# Patient Record
Sex: FEMALE | Race: WHITE | NOT HISPANIC OR LATINO | Employment: OTHER | URBAN - METROPOLITAN AREA
[De-identification: names, ages, dates, MRNs, and addresses within clinical notes are randomized per-mention and may not be internally consistent; named-entity substitution may affect disease eponyms.]

---

## 2017-01-25 RX ORDER — SIMVASTATIN 40 MG
TABLET ORAL
Qty: 90 TABLET | Refills: 3 | Status: SHIPPED | OUTPATIENT
Start: 2017-01-25 | End: 2017-12-22 | Stop reason: SDUPTHER

## 2017-01-25 RX ORDER — LISINOPRIL AND HYDROCHLOROTHIAZIDE 25; 20 MG/1; MG/1
TABLET ORAL
Qty: 90 TABLET | Refills: 3 | Status: SHIPPED | OUTPATIENT
Start: 2017-01-25 | End: 2017-12-22 | Stop reason: SDUPTHER

## 2017-02-28 ENCOUNTER — TELEPHONE (OUTPATIENT)
Dept: INTERNAL MEDICINE | Facility: CLINIC | Age: 72
End: 2017-02-28

## 2017-02-28 ENCOUNTER — OFFICE VISIT (OUTPATIENT)
Dept: INTERNAL MEDICINE | Facility: CLINIC | Age: 72
End: 2017-02-28

## 2017-02-28 VITALS
WEIGHT: 145 LBS | RESPIRATION RATE: 20 BRPM | TEMPERATURE: 97 F | DIASTOLIC BLOOD PRESSURE: 60 MMHG | SYSTOLIC BLOOD PRESSURE: 120 MMHG | HEART RATE: 68 BPM | BODY MASS INDEX: 28.56 KG/M2

## 2017-02-28 DIAGNOSIS — F41.1 GENERALIZED ANXIETY DISORDER: ICD-10-CM

## 2017-02-28 DIAGNOSIS — Z23 NEED FOR PROPHYLACTIC VACCINATION AGAINST STREPTOCOCCUS PNEUMONIAE (PNEUMOCOCCUS): ICD-10-CM

## 2017-02-28 DIAGNOSIS — E11.9 TYPE 2 DIABETES MELLITUS WITHOUT COMPLICATION, WITHOUT LONG-TERM CURRENT USE OF INSULIN (HCC): Primary | ICD-10-CM

## 2017-02-28 DIAGNOSIS — R82.90 MALODOROUS URINE: ICD-10-CM

## 2017-02-28 DIAGNOSIS — K21.9 GASTROESOPHAGEAL REFLUX DISEASE WITHOUT ESOPHAGITIS: ICD-10-CM

## 2017-02-28 DIAGNOSIS — M81.0 OSTEOPOROSIS: ICD-10-CM

## 2017-02-28 DIAGNOSIS — I10 ESSENTIAL HYPERTENSION: ICD-10-CM

## 2017-02-28 DIAGNOSIS — N39.0 URINARY TRACT INFECTION, SITE UNSPECIFIED: ICD-10-CM

## 2017-02-28 DIAGNOSIS — Z23 NEED FOR PROPHYLACTIC VACCINATION AND INOCULATION AGAINST INFLUENZA: ICD-10-CM

## 2017-02-28 DIAGNOSIS — K64.9 HEMORRHOIDS, UNSPECIFIED HEMORRHOID TYPE: ICD-10-CM

## 2017-02-28 DIAGNOSIS — M15.9 OSTEOARTHRITIS OF MULTIPLE JOINTS, UNSPECIFIED OSTEOARTHRITIS TYPE: ICD-10-CM

## 2017-02-28 DIAGNOSIS — K63.5 BENIGN COLONIC POLYP: ICD-10-CM

## 2017-02-28 DIAGNOSIS — E55.9 VITAMIN D DEFICIENCY: ICD-10-CM

## 2017-02-28 DIAGNOSIS — E78.49 OTHER HYPERLIPIDEMIA: ICD-10-CM

## 2017-02-28 LAB
ALBUMIN SERPL-MCNC: 4.6 G/DL (ref 3.2–4.8)
ALBUMIN/GLOB SERPL: 1.6 G/DL (ref 1.5–2.5)
ALP SERPL-CCNC: 76 U/L (ref 25–100)
ALT SERPL W P-5'-P-CCNC: 20 U/L (ref 7–40)
ANION GAP SERPL CALCULATED.3IONS-SCNC: 1 MMOL/L (ref 3–11)
ARTICHOKE IGE QN: 115 MG/DL (ref 0–130)
AST SERPL-CCNC: 23 U/L (ref 0–33)
BASOPHILS # BLD AUTO: 0.06 10*3/MM3 (ref 0–0.2)
BASOPHILS NFR BLD AUTO: 0.7 % (ref 0–1)
BILIRUB BLD-MCNC: NEGATIVE MG/DL
BILIRUB SERPL-MCNC: 0.3 MG/DL (ref 0.3–1.2)
BUN BLD-MCNC: 20 MG/DL (ref 9–23)
BUN/CREAT SERPL: 18.2 (ref 7–25)
CALCIUM SPEC-SCNC: 11.3 MG/DL (ref 8.7–10.4)
CHLORIDE SERPL-SCNC: 99 MMOL/L (ref 99–109)
CHOLEST SERPL-MCNC: 203 MG/DL (ref 0–200)
CLARITY, POC: ABNORMAL
CO2 SERPL-SCNC: 38 MMOL/L (ref 20–31)
COLOR UR: YELLOW
CREAT BLD-MCNC: 1.1 MG/DL (ref 0.6–1.3)
DEPRECATED RDW RBC AUTO: 42.3 FL (ref 37–54)
EOSINOPHIL # BLD AUTO: 0.36 10*3/MM3 (ref 0.1–0.3)
EOSINOPHIL NFR BLD AUTO: 4 % (ref 0–3)
ERYTHROCYTE [DISTWIDTH] IN BLOOD BY AUTOMATED COUNT: 12.2 % (ref 11.3–14.5)
EXPIRATION DATE: ABNORMAL
EXPIRATION DATE: NORMAL
GFR SERPL CREATININE-BSD FRML MDRD: 49 ML/MIN/1.73
GLOBULIN UR ELPH-MCNC: 2.9 GM/DL
GLUCOSE BLD-MCNC: 91 MG/DL (ref 70–100)
GLUCOSE UR STRIP-MCNC: NEGATIVE MG/DL
HBA1C MFR BLD: 5.9 %
HCT VFR BLD AUTO: 38.5 % (ref 34.5–44)
HDLC SERPL-MCNC: 52 MG/DL (ref 40–60)
HGB BLD-MCNC: 12.9 G/DL (ref 11.5–15.5)
IMM GRANULOCYTES # BLD: 0.03 10*3/MM3 (ref 0–0.03)
IMM GRANULOCYTES NFR BLD: 0.3 % (ref 0–0.6)
KETONES UR QL: NEGATIVE
LEUKOCYTE EST, POC: ABNORMAL
LYMPHOCYTES # BLD AUTO: 2.8 10*3/MM3 (ref 0.6–4.8)
LYMPHOCYTES NFR BLD AUTO: 31 % (ref 24–44)
Lab: ABNORMAL
Lab: NORMAL
MCH RBC QN AUTO: 31.9 PG (ref 27–31)
MCHC RBC AUTO-ENTMCNC: 33.5 G/DL (ref 32–36)
MCV RBC AUTO: 95.1 FL (ref 80–99)
MONOCYTES # BLD AUTO: 0.81 10*3/MM3 (ref 0–1)
MONOCYTES NFR BLD AUTO: 9 % (ref 0–12)
NEUTROPHILS # BLD AUTO: 4.96 10*3/MM3 (ref 1.5–8.3)
NEUTROPHILS NFR BLD AUTO: 55 % (ref 41–71)
NITRITE UR-MCNC: NEGATIVE MG/ML
PH UR: 7 [PH] (ref 5–8)
PLATELET # BLD AUTO: 339 10*3/MM3 (ref 150–450)
PMV BLD AUTO: 11.5 FL (ref 6–12)
POTASSIUM BLD-SCNC: 5.2 MMOL/L (ref 3.5–5.5)
PROT SERPL-MCNC: 7.5 G/DL (ref 5.7–8.2)
PROT UR STRIP-MCNC: NEGATIVE MG/DL
RBC # BLD AUTO: 4.05 10*6/MM3 (ref 3.89–5.14)
RBC # UR STRIP: NEGATIVE /UL
SODIUM BLD-SCNC: 138 MMOL/L (ref 132–146)
SP GR UR: 1 (ref 1–1.03)
TRIGL SERPL-MCNC: 323 MG/DL (ref 0–150)
TSH SERPL DL<=0.05 MIU/L-ACNC: 2.87 MIU/ML (ref 0.35–5.35)
UROBILINOGEN UR QL: NORMAL
WBC NRBC COR # BLD: 9.02 10*3/MM3 (ref 3.5–10.8)

## 2017-02-28 PROCEDURE — 87086 URINE CULTURE/COLONY COUNT: CPT | Performed by: INTERNAL MEDICINE

## 2017-02-28 PROCEDURE — 90732 PPSV23 VACC 2 YRS+ SUBQ/IM: CPT | Performed by: INTERNAL MEDICINE

## 2017-02-28 PROCEDURE — 80053 COMPREHEN METABOLIC PANEL: CPT | Performed by: INTERNAL MEDICINE

## 2017-02-28 PROCEDURE — 85025 COMPLETE CBC W/AUTO DIFF WBC: CPT | Performed by: INTERNAL MEDICINE

## 2017-02-28 PROCEDURE — 90662 IIV NO PRSV INCREASED AG IM: CPT | Performed by: INTERNAL MEDICINE

## 2017-02-28 PROCEDURE — 81003 URINALYSIS AUTO W/O SCOPE: CPT | Performed by: INTERNAL MEDICINE

## 2017-02-28 PROCEDURE — G0008 ADMIN INFLUENZA VIRUS VAC: HCPCS | Performed by: INTERNAL MEDICINE

## 2017-02-28 PROCEDURE — 36415 COLL VENOUS BLD VENIPUNCTURE: CPT | Performed by: INTERNAL MEDICINE

## 2017-02-28 PROCEDURE — 80061 LIPID PANEL: CPT | Performed by: INTERNAL MEDICINE

## 2017-02-28 PROCEDURE — 83036 HEMOGLOBIN GLYCOSYLATED A1C: CPT | Performed by: INTERNAL MEDICINE

## 2017-02-28 PROCEDURE — G0009 ADMIN PNEUMOCOCCAL VACCINE: HCPCS | Performed by: INTERNAL MEDICINE

## 2017-02-28 PROCEDURE — 99215 OFFICE O/P EST HI 40 MIN: CPT | Performed by: INTERNAL MEDICINE

## 2017-02-28 PROCEDURE — 84443 ASSAY THYROID STIM HORMONE: CPT | Performed by: INTERNAL MEDICINE

## 2017-02-28 RX ORDER — SULFAMETHOXAZOLE AND TRIMETHOPRIM 800; 160 MG/1; MG/1
1 TABLET ORAL 2 TIMES DAILY
Qty: 20 TABLET | Refills: 0 | Status: SHIPPED | OUTPATIENT
Start: 2017-02-28 | End: 2017-03-10

## 2017-02-28 RX ORDER — OMEPRAZOLE 20 MG/1
20 CAPSULE, DELAYED RELEASE ORAL 2 TIMES DAILY
COMMUNITY
End: 2018-02-21 | Stop reason: SDUPTHER

## 2017-02-28 RX ORDER — ESCITALOPRAM OXALATE 5 MG/1
5 TABLET ORAL DAILY
Qty: 30 TABLET | Refills: 0 | Status: SHIPPED | OUTPATIENT
Start: 2017-02-28 | End: 2017-03-13 | Stop reason: SINTOL

## 2017-02-28 NOTE — TELEPHONE ENCOUNTER
Call patient please.  She left without coming back to the exam room.  Her hemoglobin A1C was 5.9, so no changes in her diabetes medication.  Urine test did show a UTI.  Bactrim e-rx'd to pharmacy.

## 2017-03-02 LAB — BACTERIA SPEC AEROBE CULT: NORMAL

## 2017-03-13 ENCOUNTER — TELEPHONE (OUTPATIENT)
Dept: INTERNAL MEDICINE | Facility: CLINIC | Age: 72
End: 2017-03-13

## 2017-03-13 DIAGNOSIS — E21.3 HYPERPARATHYROIDISM (HCC): Primary | ICD-10-CM

## 2017-03-13 DIAGNOSIS — E83.52 HYPERCALCEMIA: ICD-10-CM

## 2017-03-14 ENCOUNTER — OFFICE VISIT (OUTPATIENT)
Dept: DIABETES SERVICES | Facility: HOSPITAL | Age: 72
End: 2017-03-14

## 2017-03-14 PROCEDURE — G0109 DIAB MANAGE TRN IND/GROUP: HCPCS | Performed by: DIETITIAN, REGISTERED

## 2017-03-14 NOTE — CONSULTS
Patient attended scheduled diabetes education class. Please see media tab for assessment and notes if you use [employer]. If you are not an [employer] user a copy of patient's assessment and notes will be sent per routine. Thank you.

## 2017-03-21 ENCOUNTER — HOSPITAL ENCOUNTER (OUTPATIENT)
Dept: GENERAL RADIOLOGY | Facility: HOSPITAL | Age: 72
Discharge: HOME | End: 2017-03-21
Attending: INTERNAL MEDICINE | Admitting: INTERNAL MEDICINE

## 2017-03-21 ENCOUNTER — OFFICE VISIT (OUTPATIENT)
Dept: INTERNAL MEDICINE | Facility: CLINIC | Age: 72
End: 2017-03-21

## 2017-03-21 VITALS
DIASTOLIC BLOOD PRESSURE: 60 MMHG | WEIGHT: 144 LBS | HEART RATE: 72 BPM | RESPIRATION RATE: 20 BRPM | SYSTOLIC BLOOD PRESSURE: 110 MMHG | BODY MASS INDEX: 28.36 KG/M2 | TEMPERATURE: 98.5 F

## 2017-03-21 DIAGNOSIS — M54.42 ACUTE LEFT-SIDED LOW BACK PAIN WITH BILATERAL SCIATICA: Primary | ICD-10-CM

## 2017-03-21 DIAGNOSIS — R11.0 NAUSEA: ICD-10-CM

## 2017-03-21 DIAGNOSIS — M54.9 UPPER BACK PAIN: ICD-10-CM

## 2017-03-21 DIAGNOSIS — M54.41 ACUTE LEFT-SIDED LOW BACK PAIN WITH BILATERAL SCIATICA: Primary | ICD-10-CM

## 2017-03-21 PROCEDURE — 72072 X-RAY EXAM THORAC SPINE 3VWS: CPT

## 2017-03-21 PROCEDURE — 99214 OFFICE O/P EST MOD 30 MIN: CPT | Performed by: INTERNAL MEDICINE

## 2017-03-21 PROCEDURE — 72110 X-RAY EXAM L-2 SPINE 4/>VWS: CPT

## 2017-03-21 RX ORDER — ONDANSETRON 8 MG/1
8 TABLET, ORALLY DISINTEGRATING ORAL EVERY 8 HOURS PRN
Qty: 90 TABLET | Refills: 3 | Status: SHIPPED | OUTPATIENT
Start: 2017-03-21 | End: 2017-12-04 | Stop reason: SDUPTHER

## 2017-03-21 RX ORDER — MELOXICAM 7.5 MG/1
7.5 TABLET ORAL DAILY PRN
Qty: 30 TABLET | Refills: 0 | Status: SHIPPED | OUTPATIENT
Start: 2017-03-21 | End: 2018-03-20 | Stop reason: ALTCHOICE

## 2017-03-21 RX ORDER — ONDANSETRON 8 MG/1
8 TABLET, ORALLY DISINTEGRATING ORAL EVERY 8 HOURS PRN
Qty: 90 TABLET | Refills: 3 | Status: SHIPPED | OUTPATIENT
Start: 2017-03-21 | End: 2017-03-21 | Stop reason: SDUPTHER

## 2017-03-21 NOTE — PROGRESS NOTES
Bairon Louie is a 71 y.o. female.     Chief Complaint   Patient presents with   • Anxiety     stopped the escitalopram    • Sciatica   • Nausea     x 3 weeks        History obtained from the patient.      Anxiety   Presents for follow-up visit. Symptoms include excessive worry, nausea and nervous/anxious behavior (improved overall). Patient reports no chest pain, compulsions, confusion, decreased concentration, depressed mood, dizziness (resolved after the Escitalopram stopped.), insomnia, irritability, malaise, muscle tension, obsessions, panic, shortness of breath or suicidal ideas.     Compliance with medications: stopped it after 2 days due to dizziness.   Sciatica   This is a recurrent problem. Episode onset: 3 weeks ago. The problem has been unchanged. Associated symptoms include nausea, neck pain and numbness (shoulders and arms). Pertinent negatives include no abdominal pain, chest pain, chills, coughing, fever, joint swelling, rash, urinary symptoms or vomiting. Associated symptoms comments: Pain goes to both legs L>R.. She has tried nothing for the symptoms.   Nausea   This is a new problem. Episode onset: 3 weeks ago, when started the Bactrim. The problem occurs intermittently. The problem has been gradually improving. Associated symptoms include nausea, neck pain and numbness (shoulders and arms). Pertinent negatives include no abdominal pain, chest pain, chills, coughing, fever, joint swelling, rash, urinary symptoms or vomiting. Treatments tried: zofran. The treatment provided moderate relief.        Current Outpatient Prescriptions on File Prior to Visit   Medication Sig Dispense Refill   • dicyclomine (BENTYL) 20 MG tablet Take  by mouth 4 (four) times a day.     • fluticasone (FLONASE) 50 MCG/ACT nasal spray into each nostril.     • lisinopril-hydrochlorothiazide (PRINZIDE,ZESTORETIC) 20-25 MG per tablet TAKE 1 TABLET EVERY DAY 90 tablet 3   • metFORMIN (GLUCOPHAGE) 500 MG tablet  TAKE 1 TABLET TWICE DAILY WITH FOOD 180 tablet 3   • omeprazole (priLOSEC) 20 MG capsule Take 20 mg by mouth 2 (Two) Times a Day.     • simvastatin (ZOCOR) 40 MG tablet TAKE 1 TABLET EVERY DAY AT BEDTIME 90 tablet 3   • St Johns Wort 150 MG capsule Take 1 tablet by mouth 2 (two) times a day.     • ondansetron ODT (ZOFRAN-ODT) 8 MG disintegrating tablet Take 1 tablet by mouth every 8 (eight) hours as needed for nausea or vomiting. 90 tablet 3     No current facility-administered medications on file prior to visit.          The following portions of the patient's history were reviewed and updated as appropriate: allergies, current medications, past family history, past medical history, past social history, past surgical history and problem list.    Review of Systems   Constitutional: Negative for chills, fever and irritability.   HENT:        Pain in left TMJ area x 4 days.   Respiratory: Negative for cough, shortness of breath and wheezing.    Cardiovascular: Negative for chest pain.   Gastrointestinal: Positive for diarrhea and nausea. Negative for abdominal pain and vomiting.   Genitourinary: Negative for dysuria, frequency, hematuria, pelvic pain and urgency.   Musculoskeletal: Positive for back pain (middle and low back pain, radiates down both legs) and neck pain. Negative for gait problem, joint swelling and neck stiffness.   Skin: Negative for rash.   Neurological: Positive for numbness (shoulders and arms). Negative for dizziness (resolved after the Escitalopram stopped.) and light-headedness.   Psychiatric/Behavioral: Negative for confusion, decreased concentration, sleep disturbance and suicidal ideas. The patient is nervous/anxious (improved overall). The patient does not have insomnia.         No depression or memory problems.         Objective       Blood pressure 110/60, pulse 72, temperature 98.5 °F (36.9 °C), temperature source Temporal Artery , resp. rate 20, weight 144 lb (65.3 kg).      Physical  Exam   Constitutional: She appears well-developed and well-nourished.   HENT:   There is mild tenderness to palpation over the left TMJ area, no erythema, edema, or warmth.   Neck: Normal range of motion. Neck supple.   There is no tenderness to palpation of the cervical spine or paraspinal muscles.   Cardiovascular: Normal rate, regular rhythm and normal heart sounds.    No murmur heard.  Pulmonary/Chest: Effort normal and breath sounds normal.   Abdominal: Soft. Bowel sounds are normal. She exhibits no distension and no mass. There is no hepatomegaly. There is tenderness (mild suprapubic).   No CVA tenderness.   Musculoskeletal: Normal range of motion.   There is  tenderness to palpation over the mid  Lumbar back, but no tenderness to palpation over the lumbar spine or paraspinal muscles.  Straight leg raise is negative bilaterally.  There is no pain with right hip flexion, extension, abduction, and adduction.  There is no pain with left hip flexion, extension, abduction, and adduction.   Neurological: She has normal strength and normal reflexes.   Skin: No rash noted.   Psychiatric: She has a normal mood and affect.   Nursing note and vitals reviewed.     Counseling was given to patient for the following topics: discussed labs and diagnostic tests performed last visit or since last visit, risks and benefits of treament options, side effects of medications, stress increase in the patient's life, symptoms of anxiety and symptoms of depression . Total time of the encounter was 25 minutes and 15 minutes was spent counseling.          Assessment / Plan:    Cristy was seen today for anxiety, sciatica and nausea.    Diagnoses and all orders for this visit:    Acute left-sided low back pain with bilateral sciatica  -     XR Spine Lumbar 4+ View  -     meloxicam (MOBIC) 7.5 MG tablet; Take 1 tablet by mouth Daily As Needed for Moderate Pain (4-6).    Upper back pain  -     XR spine thoracic 3 vw  -     meloxicam (MOBIC)  7.5 MG tablet; Take 1 tablet by mouth Daily As Needed for Moderate Pain (4-6).    Nausea  -     Discontinue: ondansetron ODT (ZOFRAN-ODT) 8 MG disintegrating tablet; Take 1 tablet by mouth Every 8 (Eight) Hours As Needed for Nausea or Vomiting.          Return in about 6 months (around 9/21/2017) for Recheck-Diabetes fasting.

## 2017-09-20 ENCOUNTER — OFFICE VISIT (OUTPATIENT)
Dept: INTERNAL MEDICINE | Facility: CLINIC | Age: 72
End: 2017-09-20

## 2017-09-20 VITALS
DIASTOLIC BLOOD PRESSURE: 70 MMHG | TEMPERATURE: 97.9 F | SYSTOLIC BLOOD PRESSURE: 130 MMHG | HEART RATE: 80 BPM | RESPIRATION RATE: 20 BRPM | WEIGHT: 150.5 LBS | BODY MASS INDEX: 29.64 KG/M2

## 2017-09-20 DIAGNOSIS — Z12.31 ENCOUNTER FOR SCREENING MAMMOGRAM FOR BREAST CANCER: ICD-10-CM

## 2017-09-20 DIAGNOSIS — F41.1 GENERALIZED ANXIETY DISORDER: ICD-10-CM

## 2017-09-20 DIAGNOSIS — K63.5 BENIGN COLONIC POLYP: ICD-10-CM

## 2017-09-20 DIAGNOSIS — I10 ESSENTIAL HYPERTENSION: ICD-10-CM

## 2017-09-20 DIAGNOSIS — Z79.899 HIGH RISK MEDICATION USE: ICD-10-CM

## 2017-09-20 DIAGNOSIS — E55.9 VITAMIN D DEFICIENCY: ICD-10-CM

## 2017-09-20 DIAGNOSIS — E78.49 OTHER HYPERLIPIDEMIA: ICD-10-CM

## 2017-09-20 DIAGNOSIS — M15.9 OSTEOARTHRITIS OF MULTIPLE JOINTS, UNSPECIFIED OSTEOARTHRITIS TYPE: ICD-10-CM

## 2017-09-20 DIAGNOSIS — R82.998 LEUKOCYTES IN URINE: ICD-10-CM

## 2017-09-20 DIAGNOSIS — E11.9 TYPE 2 DIABETES MELLITUS WITHOUT COMPLICATION, WITHOUT LONG-TERM CURRENT USE OF INSULIN (HCC): Primary | ICD-10-CM

## 2017-09-20 DIAGNOSIS — M81.0 OSTEOPOROSIS: ICD-10-CM

## 2017-09-20 DIAGNOSIS — K21.9 GASTROESOPHAGEAL REFLUX DISEASE WITHOUT ESOPHAGITIS: ICD-10-CM

## 2017-09-20 LAB
25(OH)D3 SERPL-MCNC: 30.8 NG/ML
A/C: NORMAL
ALBUMIN SERPL-MCNC: 4.5 G/DL (ref 3.2–4.8)
ALBUMIN/GLOB SERPL: 1.7 G/DL (ref 1.5–2.5)
ALP SERPL-CCNC: 94 U/L (ref 25–100)
ALT SERPL W P-5'-P-CCNC: 19 U/L (ref 7–40)
ANION GAP SERPL CALCULATED.3IONS-SCNC: 9 MMOL/L (ref 3–11)
ARTICHOKE IGE QN: 82 MG/DL (ref 0–130)
AST SERPL-CCNC: 20 U/L (ref 0–33)
BASOPHILS # BLD AUTO: 0.04 10*3/MM3 (ref 0–0.2)
BASOPHILS NFR BLD AUTO: 0.4 % (ref 0–1)
BILIRUB BLD-MCNC: NEGATIVE MG/DL
BILIRUB SERPL-MCNC: 0.2 MG/DL (ref 0.3–1.2)
BUN BLD-MCNC: 17 MG/DL (ref 9–23)
BUN/CREAT SERPL: 15.5 (ref 7–25)
CALCIUM SPEC-SCNC: 9.8 MG/DL (ref 8.7–10.4)
CHLORIDE SERPL-SCNC: 101 MMOL/L (ref 99–109)
CHOLEST SERPL-MCNC: 174 MG/DL (ref 0–200)
CLARITY, POC: ABNORMAL
CO2 SERPL-SCNC: 29 MMOL/L (ref 20–31)
COLOR UR: YELLOW
CREAT BLD-MCNC: 1.1 MG/DL (ref 0.6–1.3)
DEPRECATED RDW RBC AUTO: 42.3 FL (ref 37–54)
EOSINOPHIL # BLD AUTO: 0.32 10*3/MM3 (ref 0–0.3)
EOSINOPHIL NFR BLD AUTO: 3.5 % (ref 0–3)
ERYTHROCYTE [DISTWIDTH] IN BLOOD BY AUTOMATED COUNT: 12.4 % (ref 11.3–14.5)
EXPIRATION DATE: ABNORMAL
EXPIRATION DATE: NORMAL
EXPIRATION DATE: NORMAL
GFR SERPL CREATININE-BSD FRML MDRD: 49 ML/MIN/1.73
GLOBULIN UR ELPH-MCNC: 2.7 GM/DL
GLUCOSE BLD-MCNC: 94 MG/DL (ref 70–100)
GLUCOSE UR STRIP-MCNC: NEGATIVE MG/DL
HBA1C MFR BLD: 6.2 %
HCT VFR BLD AUTO: 36.5 % (ref 34.5–44)
HDLC SERPL-MCNC: 48 MG/DL (ref 40–60)
HGB BLD-MCNC: 12 G/DL (ref 11.5–15.5)
IMM GRANULOCYTES # BLD: 0.03 10*3/MM3 (ref 0–0.03)
IMM GRANULOCYTES NFR BLD: 0.3 % (ref 0–0.6)
KETONES UR QL: NEGATIVE
LEUKOCYTE EST, POC: ABNORMAL
LYMPHOCYTES # BLD AUTO: 2.69 10*3/MM3 (ref 0.6–4.8)
LYMPHOCYTES NFR BLD AUTO: 29.7 % (ref 24–44)
Lab: ABNORMAL
Lab: NORMAL
Lab: NORMAL
MAGNESIUM SERPL-MCNC: 1.8 MG/DL (ref 1.3–2.7)
MCH RBC QN AUTO: 30.9 PG (ref 27–31)
MCHC RBC AUTO-ENTMCNC: 32.9 G/DL (ref 32–36)
MCV RBC AUTO: 94.1 FL (ref 80–99)
MONOCYTES # BLD AUTO: 0.58 10*3/MM3 (ref 0–1)
MONOCYTES NFR BLD AUTO: 6.4 % (ref 0–12)
NEUTROPHILS # BLD AUTO: 5.4 10*3/MM3 (ref 1.5–8.3)
NEUTROPHILS NFR BLD AUTO: 59.7 % (ref 41–71)
NITRITE UR-MCNC: NEGATIVE MG/ML
NRBC BLD MANUAL-RTO: 0 /100 WBC (ref 0–0)
PH UR: 5 [PH] (ref 5–8)
PLATELET # BLD AUTO: 260 10*3/MM3 (ref 150–450)
PMV BLD AUTO: 12 FL (ref 6–12)
POC CREATININE URINE: 50
POC MICROALBUMIN URINE: 10
POTASSIUM BLD-SCNC: 4.6 MMOL/L (ref 3.5–5.5)
PROT SERPL-MCNC: 7.2 G/DL (ref 5.7–8.2)
PROT UR STRIP-MCNC: NEGATIVE MG/DL
RBC # BLD AUTO: 3.88 10*6/MM3 (ref 3.89–5.14)
RBC # UR STRIP: NEGATIVE /UL
SODIUM BLD-SCNC: 139 MMOL/L (ref 132–146)
SP GR UR: 1.01 (ref 1–1.03)
TRIGL SERPL-MCNC: 372 MG/DL (ref 0–150)
TSH SERPL DL<=0.05 MIU/L-ACNC: 2.6 MIU/ML (ref 0.35–5.35)
UROBILINOGEN UR QL: NORMAL
WBC NRBC COR # BLD: 9.06 10*3/MM3 (ref 3.5–10.8)

## 2017-09-20 PROCEDURE — 83735 ASSAY OF MAGNESIUM: CPT | Performed by: INTERNAL MEDICINE

## 2017-09-20 PROCEDURE — 85025 COMPLETE CBC W/AUTO DIFF WBC: CPT | Performed by: INTERNAL MEDICINE

## 2017-09-20 PROCEDURE — 99214 OFFICE O/P EST MOD 30 MIN: CPT | Performed by: INTERNAL MEDICINE

## 2017-09-20 PROCEDURE — 80053 COMPREHEN METABOLIC PANEL: CPT | Performed by: INTERNAL MEDICINE

## 2017-09-20 PROCEDURE — 84443 ASSAY THYROID STIM HORMONE: CPT | Performed by: INTERNAL MEDICINE

## 2017-09-20 PROCEDURE — 82044 UR ALBUMIN SEMIQUANTITATIVE: CPT | Performed by: INTERNAL MEDICINE

## 2017-09-20 PROCEDURE — 36415 COLL VENOUS BLD VENIPUNCTURE: CPT | Performed by: INTERNAL MEDICINE

## 2017-09-20 PROCEDURE — 87086 URINE CULTURE/COLONY COUNT: CPT | Performed by: INTERNAL MEDICINE

## 2017-09-20 PROCEDURE — 83036 HEMOGLOBIN GLYCOSYLATED A1C: CPT | Performed by: INTERNAL MEDICINE

## 2017-09-20 PROCEDURE — 81002 URINALYSIS NONAUTO W/O SCOPE: CPT | Performed by: INTERNAL MEDICINE

## 2017-09-20 PROCEDURE — 80061 LIPID PANEL: CPT | Performed by: INTERNAL MEDICINE

## 2017-09-20 PROCEDURE — 82306 VITAMIN D 25 HYDROXY: CPT | Performed by: INTERNAL MEDICINE

## 2017-09-20 RX ORDER — AMITRIPTYLINE HYDROCHLORIDE 25 MG/1
25 TABLET, FILM COATED ORAL NIGHTLY
COMMUNITY
End: 2018-03-20

## 2017-09-20 RX ORDER — DIAPER,BRIEF,ADULT, DISPOSABLE
EACH MISCELLANEOUS DAILY
COMMUNITY
End: 2019-04-23

## 2017-09-20 RX ORDER — TURMERIC ROOT EXTRACT 500 MG
1 TABLET ORAL DAILY
COMMUNITY
End: 2020-10-06

## 2017-09-20 RX ORDER — HYDROXYCHLOROQUINE SULFATE 200 MG/1
TABLET, FILM COATED ORAL DAILY
COMMUNITY
End: 2018-06-29 | Stop reason: SDUPTHER

## 2017-09-20 NOTE — PROGRESS NOTES
Bairon Louie is a 72 y.o. female.     Chief Complaint   Patient presents with   • Diabetes     6 month follow up   fasting        History obtained from the patient.      History of Present Illness     Primary Care Cardiac Diagnostic Constellation: The patient is here today for a follow-up visit.       Her Diabetes Mellitus Type 2 is stable.   Medication(s): Metformin HCl.   Her Hypertension is stable.   Medication(s): Lisinopril / HCTZ.   Her Hyperlipidemia has been unstable. Her LDL goal is 70 mg/dL, last LDL was 115 mg/dL and .   Medication(s): Simvastatin.   The patient is adherent with her medication regimen. She denies medication side effects.       Interval Events: The patient states her blood sugar at home has been  fasting. She denies episodes of low blood sugar.  Last Hemoglobin A1C was 5.9 on 2/28/17.   She does check her feet daily. She saw the ophthalmologist 9/20/16, no retinopathy.       Symptoms:  Has stable lower extremity edema, stable fatigue, stable myalgias / arthralgias, and stable lightheadedness / dizziness.  Has occasional blurred vision.   Denies chest pain, denies heart palpitations, denies intermittent leg claudication, denies dyspnea,  denies numbness of the feet, denies foot pain, denies a foot ulcer, denies visual impairment, and denies muscle weakness.   Associated symptoms include a recent 6 pounds weight gain and mild memory loss,   but no syncope, no headache, no orthopnea, no PND, no polydipsia, no polyuria, no focal neurologic deficits, and concentration problems.      Lifestyle and Disease Management: Diet: She does not have a healthy diet. Weight Issues: She has weight concerns. Exercise: She walks 1 mile per day.  she states she has been less active overall.   Smoking: She does not use tobacco.       Gastroesophageal Reflux Disease (Brief): The patient is being seen for a routine clinic follow-up of Gastroesophageal Reflux Disease.   Symptoms: no  abdominal pain, no acid regurgitation, no vomiting, no heartburn, no belching, no nausea,  no sore throat, no odynophagia, no dysphagia,  no hematemesis, and no melena.    Previous Evaluation: Last EGD 1/25/16 showed Class A reflux esophagitis.   Associated symptoms: no bloating, no anorexia, no early satiety, no bloating, no belching, no weight loss, no halitosis, no hoarseness, no cough,  and no wheezing.   Current treatment includes Omeprazole BID, Zofran, and Dicyclomine.           Osteoporosis: The patient is being seen for routine follow-up of Osteoporosis.   The last DEXA scan was done 11/12/14, Osteopenia.   Symptoms:  She reports myalgias, back pain, neck pain, arthralgias, and difficulty with stairs.   no inability to stand straight, no loss of balance, no difficulty ambulating, no difficulty with posture, no change in clothing fit, no weight gain,  no hip pain, no wrist pain, and no new fracture.  Current treatment includes:  None. Off Calcium and Vitamin D for several months.  The disease type is likely primary/postmenopausal osteoporosis.       Colonic Polyp (Brief): The patient is being seen for a routine clinic follow-up of Colon Polyp(s).   Current diagnosis was determined by colonoscopy and last 1/25/16- no polyps.   Symptoms:  occasional constipation,  but no hematochezia, no melena, no diarrhea,  no decreased stool caliber, no change in bowel habits and no abdominal pain.   Associated symptoms: no rectal prolapse.   Medications:  Magnesium supplements.      Vitamin D Deficiency:The patient is here for follow up of Vitamin D Deficiency.   Recent laboratory results: date 6/7/16 , 25-hydroxyvitamin D 45.9 ng/mL.    Symptoms: stable fatigue and stable muscle pain, no bone pain, no muscle weakness, no muscle cramps, no muscle twitching, and no paresthesias.   Medication:  None.  Off Calcium and Vitamin D for several months.      Osteoarthritis (Follow-Up): The patient states her Osteoarthritis has been  stable since the last visit.   Comorbid Illnesses: Rheumatoid arthritis and Fibromyalgia.   Interval Events: None.  Medications: The patient is adherent with her medication regimen. She denies medication side effects.   Medication(s): Advil prn.   Taking Tumeric.   Off Tramadol.        Anxiety Disorder (Follow-Up): The patient is being seen for follow-up of Anxiety. The patient reports the condition is stable.  Interval Events:   None.  Interval symptoms: stable anxiety and stable sleep disruption, but no difficulty concentrating, restlessness, or. panic attacks. denies depression.   Associated symptoms: no suicidal ideation.    Medication(s): Jessica's Wort   Medications: the patient is adherent to her medication regimen, but she denies medication side effects.            Current Outpatient Prescriptions on File Prior to Visit   Medication Sig Dispense Refill   • dicyclomine (BENTYL) 20 MG tablet Take  by mouth 4 (four) times a day.     • lisinopril-hydrochlorothiazide (PRINZIDE,ZESTORETIC) 20-25 MG per tablet TAKE 1 TABLET EVERY DAY 90 tablet 3   • metFORMIN (GLUCOPHAGE) 500 MG tablet TAKE 1 TABLET TWICE DAILY WITH FOOD 180 tablet 3   • omeprazole (priLOSEC) 20 MG capsule Take 20 mg by mouth 2 (Two) Times a Day.     • simvastatin (ZOCOR) 40 MG tablet TAKE 1 TABLET EVERY DAY AT BEDTIME 90 tablet 3   • fluticasone (FLONASE) 50 MCG/ACT nasal spray into each nostril.     • meloxicam (MOBIC) 7.5 MG tablet Take 1 tablet by mouth Daily As Needed for Moderate Pain (4-6). 30 tablet 0   • ondansetron ODT (ZOFRAN-ODT) 8 MG disintegrating tablet Take 1 tablet by mouth Every 8 (Eight) Hours As Needed for Nausea or Vomiting. 90 tablet 3   • St Johns Wort 150 MG capsule Take 1 tablet by mouth 2 (two) times a day.       No current facility-administered medications on file prior to visit.          The following portions of the patient's history were reviewed and updated as appropriate: allergies, current medications, past family  history, past medical history, past social history, past surgical history and problem list.    Review of Systems   Constitutional: Positive for fatigue. Negative for unexpected weight change.   HENT: Negative for sore throat and voice change.    Eyes: Positive for visual disturbance.   Respiratory: Negative for cough, shortness of breath and wheezing.    Cardiovascular: Positive for leg swelling. Negative for chest pain and palpitations.        Stable  COOL, but no orthopnea or claudication.   Gastrointestinal: Positive for constipation. Negative for abdominal distention, abdominal pain, blood in stool, diarrhea, nausea and vomiting.        Denies melena, heartburn, dysphagia, odynophagia, and belching.   Endocrine: Negative for polydipsia and polyuria.   Musculoskeletal: Positive for arthralgias, back pain (stable), myalgias and neck pain (stable).   Neurological: Positive for dizziness, light-headedness and numbness (right hand, not new). Negative for syncope and headaches.        Mild stable memory issues.   Psychiatric/Behavioral: Positive for sleep disturbance. Negative for decreased concentration and suicidal ideas. The patient is nervous/anxious.         Denies depression.         Objective       Blood pressure 130/70, pulse 80, temperature 97.9 °F (36.6 °C), temperature source Temporal Artery , resp. rate 20, weight 150 lb 8 oz (68.3 kg).      Physical Exam   Constitutional:   Overweight.   Neck: Normal range of motion. Neck supple. Carotid bruit is not present. No thyromegaly present.   Cardiovascular: Normal rate, regular rhythm, normal heart sounds and intact distal pulses.  Exam reveals no gallop and no friction rub.    No murmur heard.  No peripheral edema.   Pulmonary/Chest: Effort normal and breath sounds normal.   Abdominal: Soft. Bowel sounds are normal. She exhibits no distension, no abdominal bruit and no mass. There is no hepatosplenomegaly. There is no tenderness.   Psychiatric: She has a normal  mood and affect.   Nursing note and vitals reviewed.       Results for orders placed or performed in visit on 09/20/17   POC Glycosylated Hemoglobin (Hb A1C)   Result Value Ref Range    Hemoglobin A1C 6.2 %    Lot Number [id number]     Expiration Date 5-19    POC Microalbumin   Result Value Ref Range    Microalbumin, Urine 10     Creatinine, Urine 50     A/C 30-300mg/g ABNORMAL     Lot Number [id number]     Expiration Date 11-30-18    POC Urinalysis Dipstick, Multipro   Result Value Ref Range    Color Yellow Yellow, Straw, Dark Yellow, Carol    Clarity, UA Hazy (A) Clear    Glucose, UA Negative Negative, 1000 mg/dL (3+) mg/dL    Bilirubin Negative Negative    Ketones, UA Negative Negative    Specific Gravity  1.010 1.005 - 1.030    Blood, UA Negative Negative    pH, Urine 5.0 5.0 - 8.0    Protein, POC Negative Negative mg/dL    Urobilinogen, UA Normal Normal    Leukocytes 500 Olegario/ul (A) Negative    Nitrite, UA Negative Negative    Lot Number [id number]     Expiration Date 5-31-18        Assessment / Plan:    Diagnoses and all orders for this visit:    Type 2 diabetes mellitus without complication, without long-term current use of insulin  -     TSH  -     Comprehensive Metabolic Panel  -     POC Glycosylated Hemoglobin (Hb A1C)  -     POC Microalbumin  -     POC Urinalysis Dipstick, Multipro    Essential hypertension    Other hyperlipidemia  -     Lipid Panel    Gastroesophageal reflux disease without esophagitis    Benign colonic polyp    Osteoporosis  -     DEXA Bone Density Axial; Future    Vitamin D deficiency  -     Vitamin D 25 Hydroxy    Osteoarthritis of multiple joints, unspecified osteoarthritis type    Generalized anxiety disorder    High risk medication use  -     Magnesium  -     CBC & Differential  -     CBC Auto Differential    Encounter for screening mammogram for breast cancer  -     Mammo Screening Digital Tomosynthesis Bilateral With CAD; Future    Leukocytes in urine  -     Urine Culture        Return in  about 6 months (around 3/20/2018) for Recheck-Diabetes, fasting and schedule Initial Medicare Wellness Exam.

## 2017-09-22 LAB
BACTERIA SPEC AEROBE CULT: NORMAL
BACTERIA SPEC AEROBE CULT: NORMAL

## 2017-10-09 ENCOUNTER — OFFICE VISIT (OUTPATIENT)
Dept: ENDOCRINOLOGY | Facility: CLINIC | Age: 72
End: 2017-10-09

## 2017-10-09 VITALS
WEIGHT: 153.4 LBS | HEART RATE: 84 BPM | DIASTOLIC BLOOD PRESSURE: 68 MMHG | HEIGHT: 61 IN | BODY MASS INDEX: 28.96 KG/M2 | SYSTOLIC BLOOD PRESSURE: 110 MMHG | OXYGEN SATURATION: 99 %

## 2017-10-09 DIAGNOSIS — M85.89 OSTEOPENIA OF MULTIPLE SITES: ICD-10-CM

## 2017-10-09 DIAGNOSIS — E21.0 PRIMARY HYPERPARATHYROIDISM (HCC): Primary | ICD-10-CM

## 2017-10-09 LAB
25(OH)D3 SERPL-MCNC: 31.4 NG/ML
ALBUMIN SERPL-MCNC: 4.7 G/DL (ref 3.2–4.8)
ANION GAP SERPL CALCULATED.3IONS-SCNC: 8 MMOL/L (ref 3–11)
BUN BLD-MCNC: 21 MG/DL (ref 9–23)
BUN/CREAT SERPL: 17.5 (ref 7–25)
CALCIUM SPEC-SCNC: 10 MG/DL (ref 8.7–10.4)
CHLORIDE SERPL-SCNC: 106 MMOL/L (ref 99–109)
CO2 SERPL-SCNC: 28 MMOL/L (ref 20–31)
CREAT BLD-MCNC: 1.2 MG/DL (ref 0.6–1.3)
GFR SERPL CREATININE-BSD FRML MDRD: 44 ML/MIN/1.73
GLUCOSE BLD-MCNC: 102 MG/DL (ref 70–100)
PHOSPHATE SERPL-MCNC: 4 MG/DL (ref 2.4–5.1)
POTASSIUM BLD-SCNC: 4.5 MMOL/L (ref 3.5–5.5)
SODIUM BLD-SCNC: 142 MMOL/L (ref 132–146)

## 2017-10-09 PROCEDURE — 83970 ASSAY OF PARATHORMONE: CPT | Performed by: INTERNAL MEDICINE

## 2017-10-09 PROCEDURE — 82306 VITAMIN D 25 HYDROXY: CPT | Performed by: INTERNAL MEDICINE

## 2017-10-09 PROCEDURE — 80069 RENAL FUNCTION PANEL: CPT | Performed by: INTERNAL MEDICINE

## 2017-10-09 PROCEDURE — 99204 OFFICE O/P NEW MOD 45 MIN: CPT | Performed by: INTERNAL MEDICINE

## 2017-10-10 LAB — PTH-INTACT SERPL-MCNC: 145 PG/ML (ref 15–65)

## 2017-10-12 ENCOUNTER — TELEPHONE (OUTPATIENT)
Dept: INTERNAL MEDICINE | Facility: CLINIC | Age: 72
End: 2017-10-12

## 2017-10-12 NOTE — TELEPHONE ENCOUNTER
----- Message from Yolande Duffy sent at 10/12/2017 12:11 PM EDT -----  Patient's endocrinology Dr called, Dr. Sandra Aiken in regards to requesting to speak to doctor about a visit she had with the patient. Dr. Aiken has requested a call back when first available.    Phone number for endocrinology doctor: [phone]    Thank you.

## 2017-10-12 NOTE — TELEPHONE ENCOUNTER
Spoke with Dr. Aiken.  She feels the patient's hypercalcemia is due to hyperparathyroidism.  The patient has a DEXA scan scheduled for 10/16/17, and she is recommending changing it to a enhanced scan,  to better evaluate for osteoporosis, prior to sending her to surgery.  She offered to cancel the current DEXA scan and reorder it.  I was agreeable to this.

## 2017-10-17 ENCOUNTER — APPOINTMENT (OUTPATIENT)
Dept: BONE DENSITY | Facility: HOSPITAL | Age: 72
End: 2017-10-17
Attending: INTERNAL MEDICINE

## 2017-10-17 ENCOUNTER — APPOINTMENT (OUTPATIENT)
Dept: MAMMOGRAPHY | Facility: HOSPITAL | Age: 72
End: 2017-10-17
Attending: INTERNAL MEDICINE

## 2017-10-19 ENCOUNTER — HOSPITAL ENCOUNTER (OUTPATIENT)
Dept: MAMMOGRAPHY | Facility: HOSPITAL | Age: 72
Discharge: HOME | End: 2017-10-19
Attending: INTERNAL MEDICINE | Admitting: INTERNAL MEDICINE

## 2017-10-19 ENCOUNTER — HOSPITAL ENCOUNTER (OUTPATIENT)
Dept: BONE DENSITY | Facility: HOSPITAL | Age: 72
Discharge: HOME | End: 2017-10-19
Attending: INTERNAL MEDICINE

## 2017-10-19 DIAGNOSIS — M81.0 OSTEOPOROSIS: ICD-10-CM

## 2017-10-19 DIAGNOSIS — Z12.31 ENCOUNTER FOR SCREENING MAMMOGRAM FOR BREAST CANCER: ICD-10-CM

## 2017-10-19 PROCEDURE — 77063 BREAST TOMOSYNTHESIS BI: CPT

## 2017-10-19 PROCEDURE — G0202 SCR MAMMO BI INCL CAD: HCPCS

## 2017-10-19 PROCEDURE — 77080 DXA BONE DENSITY AXIAL: CPT

## 2017-10-20 PROCEDURE — G0202 SCR MAMMO BI INCL CAD: HCPCS | Performed by: RADIOLOGY

## 2017-10-20 PROCEDURE — 77063 BREAST TOMOSYNTHESIS BI: CPT | Performed by: RADIOLOGY

## 2017-12-04 ENCOUNTER — TELEPHONE (OUTPATIENT)
Dept: INTERNAL MEDICINE | Facility: CLINIC | Age: 72
End: 2017-12-04

## 2017-12-04 DIAGNOSIS — R11.0 NAUSEA: ICD-10-CM

## 2017-12-04 RX ORDER — ONDANSETRON 8 MG/1
8 TABLET, ORALLY DISINTEGRATING ORAL EVERY 8 HOURS PRN
Qty: 90 TABLET | Refills: 3 | Status: SHIPPED | OUTPATIENT
Start: 2017-12-04

## 2017-12-04 NOTE — TELEPHONE ENCOUNTER
----- Message from Linda Doss sent at 12/4/2017  3:46 PM EST -----  Contact: SELF  RACHELLE MEGAN CALLING TO SEE IF SHE CAN GET A REFILL FOR ZOFRAN. SHE WOULD LIKE THIS SENT TO THE [facility] IN [facility].  RACHELLE CAN BE REACHED [phone]

## 2017-12-22 RX ORDER — LISINOPRIL AND HYDROCHLOROTHIAZIDE 25; 20 MG/1; MG/1
TABLET ORAL
Qty: 90 TABLET | Refills: 3 | Status: SHIPPED | OUTPATIENT
Start: 2017-12-22 | End: 2018-10-10 | Stop reason: SDUPTHER

## 2017-12-22 RX ORDER — SIMVASTATIN 40 MG
TABLET ORAL
Qty: 90 TABLET | Refills: 3 | Status: SHIPPED | OUTPATIENT
Start: 2017-12-22 | End: 2018-10-10 | Stop reason: SDUPTHER

## 2017-12-27 RX ORDER — DICYCLOMINE HCL 20 MG
20 TABLET ORAL 4 TIMES DAILY
Qty: 90 TABLET | Refills: 1 | Status: CANCELLED | OUTPATIENT
Start: 2017-12-27

## 2018-01-02 RX ORDER — DICYCLOMINE HCL 20 MG
20 TABLET ORAL 4 TIMES DAILY
Qty: 100 TABLET | Refills: 3 | Status: SHIPPED | OUTPATIENT
Start: 2018-01-02 | End: 2018-01-22 | Stop reason: SDUPTHER

## 2018-01-16 ENCOUNTER — TELEPHONE (OUTPATIENT)
Dept: INTERNAL MEDICINE | Facility: CLINIC | Age: 73
End: 2018-01-16

## 2018-01-16 NOTE — TELEPHONE ENCOUNTER
Spoke to patient about her results and the plan for her f/u visit in April 2018. Will give her copy of results at her f/u.  Maureen Aiken MD

## 2018-01-16 NOTE — TELEPHONE ENCOUNTER
PT HAD LABS DONE BACK IN OCT AND SHE WOULD LIKE TO RECEIVE THE RESULTS. PLEASE FAX THEM TO THE PATIENT.     FAX NUMBER FOR PATIENT:   [phone]

## 2018-01-22 RX ORDER — DICYCLOMINE HCL 20 MG
20 TABLET ORAL 4 TIMES DAILY
Qty: 100 TABLET | Refills: 3 | Status: SHIPPED | OUTPATIENT
Start: 2018-01-22 | End: 2022-08-19

## 2018-02-21 ENCOUNTER — TELEPHONE (OUTPATIENT)
Dept: INTERNAL MEDICINE | Facility: CLINIC | Age: 73
End: 2018-02-21

## 2018-02-21 RX ORDER — OMEPRAZOLE 20 MG/1
20 CAPSULE, DELAYED RELEASE ORAL DAILY
Qty: 180 CAPSULE | Refills: 5 | Status: SHIPPED | OUTPATIENT
Start: 2018-02-21 | End: 2018-03-20 | Stop reason: DRUGHIGH

## 2018-03-20 ENCOUNTER — OFFICE VISIT (OUTPATIENT)
Dept: INTERNAL MEDICINE | Facility: CLINIC | Age: 73
End: 2018-03-20

## 2018-03-20 VITALS
BODY MASS INDEX: 28.77 KG/M2 | TEMPERATURE: 97.3 F | RESPIRATION RATE: 20 BRPM | SYSTOLIC BLOOD PRESSURE: 130 MMHG | WEIGHT: 152.25 LBS | DIASTOLIC BLOOD PRESSURE: 72 MMHG | HEART RATE: 72 BPM

## 2018-03-20 DIAGNOSIS — F41.1 GENERALIZED ANXIETY DISORDER: ICD-10-CM

## 2018-03-20 DIAGNOSIS — K63.5 BENIGN COLONIC POLYP: ICD-10-CM

## 2018-03-20 DIAGNOSIS — E78.49 OTHER HYPERLIPIDEMIA: ICD-10-CM

## 2018-03-20 DIAGNOSIS — E21.3 HYPERPARATHYROIDISM (HCC): ICD-10-CM

## 2018-03-20 DIAGNOSIS — K21.9 GASTROESOPHAGEAL REFLUX DISEASE WITHOUT ESOPHAGITIS: ICD-10-CM

## 2018-03-20 DIAGNOSIS — Z23 NEED FOR IMMUNIZATION AGAINST INFLUENZA: ICD-10-CM

## 2018-03-20 DIAGNOSIS — M15.9 OSTEOARTHRITIS OF MULTIPLE JOINTS, UNSPECIFIED OSTEOARTHRITIS TYPE: ICD-10-CM

## 2018-03-20 DIAGNOSIS — M81.8 OTHER OSTEOPOROSIS WITHOUT CURRENT PATHOLOGICAL FRACTURE: ICD-10-CM

## 2018-03-20 DIAGNOSIS — E55.9 VITAMIN D DEFICIENCY: ICD-10-CM

## 2018-03-20 DIAGNOSIS — I10 ESSENTIAL HYPERTENSION: ICD-10-CM

## 2018-03-20 DIAGNOSIS — E11.9 TYPE 2 DIABETES MELLITUS WITHOUT COMPLICATION, WITHOUT LONG-TERM CURRENT USE OF INSULIN (HCC): Primary | ICD-10-CM

## 2018-03-20 LAB
ALBUMIN SERPL-MCNC: 4.6 G/DL (ref 3.2–4.8)
ALBUMIN/GLOB SERPL: 1.8 G/DL (ref 1.5–2.5)
ALP SERPL-CCNC: 89 U/L (ref 25–100)
ALT SERPL W P-5'-P-CCNC: 22 U/L (ref 7–40)
ANION GAP SERPL CALCULATED.3IONS-SCNC: 8 MMOL/L (ref 3–11)
ARTICHOKE IGE QN: 100 MG/DL (ref 0–130)
AST SERPL-CCNC: 21 U/L (ref 0–33)
BASOPHILS # BLD AUTO: 0.05 10*3/MM3 (ref 0–0.2)
BASOPHILS NFR BLD AUTO: 0.5 % (ref 0–1)
BILIRUB SERPL-MCNC: 0.3 MG/DL (ref 0.3–1.2)
BUN BLD-MCNC: 17 MG/DL (ref 9–23)
BUN/CREAT SERPL: 17 (ref 7–25)
CALCIUM SPEC-SCNC: 10.5 MG/DL (ref 8.7–10.4)
CHLORIDE SERPL-SCNC: 100 MMOL/L (ref 99–109)
CHOLEST SERPL-MCNC: 174 MG/DL (ref 0–200)
CO2 SERPL-SCNC: 29 MMOL/L (ref 20–31)
CREAT BLD-MCNC: 1 MG/DL (ref 0.6–1.3)
DEPRECATED RDW RBC AUTO: 44.4 FL (ref 37–54)
EOSINOPHIL # BLD AUTO: 0.81 10*3/MM3 (ref 0–0.3)
EOSINOPHIL NFR BLD AUTO: 7.9 % (ref 0–3)
ERYTHROCYTE [DISTWIDTH] IN BLOOD BY AUTOMATED COUNT: 12.8 % (ref 11.3–14.5)
EXPIRATION DATE: NORMAL
GFR SERPL CREATININE-BSD FRML MDRD: 54 ML/MIN/1.73
GLOBULIN UR ELPH-MCNC: 2.6 GM/DL
GLUCOSE BLD-MCNC: 101 MG/DL (ref 70–100)
HBA1C MFR BLD: 6.2 %
HCT VFR BLD AUTO: 37.9 % (ref 34.5–44)
HDLC SERPL-MCNC: 57 MG/DL (ref 40–60)
HGB BLD-MCNC: 12 G/DL (ref 11.5–15.5)
IMM GRANULOCYTES # BLD: 0.05 10*3/MM3 (ref 0–0.03)
IMM GRANULOCYTES NFR BLD: 0.5 % (ref 0–0.6)
LYMPHOCYTES # BLD AUTO: 2.72 10*3/MM3 (ref 0.6–4.8)
LYMPHOCYTES NFR BLD AUTO: 26.7 % (ref 24–44)
Lab: NORMAL
MCH RBC QN AUTO: 30.2 PG (ref 27–31)
MCHC RBC AUTO-ENTMCNC: 31.7 G/DL (ref 32–36)
MCV RBC AUTO: 95.2 FL (ref 80–99)
MONOCYTES # BLD AUTO: 0.85 10*3/MM3 (ref 0–1)
MONOCYTES NFR BLD AUTO: 8.3 % (ref 0–12)
NEUTROPHILS # BLD AUTO: 5.72 10*3/MM3 (ref 1.5–8.3)
NEUTROPHILS NFR BLD AUTO: 56.1 % (ref 41–71)
PLATELET # BLD AUTO: 336 10*3/MM3 (ref 150–450)
PMV BLD AUTO: 11.4 FL (ref 6–12)
POTASSIUM BLD-SCNC: 5 MMOL/L (ref 3.5–5.5)
PROT SERPL-MCNC: 7.2 G/DL (ref 5.7–8.2)
RBC # BLD AUTO: 3.98 10*6/MM3 (ref 3.89–5.14)
SODIUM BLD-SCNC: 137 MMOL/L (ref 132–146)
TRIGL SERPL-MCNC: 237 MG/DL (ref 0–150)
WBC NRBC COR # BLD: 10.2 10*3/MM3 (ref 3.5–10.8)

## 2018-03-20 PROCEDURE — 36415 COLL VENOUS BLD VENIPUNCTURE: CPT | Performed by: INTERNAL MEDICINE

## 2018-03-20 PROCEDURE — 90662 IIV NO PRSV INCREASED AG IM: CPT | Performed by: INTERNAL MEDICINE

## 2018-03-20 PROCEDURE — G0008 ADMIN INFLUENZA VIRUS VAC: HCPCS | Performed by: INTERNAL MEDICINE

## 2018-03-20 PROCEDURE — 85025 COMPLETE CBC W/AUTO DIFF WBC: CPT | Performed by: INTERNAL MEDICINE

## 2018-03-20 PROCEDURE — 99214 OFFICE O/P EST MOD 30 MIN: CPT | Performed by: INTERNAL MEDICINE

## 2018-03-20 PROCEDURE — 83036 HEMOGLOBIN GLYCOSYLATED A1C: CPT | Performed by: INTERNAL MEDICINE

## 2018-03-20 PROCEDURE — 80053 COMPREHEN METABOLIC PANEL: CPT | Performed by: INTERNAL MEDICINE

## 2018-03-20 PROCEDURE — 80061 LIPID PANEL: CPT | Performed by: INTERNAL MEDICINE

## 2018-03-20 RX ORDER — OMEPRAZOLE 20 MG/1
20 CAPSULE, DELAYED RELEASE ORAL 2 TIMES DAILY
Qty: 18 CAPSULE | Refills: 3 | Status: SHIPPED | OUTPATIENT
Start: 2018-03-20 | End: 2018-08-08 | Stop reason: SDUPTHER

## 2018-03-20 RX ORDER — OMEPRAZOLE 20 MG/1
20 CAPSULE, DELAYED RELEASE ORAL DAILY
Qty: 180 CAPSULE | Refills: 5 | Status: CANCELLED | OUTPATIENT
Start: 2018-03-20

## 2018-03-20 NOTE — PATIENT INSTRUCTIONS
The patient was given an information sheet on Td and agrees to check on insurance coverage at the pharmacy.

## 2018-03-20 NOTE — PROGRESS NOTES
"Bairon Louie is a 72 y.o. female.     Chief Complaint   Patient presents with   • Diabetes       6 month follow up     fasting        History obtained from the patient.      History of Present Illness     Primary Care Cardiac Diagnostic Constellation: The patient is here today for a follow-up visit.       Her Diabetes Mellitus Type 2 is stable.   Medication(s): Metformin HCl.   Her Hypertension is stable.   Medication(s): Lisinopril / HCTZ.   Her Hyperlipidemia has been unstable.   Her LDL goal is 70 mg/dL, last LDL was 82 mg/dL and .   Medication(s): Simvastatin.   The patient is adherent with her medication regimen. She denies medication side effects.       Interval Events: The patient states her blood sugar at home has been <110  fasting.  She denies episodes of low blood sugar.  Last Hemoglobin A1C was 6.2 on 9/20/17.   She does check her feet daily. She saw the ophthalmologist 10/4/17, no retinopathy.   The patient has seen Dr. Aiken for Hyperparathyroidism.  She has recommended surgery.  She has a follow up appointment scheduled on 4/9/18.      Symptoms:  Has occasional COOL.  Has some burning in her feet, resolves with \"diabetic cream\".  Has stable lower extremity edema,  fatigue,  myalgias / arthralgias, and lightheadedness / dizziness.  Denies chest pain,  heart palpitations,  intermittent leg claudication,  dyspnea, numbness of the feet,  foot ulcer,  visual impairment, and muscle weakness.   Associated symptoms:  no recent significant  weight changes. No syncope, no headache, no orthopnea, no PND, no polydipsia, no polyuria, no focal neurologic deficits, no memory loss,  and no concentration problems.      Lifestyle and Disease Management: Diet: She does not have a healthy diet. Weight Issues: She has weight concerns. Exercise: She walks 1 mile per day.  she states she has been less active overall in the Winter..   Smoking: She does not use tobacco.       Gastroesophageal Reflux " Disease Follow-up: The patient is being seen for a routine clinic follow-up of Gastroesophageal Reflux Disease, which is stable.  Previous Evaluation: Last EGD 1/25/16 showed Class A reflux esophagitis.  Interval Events:  Dr. Kim increased her Omeprazole to BID.   Symptoms: has intermittent bloating,  Nausea, and vomiting.  Has some hoarseness and a dry cough.  No abdominal pain, no acid regurgitation,  no heartburn, no belching,  no sore throat, no odynophagia, no dysphagia,  no hematemesis, and no melena.    Associated symptoms: no bloating, no anorexia, no early satiety,  no belching, no weight loss, no halitosis,  and no wheezing.   Current treatment includes Omeprazole BID, Zofran, and Dicyclomine.     Colonic Polyp Follow-up: The patient is being seen for a routine clinic follow-up of Colon Polyp(s), which are stable.   Current diagnosis was determined by colonoscopy and last 1/25/16- no polyps.   Symptoms:  occasional constipation,  but no hematochezia, no melena, no diarrhea,  no decreased stool caliber, no change in bowel habits,and no abdominal pain.   Associated symptoms: no rectal prolapse.   Medications:  Magnesium supplements.    Osteoporosis Follow-up: The patient is being seen for routine follow-up of Osteoporosis, which is stable.   The last DEXA scan was done10/19/17, Osteopenia.   Symptoms:  She reports myalgias, back pain, neck pain, arthralgias, and difficulty with stairs.   no inability to stand straight, no loss of balance, no difficulty ambulating, no difficulty with posture, no change in clothing fit, no weight gain,  no hip pain, no wrist pain, and no new fracture.  Medication:  Vitamin D.  Off Calcium.  The disease type is likely primary/postmenopausal osteoporosis.         Vitamin D Deficiency: The patient is here for follow up of Vitamin D Deficiency, which is stable.   Recent laboratory results: date 10/19/17 , 25-hydroxyvitamin D 31.4 ng/mL.   Symptoms: stable fatigue and stable  muscle pain, but no bone pain, no muscle weakness, no muscle cramps, no muscle twitching, and no paresthesias.   Medication:  Vitamin D.  Off Calcium.      Osteoarthritis Follow-Up: The patient is here for follow up of Osteoarthritis, which is stable.  Comorbid Illnesses: Rheumatoid Arthritis and Fibromyalgia.   Interval Events: None.  Symptoms:  Has arthralgias (hands, hips, and knees), myalgias, and back pain, but no myalgias.  Has left knee swelling, but no joint stiffness.  Medications:  on Placquenil.  Takes Advil prn.   Taking Tumeric.   The patient is adherent with her medication regimen. She denies medication side effects.     Anxiety Disorder Follow-Up: The patient is being seen for follow-up of Anxiety, which is  stable.  Interval Events:   None.  Symptoms: stable anxiety and insomnia, but no difficulty concentrating, restlessness, panic attacks, or depression.   Associated symptoms: no suicidal ideation.   Medication(s): Jessica's Wort  The patient is adherent to her medication regimen, and she denies medication side effects.        Current Outpatient Prescriptions on File Prior to Visit   Medication Sig Dispense Refill   • dicyclomine (BENTYL) 20 MG tablet Take 1 tablet by mouth 4 (Four) Times a Day. 100 tablet 3   • fluticasone (FLONASE) 50 MCG/ACT nasal spray into each nostril.     • hydroxychloroquine (PLAQUENIL) 200 MG tablet Take  by mouth Daily.     • Lecithin 1200 MG capsule Take  by mouth Daily.     • lisinopril-hydrochlorothiazide (PRINZIDE,ZESTORETIC) 20-25 MG per tablet TAKE 1 TABLET EVERY DAY 90 tablet 3   • meloxicam (MOBIC) 7.5 MG tablet Take 1 tablet by mouth Daily As Needed for Moderate Pain (4-6). 30 tablet 0   • metFORMIN (GLUCOPHAGE) 500 MG tablet TAKE 1 TABLET TWICE DAILY WITH FOOD 180 tablet 3   • ondansetron ODT (ZOFRAN-ODT) 8 MG disintegrating tablet Take 1 tablet by mouth Every 8 (Eight) Hours As Needed for Nausea or Vomiting. 90 tablet 3   • simvastatin (ZOCOR) 40 MG tablet TAKE  1 TABLET AT BEDTIME 90 tablet 3   • St Johns Wort 150 MG capsule Take 1 tablet by mouth 2 (two) times a day.     • Turmeric 500 MG tablet Take 1 tablet by mouth Daily.     • omeprazole (priLOSEC) 20 MG capsule Take 1 capsule by mouth Daily. 180 capsule 5   • [DISCONTINUED] amitriptyline (ELAVIL) 25 MG tablet Take 25 mg by mouth Every Night.       No current facility-administered medications on file prior to visit.        Current outpatient and discharge medications have been reconciled for the patient.  Marina Pederson MD        The following portions of the patient's history were reviewed and updated as appropriate: allergies, current medications, past family history, past medical history, past social history, past surgical history and problem list.    Review of Systems   Constitutional: Positive for fatigue and fever (logterm in 99 range per patient). Negative for unexpected weight change.   Eyes: Negative for visual disturbance.   Respiratory: Negative for cough, shortness of breath and wheezing.    Cardiovascular: Positive for leg swelling. Negative for chest pain and palpitations.         COOL with edema, but no orthopnea or claudication.   Gastrointestinal: Positive for abdominal distention (bloating). Negative for abdominal pain, blood in stool, constipation, diarrhea, nausea and vomiting.        Denies melena.   Endocrine: Negative for polydipsia and polyuria.   Musculoskeletal: Positive for arthralgias, joint swelling and myalgias. Negative for back pain and neck pain.   Neurological: Positive for dizziness and light-headedness. Negative for syncope and headaches.        No memory issues.   Psychiatric/Behavioral: Positive for sleep disturbance. Negative for decreased concentration and suicidal ideas. The patient is nervous/anxious.         Denies depression.         Objective       Blood pressure 130/72, pulse 72, temperature 97.3 °F (36.3 °C), temperature source Temporal Artery , resp. rate 20, weight 69.1  kg (152 lb 4 oz).      Physical Exam   Constitutional:   Overweight.   Neck: Normal range of motion. Neck supple. Carotid bruit is not present. No thyromegaly present.   Cardiovascular: Normal rate, regular rhythm, normal heart sounds and intact distal pulses.  Exam reveals no gallop and no friction rub.    No murmur heard.  No peripheral edema.   Pulmonary/Chest: Effort normal and breath sounds normal.   Abdominal: Soft. Bowel sounds are normal. She exhibits no distension, no abdominal bruit and no mass. There is no hepatosplenomegaly. There is no tenderness.   Psychiatric: She has a normal mood and affect.   Nursing note and vitals reviewed.      Results for orders placed or performed in visit on 03/20/18   POC Glycosylated Hemoglobin (Hb A1C)   Result Value Ref Range    Hemoglobin A1C 6.2 %    Lot Number [id number]     Expiration Date 10-19        Assessment / Plan:    Cristy was seen today for diabetes.    Diagnoses and all orders for this visit:    Type 2 diabetes mellitus without complication, without long-term current use of insulin  -     Comprehensive Metabolic Panel  -     POC Glycosylated Hemoglobin (Hb A1C)  -     CBC & Differential  -     CBC Auto Differential    Essential hypertension    Other hyperlipidemia  -     Lipid Panel    Benign colonic polyp    Gastroesophageal reflux disease without esophagitis    Other osteoporosis without current pathological fracture    Vitamin D deficiency    Osteoarthritis of multiple joints, unspecified osteoarthritis type    Hyperparathyroidism    Generalized anxiety disorder    Need for immunization against influenza  -     Flu Vaccine High Dose PF 65YR+      The patient was given an information sheet on Td and agrees to check on insurance coverage at the pharmacy.       Return in about 6 months (around 9/20/2018) for Recheck-Diabetes fasting, and schedule Initial Medicare Wellness exam, this month is possibe.

## 2018-04-09 ENCOUNTER — OFFICE VISIT (OUTPATIENT)
Dept: ENDOCRINOLOGY | Facility: CLINIC | Age: 73
End: 2018-04-09

## 2018-04-09 VITALS
DIASTOLIC BLOOD PRESSURE: 74 MMHG | HEIGHT: 61 IN | SYSTOLIC BLOOD PRESSURE: 138 MMHG | HEART RATE: 72 BPM | OXYGEN SATURATION: 98 % | BODY MASS INDEX: 28.89 KG/M2 | WEIGHT: 153 LBS

## 2018-04-09 DIAGNOSIS — E21.0 PRIMARY HYPERPARATHYROIDISM (HCC): Primary | ICD-10-CM

## 2018-04-09 DIAGNOSIS — M85.89 OSTEOPENIA OF MULTIPLE SITES: ICD-10-CM

## 2018-04-09 LAB
25(OH)D3 SERPL-MCNC: 27.7 NG/ML
PTH-INTACT SERPL-MCNC: 89.9 PG/ML (ref 11–80)

## 2018-04-09 PROCEDURE — 76536 US EXAM OF HEAD AND NECK: CPT | Performed by: INTERNAL MEDICINE

## 2018-04-09 PROCEDURE — 99213 OFFICE O/P EST LOW 20 MIN: CPT | Performed by: INTERNAL MEDICINE

## 2018-04-09 PROCEDURE — 82306 VITAMIN D 25 HYDROXY: CPT | Performed by: INTERNAL MEDICINE

## 2018-04-09 PROCEDURE — 83970 ASSAY OF PARATHORMONE: CPT | Performed by: INTERNAL MEDICINE

## 2018-04-10 ENCOUNTER — OFFICE VISIT (OUTPATIENT)
Dept: INTERNAL MEDICINE | Facility: CLINIC | Age: 73
End: 2018-04-10

## 2018-04-10 VITALS
DIASTOLIC BLOOD PRESSURE: 66 MMHG | BODY MASS INDEX: 30.09 KG/M2 | TEMPERATURE: 97 F | SYSTOLIC BLOOD PRESSURE: 126 MMHG | WEIGHT: 153.25 LBS | HEIGHT: 60 IN | HEART RATE: 72 BPM | RESPIRATION RATE: 20 BRPM

## 2018-04-10 DIAGNOSIS — Z00.00 MEDICARE ANNUAL WELLNESS VISIT, INITIAL: Primary | ICD-10-CM

## 2018-04-10 PROCEDURE — G0438 PPPS, INITIAL VISIT: HCPCS | Performed by: INTERNAL MEDICINE

## 2018-04-10 NOTE — PROGRESS NOTES
0QUICK REFERENCE INFORMATION:  The ABCs of the Annual Wellness Visit    Initial Medicare Wellness Visit    HEALTH RISK ASSESSMENT    1945    Recent Hospitalizations:  No hospitalization(s) within the last year..        Current Medical Providers:  Patient Care Team:  Marina Pederson MD as PCP - General  Marina Pederson MD as PCP - Family Medicine  Marina Pederson MD as PCP - Claims Attributed  Sunil Jones MD as Consulting Physician (Dermatology)  Maureen Aiken MD as Consulting Physician (Endocrinology)  Gerardo Eason MD as Consulting Physician (Ophthalmology)        Smoking Status:  History   Smoking Status   • Never Smoker   Smokeless Tobacco   • Never Used       Alcohol Consumption:  History   Alcohol Use No       Depression Screen:   PHQ-2/PHQ-9 Depression Screening 4/10/2018   Little interest or pleasure in doing things 0   Feeling down, depressed, or hopeless 0   Trouble falling or staying asleep, or sleeping too much -   Feeling tired or having little energy -   Poor appetite or overeating -   Feeling bad about yourself - or that you are a failure or have let yourself or your family down -   Trouble concentrating on things, such as reading the newspaper or watching television -   Moving or speaking so slowly that other people could have noticed. Or the opposite - being so fidgety or restless that you have been moving around a lot more than usual -   Thoughts that you would be better off dead, or of hurting yourself in some way -   Total Score 0   If you checked off any problems, how difficult have these problems made it for you to do your work, take care of things at home, or get along with other people? -       Health Habits and Functional and Cognitive Screening:  Functional & Cognitive Status 4/10/2018   Do you have difficulty preparing food and eating? No   Do you have difficulty bathing yourself, getting dressed or grooming yourself? Yes   Current Diet Low Carb Diet   Dental Exam Not up to  date   Eye Exam Up to date   Exercise (times per week) 3 times per week   Current Exercise Activities Include Walking   Do you need help using the phone?  No   Are you deaf or do you have serious difficulty hearing?  Yes   Do you need help with transportation? No   Do you need help shopping? No   Do you need help preparing meals?  No   Do you need help with housework?  No   Do you need help with laundry? No   Do you need help taking your medications? No   Do you need help managing money? No   Do you ever drive or ride in a car without wearing a seat belt? No   Have you felt unusual stress, anger or loneliness in the last month? No   Who do you live with? Alone   If you need help, do you have trouble finding someone available to you? No   Have you been bothered in the last four weeks by sexual problems? No           Does the patient have evidence of cognitive impairment? No    Asiprin use counseling: Does not need ASA (and currently is not on it)      Recent Lab Results:    Visual Acuity:  No exam data present    Age-appropriate Screening Schedule:  Refer to the list below for future screening recommendations based on patient's age, sex and/or medical conditions. Orders for these recommended tests are listed in the plan section. The patient has been provided with a written plan.    Health Maintenance   Topic Date Due   • TDAP/TD VACCINES (2 - Td) 11/28/2017   • DIABETIC FOOT EXAM  02/28/2018   • HEMOGLOBIN A1C  09/20/2018   • URINE MICROALBUMIN  09/20/2018   • DIABETIC EYE EXAM  10/04/2018   • LIPID PANEL  03/20/2019   • MAMMOGRAM  10/20/2019   • DXA SCAN  10/19/2022   • COLONOSCOPY  01/25/2026   • INFLUENZA VACCINE  Completed   • PNEUMOCOCCAL VACCINES (65+ LOW/MEDIUM RISK)  Completed   • ZOSTER VACCINE  Addressed        Subjective   History of Present Illness    Cristy Louie is a 72 y.o. female who presents for an Annual Wellness Visit.    The following portions of the patient's history were reviewed and updated  as appropriate: allergies, current medications, past family history, past medical history, past social history, past surgical history and problem list.    Outpatient Medications Prior to Visit   Medication Sig Dispense Refill   • dicyclomine (BENTYL) 20 MG tablet Take 1 tablet by mouth 4 (Four) Times a Day. 100 tablet 3   • fluticasone (FLONASE) 50 MCG/ACT nasal spray into each nostril.     • hydroxychloroquine (PLAQUENIL) 200 MG tablet Take  by mouth Daily.     • Lecithin 1200 MG capsule Take  by mouth Daily.     • lisinopril-hydrochlorothiazide (PRINZIDE,ZESTORETIC) 20-25 MG per tablet TAKE 1 TABLET EVERY DAY 90 tablet 3   • metFORMIN (GLUCOPHAGE) 500 MG tablet TAKE 1 TABLET TWICE DAILY WITH FOOD 180 tablet 3   • omeprazole (PRILOSEC) 20 MG capsule Take 1 capsule by mouth 2 (Two) Times a Day. 18 capsule 3   • ondansetron ODT (ZOFRAN-ODT) 8 MG disintegrating tablet Take 1 tablet by mouth Every 8 (Eight) Hours As Needed for Nausea or Vomiting. 90 tablet 3   • simvastatin (ZOCOR) 40 MG tablet TAKE 1 TABLET AT BEDTIME 90 tablet 3   • St Johns Wort 150 MG capsule Take 1 tablet by mouth 2 (two) times a day.     • Turmeric 500 MG tablet Take 1 tablet by mouth Daily.       No facility-administered medications prior to visit.        Patient Active Problem List   Diagnosis   • Cataract   • Abnormal liver function tests   • Allergic rhinitis   • Anemia   • Anxiety disorder   • Asthma   • Benign colonic polyp   • Diabetes mellitus   • Diverticulosis of intestine   • Gastroesophageal reflux disease   • Fibromyalgia   • Hyperlipidemia   • Primary hyperparathyroidism   • Hypertension   • Osteoarthritis   • Osteoporosis   • Rheumatoid arthritis   • Vitamin D deficiency       Advance Care Planning:  has NO advance directive - information provided to the patient today    Identification of Risk Factors:  Risk factors include: weight , unhealthy diet, cardiovascular risk, inactivity, increased fall risk, chronic pain, inadequate  "social support and vision limitations.    Review of Systems    Compared to one year ago, the patient feels her physical health is the same.  Compared to one year ago, the patient feels her mental health is the same.    Objective     Physical Exam    Cristy had a diabetic foot exam performed today.   During the foot exam she had a monofilament test performed (see form).  Vascular Status -  Her right foot exhibits normal foot vasculature . Her left foot exhibits normal foot vasculature .  Skin Integrity  -  Her right foot skin is intact.Her left foot skin is intact..  Nursing note and vitals reviewed.      Vitals:    04/10/18 1129   BP: 126/66   BP Location: Right arm   Pulse: 72   Resp: 20   Temp: 97 °F (36.1 °C)   TempSrc: Temporal Artery    Weight: 69.5 kg (153 lb 4 oz)   Height: 152.4 cm (60\")   PainSc: 0-No pain       Body mass index is 29.93 kg/m².  Discussed the patient's BMI with her. BMI is above normal parameters. Follow-up plan includes:  exercise counseling and nutrition counseling.  Finger Rub Hearing{Test (right ear):passed  Finger Rub Hearing{Test (left ear):failed     Assessment/Plan   Patient Self-Management and Personalized Health Advice  The patient has been provided with information about: diet, exercise, weight management, prevention of cardiac or vascular disease, fall prevention and designing advance directives and preventive services including:   · Advance directive, Exercise counseling provided, Fall Risk assessment done, Fall Risk plan of care done, Nutrition counseling provided.  Td information sheet given, for the patient to get done at the pharmacy.    Visit Diagnoses:  Cristy was seen today for annual exam.    Diagnoses and all orders for this visit:    Medicare annual wellness visit, initial          Outpatient Encounter Prescriptions as of 4/10/2018   Medication Sig Dispense Refill   • dicyclomine (BENTYL) 20 MG tablet Take 1 tablet by mouth 4 (Four) Times a Day. 100 tablet 3   • " fluticasone (FLONASE) 50 MCG/ACT nasal spray into each nostril.     • hydroxychloroquine (PLAQUENIL) 200 MG tablet Take  by mouth Daily.     • Lecithin 1200 MG capsule Take  by mouth Daily.     • lisinopril-hydrochlorothiazide (PRINZIDE,ZESTORETIC) 20-25 MG per tablet TAKE 1 TABLET EVERY DAY 90 tablet 3   • metFORMIN (GLUCOPHAGE) 500 MG tablet TAKE 1 TABLET TWICE DAILY WITH FOOD 180 tablet 3   • omeprazole (PRILOSEC) 20 MG capsule Take 1 capsule by mouth 2 (Two) Times a Day. 18 capsule 3   • ondansetron ODT (ZOFRAN-ODT) 8 MG disintegrating tablet Take 1 tablet by mouth Every 8 (Eight) Hours As Needed for Nausea or Vomiting. 90 tablet 3   • simvastatin (ZOCOR) 40 MG tablet TAKE 1 TABLET AT BEDTIME 90 tablet 3   • St Johns Wort 150 MG capsule Take 1 tablet by mouth 2 (two) times a day.     • Turmeric 500 MG tablet Take 1 tablet by mouth Daily.       No facility-administered encounter medications on file as of 4/10/2018.        Reviewed use of high risk medication in the elderly: yes  Reviewed for potential of harmful drug interactions in the elderly: yes    Follow Up:    Return in about 1 year (around 4/10/2019) for Subsequent Medicare Wellness Exam.    An After Visit Summary and PPPS with all of these plans were given to the patient.

## 2018-04-10 NOTE — PATIENT INSTRUCTIONS
Heart-Healthy Eating Plan  Many factors influence your heart health, including eating and exercise habits. Heart (coronary) risk increases with abnormal blood fat (lipid) levels. Heart-healthy meal planning includes limiting unhealthy fats, increasing healthy fats, and making other small dietary changes. This includes maintaining a healthy body weight to help keep lipid levels within a normal range.  What is my plan?  Your health care provider recommends that you:  · Get no more than _________% of the total calories in your daily diet from fat.  · Limit your intake of saturated fat to less than _________% of your total calories each day.  · Limit the amount of cholesterol in your diet to less than _________ mg per day.  What types of fat should I choose?  · Choose healthy fats more often. Choose monounsaturated and polyunsaturated fats, such as olive oil and canola oil, flaxseeds, walnuts, almonds, and seeds.  · Eat more omega-3 fats. Good choices include salmon, mackerel, sardines, tuna, flaxseed oil, and ground flaxseeds. Aim to eat fish at least two times each week.  · Limit saturated fats. Saturated fats are primarily found in animal products, such as meats, butter, and cream. Plant sources of saturated fats include palm oil, palm kernel oil, and coconut oil.  · Avoid foods with partially hydrogenated oils in them. These contain trans fats. Examples of foods that contain trans fats are stick margarine, some tub margarines, cookies, crackers, and other baked goods.  What general guidelines do I need to follow?  · Check food labels carefully to identify foods with trans fats or high amounts of saturated fat.  · Fill one half of your plate with vegetables and green salads. Eat 4-5 servings of vegetables per day. A serving of vegetables equals 1 cup of raw leafy vegetables, ½ cup of raw or cooked cut-up vegetables, or ½ cup of vegetable juice.  · Fill one fourth of your plate with whole grains. Look for the word  "\"whole\" as the first word in the ingredient list.  · Fill one fourth of your plate with lean protein foods.  · Eat 4-5 servings of fruit per day. A serving of fruit equals one medium whole fruit, ¼ cup of dried fruit, ½ cup of fresh, frozen, or canned fruit, or ½ cup of 100% fruit juice.  · Eat more foods that contain soluble fiber. Examples of foods that contain this type of fiber are apples, broccoli, carrots, beans, peas, and barley. Aim to get 20-30 g of fiber per day.  · Eat more home-cooked food and less restaurant, buffet, and fast food.  · Limit or avoid alcohol.  · Limit foods that are high in starch and sugar.  · Avoid fried foods.  · Cook foods by using methods other than frying. Baking, boiling, grilling, and broiling are all great options. Other fat-reducing suggestions include:  ¨ Removing the skin from poultry.  ¨ Removing all visible fats from meats.  ¨ Skimming the fat off of stews, soups, and gravies before serving them.  ¨ Steaming vegetables in water or broth.  · Lose weight if you are overweight. Losing just 5-10% of your initial body weight can help your overall health and prevent diseases such as diabetes and heart disease.  · Increase your consumption of nuts, legumes, and seeds to 4-5 servings per week. One serving of dried beans or legumes equals ½ cup after being cooked, one serving of nuts equals 1½ ounces, and one serving of seeds equals ½ ounce or 1 tablespoon.  · You may need to monitor your salt (sodium) intake, especially if you have high blood pressure. Talk with your health care provider or dietitian to get more information about reducing sodium.  What foods can I eat?  Grains     Breads, including Estonian, white, marky, wheat, raisin, rye, oatmeal, and Italian. Tortillas that are neither fried nor made with lard or trans fat. Low-fat rolls, including hotdog and hamburger buns and English muffins. Biscuits. Muffins. Waffles. Pancakes. Light popcorn. Whole-grain cereals. Flatbread. " Val toast. Pretzels. Breadsticks. Rusks. Low-fat snacks and crackers, including oyster, saltine, matzo, jimy, animal, and rye. Rice and pasta, including brown rice and those that are made with whole wheat.  Vegetables   All vegetables.  Fruits   All fruits, but limit coconut.  Meats and Other Protein Sources   Lean, well-trimmed beef, veal, pork, and lamb. Chicken and turkey without skin. All fish and shellfish. Wild duck, rabbit, pheasant, and venison. Egg whites or low-cholesterol egg substitutes. Dried beans, peas, lentils, and tofu. Seeds and most nuts.  Dairy   Low-fat or nonfat cheeses, including ricotta, string, and mozzarella. Skim or 1% milk that is liquid, powdered, or evaporated. Buttermilk that is made with low-fat milk. Nonfat or low-fat yogurt.  Beverages   Mineral water. Diet carbonated beverages.  Sweets and Desserts   Sherbets and fruit ices. Honey, jam, marmalade, jelly, and syrups. Meringues and gelatins. Pure sugar candy, such as hard candy, jelly beans, gumdrops, mints, marshmallows, and small amounts of dark chocolate. Darian food cake.  Eat all sweets and desserts in moderation.  Fats and Oils   Nonhydrogenated (trans-free) margarines. Vegetable oils, including soybean, sesame, sunflower, olive, peanut, safflower, corn, canola, and cottonseed. Salad dressings or mayonnaise that are made with a vegetable oil. Limit added fats and oils that you use for cooking, baking, salads, and as spreads.  Other   Cocoa powder. Coffee and tea. All seasonings and condiments.  The items listed above may not be a complete list of recommended foods or beverages. Contact your dietitian for more options.   What foods are not recommended?  Grains   Breads that are made with saturated or trans fats, oils, or whole milk. Croissants. Butter rolls. Cheese breads. Sweet rolls. Donuts. Buttered popcorn. Chow mein noodles. High-fat crackers, such as cheese or butter crackers.  Meats and Other Protein Sources   Fatty  meats, such as hotdogs, short ribs, sausage, spareribs, ring, ribeye roast or steak, and mutton. High-fat deli meats, such as salami and bologna. Caviar. Domestic duck and goose. Organ meats, such as kidney, liver, sweetbreads, brains, gizzard, chitterlings, and heart.  Dairy   Cream, sour cream, cream cheese, and creamed cottage cheese. Whole milk cheeses, including blue (levon), Emanuel Julio, Brie, Gatito, American, Havarti, Swiss, cheddar, Camembert, and Avoca. Whole or 2% milk that is liquid, evaporated, or condensed. Whole buttermilk. Cream sauce or high-fat cheese sauce. Yogurt that is made from whole milk.  Beverages   Regular sodas and drinks with added sugar.  Sweets and Desserts   Frosting. Pudding. Cookies. Cakes other than gibran food cake. Candy that has milk chocolate or white chocolate, hydrogenated fat, butter, coconut, or unknown ingredients. Buttered syrups. Full-fat ice cream or ice cream drinks.  Fats and Oils   Gravy that has suet, meat fat, or shortening. Cocoa butter, hydrogenated oils, palm oil, coconut oil, palm kernel oil. These can often be found in baked products, candy, fried foods, nondairy creamers, and whipped toppings. Solid fats and shortenings, including ring fat, salt pork, lard, and butter. Nondairy cream substitutes, such as coffee creamers and sour cream substitutes. Salad dressings that are made of unknown oils, cheese, or sour cream.  The items listed above may not be a complete list of foods and beverages to avoid. Contact your dietitian for more information.   This information is not intended to replace advice given to you by your health care provider. Make sure you discuss any questions you have with your health care provider.  Document Released: 09/26/2009 Document Revised: 07/07/2017 Document Reviewed: 06/11/2015  "[employer] Interactive Patient Education © 2017 "[employer] Inc.    Exercising to Lose Weight  Exercising can help you to lose weight. In order to lose weight  through exercise, you need to do vigorous-intensity exercise. You can tell that you are exercising with vigorous intensity if you are breathing very hard and fast and cannot hold a conversation while exercising.  Moderate-intensity exercise helps to maintain your current weight. You can tell that you are exercising at a moderate level if you have a higher heart rate and faster breathing, but you are still able to hold a conversation.  How often should I exercise?  Choose an activity that you enjoy and set realistic goals. Your health care provider can help you to make an activity plan that works for you. Exercise regularly as directed by your health care provider. This may include:  · Doing resistance training twice each week, such as:  ¨ Push-ups.  ¨ Sit-ups.  ¨ Lifting weights.  ¨ Using resistance bands.  · Doing a given intensity of exercise for a given amount of time. Choose from these options:  ¨ 150 minutes of moderate-intensity exercise every week.  ¨ 75 minutes of vigorous-intensity exercise every week.  ¨ A mix of moderate-intensity and vigorous-intensity exercise every week.  Children, pregnant women, people who are out of shape, people who are overweight, and older adults may need to consult a health care provider for individual recommendations. If you have any sort of medical condition, be sure to consult your health care provider before starting a new exercise program.  What are some activities that can help me to lose weight?  · Walking at a rate of at least 4.5 miles an hour.  · Jogging or running at a rate of 5 miles per hour.  · Biking at a rate of at least 10 miles per hour.  · Lap swimming.  · Roller-skating or in-line skating.  · Cross-country skiing.  · Vigorous competitive sports, such as football, basketball, and soccer.  · Jumping rope.  · Aerobic dancing.  How can I be more active in my day-to-day activities?  · Use the stairs instead of the elevator.  · Take a walk during your lunch  break.  · If you drive, park your car farther away from work or school.  · If you take public transportation, get off one stop early and walk the rest of the way.  · Make all of your phone calls while standing up and walking around.  · Get up, stretch, and walk around every 30 minutes throughout the day.  What guidelines should I follow while exercising?  · Do not exercise so much that you hurt yourself, feel dizzy, or get very short of breath.  · Consult your health care provider prior to starting a new exercise program.  · Wear comfortable clothes and shoes with good support.  · Drink plenty of water while you exercise to prevent dehydration or heat stroke. Body water is lost during exercise and must be replaced.  · Work out until you breathe faster and your heart beats faster.  This information is not intended to replace advice given to you by your health care provider. Make sure you discuss any questions you have with your health care provider.  Document Released: 01/20/2012 Document Revised: 05/25/2017 Document Reviewed: 05/21/2015  [employer] Interactive Patient Education © 2017 [employer]  Fall Prevention in the Home  Falls can cause injuries and can affect people from all age groups. There are many simple things that you can do to make your home safe and to help prevent falls.  What can I do on the outside of my home?  · Regularly repair the edges of walkways and driveways and fix any cracks.  · Remove high doorway thresholds.  · Trim any shrubbery on the main path into your home.  · Use bright outdoor lighting.  · Clear walkways of debris and clutter, including tools and rocks.  · Regularly check that handrails are securely fastened and in good repair. Both sides of any steps should have handrails.  · Install guardrails along the edges of any raised decks or porches.  · Have leaves, snow, and ice cleared regularly.  · Use sand or salt on walkways during winter months.  · In the garage, clean up any spills  right away, including grease or oil spills.  What can I do in the bathroom?  · Use night lights.  · Install grab bars by the toilet and in the tub and shower. Do not use towel bars as grab bars.  · Use non-skid mats or decals on the floor of the tub or shower.  · If you need to sit down while you are in the shower, use a plastic, non-slip stool.  · Keep the floor dry. Immediately clean up any water that spills on the floor.  · Remove soap buildup in the tub or shower on a regular basis.  · Attach bath mats securely with double-sided non-slip rug tape.  · Remove throw rugs and other tripping hazards from the floor.  What can I do in the bedroom?  · Use night lights.  · Make sure that a bedside light is easy to reach.  · Do not use oversized bedding that drapes onto the floor.  · Have a firm chair that has side arms to use for getting dressed.  · Remove throw rugs and other tripping hazards from the floor.  What can I do in the kitchen?  · Clean up any spills right away.  · Avoid walking on wet floors.  · Place frequently used items in easy-to-reach places.  · If you need to reach for something above you, use a sturdy step stool that has a grab bar.  · Keep electrical cables out of the way.  · Do not use floor polish or wax that makes floors slippery. If you have to use wax, make sure that it is non-skid floor wax.  · Remove throw rugs and other tripping hazards from the floor.  What can I do in the stairways?  · Do not leave any items on the stairs.  · Make sure that there are handrails on both sides of the stairs. Fix handrails that are broken or loose. Make sure that handrails are as long as the stairways.  · Check any carpeting to make sure that it is firmly attached to the stairs. Fix any carpet that is loose or worn.  · Avoid having throw rugs at the top or bottom of stairways, or secure the rugs with carpet tape to prevent them from moving.  · Make sure that you have a light switch at the top of the stairs and  the bottom of the stairs. If you do not have them, have them installed.  What are some other fall prevention tips?  · Wear closed-toe shoes that fit well and support your feet. Wear shoes that have rubber soles or low heels.  · When you use a stepladder, make sure that it is completely opened and that the sides are firmly locked. Have someone hold the ladder while you are using it. Do not climb a closed stepladder.  · Add color or contrast paint or tape to grab bars and handrails in your home. Place contrasting color strips on the first and last steps.  · Use mobility aids as needed, such as canes, walkers, scooters, and crutches.  · Turn on lights if it is dark. Replace any light bulbs that burn out.  · Set up furniture so that there are clear paths. Keep the furniture in the same spot.  · Fix any uneven floor surfaces.  · Choose a carpet design that does not hide the edge of steps of a stairway.  · Be aware of any and all pets.  · Review your medicines with your healthcare provider. Some medicines can cause dizziness or changes in blood pressure, which increase your risk of falling.  Talk with your health care provider about other ways that you can decrease your risk of falls. This may include working with a physical therapist or  to improve your strength, balance, and endurance.  This information is not intended to replace advice given to you by your health care provider. Make sure you discuss any questions you have with your health care provider.  Document Released: 12/08/2003 Document Revised: 05/16/2017 Document Reviewed: 01/22/2016  [employer] Interactive Patient Education © 2017 [employer]

## 2018-06-06 ENCOUNTER — TELEPHONE (OUTPATIENT)
Dept: INTERNAL MEDICINE | Facility: CLINIC | Age: 73
End: 2018-06-06

## 2018-06-06 NOTE — TELEPHONE ENCOUNTER
----- Message from Nova Bautista sent at 6/6/2018  1:32 PM EDT -----  Contact: PT   PT IS HAVING PAIN IN HER RIGHT FOOT. PT STATES THAT THERE IS SWELLING AND STATES THAT IT FEELS A LITTLE WARM AND SOME NUMBNESS IN TOES PT WAS GIVEN AN APPT FOR TOMORROW BUT WANTS TO SEE IF SHE CAN BE SEEN BEFORE THEN. PT STATES IT STARTED 2 OR 3 WEEKS AGO BUT THE PAIN IS GETTING WORSE. CALL BACK NUMBER FOR PT [phone].

## 2018-06-07 ENCOUNTER — HOSPITAL ENCOUNTER (OUTPATIENT)
Dept: GENERAL RADIOLOGY | Facility: HOSPITAL | Age: 73
Discharge: HOME | End: 2018-06-07
Attending: INTERNAL MEDICINE | Admitting: INTERNAL MEDICINE

## 2018-06-07 ENCOUNTER — OFFICE VISIT (OUTPATIENT)
Dept: INTERNAL MEDICINE | Facility: CLINIC | Age: 73
End: 2018-06-07

## 2018-06-07 VITALS
HEIGHT: 60 IN | BODY MASS INDEX: 29.06 KG/M2 | TEMPERATURE: 98 F | WEIGHT: 148 LBS | HEART RATE: 62 BPM | OXYGEN SATURATION: 98 % | SYSTOLIC BLOOD PRESSURE: 124 MMHG | DIASTOLIC BLOOD PRESSURE: 62 MMHG

## 2018-06-07 DIAGNOSIS — M79.671 RIGHT FOOT PAIN: Primary | ICD-10-CM

## 2018-06-07 PROCEDURE — 73620 X-RAY EXAM OF FOOT: CPT

## 2018-06-07 PROCEDURE — 99214 OFFICE O/P EST MOD 30 MIN: CPT | Performed by: INTERNAL MEDICINE

## 2018-06-07 RX ORDER — MELOXICAM 15 MG/1
15 TABLET ORAL DAILY PRN
Qty: 30 TABLET | Refills: 2 | Status: SHIPPED | OUTPATIENT
Start: 2018-06-07 | End: 2018-09-18

## 2018-06-07 NOTE — PROGRESS NOTES
"Bairon Louie is a 72 y.o. female.     Chief Complaint   Patient presents with   • Foot Pain     right foot        History obtained from the patient.      Foot Pain   This is a new problem. Episode onset: 2 months ago, right foot at the base of the big toe, no injury or trauma. The problem occurs constantly. The problem has been unchanged. Associated symptoms include joint swelling. Pertinent negatives include no arthralgias, chills, fatigue, fever, myalgias, numbness (and no tingling) or rash. The symptoms are aggravated by walking and standing (and touching). Treatments tried: Tylenol Arthritis.  Takes Turmeric daily.        The following portions of the patient's history were reviewed and updated as appropriate: allergies, current medications, past family history, past medical history, past social history, past surgical history and problem list.      Review of Systems   Constitutional: Positive for unexpected weight change (down 5 pounds). Negative for chills, fatigue and fever.   Musculoskeletal: Positive for joint swelling. Negative for arthralgias, back pain, gait problem and myalgias.   Skin: Negative for rash.   Neurological: Negative for numbness (and no tingling).           Objective     Blood pressure 124/62, pulse 62, temperature 98 °F (36.7 °C), height 152.4 cm (60\"), weight 67.1 kg (148 lb), SpO2 98 %.    Physical Exam   Constitutional:   Overweight.   Cardiovascular: Intact distal pulses.    Musculoskeletal:   There is mild pain to palpation on the right foot at the base of the right great toe dorsally.  There is mild erythema and edema of the area, but no warmth. No tenderness over the Achilles tendon or other parts of the foot.   The patient has pain with flexion, extension, and lateral movement of the right great toe.  There is no pain in the right foot with right ankle flexion and extension, nor with right foot inversion and eversion.   Neurological: She is alert.   Skin: No " rash noted.   Psychiatric: She has a normal mood and affect.   Nursing note and vitals reviewed.    Imaging: X-ray of the right foot showed degenerative changes at the base of the right great toe, otherwise negative, per my interpretation.  The patient was informed of these results at the time of her visit.    Assessment/Plan   Cristy was seen today for foot pain.    Diagnoses and all orders for this visit:    Right foot pain  -     XR Foot 2 View Right  -     meloxicam (MOBIC) 15 MG tablet; Take 1 tablet by mouth Daily As Needed for Moderate Pain .        Recommend Meloxicam for 2-3 weeks. Use ice or heat 3-4 times per day.   If no better, recommend a Podiatry appointment.        Return if symptoms worsen or fail to improve.

## 2018-06-07 NOTE — PATIENT INSTRUCTIONS
Recommend Meloxicam for 2-3 weeks. Use ice or heat 3-4 times per day.   If no better, recommend a Podiatry appointment.

## 2018-06-25 ENCOUNTER — TELEPHONE (OUTPATIENT)
Dept: INTERNAL MEDICINE | Facility: CLINIC | Age: 73
End: 2018-06-25

## 2018-06-25 NOTE — TELEPHONE ENCOUNTER
----- Message from Linda Doss sent at 6/25/2018  2:49 PM EDT -----  Contact: SELF  RACHELLE GUZMAN WOULD LIKE TO DISCUSS DISCONTINUING HER FLUID PILL. SHE CAN BE REACHED [phone]

## 2018-06-26 NOTE — TELEPHONE ENCOUNTER
She states she has been on lisinopril/HCTC for 30 years and she is wondering if this could cause her kidney disease .  She states she is in stage 3 kidney disease

## 2018-06-27 NOTE — TELEPHONE ENCOUNTER
Do you want me to call in a rx for just Lisinopril .  And have her stop the combination  Lisinoprin/HCTZ

## 2018-06-28 NOTE — TELEPHONE ENCOUNTER
Cristy notified   She was  Told by someone that the     HCTZ could have caused her kidney .    She states she she would like to stay on it if there is no chance of it causing kidney disease .    She has appt tomorrow to discuss this and be seen for sore throat.

## 2018-06-29 ENCOUNTER — OFFICE VISIT (OUTPATIENT)
Dept: INTERNAL MEDICINE | Facility: CLINIC | Age: 73
End: 2018-06-29

## 2018-06-29 VITALS
TEMPERATURE: 96.9 F | DIASTOLIC BLOOD PRESSURE: 78 MMHG | WEIGHT: 147.38 LBS | BODY MASS INDEX: 28.78 KG/M2 | HEART RATE: 72 BPM | SYSTOLIC BLOOD PRESSURE: 120 MMHG | RESPIRATION RATE: 20 BRPM

## 2018-06-29 DIAGNOSIS — J02.9 SORE THROAT: Primary | ICD-10-CM

## 2018-06-29 DIAGNOSIS — J30.2 ACUTE SEASONAL ALLERGIC RHINITIS DUE TO OTHER ALLERGEN: ICD-10-CM

## 2018-06-29 LAB
EXPIRATION DATE: NORMAL
INTERNAL CONTROL: NORMAL
Lab: NORMAL
S PYO AG THROAT QL: NEGATIVE

## 2018-06-29 PROCEDURE — 87880 STREP A ASSAY W/OPTIC: CPT | Performed by: INTERNAL MEDICINE

## 2018-06-29 PROCEDURE — 99214 OFFICE O/P EST MOD 30 MIN: CPT | Performed by: INTERNAL MEDICINE

## 2018-06-29 PROCEDURE — 87081 CULTURE SCREEN ONLY: CPT | Performed by: INTERNAL MEDICINE

## 2018-06-29 RX ORDER — HYDROXYCHLOROQUINE SULFATE 200 MG/1
200 TABLET, FILM COATED ORAL DAILY
Qty: 90 TABLET | Refills: 1 | Status: SHIPPED | OUTPATIENT
Start: 2018-06-29 | End: 2018-09-18

## 2018-06-29 NOTE — PROGRESS NOTES
Bairon Louie is a 72 y.o. female.     Chief Complaint   Patient presents with   • Sore Throat     swollen tonsils        History obtained from the patient.      Sore Throat    This is a new problem. The current episode started yesterday. The problem has been gradually improving. The pain is worse on the left side. There has been no fever. The pain is mild. Associated symptoms include ear pain (bilateral, L>R), headaches (intermittent), a hoarse voice and swollen glands. Pertinent negatives include no abdominal pain, congestion, coughing, diarrhea, ear discharge, neck pain, shortness of breath or vomiting. She has had no exposure to strep or mono. Treatments tried: On Flonase daily. The treatment provided mild relief.        The following portions of the patient's history were reviewed and updated as appropriate: allergies, current medications, past family history, past medical history, past social history, past surgical history and problem list.      Review of Systems   Constitutional: Positive for fatigue (chronic). Negative for chills and fever.   HENT: Positive for ear pain (bilateral, L>R), hoarse voice, sore throat and voice change. Negative for congestion, ear discharge, postnasal drip, rhinorrhea, sinus pain and sinus pressure.    Eyes: Negative for pain, discharge, redness and itching.   Respiratory: Negative for cough and shortness of breath.    Cardiovascular: Negative for chest pain.   Gastrointestinal: Negative for abdominal pain, diarrhea and vomiting.   Musculoskeletal: Positive for arthralgias (chronic) and myalgias (chronic). Negative for joint swelling and neck pain.   Neurological: Positive for headaches (intermittent).   Hematological: Positive for adenopathy.           Objective     Blood pressure 120/78, pulse 72, temperature 96.9 °F (36.1 °C), temperature source Temporal Artery , resp. rate 20, weight 66.8 kg (147 lb 6 oz).    Physical Exam   Constitutional:   Overweight.    HENT:   Head: Normocephalic and atraumatic.   Right Ear: Tympanic membrane, external ear and ear canal normal.   Left Ear: Tympanic membrane, external ear and ear canal normal.   Mouth/Throat: Oropharynx is clear and moist and mucous membranes are normal. No oral lesions.   Tonsils normal.  No sinus tenderness to palpation.   Eyes: Conjunctivae are normal.   Neck: Normal range of motion. Neck supple.   Cardiovascular: Normal rate and regular rhythm.    No murmur heard.  Pulmonary/Chest: Effort normal and breath sounds normal.   Abdominal: Soft. Bowel sounds are normal. She exhibits no distension and no mass. There is no hepatosplenomegaly. There is tenderness (mild diffuse). There is no rebound, no guarding and no CVA tenderness.   Lymphadenopathy:     She has no cervical adenopathy.   Skin: No rash noted.   Psychiatric: She has a normal mood and affect.   Nursing note and vitals reviewed.      Results for orders placed or performed in visit on 06/29/18   POC Rapid Strep A   Result Value Ref Range    Rapid Strep A Screen Negative Negative, VALID, INVALID, Not Performed    Internal Control Passed Passed    Lot Number [id number]     Expiration Date 12-31-19        Assessment/Plan   Cristy was seen today for sore throat.    Diagnoses and all orders for this visit:    Sore throat  -     POC Rapid Strep A  -     Beta Strep Culture, Throat - Swab, Throat    Acute seasonal allergic rhinitis due to other allergen    Continue Fluticasone.  Add Allegra otc.          Return if symptoms worsen or fail to improve.

## 2018-07-01 LAB — BACTERIA SPEC AEROBE CULT: NORMAL

## 2018-08-08 RX ORDER — OMEPRAZOLE 20 MG/1
20 CAPSULE, DELAYED RELEASE ORAL 2 TIMES DAILY
Qty: 180 CAPSULE | Refills: 3 | Status: SHIPPED | OUTPATIENT
Start: 2018-08-08 | End: 2019-06-12 | Stop reason: SDUPTHER

## 2018-09-18 ENCOUNTER — OFFICE VISIT (OUTPATIENT)
Dept: INTERNAL MEDICINE | Facility: CLINIC | Age: 73
End: 2018-09-18

## 2018-09-18 VITALS
SYSTOLIC BLOOD PRESSURE: 110 MMHG | HEART RATE: 84 BPM | BODY MASS INDEX: 29.17 KG/M2 | TEMPERATURE: 97.1 F | DIASTOLIC BLOOD PRESSURE: 60 MMHG | RESPIRATION RATE: 20 BRPM | WEIGHT: 149.38 LBS

## 2018-09-18 DIAGNOSIS — E55.9 VITAMIN D DEFICIENCY: ICD-10-CM

## 2018-09-18 DIAGNOSIS — D22.9 ATYPICAL NEVUS: ICD-10-CM

## 2018-09-18 DIAGNOSIS — E78.49 OTHER HYPERLIPIDEMIA: ICD-10-CM

## 2018-09-18 DIAGNOSIS — J45.20 MILD INTERMITTENT ASTHMA WITHOUT COMPLICATION: ICD-10-CM

## 2018-09-18 DIAGNOSIS — Z12.31 ENCOUNTER FOR SCREENING MAMMOGRAM FOR BREAST CANCER: ICD-10-CM

## 2018-09-18 DIAGNOSIS — C73 THYROID CANCER (HCC): ICD-10-CM

## 2018-09-18 DIAGNOSIS — K21.9 GASTROESOPHAGEAL REFLUX DISEASE WITHOUT ESOPHAGITIS: ICD-10-CM

## 2018-09-18 DIAGNOSIS — I10 ESSENTIAL HYPERTENSION: ICD-10-CM

## 2018-09-18 DIAGNOSIS — M85.89 OSTEOPENIA OF MULTIPLE SITES: ICD-10-CM

## 2018-09-18 DIAGNOSIS — M15.9 PRIMARY OSTEOARTHRITIS INVOLVING MULTIPLE JOINTS: ICD-10-CM

## 2018-09-18 DIAGNOSIS — F41.1 GENERALIZED ANXIETY DISORDER: ICD-10-CM

## 2018-09-18 DIAGNOSIS — K63.5 BENIGN COLONIC POLYP: ICD-10-CM

## 2018-09-18 DIAGNOSIS — E11.9 TYPE 2 DIABETES MELLITUS WITHOUT COMPLICATION, WITHOUT LONG-TERM CURRENT USE OF INSULIN (HCC): Primary | ICD-10-CM

## 2018-09-18 LAB
25(OH)D3 SERPL-MCNC: 26 NG/ML
A/C: NORMAL
ALBUMIN SERPL-MCNC: 4.79 G/DL (ref 3.2–4.8)
ALBUMIN/GLOB SERPL: 2.1 G/DL (ref 1.5–2.5)
ALP SERPL-CCNC: 80 U/L (ref 25–100)
ALT SERPL W P-5'-P-CCNC: 17 U/L (ref 7–40)
ANION GAP SERPL CALCULATED.3IONS-SCNC: 9 MMOL/L (ref 3–11)
ARTICHOKE IGE QN: 93 MG/DL (ref 0–130)
AST SERPL-CCNC: 17 U/L (ref 0–33)
BILIRUB SERPL-MCNC: 0.3 MG/DL (ref 0.3–1.2)
BUN BLD-MCNC: 14 MG/DL (ref 9–23)
BUN/CREAT SERPL: 13.1 (ref 7–25)
CALCIUM SPEC-SCNC: 9.7 MG/DL (ref 8.7–10.4)
CHLORIDE SERPL-SCNC: 102 MMOL/L (ref 99–109)
CHOLEST SERPL-MCNC: 176 MG/DL (ref 0–200)
CLARITY, POC: CLEAR
CO2 SERPL-SCNC: 28 MMOL/L (ref 20–31)
COLOR UR: YELLOW
CREAT BLD-MCNC: 1.07 MG/DL (ref 0.6–1.3)
GFR SERPL CREATININE-BSD FRML MDRD: 50 ML/MIN/1.73
GLOBULIN UR ELPH-MCNC: 2.3 GM/DL
GLUCOSE BLD-MCNC: 97 MG/DL (ref 70–100)
GLUCOSE UR STRIP-MCNC: NEGATIVE MG/DL
HDLC SERPL-MCNC: 46 MG/DL (ref 40–60)
KETONES UR QL: NEGATIVE
LEUKOCYTE EST, POC: NEGATIVE
NITRITE UR-MCNC: NEGATIVE MG/ML
PH UR: 7 [PH] (ref 5–8)
POC CREATININE URINE: NORMAL
POC MICROALBUMIN URINE: NORMAL
POTASSIUM BLD-SCNC: 4.9 MMOL/L (ref 3.5–5.5)
PROT SERPL-MCNC: 7.1 G/DL (ref 5.7–8.2)
PROT UR STRIP-MCNC: NEGATIVE MG/DL
RBC # UR STRIP: NEGATIVE /UL
SODIUM BLD-SCNC: 139 MMOL/L (ref 132–146)
SP GR UR: 1.01 (ref 1–1.03)
T4 FREE SERPL-MCNC: 1 NG/DL (ref 0.89–1.76)
TRIGL SERPL-MCNC: 343 MG/DL (ref 0–150)
TSH SERPL DL<=0.05 MIU/L-ACNC: 4.79 MIU/ML (ref 0.35–5.35)

## 2018-09-18 PROCEDURE — 81003 URINALYSIS AUTO W/O SCOPE: CPT | Performed by: INTERNAL MEDICINE

## 2018-09-18 PROCEDURE — 84443 ASSAY THYROID STIM HORMONE: CPT | Performed by: INTERNAL MEDICINE

## 2018-09-18 PROCEDURE — 82306 VITAMIN D 25 HYDROXY: CPT | Performed by: INTERNAL MEDICINE

## 2018-09-18 PROCEDURE — 84439 ASSAY OF FREE THYROXINE: CPT | Performed by: INTERNAL MEDICINE

## 2018-09-18 PROCEDURE — 82044 UR ALBUMIN SEMIQUANTITATIVE: CPT | Performed by: INTERNAL MEDICINE

## 2018-09-18 PROCEDURE — 99215 OFFICE O/P EST HI 40 MIN: CPT | Performed by: INTERNAL MEDICINE

## 2018-09-18 PROCEDURE — 80061 LIPID PANEL: CPT | Performed by: INTERNAL MEDICINE

## 2018-09-18 PROCEDURE — 36415 COLL VENOUS BLD VENIPUNCTURE: CPT | Performed by: INTERNAL MEDICINE

## 2018-09-18 PROCEDURE — 80053 COMPREHEN METABOLIC PANEL: CPT | Performed by: INTERNAL MEDICINE

## 2018-09-18 RX ORDER — BLOOD SUGAR DIAGNOSTIC
STRIP MISCELLANEOUS
Qty: 100 EACH | Refills: 3 | Status: SHIPPED | OUTPATIENT
Start: 2018-09-18 | End: 2018-09-19 | Stop reason: CLARIF

## 2018-09-18 NOTE — PROGRESS NOTES
Subjective       Cristy Louie is a 73 y.o. female.     Chief Complaint   Patient presents with   • Diabetes     6 month follow up  fasting        History obtained from the patient.      History of Present Illness     The patient had a Right Thyroidectomy and a Right Superior Parathyroid Excision on 8/7/18.  This was done at [facility] by Dr. Deedee Jacobs.  The patient was referred by Dr. Aiken.  Records have been received and reviewed.  Notes from Dr. Jacobs were sent to Dr. Aiken and scanned into [employer].  I did review these notes.    The patient had a right lobe thyroid ultrasound guided FNA on 5/23/18.  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  The patient had a nuclear medicine parathyroid scan on 7/11/18 which showed an enlarged hypercellular parathyroid gland in the deep right neck region of the inferior right thyroid lobe.  There was also a grossly abnormal thyroid gland.  Pathology from the right thyroidectomy showed Papillary Thyroid Carcinoma with negative surgical margins, one benign lymph node, and benign adenomatous nodules.  Right superior parathyroid excision pathology was consistent with a hypercellular parathyroid.    EKG on 7/31/18 showed normal sinus rhythm with low voltage QRS.      On 8/15/18, Calcium was 10.1.  On 8/7/18, PTH was 58.  The patient has a follow-up appointment with Dr. Jacobs to discuss biopsy results on 9/26/18.    Primary Care Cardiac Diagnostic [facility]: The patient is here today for a follow-up visit.       Her Diabetes Mellitus Type 2 is stable.   Medication(s): Metformin HCl.   Her Hypertension is stable.   Medication(s): Lisinopril / HCTZ.   Her Hyperlipidemia has been unstable.   Her LDL goal is 70 mg/dL, last LDL was 100 mg/dL and    Medication(s): Simvastatin.   The patient is adherent with her medication regimen. She denies medication side effects.       Interval Events: The patient states her glucometer is broken and she has not been checking  her blood sugar at home.  She would like a new glucometer.  Last Hemoglobin A1C was 6.4 on 7/31/18.  CMP was normal at that time.   She does check her feet daily. She saw the ophthalmologist 10/4/17, no retinopathy.    She states she has an appointment scheduled in November.  Patient has a follow-up appointment scheduled with Dr. Aiken on 10/8/18.      Symptoms:  Has stable COOL,  lower extremity edema,  fatigue,  myalgias / arthralgias, and lightheadedness / dizziness. Denies chest pain,  heart palpitations, syncope,  orthopnea,  PND, intermittent leg claudication, resting dyspnea, and muscle weakness.   Associated symptoms:  no recent significant  weight changes. Has headaches and some blurred vision, but no double vision.  No polydipsia, no polyuria, no focal neurologic deficits, no memory loss,  and no concentration problems.   No numbness of the feet or foot ulcer.       Lifestyle and Disease Management: Diet: She does have a healthy diet. Weight Issues: She has weight concerns. Exercise: She has not been exercising recently.   Smoking: She does not use tobacco.       Gastroesophageal Reflux Disease Follow-up: The patient is being seen for a routine clinic follow-up of Gastroesophageal Reflux Disease, which is stable.  Previous Evaluation: Last EGD 1/25/16 showed Class A reflux esophagitis.  Interval Events:  None.  Symptoms:   No abdominal pain, no nausea, no vomiting, no acid regurgitation,  no heartburn, no bloating, no belching,  no sore throat, no hoarseness, no cough,  no odynophagia, no dysphagia,  no hematemesis, and no melena.    Associated symptoms: no bloating, no anorexia, no early satiety,  no belching, no weight loss, no halitosis,  and no wheezing.   Medication:  Omeprazole BID, Zofran, and Dicyclomine.      Colonic Polyp Follow-up: The patient is being seen for a routine clinic follow-up of Colon Polyp(s), which is stable.   Current diagnosis was determined by colonoscopy and last 1/25/16- no  polyps.   Symptoms:  occasional constipation,  but no hematochezia, no melena, no diarrhea,  no decreased stool caliber, no change in bowel habits,and no abdominal pain.   Associated symptoms: no rectal prolapse.   Medications:  Magnesium supplements.     Osteoporosis Follow-up: The patient is being seen for routine follow-up of Osteoporosis, which is stable.   Interval Events:  The last DEXA scan was done10/19/17, Osteopenia.   Symptoms:  She reports myalgias, back pain, hip pain, neck pain, arthralgias, and difficulty with stairs.   No inability to stand straight, no loss of balance, no difficulty ambulating,  no wrist pain, and no new fracture.  Medication:  Vitamin D.  Off Calcium.  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         Vitamin D Deficiency: The patient is here for follow up of Vitamin D Deficiency, which is stable.   Interval Events:  Recent laboratory results: 4/9/18  , 25-hydroxyvitamin D 27.7  ng/mL.  PTH was 89.9  Symptoms: stable fatigue and muscle pain.  Has some right hand numbness and tingling.   no bone pain, no muscle weakness, no muscle cramps, and no muscle twitching.  Medication:  Vitamin D.  Off Calcium.      Osteoarthritis Follow-Up: The patient is here for follow up of Osteoarthritis, which is stable.  Comorbid Illnesses: Rheumatoid Arthritis and Fibromyalgia.   Interval Events: she is wondering if she can stop her Placquenil since it does not seem to be helping her.    Symptoms:  Has arthralgias (hands, hips, and knees), myalgias, and back pain, but no myalgias.  Has left knee swelling, but no joint stiffness.  Medications:  on Placquenil.  Takes Advil prn.   Taking Tumeric.   The patient is adherent with her medication regimen. She denies medication side effects.      Anxiety Disorder Follow-Up: The patient is being seen for follow-up of Anxiety, which is stable.  Interval Events:   None.  Symptoms: stable anxiety and insomnia, but no difficulty  concentrating, restlessness, panic attacks, or depression.   Associated symptoms: no suicidal ideation.   Medication(s): Jessica's Wort  The patient is adherent to her medication regimen, and she denies medication side effects.     Current Outpatient Prescriptions on File Prior to Visit   Medication Sig Dispense Refill   • fluticasone (FLONASE) 50 MCG/ACT nasal spray into each nostril.     • Lecithin 1200 MG capsule Take  by mouth Daily.     • lisinopril-hydrochlorothiazide (PRINZIDE,ZESTORETIC) 20-25 MG per tablet TAKE 1 TABLET EVERY DAY 90 tablet 3   • metFORMIN (GLUCOPHAGE) 500 MG tablet TAKE 1 TABLET TWICE DAILY WITH FOOD 180 tablet 3   • omeprazole (PRILOSEC) 20 MG capsule Take 1 capsule by mouth 2 (Two) Times a Day. 180 capsule 3   • ondansetron ODT (ZOFRAN-ODT) 8 MG disintegrating tablet Take 1 tablet by mouth Every 8 (Eight) Hours As Needed for Nausea or Vomiting. 90 tablet 3   • simvastatin (ZOCOR) 40 MG tablet TAKE 1 TABLET AT BEDTIME 90 tablet 3   • St Johns Wort 150 MG capsule Take 1 tablet by mouth 2 (two) times a day.     • Turmeric 500 MG tablet Take 1 tablet by mouth Daily.     • dicyclomine (BENTYL) 20 MG tablet Take 1 tablet by mouth 4 (Four) Times a Day. 100 tablet 3   • [DISCONTINUED] hydroxychloroquine (PLAQUENIL) 200 MG tablet Take 1 tablet by mouth Daily. 90 tablet 1   • [DISCONTINUED] meloxicam (MOBIC) 15 MG tablet Take 1 tablet by mouth Daily As Needed for Moderate Pain . 30 tablet 2     No current facility-administered medications on file prior to visit.        Current outpatient and discharge medications have been reconciled for the patient.  Reviewed by: Marina Pederson MD        The following portions of the patient's history were reviewed and updated as appropriate: allergies, current medications, past family history, past medical history, past social history, past surgical history and problem list.    Review of Systems   Constitutional: Positive for fatigue. Negative for unexpected  weight change.   HENT: Negative for sore throat, trouble swallowing and voice change.    Eyes: Positive for visual disturbance.   Respiratory: Negative for cough, shortness of breath and wheezing.    Cardiovascular: Positive for leg swelling. Negative for chest pain and palpitations.        Stable  COOL, but no orthopnea or claudication.   Gastrointestinal: Positive for constipation. Negative for abdominal pain, blood in stool, diarrhea, nausea and vomiting.        Denies melena.   Endocrine: Negative for polydipsia and polyuria.   Genitourinary: Negative for dysuria, frequency, hematuria and urgency.   Musculoskeletal: Positive for arthralgias, back pain, myalgias and neck pain. Negative for joint swelling.   Skin:        Has a mole in the left groin area, which has become darker recently.   Neurological: Positive for dizziness, light-headedness and headaches. Negative for syncope.        No memory issues.   Psychiatric/Behavioral: Positive for sleep disturbance. Negative for decreased concentration and suicidal ideas. The patient is nervous/anxious.          Objective       Blood pressure 110/60, pulse 84, temperature 97.1 °F (36.2 °C), temperature source Temporal Artery , resp. rate 20, weight 67.8 kg (149 lb 6 oz).      Physical Exam   Constitutional: She appears well-developed and well-nourished.   Overweight.   Neck: Normal range of motion. Neck supple. Carotid bruit is not present. No thyromegaly (Right thyroid absent) present.   Cardiovascular: Normal rate, regular rhythm, normal heart sounds and intact distal pulses.  Exam reveals no gallop and no friction rub.    No murmur heard.  No peripheral edema.   Pulmonary/Chest: Effort normal and breath sounds normal.   Abdominal: Soft. Bowel sounds are normal. She exhibits no distension, no abdominal bruit and no mass. There is no hepatosplenomegaly. There is tenderness (diffuse). There is no rebound, no guarding and no CVA tenderness.   Neurological: She is alert.    Skin: No rash noted.   There is an oval, macular, brown lesion on the left upper inner leg.   Psychiatric: She has a normal mood and affect.   Nursing note and vitals reviewed.    Counseling was given to patient for the following topics: appropriate exercise, bone health, discussed labs and diagnostic tests performed last visit or since last visit (thyroid biopsy results and new cancer diagnosis), discussed labs and diagnostic tests performed this visit (HgA1C), disease prevention, healthy eating habits, importance of daily blood sugar checks, to include fasting/2 hours postprandial/bedtime, importance of daily foot exam for patients with diabetes, importance of yearly ophthalmology exam for patients with diabetes, stress increase in the patient's life (recent surgery),  symptoms of anxiety and symptoms of depression . Total time of the encounter was 25 minutes and 15 minutes was spent counseling.        Results for orders placed or performed in visit on 09/18/18   POC Urinalysis Dipstick, Multipro   Result Value Ref Range    Color Yellow Yellow, Straw, Dark Yellow, Carol    Clarity, UA Clear Clear    Glucose, UA Negative Negative, 1000 mg/dL (3+) mg/dL    Ketones, UA Negative Negative    Specific Gravity  1.010 1.005 - 1.030    Blood, UA Negative Negative    pH, Urine 7.0 5.0 - 8.0    Protein, POC Negative Negative mg/dL    Nitrite, UA Negative Negative    Leukocytes Negative Negative   POC Microalbumin   Result Value Ref Range    Microalbumin, Urine 10 mg/L     Creatinine, Urine 50 mg/dL     A/C  mg/g        Assessment / Plan:  Crsity was seen today for diabetes.    Diagnoses and all orders for this visit:    Type 2 diabetes mellitus without complication, without long-term current use of insulin (CMS/[facility])  -     POC Urinalysis Dipstick, Multipro  -     POC Microalbumin  -     Blood Glucose Monitoring Suppl (ADVOCATE BLOOD GLUCOSE SYSTEM) w/Device kit; Blood glucose testing once daily, as directed  -      ADVOCATE LANCETS misc; Blood glucose testing once daily, as directed  -     Glucose blood (ADVOCATE TEST) test strip; Blood glucose testing once daily, as directed    Essential hypertension    Other hyperlipidemia  -     Lipid Panel  -     Comprehensive Metabolic Panel    Thyroid cancer (CMS/[facility])  -     TSH  -     T4, Free    Gastroesophageal reflux disease without esophagitis    Benign colonic polyp    Mild intermittent asthma without complication    Primary osteoarthritis involving multiple joints   Patient was instructed to discontinue her Plaquenil, since it is not really helping.      Osteopenia of multiple sites    Vitamin D deficiency  -     Vitamin D 25 Hydroxy    Generalized anxiety disorder    Atypical nevus (new problem)  -     Ambulatory Referral to Dermatology    Encounter for screening mammogram for breast cancer  -     Mammo Screening Digital Tomosynthesis Bilateral With CAD; Future      Recommend Td (or Tdap) and Shingrix at the Pharmacy.      I certify that the patient had a face-to-face encounter with me today, 9/18/2018.  The encounter meets Medicare face-to face encounter requirements.  The reason for the visit was to assess and evaluate the patient, who has been prescribed a new glucometer, lancets, and strips for her Diabetes.  she was evaluated for the need for skilled services including diabetic glucose monitoring.  I certify that these services are medically necessary for the patient.  The patient is homebound, due to the fact that she requires the aid of supportive devices (wheelchair or walker) and requires the use of special transportation.  I will be treating the patient on an ongoing basis.        Return in about 6 months (around 3/18/2019) for Recheck Diabetes, fasting.

## 2018-09-19 RX ORDER — LANCETS
EACH MISCELLANEOUS
Qty: 102 EACH | Refills: 12 | Status: SHIPPED | OUTPATIENT
Start: 2018-09-19 | End: 2019-10-29 | Stop reason: SDUPTHER

## 2018-09-19 RX ORDER — BLOOD-GLUCOSE METER
1 EACH MISCELLANEOUS DAILY
Qty: 1 KIT | Refills: 0 | Status: SHIPPED | OUTPATIENT
Start: 2018-09-19 | End: 2021-04-27 | Stop reason: ALTCHOICE

## 2018-09-26 ENCOUNTER — HOSPITAL ENCOUNTER (OUTPATIENT)
Dept: MAMMOGRAPHY | Facility: HOSPITAL | Age: 73
Discharge: HOME | End: 2018-09-26
Attending: INTERNAL MEDICINE | Admitting: INTERNAL MEDICINE

## 2018-09-26 DIAGNOSIS — Z12.31 ENCOUNTER FOR SCREENING MAMMOGRAM FOR BREAST CANCER: ICD-10-CM

## 2018-09-26 PROCEDURE — 77063 BREAST TOMOSYNTHESIS BI: CPT

## 2018-09-26 PROCEDURE — 77067 SCR MAMMO BI INCL CAD: CPT

## 2018-09-26 PROCEDURE — 77063 BREAST TOMOSYNTHESIS BI: CPT | Performed by: RADIOLOGY

## 2018-09-26 PROCEDURE — 77067 SCR MAMMO BI INCL CAD: CPT | Performed by: RADIOLOGY

## 2018-10-08 ENCOUNTER — OFFICE VISIT (OUTPATIENT)
Dept: ENDOCRINOLOGY | Facility: CLINIC | Age: 73
End: 2018-10-08

## 2018-10-08 VITALS
SYSTOLIC BLOOD PRESSURE: 120 MMHG | WEIGHT: 148 LBS | HEART RATE: 76 BPM | OXYGEN SATURATION: 98 % | DIASTOLIC BLOOD PRESSURE: 76 MMHG | HEIGHT: 60 IN | BODY MASS INDEX: 29.06 KG/M2

## 2018-10-08 DIAGNOSIS — E89.0 POST-SURGICAL HYPOTHYROIDISM: ICD-10-CM

## 2018-10-08 DIAGNOSIS — C73 THYROID CANCER (HCC): Primary | ICD-10-CM

## 2018-10-08 PROCEDURE — 99215 OFFICE O/P EST HI 40 MIN: CPT | Performed by: INTERNAL MEDICINE

## 2018-10-08 RX ORDER — LEVOTHYROXINE SODIUM 0.05 MG/1
112 TABLET ORAL DAILY
COMMUNITY
End: 2018-12-18

## 2018-10-08 NOTE — PROGRESS NOTES
Chief Complaint   Patient presents with   • Thyroid Cancer     f/u    • post-surgical hypothyroidism       HPI:   Cristy Louie is a 73 y.o.female who returns to Endocrine Clinic for f/u evaluation of recently found thyroid cancer and post-surgical hypothyroidism. Last visit 04/09/2018.  Her history is as follows:    Interim Events: see below    1) pT1a, N0 papillary thyroid carcinoma, oncocytic variant  - pt was sent in consultation by me to Dr. Ava Jacobs, Endocrine Surgery, at [facility] for surgical treatment of pt's primary hyperparathyroidism.  - (05/24/2018) had pre-operative localization imaging with Thyroid US at  by Dr. Jacobs and an US-Guided FNA of a right inferior thyroid lobe nodule was completed. 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  - pt underwent right superior parathyroidectomy and partial right thyroidectomy on 08/07/2018    Surgical pathology:  A. Thyroid, right, lobectomy:  - papillary thyroid carcinoma, oncocytic variant (pT1a, N0)  - surgical margins negative for tumor  - on benign perithyroidal lymph node (0/1)  - benign adenomatoid nodules.    B. Parathyroid, right superior, excision:  - hypercellular parathyroid (0.45 gm)    After her pathology was reviewed by the [facility] tumor board, Dr. Jacobs discussed the pathology findings with me and the the recommendation for completion thyroidectomy given the more aggressive histologic subtype of papillary thyroid cancer the patient had.      Pt presents today for follow-up and to discuss her treatment options.    2) post-surgical hypothyroidism:  - (09/26/2018, ) TSH 6.84 (0.4 - 4.2)  - Pt was prescribed levothyroxine 50 mcg daily by Dr. Jacobs  - Pt taking in AM on an empty stomach as directed without interfering substances    3) h/o  primary hyperparathyroidism:  - s/p right superior parathyroidectomy 05/24/2018  - (09/18/2018) Calcium 9.7    Lab Summary:   (09/26/2018, )  Calcium, Total: 9.9  PTH 65 (12 -72)  TSH  6.84  "(0.4 - 4.2)  Free T4 1.1 (0.8 - 1.7)  TG Ab <1.0  TG 6.3    Review of Systems   Constitutional: Positive for fatigue.        Weight stable   HENT: Negative.    Eyes: Negative.    Respiratory: Negative.    Cardiovascular: Negative.    Gastrointestinal: Negative.  Negative for abdominal pain and constipation.   Endocrine: Negative.    Genitourinary: Negative.    Musculoskeletal: Positive for arthralgias and myalgias. Negative for joint swelling.   Skin: Negative.    Allergic/Immunologic: Negative.    Neurological: Negative.    Hematological: Negative.    Psychiatric/Behavioral: Negative.      The following portions of the patient's history were reviewed and updated as appropriate: allergies, current medications, past family history, past medical history, past social history, past surgical history and problem list.    /76   Pulse 76   Ht 152.4 cm (60\")   Wt 67.1 kg (148 lb)   SpO2 98%   BMI 28.90 kg/m²   Physical Exam   Constitutional: She is oriented to person, place, and time. She appears well-developed. No distress.   HENT:   Head: Normocephalic.   Mouth/Throat: Oropharynx is clear and moist.   Eyes: Pupils are equal, round, and reactive to light. Conjunctivae and EOM are normal.   Neck: No tracheal deviation present. No thyromegaly present.   No palpable thyroid nodules in remaining left lobe isthmus. Right thyroid lobe absent.      Cardiovascular: Normal rate, regular rhythm and normal heart sounds.    No murmur heard.  Pulmonary/Chest: Effort normal and breath sounds normal. No respiratory distress.   Abdominal: Soft. Bowel sounds are normal. She exhibits no mass. There is no tenderness.   Lymphadenopathy:     She has no cervical adenopathy.   Neurological: She is alert and oriented to person, place, and time. No cranial nerve deficit.   Skin: Skin is warm and dry. She is not diaphoretic. No erythema.   Psychiatric: She has a normal mood and affect. Her behavior is normal.   Vitals " reviewed.    LABS/IMAGING: outside records reviewed and summarized in HPI  Results for orders placed or performed in visit on 09/18/18   Lipid Panel   Result Value Ref Range    Total Cholesterol 176 0 - 200 mg/dL    Triglycerides 343 (H) 0 - 150 mg/dL    HDL Cholesterol 46 40 - 60 mg/dL    LDL Cholesterol  93 0 - 130 mg/dL   Comprehensive Metabolic Panel   Result Value Ref Range    Glucose 97 70 - 100 mg/dL    BUN 14 9 - 23 mg/dL    Creatinine 1.07 0.60 - 1.30 mg/dL    Sodium 139 132 - 146 mmol/L    Potassium 4.9 3.5 - 5.5 mmol/L    Chloride 102 99 - 109 mmol/L    CO2 28.0 20.0 - 31.0 mmol/L    Calcium 9.7 8.7 - 10.4 mg/dL    Total Protein 7.1 5.7 - 8.2 g/dL    Albumin 4.79 3.20 - 4.80 g/dL    ALT (SGPT) 17 7 - 40 U/L    AST (SGOT) 17 0 - 33 U/L    Alkaline Phosphatase 80 25 - 100 U/L    Total Bilirubin 0.3 0.3 - 1.2 mg/dL    eGFR Non African Amer 50 (L) >60 mL/min/1.73    Globulin 2.3 gm/dL    A/G Ratio 2.1 1.5 - 2.5 g/dL    BUN/Creatinine Ratio 13.1 7.0 - 25.0    Anion Gap 9.0 3.0 - 11.0 mmol/L   Vitamin D 25 Hydroxy   Result Value Ref Range    25 Hydroxy, Vitamin D 26.0 ng/ml   TSH   Result Value Ref Range    TSH 4.786 0.350 - 5.350 mIU/mL   T4, Free   Result Value Ref Range    Free T4 1.00 0.89 - 1.76 ng/dL   POC Urinalysis Dipstick, Multipro   Result Value Ref Range    Color Yellow Yellow, Straw, Dark Yellow, Carol    Clarity, UA Clear Clear    Glucose, UA Negative Negative, 1000 mg/dL (3+) mg/dL    Ketones, UA Negative Negative    Specific Gravity  1.010 1.005 - 1.030    Blood, UA Negative Negative    pH, Urine 7.0 5.0 - 8.0    Protein, POC Negative Negative mg/dL    Nitrite, UA Negative Negative    Leukocytes Negative Negative   POC Microalbumin   Result Value Ref Range    Microalbumin, Urine 10 mg/L     Creatinine, Urine 50 mg/dL     A/C  mg/g      Lab Results   Component Value Date    GLUCOSE 97 09/18/2018    BUN 14 09/18/2018    CREATININE 1.07 09/18/2018    EGFRIFNONA 50 (L) 09/18/2018    BCR 13.1  09/18/2018    K 4.9 09/18/2018    CO2 28.0 09/18/2018    CALCIUM 9.7 09/18/2018    ALBUMIN 4.79 09/18/2018    AST 17 09/18/2018    ALT 17 09/18/2018     ASSESSMENT/PLAN:  1) pT1a, N0 papillary thyroid carcinoma, oncocytic variant: s/p partial right thyroidectomy in 08/2018  - I reviewed with Ms. Louie current American Thyroid Association 2015 guidelines for differentiated thyroid carcinoma. I discussed the risks and benefits of her options for completion thyroidectomy vs close monitoring for disease recurrence after partial thyroidectomy.  - I discussed my conversation with Dr. Jacobs regarding her pathology findings and the the recommendation for completion thyroidectomy given the more aggressive histologic subtype of papillary thyroid cancer she had.   - I also discussed that the need for additional treatment (in particular, radioiodine therapy) after surgery, per these guidelines, is based upon an initial risk stratification system which estimates the risk of persistent/recurrent disease. Per this stratification system, she is at low risk for recurrence.  I did not recommend REESE remnant ablation.     Pt would like to pursue completion thyroidectomy and will contact Dr. Jacobs's office.    Discussed with patient that I will continue to monitor her for disease recurrence.    2) post-surgical hypothyroidism:  - (09/26/2018, ) TSH 6.84 (0.4 - 4.2)  - Pt was prescribed levothyroxine 50 mcg daily by Dr. Jacobs  - Advised pt to continue this dose for now. She will be prescribed a higher dose after surgery.  - Reviewed proper thyroid hormone administration, and factors to avoid that decrease medication potency and medication absorption.     RTC 6 months.     Counseling was given to patient for the following topics:  instructions for management, prognosis, impressions and risks and benefits of treatment options, see details in assessment/plan. Total face to face time of the encounter was 45 minutes and 35 minutes was spent  counseling.

## 2018-10-11 RX ORDER — LISINOPRIL AND HYDROCHLOROTHIAZIDE 25; 20 MG/1; MG/1
TABLET ORAL
Qty: 90 TABLET | Refills: 3 | Status: SHIPPED | OUTPATIENT
Start: 2018-10-11 | End: 2018-12-18

## 2018-10-11 RX ORDER — SIMVASTATIN 40 MG
TABLET ORAL
Qty: 90 TABLET | Refills: 3 | Status: SHIPPED | OUTPATIENT
Start: 2018-10-11 | End: 2019-07-30 | Stop reason: SDUPTHER

## 2018-10-12 ENCOUNTER — TELEPHONE (OUTPATIENT)
Dept: INTERNAL MEDICINE | Facility: CLINIC | Age: 73
End: 2018-10-12

## 2018-10-12 DIAGNOSIS — M79.7 FIBROMYALGIA: Primary | ICD-10-CM

## 2018-10-12 RX ORDER — MELOXICAM 15 MG/1
15 TABLET ORAL DAILY PRN
Qty: 30 TABLET | Refills: 2 | Status: SHIPPED | OUTPATIENT
Start: 2018-10-12 | End: 2018-12-18

## 2018-10-12 NOTE — TELEPHONE ENCOUNTER
----- Message from Linda Doss sent at 10/12/2018  9:37 AM EDT -----  Contact: SELF  RACHELLE GUZMAN CALLING TO SEE IF SHE CAN GET SOMETHING FOR HER FIBROMYALGIA, SHE SAID SHE IS IN A LOT OF PAIN WITH THIS. SHE SAID SHE CANT TAKE TYLENOL DUE TO HER LIVER. SHE USES THE [facility] IN [facility]. SHE CAN BE REACHED -[phone]

## 2018-10-12 NOTE — TELEPHONE ENCOUNTER
Patient states that she thinks one of her doctors told her not to take meloxicam due to her kidneys.

## 2018-12-18 ENCOUNTER — HOSPITAL ENCOUNTER (OUTPATIENT)
Dept: GENERAL RADIOLOGY | Facility: HOSPITAL | Age: 73
Discharge: HOME | End: 2018-12-18
Attending: INTERNAL MEDICINE | Admitting: INTERNAL MEDICINE

## 2018-12-18 ENCOUNTER — OFFICE VISIT (OUTPATIENT)
Dept: INTERNAL MEDICINE | Facility: CLINIC | Age: 73
End: 2018-12-18

## 2018-12-18 VITALS
SYSTOLIC BLOOD PRESSURE: 122 MMHG | TEMPERATURE: 97 F | BODY MASS INDEX: 29.69 KG/M2 | DIASTOLIC BLOOD PRESSURE: 64 MMHG | RESPIRATION RATE: 20 BRPM | HEART RATE: 72 BPM | WEIGHT: 152 LBS

## 2018-12-18 DIAGNOSIS — M79.671 RIGHT FOOT PAIN: Primary | ICD-10-CM

## 2018-12-18 DIAGNOSIS — Z23 NEED FOR INFLUENZA VACCINATION: ICD-10-CM

## 2018-12-18 DIAGNOSIS — M79.675 PAIN AND SWELLING OF TOE OF LEFT FOOT: ICD-10-CM

## 2018-12-18 DIAGNOSIS — M06.9 RHEUMATOID ARTHRITIS, INVOLVING UNSPECIFIED SITE, UNSPECIFIED RHEUMATOID FACTOR PRESENCE: ICD-10-CM

## 2018-12-18 DIAGNOSIS — M79.89 PAIN AND SWELLING OF TOE OF LEFT FOOT: ICD-10-CM

## 2018-12-18 LAB
BASOPHILS # BLD AUTO: 0.05 10*3/MM3 (ref 0–0.2)
BASOPHILS NFR BLD AUTO: 0.5 % (ref 0–1)
DEPRECATED RDW RBC AUTO: 44.3 FL (ref 37–54)
EOSINOPHIL # BLD AUTO: 0.44 10*3/MM3 (ref 0–0.3)
EOSINOPHIL NFR BLD AUTO: 4.6 % (ref 0–3)
ERYTHROCYTE [DISTWIDTH] IN BLOOD BY AUTOMATED COUNT: 12.7 % (ref 11.3–14.5)
ERYTHROCYTE [SEDIMENTATION RATE] IN BLOOD: 39 MM/HR (ref 0–30)
HCT VFR BLD AUTO: 36.4 % (ref 34.5–44)
HGB BLD-MCNC: 11.4 G/DL (ref 11.5–15.5)
IMM GRANULOCYTES # BLD: 0.02 10*3/MM3 (ref 0–0.03)
IMM GRANULOCYTES NFR BLD: 0.2 % (ref 0–0.6)
LYMPHOCYTES # BLD AUTO: 2.74 10*3/MM3 (ref 0.6–4.8)
LYMPHOCYTES NFR BLD AUTO: 28.8 % (ref 24–44)
MCH RBC QN AUTO: 29.9 PG (ref 27–31)
MCHC RBC AUTO-ENTMCNC: 31.3 G/DL (ref 32–36)
MCV RBC AUTO: 95.5 FL (ref 80–99)
MONOCYTES # BLD AUTO: 1.16 10*3/MM3 (ref 0–1)
MONOCYTES NFR BLD AUTO: 12.2 % (ref 0–12)
NEUTROPHILS # BLD AUTO: 5.13 10*3/MM3 (ref 1.5–8.3)
NEUTROPHILS NFR BLD AUTO: 53.9 % (ref 41–71)
PLATELET # BLD AUTO: 352 10*3/MM3 (ref 150–450)
PMV BLD AUTO: 11.9 FL (ref 6–12)
RBC # BLD AUTO: 3.81 10*6/MM3 (ref 3.89–5.14)
URATE SERPL-MCNC: 6.7 MG/DL (ref 3.1–7.8)
WBC NRBC COR # BLD: 9.52 10*3/MM3 (ref 3.5–10.8)

## 2018-12-18 PROCEDURE — 86431 RHEUMATOID FACTOR QUANT: CPT | Performed by: INTERNAL MEDICINE

## 2018-12-18 PROCEDURE — 85025 COMPLETE CBC W/AUTO DIFF WBC: CPT | Performed by: INTERNAL MEDICINE

## 2018-12-18 PROCEDURE — 99214 OFFICE O/P EST MOD 30 MIN: CPT | Performed by: INTERNAL MEDICINE

## 2018-12-18 PROCEDURE — G0008 ADMIN INFLUENZA VIRUS VAC: HCPCS | Performed by: INTERNAL MEDICINE

## 2018-12-18 PROCEDURE — 84550 ASSAY OF BLOOD/URIC ACID: CPT | Performed by: INTERNAL MEDICINE

## 2018-12-18 PROCEDURE — 36415 COLL VENOUS BLD VENIPUNCTURE: CPT | Performed by: INTERNAL MEDICINE

## 2018-12-18 PROCEDURE — 73630 X-RAY EXAM OF FOOT: CPT

## 2018-12-18 PROCEDURE — 90662 IIV NO PRSV INCREASED AG IM: CPT | Performed by: INTERNAL MEDICINE

## 2018-12-18 PROCEDURE — 86430 RHEUMATOID FACTOR TEST QUAL: CPT | Performed by: INTERNAL MEDICINE

## 2018-12-18 RX ORDER — LEVOTHYROXINE SODIUM 112 UG/1
112 TABLET ORAL DAILY
COMMUNITY
End: 2019-04-08 | Stop reason: SDUPTHER

## 2018-12-18 RX ORDER — CALCIUM CARBONATE 200(500)MG
6 TABLET,CHEWABLE ORAL DAILY
COMMUNITY
End: 2019-04-08

## 2018-12-18 RX ORDER — LISINOPRIL 20 MG/1
20 TABLET ORAL DAILY
COMMUNITY
End: 2019-04-08

## 2018-12-18 RX ORDER — MELOXICAM 15 MG/1
15 TABLET ORAL DAILY
Qty: 14 TABLET | Refills: 0 | Status: SHIPPED | OUTPATIENT
Start: 2018-12-18 | End: 2019-01-01

## 2018-12-18 NOTE — PROGRESS NOTES
Bairon Louie is a 73 y.o. female.     Chief Complaint   Patient presents with   • Foot Pain     RT foot        History obtained from the patient.      Foot Pain   This is a new problem. Episode onset: 3 weeks ago, no injury or trauma. The problem occurs constantly (and radiates all the way to the hip). The problem has been gradually improving. Associated symptoms include joint swelling. Pertinent negatives include no arthralgias, chest pain, chills, coughing, fever, myalgias, neck pain, numbness (and no tingling) or rash. The symptoms are aggravated by walking and standing. Treatments tried: seen at an urgent care on 12/8/18, diagnosed with Gout, and put on Colchicine x 3 doses.  Has taken an occasional Tylenol.   No other medication.  No heat or ice. The treatment provided no relief.      She states she did not have lab or x-ray done at the urgent care.  She has a history of Rheumatoid Arthritis, but has been off Plaquenil.  She states she did take 2 doses recently but is not sure whether it helped her pain.    The following portions of the patient's history were reviewed and updated as appropriate: allergies, current medications, past family history, past medical history, past social history, past surgical history and problem list.      Review of Systems   Constitutional: Negative for chills and fever.   Respiratory: Negative for cough and shortness of breath.    Cardiovascular: Negative for chest pain.   Musculoskeletal: Positive for joint swelling. Negative for arthralgias, back pain, gait problem, myalgias and neck pain.   Skin: Negative for rash.   Neurological: Negative for numbness (and no tingling).           Objective     Blood pressure 122/64, pulse 72, temperature 97 °F (36.1 °C), temperature source Temporal, resp. rate 20, weight 68.9 kg (152 lb).    Physical Exam   Constitutional:   Overweight.   Cardiovascular: Intact distal pulses.   Musculoskeletal:   There is slight swelling at  the base of the second and third metatarsals in the left foot, but no erythema, warmth, or tenderness to palpation.  There is erythema, bruising, and  tenderness to palpation at the base of the right great toe.  There is pain with flexion, extension, and  lateral movement of the right great toe.  There is no pain with flexion and extension of the right ankle.  There is no pain with inversion and eversion of the right foot.   Neurological: She is alert.   Skin: No rash noted.   Psychiatric: She has a normal mood and affect.   Nursing note and vitals reviewed.      Imaging: Bilateral foot x-ray showed degenerative changes, but no fracture, per my interpretation.  The patient was informed of these results at her visit.    Assessment/Plan   Cristy was seen today for foot pain.    Diagnoses and all orders for this visit:    Right foot pain  -     XR Foot 3+ View Right  -     CBC & Differential  -     Uric Acid  -     Sedimentation Rate  -     Rheumatoid Factor  -     CBC Auto Differential  -     meloxicam (MOBIC) 15 MG tablet; Take 1 tablet by mouth Daily for 14 days.    Need for influenza vaccination  -     Fluzone High Dose =>65Years  -     CBC & Differential  -     Sedimentation Rate  -     Rheumatoid Factor  -     CBC Auto Differential    Pain and swelling of toe of left foot  -     XR Foot 3+ View Left    Rheumatoid arthritis, involving unspecified site, unspecified rheumatoid factor presence (CMS/HCC)      Take Meloxicam with food.  Recommend ice, rest, and elevation of the feet.        Return if symptoms worsen or fail to improve.

## 2018-12-19 LAB
RHEUMATOID FACT SERPL-ACNC: POSITIVE [IU]/ML
RHEUMATOID FACT TITR SER: NORMAL IU/ML

## 2018-12-20 DIAGNOSIS — R07.81 RIB PAIN ON RIGHT SIDE: Primary | ICD-10-CM

## 2018-12-20 DIAGNOSIS — M05.9 RHEUMATOID ARTHRITIS WITH POSITIVE RHEUMATOID FACTOR, INVOLVING UNSPECIFIED SITE (HCC): Primary | ICD-10-CM

## 2018-12-21 ENCOUNTER — HOSPITAL ENCOUNTER (OUTPATIENT)
Dept: GENERAL RADIOLOGY | Facility: HOSPITAL | Age: 73
Discharge: HOME | End: 2018-12-21
Attending: INTERNAL MEDICINE | Admitting: INTERNAL MEDICINE

## 2018-12-21 DIAGNOSIS — R07.81 RIB PAIN ON RIGHT SIDE: ICD-10-CM

## 2018-12-21 PROCEDURE — 71101 X-RAY EXAM UNILAT RIBS/CHEST: CPT

## 2018-12-25 NOTE — TELEPHONE ENCOUNTER
"Physical Therapy Treatment completed.   Bed Mobility:  Supine to Sit: Contact Guard Assist  Transfers: Sit to Stand: Contact Guard Assist (Min A from lower surfaces )  Gait: Level Of Assist: Minimal Assist with Front-Wheel Walker       Plan of Care: Will benefit from Physical Therapy 5 times per week  Discharge Recommendations: Equipment: Will Continue to Assess for Equipment Needs. Recommend inpatient transitional care services for continued physical therapy services.      See \"Rehab Therapy-Acute\" Patient Summary Report for complete documentation.     Pt is progressing gradually demonstrating with improved functional mobility, strength, and balance. Pt has also improved in saftey awareness, however, continues to require occasional cues to keep FWW close to body. Pt was able to demonstrate CGA to Min A for all functional mobility at this time. Pt was able to ambulate a total of 120ft with 25ft increments and rest breaks along the way. Pt was primarily limited by pain. Pt required Min A to stand from a lower surface and cues to kick on L LE to maintain posterior hip precautions. Pt will continue to benefit from skilled PT while in house, with recommendation for post acute therapy services prior to d/c home given current objective findings.   " She states she stopped taking the es citalopram.   It made her dizzy.   Once she stopped the medication the dizziness went away.   She is taking st Johns wort. And she is doing fine.  She is still feeling nauseated.

## 2019-03-19 ENCOUNTER — OFFICE VISIT (OUTPATIENT)
Dept: INTERNAL MEDICINE | Facility: CLINIC | Age: 74
End: 2019-03-19

## 2019-03-19 VITALS
SYSTOLIC BLOOD PRESSURE: 124 MMHG | WEIGHT: 153.38 LBS | BODY MASS INDEX: 29.95 KG/M2 | DIASTOLIC BLOOD PRESSURE: 64 MMHG | RESPIRATION RATE: 20 BRPM | TEMPERATURE: 98 F | HEART RATE: 72 BPM

## 2019-03-19 DIAGNOSIS — M79.7 FIBROMYALGIA: ICD-10-CM

## 2019-03-19 DIAGNOSIS — M15.9 PRIMARY OSTEOARTHRITIS INVOLVING MULTIPLE JOINTS: ICD-10-CM

## 2019-03-19 DIAGNOSIS — K63.5 BENIGN COLONIC POLYP: ICD-10-CM

## 2019-03-19 DIAGNOSIS — F41.1 GENERALIZED ANXIETY DISORDER: ICD-10-CM

## 2019-03-19 DIAGNOSIS — L02.214 ABSCESS OF RIGHT GROIN: ICD-10-CM

## 2019-03-19 DIAGNOSIS — M85.89 OSTEOPENIA OF MULTIPLE SITES: ICD-10-CM

## 2019-03-19 DIAGNOSIS — C73 THYROID CANCER (HCC): ICD-10-CM

## 2019-03-19 DIAGNOSIS — I10 ESSENTIAL HYPERTENSION: ICD-10-CM

## 2019-03-19 DIAGNOSIS — M05.9 RHEUMATOID ARTHRITIS WITH POSITIVE RHEUMATOID FACTOR, INVOLVING UNSPECIFIED SITE (HCC): ICD-10-CM

## 2019-03-19 DIAGNOSIS — K21.9 GASTROESOPHAGEAL REFLUX DISEASE WITHOUT ESOPHAGITIS: ICD-10-CM

## 2019-03-19 DIAGNOSIS — E11.9 TYPE 2 DIABETES MELLITUS WITHOUT COMPLICATION, WITHOUT LONG-TERM CURRENT USE OF INSULIN (HCC): Primary | ICD-10-CM

## 2019-03-19 DIAGNOSIS — E78.49 OTHER HYPERLIPIDEMIA: ICD-10-CM

## 2019-03-19 DIAGNOSIS — E55.9 VITAMIN D DEFICIENCY: ICD-10-CM

## 2019-03-19 LAB
25(OH)D3 SERPL-MCNC: 57.8 NG/ML
ARTICHOKE IGE QN: 86 MG/DL (ref 0–130)
CHOLEST SERPL-MCNC: 156 MG/DL (ref 0–200)
EXPIRATION DATE: NORMAL
HBA1C MFR BLD: 6.2 %
HDLC SERPL-MCNC: 46 MG/DL (ref 40–60)
Lab: NORMAL
TRIGL SERPL-MCNC: 213 MG/DL (ref 0–150)
TSH SERPL DL<=0.05 MIU/L-ACNC: 0.4 MIU/ML (ref 0.35–5.35)

## 2019-03-19 PROCEDURE — 36415 COLL VENOUS BLD VENIPUNCTURE: CPT | Performed by: INTERNAL MEDICINE

## 2019-03-19 PROCEDURE — 80061 LIPID PANEL: CPT | Performed by: INTERNAL MEDICINE

## 2019-03-19 PROCEDURE — 99214 OFFICE O/P EST MOD 30 MIN: CPT | Performed by: INTERNAL MEDICINE

## 2019-03-19 PROCEDURE — 83036 HEMOGLOBIN GLYCOSYLATED A1C: CPT | Performed by: INTERNAL MEDICINE

## 2019-03-19 PROCEDURE — 82306 VITAMIN D 25 HYDROXY: CPT | Performed by: INTERNAL MEDICINE

## 2019-03-19 PROCEDURE — 84443 ASSAY THYROID STIM HORMONE: CPT | Performed by: INTERNAL MEDICINE

## 2019-03-19 RX ORDER — SULFAMETHOXAZOLE AND TRIMETHOPRIM 800; 160 MG/1; MG/1
1 TABLET ORAL 2 TIMES DAILY
Qty: 20 TABLET | Refills: 0 | Status: SHIPPED | OUTPATIENT
Start: 2019-03-19 | End: 2019-03-28

## 2019-03-19 RX ORDER — DULOXETIN HYDROCHLORIDE 30 MG/1
30 CAPSULE, DELAYED RELEASE ORAL DAILY
COMMUNITY

## 2019-03-19 NOTE — PROGRESS NOTES
Bairon Louie is a 73 y.o. female.     Chief Complaint   Patient presents with   • Diabetes     6 month follow up        History obtained from the patient.      History of Present Illness     The patient had a Right Thyroidectomy and a Right Superior Parathyroid Excision on 8/7/18.  This was done at [facility] by Dr. Deedee Jacobs.  She was diagnosed with Papillary Thyroid Cancer.  The patient has an appointment with Dr. Aiken in May 2019.  she states she was told to take Calcium 3000 mg daily, in addition to Tums 1000 mg daily, and a Vitamin D supplement (? dose).     The patient sees Dr. Brenner for RA and Fibromyalgia. She has been on Cymbalta.  She is now on Neurontin. She states Humira was too expensive, and she is waiting to hear what the alternative will be.  On 2/4/19, the patient had a normal CBC.  CMP was normal with the exception of a mildly elevated creatinine.      The patient is complaining of a boil in her right groin area x 3-4 days.  It is itchy, but not painful.  It has not changed.  She has treated it with Neosporin ointment and Hydrocortisone cream, with mild relief. There has been some yellow, bloody drainage.  No fever or chills.    Primary Care Cardiac Diagnostic Constellation: The patient is here today for a follow-up visit.       Her Diabetes Mellitus Type 2 is stable.   Medication(s): Metformin HCl.   Her Hypertension is stable.   Medication(s): Lisinopril.   Her Hyperlipidemia has been unstable.   Her LDL goal is 70 mg/dL, last LDL on 9/18/18was 93 mg/dL and .   Medication(s): Simvastatin.   The patient is adherent with her medication regimen. She denies medication side effects.       Interval Events: The patient states her blood sugar at home has been 119-128, fasting, but she does not check it post prandial.  No episodes of Hypoglycemia.  Last Hemoglobin A1C was 6.4 on 7/31/18.  She does check her feet daily. She saw the ophthalmologist 10/4/17, no  retinopathy.          Symptoms:   Denies chest pain, dyspnea, COOL, orthopnea, PND,  heart palpitations, syncope, lower extremity edema, intermittent leg claudication, lightheadedness, and dizziness.  .Associated symptoms:  no recent significant  weight changes. Has stable fatigue, headaches, myalgias, and arthralgias.  Visual impairment has resolved.  No polydipsia, polyuria, memory loss, concentration issues, or focal neurologic deficits.   No foot pain, numbness of the feet, or foot ulcer.       Lifestyle and Disease Management: Diet: She does have a healthy diet. Weight Issues: She has weight concerns. Exercise: She has not been exercising recently.   Smoking: She does not use tobacco.       Gastroesophageal Reflux Disease Follow-up: The patient is being seen for a routine clinic follow-up of Gastroesophageal Reflux Disease, which is stable.  Previous Evaluation: Last EGD 1/25/16 showed Class A reflux esophagitis.  Interval Events:  None.  Symptoms:  Stable lower abdominal pain.  No nausea, vomiting, acid regurgitation, heartburn, dysphagia, odynophagia, hematochezia, melena, early satiety, bloating, or belching.   Associated symptoms: No chronic sore throat, hoarseness, cough, or wheezing.   Medication:  Omeprazole and Zofran.      Colonic Polyp Follow-up: The patient is being seen for a routine clinic follow-up of Colon Polyp(s), which is stable.   Interval Events:  Current diagnosis was determined by Colonoscopy and last 1/25/16- no polyps.   Symptoms:  stable  lower abdominal pain and chronic constipation.  No hematochezia, melena, diarrhea,  decreased stool caliber, or change in bowel habits.   Associated symptoms: no rectal prolapse.   Medications:  Bentyl.     Osteoporosis Follow-up: The patient is being seen for routine follow-up of Osteoporosis, which is stable.   Interval Events:  The last DEXA scan was done10/19/17, Osteopenia.   Symptoms:  She reports arthralgias, myalgias, back pain, hip pain, neck  pain,  and difficulty with stairs. No loss of balance, difficulty ambulating,  wrist pain, or new fracture.  Medication:  Calcium and Vitamin D supplements.  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         Vitamin D Deficiency: The patient is here for follow up of Vitamin D Deficiency, which is stable.   Interval Events:  Recent laboratory results: 9/18/18  , 25-hydroxyvitamin D 26.0 ng/mL.  Symptoms: stable fatigue, myalgias, arthralgias, paresthesias, and balance issues.       Medication:  Vitamin D.        Osteoarthritis Follow-Up: The patient is here for follow up of Osteoarthritis, which is stable.  Comorbid Illnesses: Rheumatoid Arthritis and Fibromyalgia.   Interval Events: None.  Symptoms:  Has arthralgias (hands, hips, and knees), myalgias, and back pain. Has left knee swelling, but no joint stiffness.  Medications:  Cymbalta.    The patient is adherent with her medication regimen. She denies medication side effects.      Anxiety Disorder Follow-Up: The patient is being seen for follow-up of Anxiety, which is stable.  Interval Events:   None.  Symptoms: stable anxiety and insomnia, but no memory loss, difficulty concentrating, restlessness, panic attacks, or depression.   Associated symptoms: no suicidal ideation.   Medication(s): Jessica's Wort  The patient is adherent to her medication regimen, and she denies medication side effects.     Current Outpatient Medications on File Prior to Visit   Medication Sig Dispense Refill   • Blood Glucose Monitoring Suppl (ACCU-CHEK RENZO PLUS) w/Device kit 1 each Daily. 1 kit 0   • calcium carbonate (TUMS) 500 MG chewable tablet Chew 6 tablets Daily.     • DULoxetine (CYMBALTA) 30 MG capsule Take 30 mg by mouth Daily.     • fluticasone (FLONASE) 50 MCG/ACT nasal spray into each nostril.     • glucose blood (ACCU-CHEK RENZO) test strip Test once daily  DX  E 11.9 100 each 12   • Lancets (ACCU-CHEK MULTICLIX) lancets Test once daily  Dx   E11.9  102 each 12   • levothyroxine (SYNTHROID, LEVOTHROID) 112 MCG tablet Take 112 mcg by mouth Daily.     • lisinopril (PRINIVIL,ZESTRIL) 20 MG tablet Take 20 mg by mouth Daily.     • metFORMIN (GLUCOPHAGE) 500 MG tablet TAKE 1 TABLET TWICE DAILY WITH FOOD 180 tablet 3   • omeprazole (PRILOSEC) 20 MG capsule Take 1 capsule by mouth 2 (Two) Times a Day. 180 capsule 3   • simvastatin (ZOCOR) 40 MG tablet TAKE 1 TABLET AT BEDTIME 90 tablet 3   • Turmeric 500 MG tablet Take 1 tablet by mouth Daily.     • dicyclomine (BENTYL) 20 MG tablet Take 1 tablet by mouth 4 (Four) Times a Day. 100 tablet 3   • Lecithin 1200 MG capsule Take  by mouth Daily.     • ondansetron ODT (ZOFRAN-ODT) 8 MG disintegrating tablet Take 1 tablet by mouth Every 8 (Eight) Hours As Needed for Nausea or Vomiting. 90 tablet 3     No current facility-administered medications on file prior to visit.        Current outpatient and discharge medications have been reconciled for the patient.  Reviewed by: Marina Pederson MD        The following portions of the patient's history were reviewed and updated as appropriate: allergies, current medications, past family history, past medical history, past social history, past surgical history and problem list.    Review of Systems   Constitutional: Positive for fatigue. Negative for unexpected weight change.   Eyes: Negative for visual disturbance.   Respiratory: Negative for cough, shortness of breath and wheezing.    Cardiovascular: Negative for chest pain, palpitations and leg swelling.        No COOL, orthopnea, or claudication.   Gastrointestinal: Positive for abdominal pain and constipation. Negative for blood in stool, diarrhea, nausea and vomiting.        Denies melena.   Endocrine: Negative for polydipsia and polyuria.   Musculoskeletal: Positive for arthralgias and myalgias.   Neurological: Positive for headaches. Negative for dizziness, syncope and light-headedness.        No memory issues.    Psychiatric/Behavioral: Positive for sleep disturbance. Negative for decreased concentration and suicidal ideas. The patient is nervous/anxious.          Objective       Blood pressure 124/64, pulse 72, temperature 98 °F (36.7 °C), temperature source Temporal, resp. rate 20, weight 69.6 kg (153 lb 6 oz).      Physical Exam   Constitutional:   Overweight, borderline obese.   Neck: Normal range of motion. Neck supple. Carotid bruit is not present. No thyromegaly present.   Cardiovascular: Normal rate, regular rhythm, normal heart sounds and intact distal pulses. Exam reveals no gallop and no friction rub.   No murmur heard.  No peripheral edema.   Pulmonary/Chest: Effort normal and breath sounds normal.   Abdominal: Soft. Bowel sounds are normal. She exhibits no distension, no abdominal bruit and no mass. There is no hepatosplenomegaly. There is no tenderness.    Cristy had a diabetic foot exam performed today.   During the foot exam she had a monofilament test performed (see form).  Vascular Status -  Her right foot exhibits normal foot vasculature . Her left foot exhibits normal foot vasculature .  Skin Integrity  -  Her right foot skin is intact (dry).Her left foot skin is intact (dry)..  Skin: No rash noted.   Nodular mass in right groin.  No erythema, edema, warmth, fluctuance, or drainage.   Psychiatric: She has a normal mood and affect.   Nursing note and vitals reviewed.       Lab Results   Component Value Date    HGBA1C 6.2 03/19/2019       Assessment / Plan:  Cristy was seen today for diabetes.    Diagnoses and all orders for this visit:    Type 2 diabetes mellitus without complication, without long-term current use of insulin (CMS/[facility])  -     POC Glycosylated Hemoglobin (Hb A1C)  -     Ambulatory Referral to Ophthalmology    Other hyperlipidemia  -     Lipid Panel    Essential hypertension    Thyroid cancer (CMS/HCC)  -     TSH    Gastroesophageal reflux disease without esophagitis    Benign colonic  polyp    Osteopenia of multiple sites    Rheumatoid arthritis with positive rheumatoid factor, involving unspecified site (CMS/HCC)    Primary osteoarthritis involving multiple joints    Vitamin D deficiency  -     Vitamin D 25 Hydroxy    Fibromyalgia    Generalized anxiety disorder    Abscess of right groin  -     sulfamethoxazole-trimethoprim (BACTRIM DS) 800-160 MG per tablet; Take 1 tablet by mouth 2 (Two) Times a Day for 10 days.    I recommend Shingrix  (new Shingles vaccine), Td/Tdap vaccine (Tetanus booster), and Hepatitis A vaccination at the pharmacy.      Return in about 6 months (around 9/19/2019) for Recheck Diabetes, fasting, and schedule subseq Medicare Wellness Exam after 4/10 19.

## 2019-03-19 NOTE — PATIENT INSTRUCTIONS
I recommend Shingrix  (new Shingles vaccine), Td/Tdap vaccine (Tetanus booster), and Hepatitis A vaccination at the pharmacy.      Fall Prevention in the Home, Adult  Falls can cause injuries and can affect people from all age groups. There are many simple things that you can do to make your home safe and to help prevent falls. Ask for help when making these changes, if needed.  What actions can I take to prevent falls?  General instructions  · Use good lighting in all rooms. Replace any light bulbs that burn out.  · Turn on lights if it is dark. Use night-lights.  · Place frequently used items in easy-to-reach places. Lower the shelves around your home if necessary.  · Set up furniture so that there are clear paths around it. Avoid moving your furniture around.  · Remove throw rugs and other tripping hazards from the floor.  · Avoid walking on wet floors.  · Fix any uneven floor surfaces.  · Add color or contrast paint or tape to grab bars and handrails in your home. Place contrasting color strips on the first and last steps of stairways.  · When you use a stepladder, make sure that it is completely opened and that the sides are firmly locked. Have someone hold the ladder while you are using it. Do not climb a closed stepladder.  · Be aware of any and all pets.  What can I do in the bathroom?  · Keep the floor dry. Immediately clean up any water that spills onto the floor.  · Remove soap buildup in the tub or shower on a regular basis.  · Use non-skid mats or decals on the floor of the tub or shower.  · Attach bath mats securely with double-sided, non-slip rug tape.  · If you need to sit down while you are in the shower, use a plastic, non-slip stool.  · Install grab bars by the toilet and in the tub and shower. Do not use towel bars as grab bars.  What can I do in the bedroom?  · Make sure that a bedside light is easy to reach.  · Do not use oversized bedding that drapes onto the floor.  · Have a firm chair that  has side arms to use for getting dressed.  What can I do in the kitchen?  · Clean up any spills right away.  · If you need to reach for something above you, use a sturdy step stool that has a grab bar.  · Keep electrical cables out of the way.  · Do not use floor polish or wax that makes floors slippery. If you must use wax, make sure that it is non-skid floor wax.  What can I do in the stairways?  · Do not leave any items on the stairs.  · Make sure that you have a light switch at the top of the stairs and the bottom of the stairs. Have them installed if you do not have them.  · Make sure that there are handrails on both sides of the stairs. Fix handrails that are broken or loose. Make sure that handrails are as long as the stairways.  · Install non-slip stair treads on all stairs in your home.  · Avoid having throw rugs at the top or bottom of stairways, or secure the rugs with carpet tape to prevent them from moving.  · Choose a carpet design that does not hide the edge of steps on the stairway.  · Check any carpeting to make sure that it is firmly attached to the stairs. Fix any carpet that is loose or worn.  What can I do on the outside of my home?  · Use bright outdoor lighting.  · Regularly repair the edges of walkways and driveways and fix any cracks.  · Remove high doorway thresholds.  · Trim any shrubbery on the main path into your home.  · Regularly check that handrails are securely fastened and in good repair. Both sides of any steps should have handrails.  · Install guardrails along the edges of any raised decks or porches.  · Clear walkways of debris and clutter, including tools and rocks.  · Have leaves, snow, and ice cleared regularly.  · Use sand or salt on walkways during winter months.  · In the garage, clean up any spills right away, including grease or oil spills.  What other actions can I take?  · Wear closed-toe shoes that fit well and support your feet. Wear shoes that have rubber soles or  low heels.  · Use mobility aids as needed, such as canes, walkers, scooters, and crutches.  · Review your medicines with your health care provider. Some medicines can cause dizziness or changes in blood pressure, which increase your risk of falling.  Talk with your health care provider about other ways that you can decrease your risk of falls. This may include working with a physical therapist or  to improve your strength, balance, and endurance.  Where to find more information  · Centers for Disease Control and Prevention, STEAD[place]: https://www.cdc.gov  · National [employer] on Aging: https://wm9phij.deena.nih.gov  Contact a health care provider if:  · You are afraid of falling at home.  · You feel weak, drowsy, or dizzy at home.  · You fall at home.  Summary  · There are many simple things that you can do to make your home safe and to help prevent falls.  · Ways to make your home safe include removing tripping hazards and installing grab bars in the bathroom.  · Ask for help when making these changes in your home.  This information is not intended to replace advice given to you by your health care provider. Make sure you discuss any questions you have with your health care provider.  Document Released: 12/08/2003 Document Revised: 08/02/2018 Document Reviewed: 08/02/2018  [employer] Interactive Patient Education © 2019 [employer]      Sit-to-Stand Exercise  The sit-to-stand exercise (also known as the chair stand or chair rise exercise) strengthens your lower body and helps you maintain or improve your mobility and independence. The goal is to do the sit-to-stand exercise without using your hands. This will be easier as you become stronger. You should always talk with your health care provider before starting any exercise program, especially if you have had recent surgery.  Do the exercise exactly as told by your health care provider and adjust it as directed. It is normal to feel mild stretching, pulling,  tightness, or discomfort as you do this exercise, but you should stop right away if you feel sudden pain or your pain gets worse. Do not begin doing this exercise until told by your health care provider.  What the sit-to-stand exercise does  The sit-to-stand exercise helps to strengthen the muscles in your thighs and the muscles in the center of your body that give you stability (core muscles). This exercise is especially helpful if:  · You have had knee or hip surgery.  · You have trouble getting up from a chair, out of a car, or off the toilet.    How to do the sit-to-stand exercise  1. Sit toward the front edge of a sturdy chair without armrests. Your knees should be bent and your feet should be flat on the floor and shoulder-width apart.  2. Place your hands lightly on each side of the seat. Keep your back and neck as straight as possible, with your chest slightly forward.  3. Breathe in slowly. Lean forward and slightly shift your weight to the front of your feet.  4. Breathe out as you slowly stand up. Use your hands as little as possible.  5. Stand and pause for a full breath in and out.  6. Breathe in as you sit down slowly. Tighten your core and abdominal muscles to control your lowering as much as possible.  7. Breathe out slowly.  8. Do this exercise 10-15 times. If needed, do it fewer times until you build up strength.  9. Rest for 1 minute, then do another set of 10-15 repetitions.  To change the difficulty of the sit-to-stand exercise  · If the exercise is too difficult, use a chair with sturdy armrests, and push off the armrests to help you come to the standing position. You can also use the armrests to help slowly lower yourself back to sitting. As this gets easier, try to use your arms less. You can also place a firm cushion or pillow on the chair to make the surface higher.  · If this exercise is too easy, do not use your arms to help raise or lower yourself. You can also wear a weighted vest, use  hand weights, increase your repetitions, or try a lower chair.  General tips  · You may feel tired when starting an exercise routine. This is normal.  · You may have muscle soreness that lasts a few days. This is normal. As you get stronger, you may not feel muscle soreness.  · Use smooth, steady movements.  · Do not  hold your breath during strength exercises. This can cause unsafe changes in your blood pressure.  · Breathe in slowly through your nose, and breathe out slowly through your mouth.  Summary  · Strengthening your lower body is an important step to help you move safely and independently.  · The sit-to-stand exercise helps strengthen the muscles in your thighs and core.  · You should always talk with your health care provider before starting any exercise program, especially if you have had recent surgery.  This information is not intended to replace advice given to you by your health care provider. Make sure you discuss any questions you have with your health care provider.  Document Released: 02/08/2018 Document Revised: 02/08/2018 Document Reviewed: 02/08/2018  [employer] Interactive Patient Education © 2019 [employer]

## 2019-03-20 ENCOUNTER — TELEPHONE (OUTPATIENT)
Dept: INTERNAL MEDICINE | Facility: CLINIC | Age: 74
End: 2019-03-20

## 2019-03-20 RX ORDER — CALCITRIOL 0.25 UG/1
0.25 CAPSULE, LIQUID FILLED ORAL 2 TIMES DAILY
Start: 2019-03-20 | End: 2019-04-08

## 2019-03-20 NOTE — TELEPHONE ENCOUNTER
----- Message from Chela Muñoz sent at 3/20/2019  9:54 AM EDT -----  [phone]  PT IS CALLING TO LET YOU KNOW SHE TOOK VITAMIN D3 25MCG 1000IU 2 A DAY , THE OTHER ONE IS CALCITRIOL .25MCG CAPS 2XS A DAY . PT IS SUPPOSE TO LET PT KNOW ABOUT STOMACH MEDICINE CALL PT TO DISCUSS   [facility]

## 2019-03-28 ENCOUNTER — TELEPHONE (OUTPATIENT)
Dept: INTERNAL MEDICINE | Facility: CLINIC | Age: 74
End: 2019-03-28

## 2019-03-28 NOTE — TELEPHONE ENCOUNTER
Cristy states the abcess is better.  She does have a swollen gland below the inside of the RT Groin area.  She states the gland feels better today than yesterday     She states she sees Dr Harrison in 2 weeks and will ask her about how many tums to take a day .    Med list updated

## 2019-03-28 NOTE — TELEPHONE ENCOUNTER
She should discontinue the Bactrim immediately.  The patient is on the Bactrim for the abscess in her right groin area.  Has that resolved?    It is okay to take Omeprazole with Tums.  However, I still feel that she is taking too much Tums.  She states her endocrinologist told her to take this much.  Please have her confirm the amount she is supposed be taking, with her endocrinologist.

## 2019-03-28 NOTE — TELEPHONE ENCOUNTER
Dr Pederson See other message also     She is also wanting to know if she should take omeprazole along with tums.   She states she will takes up to 3000mg of tums   She she be taking tums and omeprazole

## 2019-03-28 NOTE — TELEPHONE ENCOUNTER
----- Message from Ester Izquierdo sent at 3/28/2019 12:41 PM EDT -----  PT CALLED STATING SHE HAS BEEN SICK, VOMITING, NAUSEA, CAN BARELY GET OUT OF BED SINCE SHE STARTING TAKING sulfamethoxazole-trimethoprim (BACTRIM DS) 800-160 MG per tablet. PT BELIEVES SHE'S HAVING AN ALLERGIC REACTION TO THE MEDICATION.    PHARMACY: [place] [address] - [phone]  - [phone] FX    THANK YOU.

## 2019-03-28 NOTE — TELEPHONE ENCOUNTER
She started the bactrim on 3/19/2019 and has had nausea and vomiting and can only lay in bed .  Excessive sweating  She tried taking it with food but still made her sick to her stomach.     Please clarify

## 2019-04-08 ENCOUNTER — OFFICE VISIT (OUTPATIENT)
Dept: ENDOCRINOLOGY | Facility: CLINIC | Age: 74
End: 2019-04-08

## 2019-04-08 VITALS
BODY MASS INDEX: 30.35 KG/M2 | HEIGHT: 60 IN | SYSTOLIC BLOOD PRESSURE: 130 MMHG | WEIGHT: 154.6 LBS | DIASTOLIC BLOOD PRESSURE: 70 MMHG | HEART RATE: 80 BPM | OXYGEN SATURATION: 98 %

## 2019-04-08 DIAGNOSIS — C73 THYROID CANCER (HCC): ICD-10-CM

## 2019-04-08 DIAGNOSIS — E89.0 POST-SURGICAL HYPOTHYROIDISM: Primary | ICD-10-CM

## 2019-04-08 LAB
ALBUMIN SERPL-MCNC: 4.6 G/DL (ref 3.5–5.2)
ANION GAP SERPL CALCULATED.3IONS-SCNC: 14.7 MMOL/L
BUN BLD-MCNC: 17 MG/DL (ref 8–23)
BUN/CREAT SERPL: 16 (ref 7–25)
CALCIUM SPEC-SCNC: 8.6 MG/DL (ref 8.6–10.5)
CHLORIDE SERPL-SCNC: 95 MMOL/L (ref 98–107)
CO2 SERPL-SCNC: 28.3 MMOL/L (ref 22–29)
CREAT BLD-MCNC: 1.06 MG/DL (ref 0.57–1)
GFR SERPL CREATININE-BSD FRML MDRD: 51 ML/MIN/1.73
GLUCOSE BLD-MCNC: 127 MG/DL (ref 65–99)
PHOSPHATE SERPL-MCNC: 4.4 MG/DL (ref 2.5–4.5)
POTASSIUM BLD-SCNC: 4.4 MMOL/L (ref 3.5–5.2)
SODIUM BLD-SCNC: 138 MMOL/L (ref 136–145)

## 2019-04-08 PROCEDURE — 86800 THYROGLOBULIN ANTIBODY: CPT

## 2019-04-08 PROCEDURE — 99214 OFFICE O/P EST MOD 30 MIN: CPT | Performed by: INTERNAL MEDICINE

## 2019-04-08 PROCEDURE — 80069 RENAL FUNCTION PANEL: CPT | Performed by: INTERNAL MEDICINE

## 2019-04-08 PROCEDURE — 83970 ASSAY OF PARATHORMONE: CPT | Performed by: INTERNAL MEDICINE

## 2019-04-08 PROCEDURE — 84432 ASSAY OF THYROGLOBULIN: CPT | Performed by: INTERNAL MEDICINE

## 2019-04-08 RX ORDER — LEVOTHYROXINE SODIUM 112 UG/1
112 TABLET ORAL DAILY
Qty: 90 TABLET | Refills: 3 | Status: SHIPPED | OUTPATIENT
Start: 2019-04-08 | End: 2020-01-02

## 2019-04-08 NOTE — PROGRESS NOTES
Chief Complaint   Patient presents with   • Thyroid Cancer     f/u    • post-surgical hypothyroidism     f/u       HPI:   Cristy Louie is a 73 y.o.female who returns to [facility] for f/u evaluation of her thyroid cancer and post-surgical hypothyroidism. Last visit 10/08/2018.  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 Ava Jacobs, Endocrine Surgery, at [facility] for surgical treatment of pt's primary hyperparathyroidism.  - (05/24/2018) had pre-operative localization imaging with Thyroid US at  by Dr. Jacobs and an US-Guided FNA of a right inferior thyroid lobe nodule was completed. 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  - pt underwent right superior parathyroidectomy and partial right thyroidectomy on 08/07/2018  - underwent completion thyroidectomy 11/27/2018    Surgical pathology:  (08/07/2018)  A. Thyroid, right, lobectomy:  - papillary thyroid carcinoma, oncocytic variant (pT1a, N0)  - Tumor Size: 1.0 x 0.8 x 0.7 cm, single focus  - no extrathyroidal extension  - no lymphatic or angioinvasion  - no invasion of the surgical resection margin or to the thyroid capsule  - one benign perithyroidal lymph node (0/1)  - benign adenomatoid nodules.    B. Parathyroid, right superior, excision:  - hypercellular parathyroid (0.45 gm)    (11/27/2018)  A. Thyroid, left lobe:   - multifocal papillary thyroid carcinoma, classic type, involving bilateral lobes (pT1b, Nx)  - Tumor Size: 1.2 x 0.8 x 0.6 cm, single focus  - no extrathyroidal extension  - no lymphatic or angioinvasion  - no invasion of the surgical resection margin or to the thyroid capsule.  - Follicular adenoma of the superior pole (1.5 mm in the greatest  "dimension).    B. Parathyroid Left Inferior:   - hypercellular parathyroid gland (0.03 gm) with no tumor seen    C. Parathyroid Left Superior:   - hypercellular parathyroid gland (0.04 gm) with no tumor seen    Lab summary:   (01/2019, [facility]) TG <0.1, TG Ab <0.1, TSH 0.55, free T4 1.6, Calcium 7.7    2) post-surgical hypothyroidism:  Current Dose: levothyroxine 112 mcg  - Pt taking in AM on an empty stomach as directed without interfering substances    3) h/o  primary hyperparathyroidism:  - s/p right superior parathyroidectomy 08/2018  - s/p left superior and inferior parathyroidectomy 11/2018    No calcitriol for past few months  - takes Vit D3 1000 IU daily, calcium 1000 mg daily    Review of Systems   Constitutional: Positive for fatigue.        Weight stable   HENT: Negative.    Eyes: Negative.    Respiratory: Negative.    Cardiovascular: Negative.    Gastrointestinal: Negative.  Negative for abdominal pain and constipation.   Endocrine: Negative.    Genitourinary: Negative.    Musculoskeletal: Positive for arthralgias and myalgias. Negative for joint swelling.   Skin: Negative.    Allergic/Immunologic: Negative.    Neurological: Negative.    Hematological: Negative.    Psychiatric/Behavioral: Negative.      The following portions of the patient's history were reviewed and updated as appropriate: allergies, current medications, past family history, past medical history, past social history, past surgical history and problem list.    /70   Pulse 80   Ht 152.4 cm (60\")   Wt 70.1 kg (154 lb 9.6 oz)   SpO2 98%   BMI 30.19 kg/m²   Physical Exam   Constitutional: She is oriented to person, place, and time. She appears well-developed. No distress.   HENT:   Head: Normocephalic.   Mouth/Throat: Oropharynx is clear and moist.   Eyes: Conjunctivae and EOM are normal. Pupils are equal, round, and reactive to light.   Neck: No tracheal deviation present.   No palpable thyroid tissue      Cardiovascular: Normal " rate, regular rhythm and normal heart sounds.   No murmur heard.  Pulmonary/Chest: Effort normal and breath sounds normal. No respiratory distress.   Abdominal: Soft. Bowel sounds are normal. She exhibits no mass. There is no tenderness.   Lymphadenopathy:     She has no cervical adenopathy.   Neurological: She is alert and oriented to person, place, and time. No cranial nerve deficit.   Skin: Skin is warm and dry. She is not diaphoretic. No erythema.   Psychiatric: She has a normal mood and affect. Her behavior is normal.   Vitals reviewed.    LABS/IMAGING: outside records reviewed and summarized in HPI  Results for orders placed or performed in visit on 04/08/19   TG and TG Abs -  send out - Miscellaneous Test   Result Value Ref Range    TG 0.1  TG Ab <0.1     Renal Function Panel   Result Value Ref Range    Glucose 127 (H) 65 - 99 mg/dL    BUN 17 8 - 23 mg/dL    Creatinine 1.06 (H) 0.57 - 1.00 mg/dL    Sodium 138 136 - 145 mmol/L    Potassium 4.4 3.5 - 5.2 mmol/L    Chloride 95 (L) 98 - 107 mmol/L    CO2 28.3 22.0 - 29.0 mmol/L    Calcium 8.6 8.6 - 10.5 mg/dL    Albumin 4.60 3.50 - 5.20 g/dL    Phosphorus 4.4 2.5 - 4.5 mg/dL    Anion Gap 14.7 mmol/L    BUN/Creatinine Ratio 16.0 7.0 - 25.0    eGFR Non African Amer 51 (L) >60 mL/min/1.73   PTH, Intact   Result Value Ref Range    PTH, Intact 43.9 15.0 - 65.0 pg/mL     Lab Results   Component Value Date    TSH 0.397 03/19/2019       Lab Results   Component Value Date    GLUCOSE 127 (H) 04/08/2019    BUN 17 04/08/2019    CREATININE 1.06 (H) 04/08/2019    EGFRIFNONA 51 (L) 04/08/2019    BCR 16.0 04/08/2019    K 4.4 04/08/2019    CO2 28.3 04/08/2019    CALCIUM 8.6 04/08/2019    ALBUMIN 4.60 04/08/2019    AST 17 09/18/2018    ALT 17 09/18/2018     ASSESSMENT/PLAN:  1) pT1b(m), N0, Mx: papillary thyroid carcinoma, 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 Louie current American Thyroid  Association 2015 guidelines for differentiated thyroid carcinoma.   - discussed that the need for additional treatment (in particular, radioiodine therapy) after surgery, per these guidelines, is based upon an initial risk stratification system which estimates the risk of persistent/recurrent disease. Per this stratification system, she is at low risk for recurrence.  I did not recommend REESE remnant ablation.     TG 0.1, TG Ab <0.1, TSH 0.397: good response to surgical resection  - goal will be to keep TSH in the lower end of the range (0.1 - 0.5) for the first year post-operatively    2) post-surgical hypothyroidism:  - Recent TSH at goal  - continue levothyroxine 112 mcg daily  - Reviewed proper thyroid hormone administration, and factors to avoid that decrease medication potency and medication absorption.     3) h/o hyperparathyroidism:  - s/p parathyroid resection  X 3 glands  - not on calcitriol for a few months  - currently taking Vit D3 1000 IU daily, calcium 1000 mg daily  - labs today show normal PTH and calcium level    RTC 6 months.     Counseling was given to patient for the following topics:  instructions for management, prognosis and impressions, see details in assessment/plan. Total face to face time of the encounter was 30 minutes and 20 minutes was spent counseling.

## 2019-04-09 LAB — PTH-INTACT SERPL-MCNC: 43.9 PG/ML (ref 15–65)

## 2019-04-10 LAB — REF LAB TEST RESULTS: NORMAL

## 2019-04-23 ENCOUNTER — OFFICE VISIT (OUTPATIENT)
Dept: INTERNAL MEDICINE | Facility: CLINIC | Age: 74
End: 2019-04-23

## 2019-04-23 VITALS
HEART RATE: 72 BPM | WEIGHT: 155 LBS | RESPIRATION RATE: 20 BRPM | TEMPERATURE: 97 F | SYSTOLIC BLOOD PRESSURE: 122 MMHG | HEIGHT: 60 IN | DIASTOLIC BLOOD PRESSURE: 72 MMHG | BODY MASS INDEX: 30.43 KG/M2

## 2019-04-23 DIAGNOSIS — Z00.00 MEDICARE ANNUAL WELLNESS VISIT, SUBSEQUENT: Primary | ICD-10-CM

## 2019-04-23 PROCEDURE — G0439 PPPS, SUBSEQ VISIT: HCPCS | Performed by: INTERNAL MEDICINE

## 2019-04-23 NOTE — PATIENT INSTRUCTIONS
· Recommend Shingrix (new Shingles vaccine), Td/Tdap (Tetanus booster) and Hepatitis A vaccine at the pharmacy.      Health Maintenance for Postmenopausal Women  Menopause is a normal process in which your reproductive ability comes to an end. This process happens gradually over a span of months to years, usually between the ages of 48 and 55. Menopause is complete when you have missed 12 consecutive menstrual periods.  It is important to talk with your health care provider about some of the most common conditions that affect postmenopausal women, such as heart disease, cancer, and bone loss (osteoporosis). Adopting a healthy lifestyle and getting preventive care can help to promote your health and wellness. Those actions can also lower your chances of developing some of these common conditions.  What should I know about menopause?  During menopause, you may experience a number of symptoms, such as:  · Moderate-to-severe hot flashes.  · Night sweats.  · Decrease in sex drive.  · Mood swings.  · Headaches.  · Tiredness.  · Irritability.  · Memory problems.  · Insomnia.    Choosing to treat or not to treat menopausal changes is an individual decision that you make with your health care provider.  What should I know about hormone replacement therapy and supplements?  Hormone therapy products are effective for treating symptoms that are associated with menopause, such as hot flashes and night sweats. Hormone replacement carries certain risks, especially as you become older. If you are thinking about using estrogen or estrogen with progestin treatments, discuss the benefits and risks with your health care provider.  What should I know about heart disease and stroke?  Heart disease, heart attack, and stroke become more likely as you age. This may be due, in part, to the hormonal changes that your body experiences during menopause. These can affect how your body processes dietary fats, triglycerides, and cholesterol. Heart  attack and stroke are both medical emergencies.  There are many things that you can do to help prevent heart disease and stroke:  · Have your blood pressure checked at least every 1-2 years. High blood pressure causes heart disease and increases the risk of stroke.  · If you are 55-79 years old, ask your health care provider if you should take aspirin to prevent a heart attack or a stroke.  · Do not use any tobacco products, including cigarettes, chewing tobacco, or electronic cigarettes. If you need help quitting, ask your health care provider.  · It is important to eat a healthy diet and maintain a healthy weight.  ? Be sure to include plenty of vegetables, fruits, low-fat dairy products, and lean protein.  ? Avoid eating foods that are high in solid fats, added sugars, or salt (sodium).  · Get regular exercise. This is one of the most important things that you can do for your health.  ? Try to exercise for at least 150 minutes each week. The type of exercise that you do should increase your heart rate and make you sweat. This is known as moderate-intensity exercise.  ? Try to do strengthening exercises at least twice each week. Do these in addition to the moderate-intensity exercise.  · Know your numbers. Ask your health care provider to check your cholesterol and your blood glucose. Continue to have your blood tested as directed by your health care provider.    What should I know about cancer screening?  There are several types of cancer. Take the following steps to reduce your risk and to catch any cancer development as early as possible.  Breast Cancer  · Practice breast self-awareness.  ? This means understanding how your breasts normally appear and feel.  ? It also means doing regular breast self-exams. Let your health care provider know about any changes, no matter how small.  · If you are 40 or older, have a clinician do a breast exam (clinical breast exam or CBE) every year. Depending on your age, family  history, and medical history, it may be recommended that you also have a yearly breast X-ray (mammogram).  · If you have a family history of breast cancer, talk with your health care provider about genetic screening.  · If you are at high risk for breast cancer, talk with your health care provider about having an MRI and a mammogram every year.  · Breast cancer (BRCA) gene test is recommended for women who have family members with BRCA-related cancers. Results of the assessment will determine the need for genetic counseling and BRCA1 and for BRCA2 testing. BRCA-related cancers include these types:  ? Breast. This occurs in males or females.  ? Ovarian.  ? Tubal. This may also be called fallopian tube cancer.  ? Cancer of the abdominal or pelvic lining (peritoneal cancer).  ? Prostate.  ? Pancreatic.    Cervical, Uterine, and Ovarian Cancer  Your health care provider may recommend that you be screened regularly for cancer of the pelvic organs. These include your ovaries, uterus, and vagina. This screening involves a pelvic exam, which includes checking for microscopic changes to the surface of your cervix (Pap test).  · For women ages 21-65, health care providers may recommend a pelvic exam and a Pap test every three years. For women ages 30-65, they may recommend the Pap test and pelvic exam, combined with testing for human papilloma virus (HPV), every five years. Some types of HPV increase your risk of cervical cancer. Testing for HPV may also be done on women of any age who have unclear Pap test results.  · Other health care providers may not recommend any screening for nonpregnant women who are considered low risk for pelvic cancer and have no symptoms. Ask your health care provider if a screening pelvic exam is right for you.  · If you have had past treatment for cervical cancer or a condition that could lead to cancer, you need Pap tests and screening for cancer for at least 20 years after your treatment. If  Pap tests have been discontinued for you, your risk factors (such as having a new sexual partner) need to be reassessed to determine if you should start having screenings again. Some women have medical problems that increase the chance of getting cervical cancer. In these cases, your health care provider may recommend that you have screening and Pap tests more often.  · If you have a family history of uterine cancer or ovarian cancer, talk with your health care provider about genetic screening.  · If you have vaginal bleeding after reaching menopause, tell your health care provider.  · There are currently no reliable tests available to screen for ovarian cancer.    Lung Cancer  Lung cancer screening is recommended for adults 55-80 years old who are at high risk for lung cancer because of a history of smoking. A yearly low-dose CT scan of the lungs is recommended if you:  · Currently smoke.  · Have a history of at least 30 pack-years of smoking and you currently smoke or have quit within the past 15 years. A pack-year is smoking an average of one pack of cigarettes per day for one year.    Yearly screening should:  · Continue until it has been 15 years since you quit.  · Stop if you develop a health problem that would prevent you from having lung cancer treatment.    Colorectal Cancer  · This type of cancer can be detected and can often be prevented.  · Routine colorectal cancer screening usually begins at age 50 and continues through age 75.  · If you have risk factors for colon cancer, your health care provider may recommend that you be screened at an earlier age.  · If you have a family history of colorectal cancer, talk with your health care provider about genetic screening.  · Your health care provider may also recommend using home test kits to check for hidden blood in your stool.  · A small camera at the end of a tube can be used to examine your colon directly (sigmoidoscopy or colonoscopy). This is done to  check for the earliest forms of colorectal cancer.  · Direct examination of the colon should be repeated every 5-10 years until age 75. However, if early forms of precancerous polyps or small growths are found or if you have a family history or genetic risk for colorectal cancer, you may need to be screened more often.    Skin Cancer  · Check your skin from head to toe regularly.  · Monitor any moles. Be sure to tell your health care provider:  ? About any new moles or changes in moles, especially if there is a change in a mole's shape or color.  ? If you have a mole that is larger than the size of a pencil eraser.  · If any of your family members has a history of skin cancer, especially at a young age, talk with your health care provider about genetic screening.  · Always use sunscreen. Apply sunscreen liberally and repeatedly throughout the day.  · Whenever you are outside, protect yourself by wearing long sleeves, pants, a wide-brimmed hat, and sunglasses.    What should I know about osteoporosis?  Osteoporosis is a condition in which bone destruction happens more quickly than new bone creation. After menopause, you may be at an increased risk for osteoporosis. To help prevent osteoporosis or the bone fractures that can happen because of osteoporosis, the following is recommended:  · If you are 19-50 years old, get at least 1,000 mg of calcium and at least 600 mg of vitamin D per day.  · If you are older than age 50 but younger than age 70, get at least 1,200 mg of calcium and at least 600 mg of vitamin D per day.  · If you are older than age 70, get at least 1,200 mg of calcium and at least 800 mg of vitamin D per day.    Smoking and excessive alcohol intake increase the risk of osteoporosis. Eat foods that are rich in calcium and vitamin D, and do weight-bearing exercises several times each week as directed by your health care provider.  What should I know about how menopause affects my mental  health?  Depression may occur at any age, but it is more common as you become older. Common symptoms of depression include:  · Low or sad mood.  · Changes in sleep patterns.  · Changes in appetite or eating patterns.  · Feeling an overall lack of motivation or enjoyment of activities that you previously enjoyed.  · Frequent crying spells.    Talk with your health care provider if you think that you are experiencing depression.  What should I know about immunizations?  It is important that you get and maintain your immunizations. These include:  · Tetanus, diphtheria, and pertussis (Tdap) booster vaccine.  · Influenza every year before the flu season begins.  · Pneumonia vaccine.  · Shingles vaccine.    Your health care provider may also recommend other immunizations.  This information is not intended to replace advice given to you by your health care provider. Make sure you discuss any questions you have with your health care provider.  Document Released: 02/09/2007 Document Revised: 07/07/2017 Document Reviewed: 09/20/2016  [employer] Interactive Patient Education © 2019 [employer]      Heart-Healthy Eating Plan  Many factors influence your heart health, including eating and exercise habits. Heart (coronary) risk increases with abnormal blood fat (lipid) levels. Heart-healthy meal planning includes limiting unhealthy fats, increasing healthy fats, and making other small dietary changes. This includes maintaining a healthy body weight to help keep lipid levels within a normal range.  What is my plan?  Your health care provider recommends that you:  · Get no more than _________% of the total calories in your daily diet from fat.  · Limit your intake of saturated fat to less than _________% of your total calories each day.  · Limit the amount of cholesterol in your diet to less than _________ mg per day.    What types of fat should I choose?  · Choose healthy fats more often. Choose monounsaturated and  "polyunsaturated fats, such as olive oil and canola oil, flaxseeds, walnuts, almonds, and seeds.  · Eat more omega-3 fats. Good choices include salmon, mackerel, sardines, tuna, flaxseed oil, and ground flaxseeds. Aim to eat fish at least two times each week.  · Limit saturated fats. Saturated fats are primarily found in animal products, such as meats, butter, and cream. Plant sources of saturated fats include palm oil, palm kernel oil, and coconut oil.  · Avoid foods with partially hydrogenated oils in them. These contain trans fats. Examples of foods that contain trans fats are stick margarine, some tub margarines, cookies, crackers, and other baked goods.  What general guidelines do I need to follow?  · Check food labels carefully to identify foods with trans fats or high amounts of saturated fat.  · Fill one half of your plate with vegetables and green salads. Eat 4-5 servings of vegetables per day. A serving of vegetables equals 1 cup of raw leafy vegetables, ½ cup of raw or cooked cut-up vegetables, or ½ cup of vegetable juice.  · Fill one fourth of your plate with whole grains. Look for the word \"whole\" as the first word in the ingredient list.  · Fill one fourth of your plate with lean protein foods.  · Eat 4-5 servings of fruit per day. A serving of fruit equals one medium whole fruit, ¼ cup of dried fruit, ½ cup of fresh, frozen, or canned fruit, or ½ cup of 100% fruit juice.  · Eat more foods that contain soluble fiber. Examples of foods that contain this type of fiber are apples, broccoli, carrots, beans, peas, and barley. Aim to get 20-30 g of fiber per day.  · Eat more home-cooked food and less restaurant, buffet, and fast food.  · Limit or avoid alcohol.  · Limit foods that are high in starch and sugar.  · Avoid fried foods.  · Cook foods by using methods other than frying. Baking, boiling, grilling, and broiling are all great options. Other fat-reducing suggestions include:  ? Removing the skin from " poultry.  ? Removing all visible fats from meats.  ? Skimming the fat off of stews, soups, and gravies before serving them.  ? Steaming vegetables in water or broth.  · Lose weight if you are overweight. Losing just 5-10% of your initial body weight can help your overall health and prevent diseases such as diabetes and heart disease.  · Increase your consumption of nuts, legumes, and seeds to 4-5 servings per week. One serving of dried beans or legumes equals ½ cup after being cooked, one serving of nuts equals 1½ ounces, and one serving of seeds equals ½ ounce or 1 tablespoon.  · You may need to monitor your salt (sodium) intake, especially if you have high blood pressure. Talk with your health care provider or dietitian to get more information about reducing sodium.  What foods can I eat?  Grains    Breads, including Maltese, white, marky, wheat, raisin, rye, oatmeal, and Italian. Tortillas that are neither fried nor made with lard or trans fat. Low-fat rolls, including hotdog and hamburger buns and English muffins. Biscuits. Muffins. Waffles. Pancakes. Light popcorn. Whole-grain cereals. Flatbread. [place] toast. Pretzels. Breadsticks. Rusks. Low-fat snacks and crackers, including oyster, saltine, matzo, jimy, animal, and rye. Rice and pasta, including brown rice and those that are made with whole wheat.  Vegetables  All vegetables.  Fruits  All fruits, but limit coconut.  Meats and Other Protein Sources  Lean, well-trimmed beef, veal, pork, and lamb. Chicken and turkey without skin. All fish and shellfish. Wild duck, rabbit, pheasant, and venison. Egg whites or low-cholesterol egg substitutes. Dried beans, peas, lentils, and tofu. Seeds and most nuts.  Dairy  Low-fat or nonfat cheeses, including ricotta, string, and mozzarella. Skim or 1% milk that is liquid, powdered, or evaporated. Buttermilk that is made with low-fat milk. Nonfat or low-fat yogurt.  Beverages  Mineral water. Diet carbonated beverages.  Sweets  and Desserts  Sherbets and fruit ices. Honey, jam, marmalade, jelly, and syrups. Meringues and gelatins. Pure sugar candy, such as hard candy, jelly beans, gumdrops, mints, marshmallows, and small amounts of dark chocolate. Darian food cake.  Eat all sweets and desserts in moderation.  Fats and Oils  Nonhydrogenated (trans-free) margarines. Vegetable oils, including soybean, sesame, sunflower, olive, peanut, safflower, corn, canola, and cottonseed. Salad dressings or mayonnaise that are made with a vegetable oil. Limit added fats and oils that you use for cooking, baking, salads, and as spreads.  Other  Cocoa powder. Coffee and tea. All seasonings and condiments.  The items listed above may not be a complete list of recommended foods or beverages. Contact your dietitian for more options.  What foods are not recommended?  Grains  Breads that are made with saturated or trans fats, oils, or whole milk. Croissants. Butter rolls. Cheese breads. Sweet rolls. Donuts. Buttered popcorn. Chow mein noodles. High-fat crackers, such as cheese or butter crackers.  Meats and Other Protein Sources  Fatty meats, such as hotdogs, short ribs, sausage, spareribs, ring, ribeye roast or steak, and mutton. High-fat deli meats, such as salami and bologna. Caviar. Domestic duck and goose. Organ meats, such as kidney, liver, sweetbreads, brains, gizzard, chitterlings, and heart.  Dairy  Cream, sour cream, cream cheese, and creamed cottage cheese. Whole milk cheeses, including blue (levon), [place] Julio, Brie, Gatito, American, Havarti, Swiss, cheddar, Camembert, and Mcintosh. Whole or 2% milk that is liquid, evaporated, or condensed. Whole buttermilk. Cream sauce or high-fat cheese sauce. Yogurt that is made from whole milk.  Beverages  Regular sodas and drinks with added sugar.  Sweets and Desserts  Frosting. Pudding. Cookies. Cakes other than darian food cake. Candy that has milk chocolate or white chocolate, hydrogenated fat, butter,  coconut, or unknown ingredients. Buttered syrups. Full-fat ice cream or ice cream drinks.  Fats and Oils  Gravy that has suet, meat fat, or shortening. Cocoa butter, hydrogenated oils, palm oil, coconut oil, palm kernel oil. These can often be found in baked products, candy, fried foods, nondairy creamers, and whipped toppings. Solid fats and shortenings, including ring fat, salt pork, lard, and butter. Nondairy cream substitutes, such as coffee creamers and sour cream substitutes. Salad dressings that are made of unknown oils, cheese, or sour cream.  The items listed above may not be a complete list of foods and beverages to avoid. Contact your dietitian for more information.  This information is not intended to replace advice given to you by your health care provider. Make sure you discuss any questions you have with your health care provider.  Document Released: 09/26/2009 Document Revised: 07/07/2017 Document Reviewed: 06/11/2015  [email or web address] Interactive Patient Education © 2019 [email or web address]      Exercising to Lose Weight  Exercising can help you to lose weight. In order to lose weight through exercise, you need to do vigorous-intensity exercise. You can tell that you are exercising with vigorous intensity if you are breathing very hard and fast and cannot hold a conversation while exercising.  Moderate-intensity exercise helps to maintain your current weight. You can tell that you are exercising at a moderate level if you have a higher heart rate and faster breathing, but you are still able to hold a conversation.  How often should I exercise?  Choose an activity that you enjoy and set realistic goals. Your health care provider can help you to make an activity plan that works for you. Exercise regularly as directed by your health care provider. This may include:  · Doing resistance training twice each week, such as:  ? Push-ups.  ? Sit-ups.  ? Lifting weights.  ? Using resistance bands.  · Doing a given  intensity of exercise for a given amount of time. Choose from these options:  ? 150 minutes of moderate-intensity exercise every week.  ? 75 minutes of vigorous-intensity exercise every week.  ? A mix of moderate-intensity and vigorous-intensity exercise every week.    Children, pregnant women, people who are out of shape, people who are overweight, and older adults may need to consult a health care provider for individual recommendations. If you have any sort of medical condition, be sure to consult your health care provider before starting a new exercise program.  What are some activities that can help me to lose weight?  · Walking at a rate of at least 4.5 miles an hour.  · Jogging or running at a rate of 5 miles per hour.  · Biking at a rate of at least 10 miles per hour.  · Lap swimming.  · Roller-skating or in-line skating.  · Cross-country skiing.  · Vigorous competitive sports, such as football, basketball, and soccer.  · Jumping rope.  · Aerobic dancing.  How can I be more active in my day-to-day activities?  · Use the stairs instead of the elevator.  · Take a walk during your lunch break.  · If you drive, park your car farther away from work or school.  · If you take public transportation, get off one stop early and walk the rest of the way.  · Make all of your phone calls while standing up and walking around.  · Get up, stretch, and walk around every 30 minutes throughout the day.  What guidelines should I follow while exercising?  · Do not exercise so much that you hurt yourself, feel dizzy, or get very short of breath.  · Consult your health care provider prior to starting a new exercise program.  · Wear comfortable clothes and shoes with good support.  · Drink plenty of water while you exercise to prevent dehydration or heat stroke. Body water is lost during exercise and must be replaced.  · Work out until you breathe faster and your heart beats faster.  This information is not intended to replace  advice given to you by your health care provider. Make sure you discuss any questions you have with your health care provider.  Document Released: 01/20/2012 Document Revised: 05/25/2017 Document Reviewed: 05/21/2015  [employer] Interactive Patient Education © 2019 [employer]

## 2019-04-23 NOTE — PROGRESS NOTES
QUICK REFERENCE INFORMATION:  The ABCs of the Annual Wellness Visit    Subsequent Medicare Wellness Visit     HEALTH RISK ASSESSMENT    : 1945    Recent Hospitalizations:  [facility]  2018 and 2018 .  ccc      Current Medical Providers:  Patient Care Team:  Marina Pederson MD as PCP - General  Marina Pederson MD as PCP - Family Medicine  Marina Pederson MD as PCP - Claims Attributed  Sunil Jones MD as Consulting Physician (Dermatology)  Maureen Aiken MD as Consulting Physician (Endocrinology)        Smoking Status:  Social History     Tobacco Use   Smoking Status Never Smoker   Smokeless Tobacco Never Used       Alcohol Consumption:  Social History     Substance and Sexual Activity   Alcohol Use No       Depression Screen:   PHQ-2/PHQ-9 Depression Screening 2019   Little interest or pleasure in doing things 0   Feeling down, depressed, or hopeless 0   Trouble falling or staying asleep, or sleeping too much -   Feeling tired or having little energy -   Poor appetite or overeating -   Feeling bad about yourself - or that you are a failure or have let yourself or your family down -   Trouble concentrating on things, such as reading the newspaper or watching television -   Moving or speaking so slowly that other people could have noticed. Or the opposite - being so fidgety or restless that you have been moving around a lot more than usual -   Thoughts that you would be better off dead, or of hurting yourself in some way -   Total Score 0   If you checked off any problems, how difficult have these problems made it for you to do your work, take care of things at home, or get along with other people? -       Health Habits and Functional and Cognitive Screening:  Functional & Cognitive Status 2019   Do you have difficulty preparing food and eating? No   Do you have difficulty bathing yourself, getting dressed or grooming yourself? No   Do you have difficulty using the toilet?  No   Do you have difficulty moving around from place to place? No   Do you have trouble with steps or getting out of a bed or a chair? No   In the past year have you fallen or experienced a near fall? No   Current Diet Unhealthy Diet   Dental Exam Up to date   Eye Exam Up to date   Exercise (times per week) 1 times per week   Current Exercise Activities Include Walking   Do you need help using the phone?  No   Are you deaf or do you have serious difficulty hearing?  Yes   Do you need help with transportation? No   Do you need help shopping? No   Do you need help preparing meals?  No   Do you need help with housework?  No   Do you need help with laundry? No   Do you need help taking your medications? No   Do you need help managing money? No   Do you ever drive or ride in a car without wearing a seat belt? No   Have you felt unusual stress, anger or loneliness in the last month? No   Who do you live with? Alone   If you need help, do you have trouble finding someone available to you? No   Have you been bothered in the last four weeks by sexual problems? No   Do you have difficulty concentrating, remembering or making decisions? No           Does the patient have evidence of cognitive impairment? No    Asiprin use counseling: Does not need ASA (and currently is not on it)      Recent Lab Results:       Lab Results   Component Value Date    HGBA1C 6.2 03/19/2019     Lab Results   Component Value Date    CHOL 156 03/19/2019    TRIG 213 (H) 03/19/2019    HDL 46 03/19/2019    LDLHDL 1.30 09/06/2016           Age-appropriate Screening Schedule:  Refer to the list below for future screening recommendations based on patient's age, sex and/or medical conditions. Orders for these recommended tests are listed in the plan section. The patient has been provided with a written plan.    Health Maintenance   Topic Date Due   • ZOSTER VACCINE (2 of 2) 04/25/2017   • TDAP/TD VACCINES (2 - Td) 11/28/2017   • DIABETIC EYE EXAM   10/04/2018   • INFLUENZA VACCINE  08/01/2019   • URINE MICROALBUMIN  09/18/2019   • HEMOGLOBIN A1C  09/19/2019   • LIPID PANEL  03/19/2020   • DIABETIC FOOT EXAM  03/21/2020   • MAMMOGRAM  09/26/2020   • DXA SCAN  10/19/2022   • COLONOSCOPY  01/25/2026   • PNEUMOCOCCAL VACCINES (65+ LOW/MEDIUM RISK)  Completed        Subjective   History of Present Illness    Cristy Louie is a 73 y.o. female who presents for an Annual Wellness Visit.    The following portions of the patient's history were reviewed and updated as appropriate: allergies, current medications, past family history, past medical history, past social history, past surgical history and problem list.    Outpatient Medications Prior to Visit   Medication Sig Dispense Refill   • Blood Glucose Monitoring Suppl (ACCU-CHEK RENZO PLUS) w/Device kit 1 each Daily. 1 kit 0   • Cholecalciferol (VITAMIN D3) 1000 units capsule Take 1 capsule by mouth 2 (Two) Times a Day. 60 capsule    • dicyclomine (BENTYL) 20 MG tablet Take 1 tablet by mouth 4 (Four) Times a Day. 100 tablet 3   • DULoxetine (CYMBALTA) 30 MG capsule Take 30 mg by mouth Daily.     • fluticasone (FLONASE) 50 MCG/ACT nasal spray into each nostril.     • glucose blood (ACCU-CHEK RENZO) test strip Test once daily  DX  E 11.9 100 each 12   • Lancets (ACCU-CHEK MULTICLIX) lancets Test once daily  Dx   E11.9 102 each 12   • levothyroxine (SYNTHROID, LEVOTHROID) 112 MCG tablet Take 1 tablet by mouth Daily. 90 tablet 3   • lisinopril 10 MG tablet 20 mg, hydrochlorothiazide 25 MG tablet 25 mg Take 1 dose by mouth Daily.     • metFORMIN (GLUCOPHAGE) 500 MG tablet TAKE 1 TABLET TWICE DAILY WITH FOOD 180 tablet 3   • omeprazole (PRILOSEC) 20 MG capsule Take 1 capsule by mouth 2 (Two) Times a Day. 180 capsule 3   • simvastatin (ZOCOR) 40 MG tablet TAKE 1 TABLET AT BEDTIME 90 tablet 3   • Turmeric 500 MG tablet Take 1 tablet by mouth Daily.     • ondansetron ODT (ZOFRAN-ODT) 8 MG disintegrating tablet Take 1 tablet  "by mouth Every 8 (Eight) Hours As Needed for Nausea or Vomiting. 90 tablet 3   • Lecithin 1200 MG capsule Take  by mouth Daily.       No facility-administered medications prior to visit.        Patient Active Problem List   Diagnosis   • Cataract   • Abnormal liver function tests   • Allergic rhinitis   • Anemia   • Anxiety disorder   • Asthma   • Benign colonic polyp   • Diabetes mellitus (CMS/HCC)   • Diverticulosis of intestine   • Gastroesophageal reflux disease   • Fibromyalgia   • Hyperlipidemia   • Hypertension   • Osteoarthritis   • Osteopenia of multiple sites   • Rheumatoid arthritis (CMS/HCC)   • Vitamin D deficiency   • Thyroid cancer (CMS/HCC)   • Post-surgical hypothyroidism       Advance Care Planning:  Patient does not have an advance directive - information provided to the patient today    Identification of Risk Factors:  Risk factors include: weight , unhealthy diet, cardiovascular risk, hearing limitations and incontinence.    Review of Systems    Compared to one year ago, the patient feels her physical health is the same.  Compared to one year ago, the patient feels her mental health is better.    Objective     Physical Exam    Vitals:    04/23/19 1127   BP: 122/72   BP Location: Right arm   Pulse: 72   Resp: 20   Temp: 97 °F (36.1 °C)   TempSrc: Temporal   Weight: 70.3 kg (155 lb)   Height: 153 cm (60.25\")   PainSc: 0-No pain       Patient's Body mass index is 30.02 kg/m². BMI is above normal parameters. Recommendations include: educational material, exercise counseling and nutrition counseling.    Finger Rub Hearing{Test (right ear):failed   Finger Rub Hearing{Test (left ear):failed      Assessment/Plan   Patient Self-Management and Personalized Health Advice  The patient has been provided with information about: diet, exercise, weight management and prevention of cardiac or vascular disease and preventive services including:   · Advance directive, Exercise counseling provided, Fall Risk " assessment done, Nutrition counseling provided.   Recommend Shingrix (new Shingles vaccine), Td/Tdap (Tetanus booster) and Hepatitis A vaccine at the pharmacy.    Visit Diagnoses:    ICD-10-CM ICD-9-CM   1. Medicare annual wellness visit, subsequent Z00.00 V70.0       No orders of the defined types were placed in this encounter.      Outpatient Encounter Medications as of 4/23/2019   Medication Sig Dispense Refill   • Blood Glucose Monitoring Suppl (ACCU-CHEK RENZO PLUS) w/Device kit 1 each Daily. 1 kit 0   • Cholecalciferol (VITAMIN D3) 1000 units capsule Take 1 capsule by mouth 2 (Two) Times a Day. 60 capsule    • dicyclomine (BENTYL) 20 MG tablet Take 1 tablet by mouth 4 (Four) Times a Day. 100 tablet 3   • DULoxetine (CYMBALTA) 30 MG capsule Take 30 mg by mouth Daily.     • fluticasone (FLONASE) 50 MCG/ACT nasal spray into each nostril.     • glucose blood (ACCU-CHEK RENZO) test strip Test once daily  DX  E 11.9 100 each 12   • Lancets (ACCU-CHEK MULTICLIX) lancets Test once daily  Dx   E11.9 102 each 12   • levothyroxine (SYNTHROID, LEVOTHROID) 112 MCG tablet Take 1 tablet by mouth Daily. 90 tablet 3   • lisinopril 10 MG tablet 20 mg, hydrochlorothiazide 25 MG tablet 25 mg Take 1 dose by mouth Daily.     • metFORMIN (GLUCOPHAGE) 500 MG tablet TAKE 1 TABLET TWICE DAILY WITH FOOD 180 tablet 3   • omeprazole (PRILOSEC) 20 MG capsule Take 1 capsule by mouth 2 (Two) Times a Day. 180 capsule 3   • simvastatin (ZOCOR) 40 MG tablet TAKE 1 TABLET AT BEDTIME 90 tablet 3   • Turmeric 500 MG tablet Take 1 tablet by mouth Daily.     • ondansetron ODT (ZOFRAN-ODT) 8 MG disintegrating tablet Take 1 tablet by mouth Every 8 (Eight) Hours As Needed for Nausea or Vomiting. 90 tablet 3   • [DISCONTINUED] Lecithin 1200 MG capsule Take  by mouth Daily.       No facility-administered encounter medications on file as of 4/23/2019.        Reviewed use of high risk medication in the elderly: yes  Reviewed for potential of harmful drug  interactions in the elderly: yes    Follow Up:  Return in about 1 year (around 4/23/2020) for schedule subseq Medicare Wellness Exam.     An After Visit Summary and PPPS with all of these plans were given to the patient.

## 2019-06-12 RX ORDER — OMEPRAZOLE 20 MG/1
CAPSULE, DELAYED RELEASE ORAL
Qty: 180 CAPSULE | Refills: 3 | Status: SHIPPED | OUTPATIENT
Start: 2019-06-12

## 2019-07-31 RX ORDER — SIMVASTATIN 40 MG
TABLET ORAL
Qty: 90 TABLET | Refills: 3 | Status: SHIPPED | OUTPATIENT
Start: 2019-07-31 | End: 2021-04-20 | Stop reason: SDUPTHER

## 2019-07-31 RX ORDER — LISINOPRIL AND HYDROCHLOROTHIAZIDE 25; 20 MG/1; MG/1
TABLET ORAL
Qty: 90 TABLET | Refills: 3 | Status: SHIPPED | OUTPATIENT
Start: 2019-07-31 | End: 2021-04-20 | Stop reason: SDUPTHER

## 2019-10-14 ENCOUNTER — OFFICE VISIT (OUTPATIENT)
Dept: ENDOCRINOLOGY | Facility: CLINIC | Age: 74
End: 2019-10-14

## 2019-10-14 VITALS
OXYGEN SATURATION: 96 % | WEIGHT: 159 LBS | BODY MASS INDEX: 31.22 KG/M2 | HEART RATE: 88 BPM | DIASTOLIC BLOOD PRESSURE: 76 MMHG | HEIGHT: 60 IN | SYSTOLIC BLOOD PRESSURE: 132 MMHG

## 2019-10-14 DIAGNOSIS — E89.0 POST-SURGICAL HYPOTHYROIDISM: Primary | ICD-10-CM

## 2019-10-14 DIAGNOSIS — C73 THYROID CANCER (HCC): ICD-10-CM

## 2019-10-14 LAB
ALBUMIN SERPL-MCNC: 4.9 G/DL (ref 3.5–5.2)
ANION GAP SERPL CALCULATED.3IONS-SCNC: 13.1 MMOL/L (ref 5–15)
BUN BLD-MCNC: 16 MG/DL (ref 8–23)
BUN/CREAT SERPL: 16.5 (ref 7–25)
CALCIUM SPEC-SCNC: 8.4 MG/DL (ref 8.6–10.5)
CHLORIDE SERPL-SCNC: 89 MMOL/L (ref 98–107)
CO2 SERPL-SCNC: 28.9 MMOL/L (ref 22–29)
CREAT BLD-MCNC: 0.97 MG/DL (ref 0.57–1)
GFR SERPL CREATININE-BSD FRML MDRD: 56 ML/MIN/1.73
GLUCOSE BLD-MCNC: 112 MG/DL (ref 65–99)
PHOSPHATE SERPL-MCNC: 3.5 MG/DL (ref 2.5–4.5)
POTASSIUM BLD-SCNC: 4.3 MMOL/L (ref 3.5–5.2)
SODIUM BLD-SCNC: 131 MMOL/L (ref 136–145)
TSH SERPL DL<=0.05 MIU/L-ACNC: 0.3 UIU/ML (ref 0.27–4.2)

## 2019-10-14 PROCEDURE — 80069 RENAL FUNCTION PANEL: CPT | Performed by: INTERNAL MEDICINE

## 2019-10-14 PROCEDURE — 99213 OFFICE O/P EST LOW 20 MIN: CPT | Performed by: INTERNAL MEDICINE

## 2019-10-14 PROCEDURE — 84443 ASSAY THYROID STIM HORMONE: CPT | Performed by: INTERNAL MEDICINE

## 2019-10-29 ENCOUNTER — OFFICE VISIT (OUTPATIENT)
Dept: INTERNAL MEDICINE | Facility: CLINIC | Age: 74
End: 2019-10-29

## 2019-10-29 ENCOUNTER — TRANSCRIBE ORDERS (OUTPATIENT)
Dept: ADMINISTRATIVE | Facility: HOSPITAL | Age: 74
End: 2019-10-29

## 2019-10-29 VITALS
SYSTOLIC BLOOD PRESSURE: 142 MMHG | DIASTOLIC BLOOD PRESSURE: 82 MMHG | OXYGEN SATURATION: 97 % | HEIGHT: 60 IN | RESPIRATION RATE: 18 BRPM | BODY MASS INDEX: 31.33 KG/M2 | WEIGHT: 159.6 LBS | HEART RATE: 79 BPM | TEMPERATURE: 97.9 F

## 2019-10-29 DIAGNOSIS — E11.9 TYPE 2 DIABETES MELLITUS WITHOUT COMPLICATION, WITHOUT LONG-TERM CURRENT USE OF INSULIN (HCC): Primary | ICD-10-CM

## 2019-10-29 DIAGNOSIS — Z23 NEED FOR IMMUNIZATION AGAINST INFLUENZA: ICD-10-CM

## 2019-10-29 DIAGNOSIS — Z12.31 VISIT FOR SCREENING MAMMOGRAM: Primary | ICD-10-CM

## 2019-10-29 DIAGNOSIS — R82.998 LEUKOCYTES IN URINE: ICD-10-CM

## 2019-10-29 DIAGNOSIS — Z79.899 HIGH RISK MEDICATION USE: ICD-10-CM

## 2019-10-29 DIAGNOSIS — I10 ESSENTIAL HYPERTENSION: ICD-10-CM

## 2019-10-29 DIAGNOSIS — E78.49 OTHER HYPERLIPIDEMIA: ICD-10-CM

## 2019-10-29 LAB
A/C: NORMAL
ALBUMIN SERPL-MCNC: 4.9 G/DL (ref 3.5–5.2)
ALBUMIN/GLOB SERPL: 1.4 G/DL
ALP SERPL-CCNC: 87 U/L (ref 39–117)
ALT SERPL W P-5'-P-CCNC: 19 U/L (ref 1–33)
ANION GAP SERPL CALCULATED.3IONS-SCNC: 13.6 MMOL/L (ref 5–15)
AST SERPL-CCNC: 22 U/L (ref 1–32)
BASOPHILS # BLD AUTO: 0.05 10*3/MM3 (ref 0–0.2)
BASOPHILS NFR BLD AUTO: 0.6 % (ref 0–1.5)
BILIRUB SERPL-MCNC: 0.4 MG/DL (ref 0.2–1.2)
BUN BLD-MCNC: 15 MG/DL (ref 8–23)
BUN/CREAT SERPL: 15 (ref 7–25)
CALCIUM SPEC-SCNC: 8.5 MG/DL (ref 8.6–10.5)
CHLORIDE SERPL-SCNC: 95 MMOL/L (ref 98–107)
CHOLEST SERPL-MCNC: 164 MG/DL (ref 0–200)
CLARITY, POC: CLEAR
CO2 SERPL-SCNC: 28.4 MMOL/L (ref 22–29)
COLOR UR: YELLOW
CREAT BLD-MCNC: 1 MG/DL (ref 0.57–1)
DEPRECATED RDW RBC AUTO: 39.4 FL (ref 37–54)
EOSINOPHIL # BLD AUTO: 0.28 10*3/MM3 (ref 0–0.4)
EOSINOPHIL NFR BLD AUTO: 3.3 % (ref 0.3–6.2)
ERYTHROCYTE [DISTWIDTH] IN BLOOD BY AUTOMATED COUNT: 11.8 % (ref 12.3–15.4)
EXPIRATION DATE: ABNORMAL
EXPIRATION DATE: NORMAL
EXPIRATION DATE: NORMAL
GFR SERPL CREATININE-BSD FRML MDRD: 54 ML/MIN/1.73
GLOBULIN UR ELPH-MCNC: 3.5 GM/DL
GLUCOSE BLD-MCNC: 108 MG/DL (ref 65–99)
GLUCOSE UR STRIP-MCNC: NEGATIVE MG/DL
HBA1C MFR BLD: 6.5 %
HCT VFR BLD AUTO: 35.8 % (ref 34–46.6)
HDLC SERPL-MCNC: 44 MG/DL (ref 40–60)
HGB BLD-MCNC: 12.2 G/DL (ref 12–15.9)
IMM GRANULOCYTES # BLD AUTO: 0.05 10*3/MM3 (ref 0–0.05)
IMM GRANULOCYTES NFR BLD AUTO: 0.6 % (ref 0–0.5)
KETONES UR QL: NEGATIVE
LDLC SERPL CALC-MCNC: 79 MG/DL (ref 0–100)
LDLC/HDLC SERPL: 1.8 {RATIO}
LEUKOCYTE EST, POC: ABNORMAL
LYMPHOCYTES # BLD AUTO: 2.4 10*3/MM3 (ref 0.7–3.1)
LYMPHOCYTES NFR BLD AUTO: 28 % (ref 19.6–45.3)
Lab: ABNORMAL
Lab: NORMAL
Lab: NORMAL
MAGNESIUM SERPL-MCNC: 2.2 MG/DL (ref 1.6–2.4)
MCH RBC QN AUTO: 31 PG (ref 26.6–33)
MCHC RBC AUTO-ENTMCNC: 34.1 G/DL (ref 31.5–35.7)
MCV RBC AUTO: 90.9 FL (ref 79–97)
MONOCYTES # BLD AUTO: 0.81 10*3/MM3 (ref 0.1–0.9)
MONOCYTES NFR BLD AUTO: 9.5 % (ref 5–12)
NEUTROPHILS # BLD AUTO: 4.97 10*3/MM3 (ref 1.7–7)
NEUTROPHILS NFR BLD AUTO: 58 % (ref 42.7–76)
NITRITE UR-MCNC: NEGATIVE MG/ML
NRBC BLD AUTO-RTO: 0 /100 WBC (ref 0–0.2)
PH UR: 7.5 [PH] (ref 5–8)
PLATELET # BLD AUTO: 346 10*3/MM3 (ref 140–450)
PMV BLD AUTO: 11.7 FL (ref 6–12)
POC CREATININE URINE: 50
POC MICROALBUMIN URINE: 30
POTASSIUM BLD-SCNC: 4.3 MMOL/L (ref 3.5–5.2)
PROT SERPL-MCNC: 8.4 G/DL (ref 6–8.5)
PROT UR STRIP-MCNC: NEGATIVE MG/DL
PROT/CREAT UR: 50 MG/G CREA
RBC # BLD AUTO: 3.94 10*6/MM3 (ref 3.77–5.28)
RBC # UR STRIP: NEGATIVE /UL
SODIUM BLD-SCNC: 137 MMOL/L (ref 136–145)
SP GR UR: 1.01 (ref 1–1.03)
TRIGL SERPL-MCNC: 203 MG/DL (ref 0–150)
VLDLC SERPL-MCNC: 40.6 MG/DL (ref 5–40)
WBC NRBC COR # BLD: 8.56 10*3/MM3 (ref 3.4–10.8)

## 2019-10-29 PROCEDURE — 80061 LIPID PANEL: CPT | Performed by: INTERNAL MEDICINE

## 2019-10-29 PROCEDURE — 83735 ASSAY OF MAGNESIUM: CPT | Performed by: INTERNAL MEDICINE

## 2019-10-29 PROCEDURE — 90653 IIV ADJUVANT VACCINE IM: CPT | Performed by: INTERNAL MEDICINE

## 2019-10-29 PROCEDURE — 85025 COMPLETE CBC W/AUTO DIFF WBC: CPT | Performed by: INTERNAL MEDICINE

## 2019-10-29 PROCEDURE — 80053 COMPREHEN METABOLIC PANEL: CPT | Performed by: INTERNAL MEDICINE

## 2019-10-29 PROCEDURE — 87086 URINE CULTURE/COLONY COUNT: CPT | Performed by: INTERNAL MEDICINE

## 2019-10-29 PROCEDURE — 83036 HEMOGLOBIN GLYCOSYLATED A1C: CPT | Performed by: INTERNAL MEDICINE

## 2019-10-29 PROCEDURE — 81003 URINALYSIS AUTO W/O SCOPE: CPT | Performed by: INTERNAL MEDICINE

## 2019-10-29 PROCEDURE — G0008 ADMIN INFLUENZA VIRUS VAC: HCPCS | Performed by: INTERNAL MEDICINE

## 2019-10-29 PROCEDURE — 99214 OFFICE O/P EST MOD 30 MIN: CPT | Performed by: INTERNAL MEDICINE

## 2019-10-29 PROCEDURE — 82044 UR ALBUMIN SEMIQUANTITATIVE: CPT | Performed by: INTERNAL MEDICINE

## 2019-10-29 PROCEDURE — 36415 COLL VENOUS BLD VENIPUNCTURE: CPT | Performed by: INTERNAL MEDICINE

## 2019-10-29 RX ORDER — LANCETS
EACH MISCELLANEOUS
Qty: 102 EACH | Refills: 12 | Status: SHIPPED | OUTPATIENT
Start: 2019-10-29 | End: 2021-04-27 | Stop reason: ALTCHOICE

## 2019-10-29 NOTE — PROGRESS NOTES
Bairon Louie is a 74 y.o. female.     Chief Complaint   Patient presents with   • Diabetes     6 month f/u       History obtained from the patient.      History of Present Illness     The patient had a Right Thyroidectomy and a Right Superior Parathyroid Excision on 8/7/18.  This was done at [facility] by Dr. Deedee Jacobs.  She was diagnosed with Papillary Thyroid Cancer.  The patient  is being followed by Dr. Aiken, last appointment October 14, 2019.  TSH was normal.     The patient sees Dr. Brenner for RA and Fibromyalgia, next appointment scheduled November 2019.. She has been on Cymbalta.  She states she never took Neurontin.        Primary Care Cardiac Diagnostic Constellation: The patient is here today for a follow-up visit.       Her Diabetes Mellitus Type 2 has been stable.   Medication(s): Metformin HCl, Lisinopril/HCT, and Simvastatin.    Her Hypertension has been stable.   Medication(s): Lisinopril/HCT.   Her Hyperlipidemia has been unstable.   Her LDL goal is < 70 mg/dL, last LDL was 86 mg/dL and TG 213.   Medication(s): Simvastatin.   The patient is adherent with her medication regimen. She denies medication side effects.       Interval Events: The patient states her blood sugar at home has been 125-145, fasting, but she does not check it post prandial.  No episodes of Hypoglycemia.  Last Hemoglobin A1C was 6.2.  She does check her feet daily. She states saw the ophthalmologist in October 2019, no retinopathy.          Symptoms: Stable COOL.  Denies chest pain, dyspnea, orthopnea, PND, palpitations, syncope, lower extremity edema, intermittent leg claudication, lightheadedness, and dizziness.  Associated Symptoms: Weight up 4 pounds since last visit. Has stable fatigue, headaches, myalgias, and arthralgias. No polydipsia, polyuria, visual impairment, memory loss, concentration issues, or focal neurologic deficits.   No foot pain, numbness of the feet, or foot  ulcer.       Lifestyle and Disease Management: Diet: She has not been following a healthy diet. Weight Issues: She has weight concerns. Exercise: She has been exercising recently, walks every other day.  Smoking: She does not use tobacco.       Gastroesophageal Reflux Disease Follow-up: The patient is being seen for a routine clinic follow-up of Gastroesophageal Reflux Disease, which is stable.  Previous Evaluation: Last EGD 1/25/16 showed Class A reflux esophagitis.  Interval Events:  None.  Symptoms: Has intermittent nausea.  No abdominal pain, heartburn, acid regurgitation, vomiting, dysphagia, odynophagia, hematochezia, melena, early satiety, bloating, or belching.  Associated Symptoms: No chronic sore throat, hoarseness, cough, or wheezing.   Medication:  Omeprazole and Zofran.      Colonic Polyp Follow-up: The patient is being seen for a routine clinic follow-up of Colon Polyp(s), which is stable.   Interval Events:  Current diagnosis was determined by Colonoscopy and last 1/25/16- no polyps.   Symptoms:  No abdominal pain, diarrhea, constipation, hematochezia, melena, decreased stool caliber, or change in bowel habits.   Associated Symptoms: no rectal prolapse.   Medications:  Bentyl.     Osteoporosis Follow-up: The patient is being seen for routine follow-up of Osteoporosis, which is stable.   Interval Events:  The last DEXA scan was done 10/19/17, Osteopenia.   Symptoms:  She reports stable arthralgias, myalgias, back pain, hip pain, and neck pain.   No wrist pain, loss of balance, or gait instability.  No new fracture.  Medication:  Calcium and Vitamin D supplements.  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         Vitamin D Deficiency: The patient is here for follow up of Vitamin D Deficiency, which is stable.   Interval Events:  Recent laboratory results: 3/19/19  , 25-hydroxyvitamin D 57.8 ng/mL.  Symptoms: Has some numbness and tingling of the right hand.  Stable fatigue,  myalgias, and arthralgias.  Denies balance issues.       Medication:  Vitamin D supplements.        Osteoarthritis Follow-Up: The patient is here for follow up of Osteoarthritis, which is stable.  Comorbid Illnesses: Rheumatoid Arthritis and Fibromyalgia.   Interval Events: None.  Symptoms:  Has arthralgias (hands, hips, and knees), myalgias, neck pain, and back pain.   Associated Symptoms: Denies joint swelling and joint stiffness.  Medications:  Cymbalta.    The patient is adherent with her medication regimen. She denies medication side effects.      Anxiety Disorder Follow-Up: The patient is being seen for follow-up of Anxiety, which is stable.  Interval Events:   None.  Symptoms: stable anxiety and insomnia.  Denies depression, panic attacks,  memory loss, and difficulty concentrating.   Associated Symptoms: no suicidal ideation.   Medication(s): Cymbalta  The patient is adherent to her medication regimen, and she denies medication side effects.        Current Outpatient Medications on File Prior to Visit   Medication Sig Dispense Refill   • Blood Glucose Monitoring Suppl (ACCU-CHEK RENZO PLUS) w/Device kit 1 each Daily. 1 kit 0   • Cholecalciferol (VITAMIN D3) 1000 units capsule Take 1 capsule by mouth 2 (Two) Times a Day. 60 capsule    • dicyclomine (BENTYL) 20 MG tablet Take 1 tablet by mouth 4 (Four) Times a Day. 100 tablet 3   • DULoxetine (CYMBALTA) 30 MG capsule Take 30 mg by mouth Daily.     • fluticasone (FLONASE) 50 MCG/ACT nasal spray into each nostril.     • levothyroxine (SYNTHROID, LEVOTHROID) 112 MCG tablet Take 1 tablet by mouth Daily. 90 tablet 3   • lisinopril-hydrochlorothiazide (PRINZIDE,ZESTORETIC) 20-25 MG per tablet TAKE 1 TABLET EVERY DAY 90 tablet 3   • metFORMIN (GLUCOPHAGE) 500 MG tablet TAKE 1 TABLET TWICE DAILY WITH FOOD 180 tablet 3   • omeprazole (priLOSEC) 20 MG capsule TAKE 1 CAPSULE TWICE DAILY 180 capsule 3   • ondansetron ODT (ZOFRAN-ODT) 8 MG disintegrating tablet Take 1  tablet by mouth Every 8 (Eight) Hours As Needed for Nausea or Vomiting. 90 tablet 3   • simvastatin (ZOCOR) 40 MG tablet TAKE 1 TABLET AT BEDTIME 90 tablet 3   • Turmeric 500 MG tablet Take 1 tablet by mouth Daily.     • [DISCONTINUED] glucose blood (ACCU-CHEK RENZO) test strip Test once daily  DX  E 11.9 100 each 12   • [DISCONTINUED] Lancets (ACCU-CHEK MULTICLIX) lancets Test once daily  Dx   E11.9 102 each 12   • [DISCONTINUED] metFORMIN (GLUCOPHAGE) 500 MG tablet TAKE 1 TABLET TWICE DAILY WITH FOOD 180 tablet 3     No current facility-administered medications on file prior to visit.        Current outpatient and discharge medications have been reconciled for the patient.  Reviewed by: Marina Pederson MD        The following portions of the patient's history were reviewed and updated as appropriate: allergies, current medications, past family history, past medical history, past social history, past surgical history and problem list.    Review of Systems   Constitutional: Positive for fatigue. Negative for unexpected weight change.   HENT: Negative for sore throat, trouble swallowing and voice change.    Eyes: Negative for visual disturbance.   Respiratory: Negative for cough, shortness of breath and wheezing.    Cardiovascular: Negative for chest pain, palpitations and leg swelling.        Stable COOL.  No orthopnea or claudication.   Gastrointestinal: Positive for nausea. Negative for abdominal pain, blood in stool, constipation, diarrhea and vomiting.        Denies melena.   Endocrine: Negative for polydipsia and polyuria.   Musculoskeletal: Positive for arthralgias, back pain, myalgias and neck pain. Negative for joint swelling.   Neurological: Positive for headaches. Negative for dizziness, syncope and light-headedness.        No memory issues.   Psychiatric/Behavioral: Positive for sleep disturbance. Negative for decreased concentration and suicidal ideas. The patient is nervous/anxious.          Objective  "      Blood pressure 142/82, pulse 79, temperature 97.9 °F (36.6 °C), temperature source Temporal, resp. rate 18, height 153 cm (60.24\"), weight 72.4 kg (159 lb 9.6 oz), SpO2 97 %.      Physical Exam   Constitutional:   Overweight, borderline obese.   Neck: Normal range of motion. Neck supple. Carotid bruit is not present. No thyromegaly present.   Cardiovascular: Normal rate, regular rhythm, normal heart sounds and intact distal pulses. Exam reveals no gallop and no friction rub.   No murmur heard.  No peripheral edema.   Pulmonary/Chest: Effort normal and breath sounds normal.   Abdominal: Soft. Bowel sounds are normal. She exhibits no distension, no abdominal bruit and no mass. There is no hepatosplenomegaly. There is no tenderness.   Psychiatric: She has a normal mood and affect.   Nursing note and vitals reviewed.    Results for orders placed or performed in visit on 10/29/19   POC Urinalysis Dipstick, Multipro   Result Value Ref Range    Color Yellow Yellow, Straw, Dark Yellow, Carol    Clarity, UA Clear Clear    Glucose, UA Negative Negative, 1000 mg/dL (3+) mg/dL    Ketones, UA Negative Negative    Specific Gravity  1.010 1.005 - 1.030    Blood, UA Negative Negative    pH, Urine 7.5 5.0 - 8.0    Protein, POC Negative Negative mg/dL    Nitrite, UA Negative Negative    Leukocytes Moderate (2+) (A) Negative    Protein/Creatinine Ratio, Urine 50.0 mg/G Crea    Lot Number [id number]     Expiration Date 3-31-20    POC Microalbumin   Result Value Ref Range    Microalbumin, Urine 30     Creatinine, Urine 50     A/C 30-300mg/g ABNORMAL     Lot Number [id number]     Expiration Date 8-31-20    POC Glycosylated Hemoglobin (Hb A1C)   Result Value Ref Range    Hemoglobin A1C 6.5 %    Lot Number [id number]     Expiration Date 6-16-21        Assessment / Plan:  Cristy was seen today for Diabetes follow-up.    Diagnoses and all orders for this visit:    Type 2 diabetes mellitus without complication, without long-term current use " of insulin (CMS/[facility])  -     Ambulatory Referral to Podiatry  -     POC Urinalysis Dipstick, Multipro  -     POC Microalbumin  -     POC Glycosylated Hemoglobin (Hb A1C)  -     Lipid Panel  -     Comprehensive Metabolic Panel  -     CBC & Differential  -     CBC Auto Differential  -     glucose blood (ACCU-CHEK RENZO) test strip; Test once daily  DX  E 11.9  -     Lancets (ACCU-CHEK MULTICLIX) lancets; Test once daily  Dx   E11.9   Continue current medication(s) as noted in the history of present illness.    Other hyperlipidemia  -     Lipid Panel  -     Comprehensive Metabolic Panel  -     CBC & Differential  -     CBC Auto Differential   Continue current medication(s) as noted in the history of present illness.    Essential hypertension  -     POC Urinalysis Dipstick, Multipro  -     Lipid Panel  -     Comprehensive Metabolic Panel  -     CBC & Differential  -     Magnesium  -     CBC Auto Differential   Continue current medication(s) as noted in the history of present illness.    High risk medication use  -     Magnesium    Leukocytes in urine  -     Urine Culture - Urine, Urine, Clean Catch    Need for immunization against influenza  -     Fluad Tri 65yr ([id number])    Recommended Shingrix (new Shingles vaccine), Td/Tdap vaccine (Tetanus booster), and Hepatitis A vaccination at the pharmacy.        Return in about 6 months (around 4/29/2020) for Recheck Diabetes, fasting.

## 2019-10-29 NOTE — PATIENT INSTRUCTIONS
I recommend Shingrix (new Shingles vaccine), Td/Tdap vaccine (Tetanus booster), and Hepatitis A vaccination at the pharmacy.      Heart-Healthy Eating Plan  Many factors influence your heart (coronary) health, including eating and exercise habits. Coronary risk increases with abnormal blood fat (lipid) levels. Heart-healthy meal planning includes limiting unhealthy fats, increasing healthy fats, and making other diet and lifestyle changes.  What is my plan?  Your health care provider may recommend that you:  · Limit your fat intake to _________% or less of your total calories each day.  · Limit your saturated fat intake to _________% or less of your total calories each day.  · Limit the amount of cholesterol in your diet to less than _________ mg per day.  What are tips for following this plan?  Cooking  Cook foods using methods other than frying. Baking, boiling, grilling, and broiling are all good options. Other ways to reduce fat include:  · Removing the skin from poultry.  · Removing all visible fats from meats.  · Steaming vegetables in water or broth.  Meal planning    · At meals, imagine dividing your plate into fourths:  ? Fill one-half of your plate with vegetables and green salads.  ? Fill one-fourth of your plate with whole grains.  ? Fill one-fourth of your plate with lean protein foods.  · Eat 4-5 servings of vegetables per day. One serving equals 1 cup raw or cooked vegetable, or 2 cups raw leafy greens.  · Eat 4-5 servings of fruit per day. One serving equals 1 medium whole fruit, ¼ cup dried fruit, ½ cup fresh, frozen, or canned fruit, or ½ cup 100% fruit juice.  · Eat more foods that contain soluble fiber. Examples include apples, broccoli, carrots, beans, peas, and barley. Aim to get 25-30 g of fiber per day.  · Increase your consumption of legumes, nuts, and seeds to 4-5 servings per week. One serving of dried beans or legumes equals ½ cup cooked, 1 serving of nuts is ¼ cup, and 1 serving of seeds  equals 1 tablespoon.  Fats  · Choose healthy fats more often. Choose monounsaturated and polyunsaturated fats, such as olive and canola oils, flaxseeds, walnuts, almonds, and seeds.  · Eat more omega-3 fats. Choose salmon, mackerel, sardines, tuna, flaxseed oil, and ground flaxseeds. Aim to eat fish at least 2 times each week.  · Check food labels carefully to identify foods with trans fats or high amounts of saturated fat.  · Limit saturated fats. These are found in animal products, such as meats, butter, and cream. Plant sources of saturated fats include palm oil, palm kernel oil, and coconut oil.  · Avoid foods with partially hydrogenated oils in them. These contain trans fats. Examples are stick margarine, some tub margarines, cookies, crackers, and other baked goods.  · Avoid fried foods.  General information  · Eat more home-cooked food and less restaurant, buffet, and fast food.  · Limit or avoid alcohol.  · Limit foods that are high in starch and sugar.  · Lose weight if you are overweight. Losing just 5-10% of your body weight can help your overall health and prevent diseases such as diabetes and heart disease.  · Monitor your salt (sodium) intake, especially if you have high blood pressure. Talk with your health care provider about your sodium intake.  · Try to incorporate more vegetarian meals weekly.  What foods can I eat?  Fruits  All fresh, canned (in natural juice), or frozen fruits.  Vegetables  Fresh or frozen vegetables (raw, steamed, roasted, or grilled). Green salads.  Grains  Most grains. Choose whole wheat and whole grains most of the time. Rice and pasta, including brown rice and pastas made with whole wheat.  Meats and other proteins  Lean, well-trimmed beef, veal, pork, and lamb. Chicken and turkey without skin. All fish and shellfish. Wild duck, rabbit, pheasant, and venison. Egg whites or low-cholesterol egg substitutes. Dried beans, peas, lentils, and tofu. Seeds and most  nuts.  Dairy  Low-fat or nonfat cheeses, including ricotta and mozzarella. Skim or 1% milk (liquid, powdered, or evaporated). Buttermilk made with low-fat milk. Nonfat or low-fat yogurt.  Fats and oils  Non-hydrogenated (trans-free) margarines. Vegetable oils, including soybean, sesame, sunflower, olive, peanut, safflower, corn, canola, and cottonseed. Salad dressings or mayonnaise made with a vegetable oil.  Beverages  Water (mineral or sparkling). Coffee and tea. Diet carbonated beverages.  Sweets and desserts  Sherbet, gelatin, and fruit ice. Small amounts of dark chocolate.  Limit all sweets and desserts.  Seasonings and condiments  All seasonings and condiments.  The items listed above may not be a complete list of foods and beverages you can eat. Contact a dietitian for more options.  What foods are not recommended?  Fruits  Canned fruit in heavy syrup. Fruit in cream or butter sauce. Fried fruit. Limit coconut.  Vegetables  Vegetables cooked in cheese, cream, or butter sauce. Fried vegetables.  Grains  Breads made with saturated or trans fats, oils, or whole milk. Croissants. Sweet rolls. Donuts. High-fat crackers, such as cheese crackers.  Meats and other proteins  Fatty meats, such as hot dogs, ribs, sausage, ring, rib-eye roast or steak. High-fat deli meats, such as salami and bologna. Caviar. Domestic duck and goose. Organ meats, such as liver.  Dairy  Cream, sour cream, cream cheese, and creamed cottage cheese. Whole milk cheeses. Whole or 2% milk (liquid, evaporated, or condensed). Whole buttermilk. Cream sauce or high-fat cheese sauce. Whole-milk yogurt.  Fats and oils  Meat fat, or shortening. Cocoa butter, hydrogenated oils, palm oil, coconut oil, palm kernel oil. Solid fats and shortenings, including ring fat, salt pork, lard, and butter. Nondairy cream substitutes. Salad dressings with cheese or sour cream.  Beverages  Regular sodas and any drinks with added sugar.  Sweets and desserts  Frosting.  Pudding. Cookies. Cakes. Pies. Milk chocolate or white chocolate. Buttered syrups. Full-fat ice cream or ice cream drinks.  The items listed above may not be a complete list of foods and beverages to avoid. Contact a dietitian for more information.  Summary  · Heart-healthy meal planning includes limiting unhealthy fats, increasing healthy fats, and making other diet and lifestyle changes.  · Lose weight if you are overweight. Losing just 5-10% of your body weight can help your overall health and prevent diseases such as diabetes and heart disease.  · Focus on eating a balance of foods, including fruits and vegetables, low-fat or nonfat dairy, lean protein, nuts and legumes, whole grains, and heart-healthy oils and fats.  This information is not intended to replace advice given to you by your health care provider. Make sure you discuss any questions you have with your health care provider.  Document Released: 09/26/2009 Document Revised: 01/25/2019 Document Reviewed: 01/25/2019  [employer] Patient Education © 2019 [employer]      Exercising to Lose Weight  Exercise is structured, repetitive physical activity to improve fitness and health. Getting regular exercise is important for everyone. It is especially important if you are overweight. Being overweight increases your risk of heart disease, stroke, diabetes, high blood pressure, and several types of cancer. Reducing your calorie intake and exercising can help you lose weight.  Exercise is usually categorized as moderate or vigorous intensity. To lose weight, most people need to do a certain amount of moderate-intensity or vigorous-intensity exercise each week.  Moderate-intensity exercise    Moderate-intensity exercise is any activity that gets you moving enough to burn at least three times more energy (calories) than if you were sitting.  Examples of moderate exercise include:  · Walking a mile in 15 minutes.  · Doing light yard work.  · Biking at an  easy pace.  Most people should get at least 150 minutes (2 hours and 30 minutes) a week of moderate-intensity exercise to maintain their body weight.  Vigorous-intensity exercise  Vigorous-intensity exercise is any activity that gets you moving enough to burn at least six times more calories than if you were sitting. When you exercise at this intensity, you should be working hard enough that you are not able to carry on a conversation.  Examples of vigorous exercise include:  · Running.  · Playing a team sport, such as football, basketball, and soccer.  · Jumping rope.  Most people should get at least 75 minutes (1 hour and 15 minutes) a week of vigorous-intensity exercise to maintain their body weight.  How can exercise affect me?  When you exercise enough to burn more calories than you eat, you lose weight. Exercise also reduces body fat and builds muscle. The more muscle you have, the more calories you burn. Exercise also:  · Improves mood.  · Reduces stress and tension.  · Improves your overall fitness, flexibility, and endurance.  · Increases bone strength.  The amount of exercise you need to lose weight depends on:  · Your age.  · The type of exercise.  · Any health conditions you have.  · Your overall physical ability.  Talk to your health care provider about how much exercise you need and what types of activities are safe for you.  What actions can I take to lose weight?  Nutrition    · Make changes to your diet as told by your health care provider or diet and nutrition specialist (dietitian). This may include:  ? Eating fewer calories.  ? Eating more protein.  ? Eating less unhealthy fats.  ? Eating a diet that includes fresh fruits and vegetables, whole grains, low-fat dairy products, and lean protein.  ? Avoiding foods with added fat, salt, and sugar.  · Drink plenty of water while you exercise to prevent dehydration or heat stroke.  Activity  · Choose an activity that you enjoy and set realistic goals.  Your health care provider can help you make an exercise plan that works for you.  · Exercise at a moderate or vigorous intensity most days of the week.  ? The intensity of exercise may vary from person to person. You can tell how intense a workout is for you by paying attention to your breathing and heartbeat. Most people will notice their breathing and heartbeat get faster with more intense exercise.  · Do resistance training twice each week, such as:  ? Push-ups.  ? Sit-ups.  ? Lifting weights.  ? Using resistance bands.  · Getting short amounts of exercise can be just as helpful as long structured periods of exercise. If you have trouble finding time to exercise, try to include exercise in your daily routine.  ? Get up, stretch, and walk around every 30 minutes throughout the day.  ? Go for a walk during your lunch break.  ? Park your car farther away from your destination.  ? If you take public transportation, get off one stop early and walk the rest of the way.  ? Make phone calls while standing up and walking around.  ? Take the stairs instead of elevators or escalators.  · Wear comfortable clothes and shoes with good support.  · Do not exercise so much that you hurt yourself, feel dizzy, or get very short of breath.  Where to find more information  · [place] Department of Health and Human Services: www.hhs.gov  · Centers for Disease Control and Prevention (CDC): www.cdc.gov  Contact a health care provider:  · Before starting a new exercise program.  · If you have questions or concerns about your weight.  · If you have a medical problem that keeps you from exercising.  Get help right away if you have any of the following while exercising:  · Injury.  · Dizziness.  · Difficulty breathing or shortness of breath that does not go away when you stop exercising.  · Chest pain.  · Rapid heartbeat.  Summary  · Being overweight increases your risk of heart disease, stroke, diabetes, high blood pressure, and several types  of cancer.  · Losing weight happens when you burn more calories than you eat.  · Reducing the amount of calories you eat in addition to getting regular moderate or vigorous exercise each week helps you lose weight.  This information is not intended to replace advice given to you by your health care provider. Make sure you discuss any questions you have with your health care provider.  Document Released: 01/20/2012 Document Revised: 12/31/2018 Document Reviewed: 12/31/2018  [employer] Interactive Patient Education © 2019 [employer]

## 2019-10-30 ENCOUNTER — HOSPITAL ENCOUNTER (OUTPATIENT)
Dept: MAMMOGRAPHY | Facility: HOSPITAL | Age: 74
Discharge: HOME | End: 2019-10-30
Admitting: INTERNAL MEDICINE

## 2019-10-30 DIAGNOSIS — Z12.31 VISIT FOR SCREENING MAMMOGRAM: ICD-10-CM

## 2019-10-30 LAB — BACTERIA SPEC AEROBE CULT: NO GROWTH

## 2019-10-30 PROCEDURE — 77067 SCR MAMMO BI INCL CAD: CPT | Performed by: RADIOLOGY

## 2019-10-30 PROCEDURE — 77063 BREAST TOMOSYNTHESIS BI: CPT

## 2019-10-30 PROCEDURE — 77067 SCR MAMMO BI INCL CAD: CPT

## 2019-10-30 PROCEDURE — 77063 BREAST TOMOSYNTHESIS BI: CPT | Performed by: RADIOLOGY

## 2019-12-10 ENCOUNTER — TELEPHONE (OUTPATIENT)
Dept: INTERNAL MEDICINE | Facility: CLINIC | Age: 74
End: 2019-12-10

## 2019-12-10 NOTE — TELEPHONE ENCOUNTER
Pt called in today to check on forms she had dropped off about diabetic shoes. Pt  States she dropped them off 3 weeks ago to     Please advise and call back

## 2019-12-11 NOTE — TELEPHONE ENCOUNTER
Cristy notified  She will check with the drug store to make sure they got the fax.  Notified her this was the 2nd time that it was faxed.   Verb understanding given

## 2020-01-02 DIAGNOSIS — E89.0 POST-SURGICAL HYPOTHYROIDISM: ICD-10-CM

## 2020-01-02 RX ORDER — LEVOTHYROXINE SODIUM 112 UG/1
TABLET ORAL
Qty: 90 TABLET | Refills: 2 | Status: SHIPPED | OUTPATIENT
Start: 2020-01-02 | End: 2020-11-10 | Stop reason: DRUGHIGH

## 2020-10-06 ENCOUNTER — OFFICE VISIT (OUTPATIENT)
Dept: INTERNAL MEDICINE | Facility: CLINIC | Age: 75
End: 2020-10-06

## 2020-10-06 ENCOUNTER — HOSPITAL ENCOUNTER (OUTPATIENT)
Dept: GENERAL RADIOLOGY | Facility: HOSPITAL | Age: 75
Discharge: HOME | End: 2020-10-06
Admitting: INTERNAL MEDICINE

## 2020-10-06 VITALS
RESPIRATION RATE: 20 BRPM | TEMPERATURE: 96.6 F | WEIGHT: 157.5 LBS | DIASTOLIC BLOOD PRESSURE: 72 MMHG | HEART RATE: 72 BPM | SYSTOLIC BLOOD PRESSURE: 130 MMHG | BODY MASS INDEX: 30.52 KG/M2

## 2020-10-06 DIAGNOSIS — R10.30 LOWER ABDOMINAL PAIN: Primary | ICD-10-CM

## 2020-10-06 DIAGNOSIS — K59.04 CHRONIC IDIOPATHIC CONSTIPATION: ICD-10-CM

## 2020-10-06 DIAGNOSIS — M67.431 GANGLION CYST OF WRIST, RIGHT: ICD-10-CM

## 2020-10-06 LAB
BILIRUB BLD-MCNC: NEGATIVE MG/DL
CLARITY, POC: CLEAR
COLOR UR: YELLOW
EXPIRATION DATE: NORMAL
GLUCOSE UR STRIP-MCNC: NEGATIVE MG/DL
KETONES UR QL: NEGATIVE
LEUKOCYTE EST, POC: NEGATIVE
Lab: NORMAL
NITRITE UR-MCNC: NEGATIVE MG/ML
PH UR: 6 [PH] (ref 5–8)
PROT UR STRIP-MCNC: NEGATIVE MG/DL
RBC # UR STRIP: NEGATIVE /UL
SP GR UR: 1.01 (ref 1–1.03)
UROBILINOGEN UR QL: NORMAL

## 2020-10-06 PROCEDURE — 81003 URINALYSIS AUTO W/O SCOPE: CPT | Performed by: INTERNAL MEDICINE

## 2020-10-06 PROCEDURE — 74018 RADEX ABDOMEN 1 VIEW: CPT

## 2020-10-06 PROCEDURE — 99214 OFFICE O/P EST MOD 30 MIN: CPT | Performed by: INTERNAL MEDICINE

## 2020-10-06 RX ORDER — POLYETHYLENE GLYCOL 3350 17 G/17G
17 POWDER, FOR SOLUTION ORAL DAILY
Qty: 850 G | Refills: 2 | Status: SHIPPED | OUTPATIENT
Start: 2020-10-06 | End: 2020-10-14

## 2020-10-06 NOTE — PROGRESS NOTES
Bairon Louie is a 75 y.o. female.     Chief Complaint   Patient presents with   • Abdominal Pain   • Mass     Rt wrist       History obtained from the patient.      Abdominal Pain  This is a new problem. Episode onset: 2 days ago. The onset quality is sudden. The problem occurs intermittently (but was constant initially). The problem has been gradually improving. The pain is located in the suprapubic region. The pain is moderate. The quality of the pain is tearing and cramping. Pain radiation: lower mid back. Associated symptoms include arthralgias (chronic), constipation (bowel movements once per day), headaches and hematochezia (?). Pertinent negatives include no belching, diarrhea, dysuria, fever, frequency, hematuria, melena, myalgias, nausea or vomiting. The pain is aggravated by eating (and sitting down). The pain is relieved by nothing. Treatments tried: takes Metamucil daily.  Tried GasX. The treatment provided mild relief. Prior workup: Last Colonoscopy 1/25/16, extensive diverticulosis. Her past medical history is significant for abdominal surgery (h/o cholecystectomy and TERRANCE/BSO) and GERD. There is no history of colon cancer, Crohn's disease, irritable bowel syndrome, pancreatitis, PUD or ulcerative colitis. Has Diverticulosis      The patient also has a cyst on her right wrist, which has increased slightly in size.  It is non-painful.    The following portions of the patient's history were reviewed and updated as appropriate: allergies, current medications, past family history, past medical history, past social history, past surgical history and problem list.      Review of Systems   Constitutional: Positive for appetite change (decreased x 2-3 months). Negative for chills, fever and unexpected weight change.   Respiratory: Negative for cough, shortness of breath and wheezing.    Gastrointestinal: Positive for abdominal pain, blood in stool (?), constipation (bowel movements once per  day) and hematochezia (?). Negative for diarrhea, melena, nausea and vomiting.   Genitourinary: Positive for pelvic pain. Negative for dysuria, frequency, hematuria, urgency, vaginal bleeding and vaginal discharge.   Musculoskeletal: Positive for arthralgias (chronic) and back pain. Negative for myalgias.   Skin: Negative for rash.   Neurological: Positive for headaches.   Hematological: Negative for adenopathy.           Objective     Blood pressure 130/72, pulse 72, temperature 96.6 °F (35.9 °C), temperature source Temporal, resp. rate 20, weight 71.4 kg (157 lb 8 oz).    Physical Exam  Vitals signs and nursing note reviewed.   Constitutional:       Comments: Overweight, borderline obese.   Cardiovascular:      Rate and Rhythm: Normal rate and regular rhythm.      Heart sounds: Normal heart sounds. No murmur.   Pulmonary:      Effort: Pulmonary effort is normal.      Breath sounds: Normal breath sounds.   Abdominal:      General: Abdomen is flat. Bowel sounds are normal. There is no distension.      Palpations: There is no mass.      Tenderness: There is abdominal tenderness (mild diffusely, with the exception of moderate in suprapubic area and bilateral lower abdomen). There is left CVA tenderness. There is no right CVA tenderness, guarding or rebound.   Genitourinary:     Comments: There is vaginal atrophy.  Uterus and cervix are absent.  There is mild diffuse tenderness with the manual pelvic exam.  Musculoskeletal:      Comments: There is a compressible flesh-colored pea-sized nodule right wrist, palmar surface.  It is nontender to palpation.   Skin:     Findings: No rash.   Neurological:      Mental Status: She is alert.   Psychiatric:         Mood and Affect: Mood normal.         Imaging:  X-ray of the abdomen showed moderate to large stool burden, per my interpretation.  The patient was informed of this result at the time of the visit.    Results for orders placed or performed in visit on 10/06/20   POC  Urinalysis Dipstick, Automated    Specimen: Urine   Result Value Ref Range    Color Yellow Yellow, Straw, Dark Yellow, Carol    Clarity, UA Clear Clear    Specific Gravity  1.010 1.005 - 1.030    pH, Urine 6.0 5.0 - 8.0    Leukocytes Negative Negative    Nitrite, UA Negative Negative    Protein, POC Negative Negative mg/dL    Glucose, UA Negative Negative, 1000 mg/dL (3+) mg/dL    Ketones, UA Negative Negative    Urobilinogen, UA Normal Normal    Bilirubin Negative Negative    Blood, UA Negative Negative    Lot Number [id number]     Expiration Date 5-31-21          Assessment/Plan   Cristy was seen today for abdominal pain and mass.    Diagnoses and all orders for this visit:    Lower abdominal pain  -     POC Urinalysis Dipstick, Automated  -     XR Abdomen KUB    Chronic idiopathic constipation  -     polyethylene glycol (MIRALAX) 17 GM/SCOOP powder; Take 17 g by mouth Daily.   Continue Metamucil.   Plenty of fluids.    Ganglion cyst of wrist, right   Continue to monitor.          Return in about 2 weeks (around 10/20/2020) for Recheck Diabetes, fasting.

## 2020-10-14 ENCOUNTER — OFFICE VISIT (OUTPATIENT)
Dept: ENDOCRINOLOGY | Facility: CLINIC | Age: 75
End: 2020-10-14

## 2020-10-14 ENCOUNTER — LAB (OUTPATIENT)
Dept: LAB | Facility: HOSPITAL | Age: 75
End: 2020-10-14

## 2020-10-14 VITALS
WEIGHT: 160 LBS | HEART RATE: 75 BPM | BODY MASS INDEX: 30.21 KG/M2 | DIASTOLIC BLOOD PRESSURE: 70 MMHG | HEIGHT: 61 IN | SYSTOLIC BLOOD PRESSURE: 130 MMHG | OXYGEN SATURATION: 95 %

## 2020-10-14 DIAGNOSIS — C73 THYROID CANCER (HCC): ICD-10-CM

## 2020-10-14 DIAGNOSIS — E89.0 POST-SURGICAL HYPOTHYROIDISM: Primary | ICD-10-CM

## 2020-10-14 LAB
ALBUMIN SERPL-MCNC: 4.4 G/DL (ref 3.5–5.2)
ANION GAP SERPL CALCULATED.3IONS-SCNC: 9.7 MMOL/L (ref 5–15)
BUN SERPL-MCNC: 12 MG/DL (ref 8–23)
BUN/CREAT SERPL: 12.1 (ref 7–25)
CALCIUM SPEC-SCNC: 8.3 MG/DL (ref 8.6–10.5)
CHLORIDE SERPL-SCNC: 104 MMOL/L (ref 98–107)
CO2 SERPL-SCNC: 28.3 MMOL/L (ref 22–29)
CREAT SERPL-MCNC: 0.99 MG/DL (ref 0.57–1)
GFR SERPL CREATININE-BSD FRML MDRD: 54 ML/MIN/1.73
GLUCOSE SERPL-MCNC: 120 MG/DL (ref 65–99)
PHOSPHATE SERPL-MCNC: 3.9 MG/DL (ref 2.5–4.5)
POTASSIUM SERPL-SCNC: 4.4 MMOL/L (ref 3.5–5.2)
SODIUM SERPL-SCNC: 142 MMOL/L (ref 136–145)
T4 FREE SERPL-MCNC: 1.85 NG/DL (ref 0.93–1.7)
TSH SERPL DL<=0.05 MIU/L-ACNC: 0.1 UIU/ML (ref 0.27–4.2)

## 2020-10-14 PROCEDURE — 86800 THYROGLOBULIN ANTIBODY: CPT | Performed by: INTERNAL MEDICINE

## 2020-10-14 PROCEDURE — 80069 RENAL FUNCTION PANEL: CPT | Performed by: INTERNAL MEDICINE

## 2020-10-14 PROCEDURE — 84443 ASSAY THYROID STIM HORMONE: CPT | Performed by: INTERNAL MEDICINE

## 2020-10-14 PROCEDURE — 99213 OFFICE O/P EST LOW 20 MIN: CPT | Performed by: INTERNAL MEDICINE

## 2020-10-14 PROCEDURE — 84439 ASSAY OF FREE THYROXINE: CPT | Performed by: INTERNAL MEDICINE

## 2020-10-14 PROCEDURE — 84432 ASSAY OF THYROGLOBULIN: CPT | Performed by: INTERNAL MEDICINE

## 2020-10-16 LAB — REF LAB TEST METHOD: NORMAL

## 2020-10-20 ENCOUNTER — OFFICE VISIT (OUTPATIENT)
Dept: INTERNAL MEDICINE | Facility: CLINIC | Age: 75
End: 2020-10-20

## 2020-10-20 VITALS
DIASTOLIC BLOOD PRESSURE: 80 MMHG | TEMPERATURE: 98.2 F | OXYGEN SATURATION: 98 % | SYSTOLIC BLOOD PRESSURE: 120 MMHG | BODY MASS INDEX: 30.11 KG/M2 | WEIGHT: 159.38 LBS | HEART RATE: 75 BPM | RESPIRATION RATE: 18 BRPM

## 2020-10-20 DIAGNOSIS — I10 ESSENTIAL HYPERTENSION: ICD-10-CM

## 2020-10-20 DIAGNOSIS — J45.20 MILD INTERMITTENT ASTHMA WITHOUT COMPLICATION: ICD-10-CM

## 2020-10-20 DIAGNOSIS — Z11.59 NEED FOR HEPATITIS C SCREENING TEST: ICD-10-CM

## 2020-10-20 DIAGNOSIS — C73 THYROID CANCER (HCC): ICD-10-CM

## 2020-10-20 DIAGNOSIS — K63.5 BENIGN COLONIC POLYP: ICD-10-CM

## 2020-10-20 DIAGNOSIS — E55.9 VITAMIN D DEFICIENCY: ICD-10-CM

## 2020-10-20 DIAGNOSIS — K21.9 GASTROESOPHAGEAL REFLUX DISEASE, UNSPECIFIED WHETHER ESOPHAGITIS PRESENT: ICD-10-CM

## 2020-10-20 DIAGNOSIS — E78.49 OTHER HYPERLIPIDEMIA: ICD-10-CM

## 2020-10-20 DIAGNOSIS — M85.89 OSTEOPENIA OF MULTIPLE SITES: ICD-10-CM

## 2020-10-20 DIAGNOSIS — Z23 NEED FOR IMMUNIZATION AGAINST INFLUENZA: ICD-10-CM

## 2020-10-20 DIAGNOSIS — M15.9 PRIMARY OSTEOARTHRITIS INVOLVING MULTIPLE JOINTS: ICD-10-CM

## 2020-10-20 DIAGNOSIS — Z79.899 HIGH RISK MEDICATION USE: ICD-10-CM

## 2020-10-20 DIAGNOSIS — Z12.31 ENCOUNTER FOR SCREENING MAMMOGRAM FOR BREAST CANCER: ICD-10-CM

## 2020-10-20 DIAGNOSIS — E11.9 TYPE 2 DIABETES MELLITUS WITHOUT COMPLICATION, WITHOUT LONG-TERM CURRENT USE OF INSULIN (HCC): Primary | ICD-10-CM

## 2020-10-20 DIAGNOSIS — F41.1 GENERALIZED ANXIETY DISORDER: ICD-10-CM

## 2020-10-20 LAB
A/C: NORMAL
BASOPHILS # BLD AUTO: 0.09 10*3/MM3 (ref 0–0.2)
BASOPHILS NFR BLD AUTO: 1 % (ref 0–1.5)
BILIRUB BLD-MCNC: NEGATIVE MG/DL
CLARITY, POC: CLEAR
COLOR UR: YELLOW
DEPRECATED RDW RBC AUTO: 37.7 FL (ref 37–54)
EOSINOPHIL # BLD AUTO: 0.49 10*3/MM3 (ref 0–0.4)
EOSINOPHIL NFR BLD AUTO: 5.7 % (ref 0.3–6.2)
ERYTHROCYTE [DISTWIDTH] IN BLOOD BY AUTOMATED COUNT: 11.9 % (ref 12.3–15.4)
EXPIRATION DATE: ABNORMAL
EXPIRATION DATE: NORMAL
EXPIRATION DATE: NORMAL
GLUCOSE UR STRIP-MCNC: NEGATIVE MG/DL
HBA1C MFR BLD: 6.9 %
HCT VFR BLD AUTO: 37.1 % (ref 34–46.6)
HGB BLD-MCNC: 12.5 G/DL (ref 12–15.9)
IMM GRANULOCYTES # BLD AUTO: 0.05 10*3/MM3 (ref 0–0.05)
IMM GRANULOCYTES NFR BLD AUTO: 0.6 % (ref 0–0.5)
KETONES UR QL: NEGATIVE
LEUKOCYTE EST, POC: NEGATIVE
LYMPHOCYTES # BLD AUTO: 2.63 10*3/MM3 (ref 0.7–3.1)
LYMPHOCYTES NFR BLD AUTO: 30.4 % (ref 19.6–45.3)
Lab: 1063
Lab: ABNORMAL
Lab: NORMAL
MCH RBC QN AUTO: 29.3 PG (ref 26.6–33)
MCHC RBC AUTO-ENTMCNC: 33.7 G/DL (ref 31.5–35.7)
MCV RBC AUTO: 87.1 FL (ref 79–97)
MONOCYTES # BLD AUTO: 1.05 10*3/MM3 (ref 0.1–0.9)
MONOCYTES NFR BLD AUTO: 12.2 % (ref 5–12)
NEUTROPHILS NFR BLD AUTO: 4.33 10*3/MM3 (ref 1.7–7)
NEUTROPHILS NFR BLD AUTO: 50.1 % (ref 42.7–76)
NITRITE UR-MCNC: NEGATIVE MG/ML
NRBC BLD AUTO-RTO: 0 /100 WBC (ref 0–0.2)
PH UR: 7 [PH] (ref 5–8)
PLATELET # BLD AUTO: 370 10*3/MM3 (ref 140–450)
PMV BLD AUTO: 11.7 FL (ref 6–12)
POC CREATININE URINE: 50
POC MICROALBUMIN URINE: 10
PROT UR STRIP-MCNC: NEGATIVE MG/DL
RBC # BLD AUTO: 4.26 10*6/MM3 (ref 3.77–5.28)
RBC # UR STRIP: NEGATIVE /UL
SP GR UR: 1 (ref 1–1.03)
UROBILINOGEN UR QL: NORMAL
WBC # BLD AUTO: 8.64 10*3/MM3 (ref 3.4–10.8)

## 2020-10-20 PROCEDURE — 81003 URINALYSIS AUTO W/O SCOPE: CPT | Performed by: INTERNAL MEDICINE

## 2020-10-20 PROCEDURE — 85025 COMPLETE CBC W/AUTO DIFF WBC: CPT | Performed by: INTERNAL MEDICINE

## 2020-10-20 PROCEDURE — 82044 UR ALBUMIN SEMIQUANTITATIVE: CPT | Performed by: INTERNAL MEDICINE

## 2020-10-20 PROCEDURE — 82306 VITAMIN D 25 HYDROXY: CPT | Performed by: INTERNAL MEDICINE

## 2020-10-20 PROCEDURE — 80061 LIPID PANEL: CPT | Performed by: INTERNAL MEDICINE

## 2020-10-20 PROCEDURE — G0008 ADMIN INFLUENZA VIRUS VAC: HCPCS | Performed by: INTERNAL MEDICINE

## 2020-10-20 PROCEDURE — 99214 OFFICE O/P EST MOD 30 MIN: CPT | Performed by: INTERNAL MEDICINE

## 2020-10-20 PROCEDURE — 83036 HEMOGLOBIN GLYCOSYLATED A1C: CPT | Performed by: INTERNAL MEDICINE

## 2020-10-20 PROCEDURE — 36415 COLL VENOUS BLD VENIPUNCTURE: CPT | Performed by: INTERNAL MEDICINE

## 2020-10-20 PROCEDURE — 90694 VACC AIIV4 NO PRSRV 0.5ML IM: CPT | Performed by: INTERNAL MEDICINE

## 2020-10-20 PROCEDURE — 86803 HEPATITIS C AB TEST: CPT | Performed by: INTERNAL MEDICINE

## 2020-10-20 PROCEDURE — 83735 ASSAY OF MAGNESIUM: CPT | Performed by: INTERNAL MEDICINE

## 2020-10-20 NOTE — PROGRESS NOTES
Subjective       Cristy Louie is a 75 y.o. female.     Chief Complaint   Patient presents with   • Diabetes   • Constipation     bowels still feel full and        History obtained from the patient.      History of Present Illness     The patient had a Right Thyroidectomy and a Right Superior Parathyroid Excision on 8/7/18.  This was done at [facility] by Dr. Deedee Jacobs.  She was diagnosed with Papillary Thyroid Cancer.  The patient  is being followed by Dr. Aiken, last appointment October 14,2020.  TSH was low (0.098), T4 was elevated (1.85), and thyroglobulin was negative.  There were no medication changes.    The patient has Asthma, stable on no medication.  She denies cough, hemoptysis, chest pain, chest tightness, shortness of breath, and wheezing.     Primary Care Cardiac Diagnostic Constellation: The patient is here today for a follow-up visit.       Her Diabetes Mellitus Type 2 has been stable.   Medication(s): Metformin HCl, Lisinopril/HCT, and Simvastatin.    Her Hypertension has been stable.   Medication(s): Lisinopril/HCT.   Her Hyperlipidemia has been unstable.   Her LDL goal is < 70 mg/dL, last LDL was 79 mg/dL and TG 203.   Medication(s): Simvastatin.   The patient is adherent with her medication regimen. She denies medication side effects.       Interval Events: 10/14/2020, CMP was normal with the exception of low Calcium (8.3), elevated Glucose (120),  and GFR 54.  The patient states her blood sugar at home has been 120-150, fasting, but she does not check it post prandial.  No episodes of Hypoglycemia.  Last Hemoglobin A1C was 6.5.  She does check her feet daily. She states saw the Ophthalmologist in October 2019, no retinopathy.    She states her blood pressure at home has been 120's/70's.      Symptoms: Stable COOL.  Denies chest pain, dyspnea, orthopnea, PND, palpitations, syncope, lower extremity edema, intermittent leg claudication, lightheadedness, and dizziness.  Associated  Symptoms:  No significant weight change since last visit. Has stable fatigue, headaches, myalgias, and arthralgias. No polydipsia, polyuria, visual impairment, memory loss, concentration issues, or focal neurologic deficits.   No foot pain, numbness of the feet, or foot ulcer.       Lifestyle and Disease Management: Diet: She has not been following a healthy diet. Weight Issues: She has weight concerns. Exercise: She has not been exercising.  Tobacco Use:Never a Smoker.       Gastroesophageal Reflux Disease Follow-up: The patient is being seen for a routine clinic follow-up of Gastroesophageal Reflux Disease, which is stable.  Previous Evaluation: Last EGD 1/25/16 showed Class A reflux esophagitis.  Interval Events: The patient was seen on 10/6/2020 for abdominal pain and constipation.  Urinalysis was negative and abdominal x-ray was consistent with moderate stool burden.  MiraLAX was prescribed, but the patient states the pharmacy did not call her for 1 week and so she never picked it up.  She has started Metamucil and states the pain has improved.  Symptoms: Has intermittent clinic nausea.  No abdominal pain, heartburn, acid regurgitation, vomiting, dysphagia, odynophagia, hematochezia, melena, early satiety, bloating, or belching.  Associated Symptoms: No chronic sore throat, hoarseness, cough, or wheezing.   Medication:  Omeprazole and Zofran.      Colonic Polyp Follow-up: The patient is being seen for a routine clinic follow-up of Colon Polyp(s), which is stable.   Interval Events:  Current diagnosis was determined by Colonoscopy and last 1/25/16- no polyps.   Symptoms: Has chronic intermittent constipation.  No abdominal pain, diarrhea,  hematochezia, melena, decreased stool caliber, or change in bowel habits.   Associated Symptoms: no rectal prolapse.   Medications:  Bentyl.     Osteoporosis Follow-up: The patient is being seen for routine follow-up of Osteoporosis, which is stable.   Interval Events:  The  last DEXA scan was done 10/19/17, Osteopenia.   Symptoms:  She reports stable arthralgias, myalgias, back pain, hip pain, and neck pain.   No wrist pain, loss of balance, or gait instability.    Associated Symptoms: No new fracture.  Medication:   Vitamin D supplements.  She states Calcium causes constipation.           Vitamin D Deficiency: The patient is here for follow up of Vitamin D Deficiency, which is stable.   Interval Events: Vitamin D level on 3/19/19 was 57.8.  Symptoms:   Stable fatigue, myalgias, and arthralgias.  Denies paresthesias, balance issues, and gait instability.       Medication:  Vitamin D supplements.        Osteoarthritis Follow-Up: The patient is here for follow up of Osteoarthritis, which is stable.  Comorbid Illnesses: Rheumatoid Arthritis and Fibromyalgia.   Interval Events:  The patient sees Dr. Brenner for RA and Fibromyalgia, last appointment 6/16/2020.  Symptoms:  Has arthralgias (hands, hips, and knees), myalgias, neck pain, and back pain.   Associated Symptoms: Denies joint swelling and joint stiffness.  Medications:  Cymbalta.    The patient is adherent with her medication regimen. She denies medication side effects.      Anxiety Disorder Follow-Up: The patient is being seen for follow-up of Anxiety, which is stable.  Interval Events:   None.  Symptoms: stable anxiety and insomnia.  Denies depression, panic attacks, anhedonia, memory loss, and difficulty concentrating.   Associated Symptoms: no suicidal ideation.   Medication(s): Cymbalta  The patient is adherent to her medication regimen, and she denies medication side effects.        Current Outpatient Medications on File Prior to Visit   Medication Sig Dispense Refill   • Blood Glucose Monitoring Suppl (ACCU-CHEK RENZO PLUS) w/Device kit 1 each Daily. 1 kit 0   • Cholecalciferol (VITAMIN D3) 1000 units capsule Take 1 capsule by mouth 2 (Two) Times a Day. 60 capsule    • dicyclomine (BENTYL) 20 MG tablet Take 1 tablet by mouth  4 (Four) Times a Day. 100 tablet 3   • DULoxetine (CYMBALTA) 30 MG capsule Take 30 mg by mouth Daily.     • fluticasone (FLONASE) 50 MCG/ACT nasal spray into each nostril.     • glucose blood (ACCU-CHEK RENZO) test strip Test once daily  DX  E 11.9 100 each 12   • Lancets (ACCU-CHEK MULTICLIX) lancets Test once daily  Dx   E11.9 102 each 12   • levothyroxine (SYNTHROID, LEVOTHROID) 112 MCG tablet TAKE 1 TABLET EVERY DAY 90 tablet 2   • lisinopril-hydrochlorothiazide (PRINZIDE,ZESTORETIC) 20-25 MG per tablet TAKE 1 TABLET EVERY DAY 90 tablet 3   • metFORMIN (GLUCOPHAGE) 500 MG tablet TAKE 1 TABLET TWICE DAILY WITH FOOD 180 tablet 3   • omeprazole (priLOSEC) 20 MG capsule TAKE 1 CAPSULE TWICE DAILY 180 capsule 3   • ondansetron ODT (ZOFRAN-ODT) 8 MG disintegrating tablet Take 1 tablet by mouth Every 8 (Eight) Hours As Needed for Nausea or Vomiting. 90 tablet 3   • simvastatin (ZOCOR) 40 MG tablet TAKE 1 TABLET AT BEDTIME 90 tablet 3     No current facility-administered medications on file prior to visit.        Current outpatient and discharge medications have been reconciled for the patient.  Reviewed by: Marina Pederson MD        The following portions of the patient's history were reviewed and updated as appropriate: allergies, current medications, past family history, past medical history, past social history, past surgical history and problem list.    Review of Systems   Constitutional: Positive for fatigue. Negative for unexpected weight change.   Eyes: Negative for visual disturbance.   Respiratory: Negative for cough, shortness of breath and wheezing.    Cardiovascular: Negative for chest pain, palpitations and leg swelling.        Stable COOL.  No orthopnea or claudication.   Gastrointestinal: Positive for constipation and nausea. Negative for abdominal pain, blood in stool, diarrhea and vomiting.        Denies melena.   Endocrine: Negative for polydipsia and polyuria.   Musculoskeletal: Positive for  arthralgias, back pain, myalgias and neck pain. Negative for gait problem and joint swelling.   Neurological: Positive for headaches. Negative for dizziness, syncope, light-headedness and numbness.        No memory issues.   Psychiatric/Behavioral: Positive for sleep disturbance. Negative for decreased concentration and suicidal ideas. The patient is nervous/anxious.          Objective       Blood pressure 120/80, pulse 75, temperature 98.2 °F (36.8 °C), temperature source Temporal, resp. rate 18, weight 72.3 kg (159 lb 6 oz), SpO2 98 %.      Physical Exam  Vitals signs and nursing note reviewed.   Constitutional:       Appearance: She is well-developed.      Comments: Overweight.   Neck:      Musculoskeletal: Normal range of motion and neck supple.      Vascular: No carotid bruit.      Comments: Thyroid absent.  Cardiovascular:      Rate and Rhythm: Normal rate and regular rhythm.      Pulses: Normal pulses.      Heart sounds: Normal heart sounds. No murmur. No friction rub. No gallop.    Pulmonary:      Effort: Pulmonary effort is normal.      Breath sounds: Normal breath sounds.   Abdominal:      General: Bowel sounds are normal. There is no distension or abdominal bruit.      Palpations: Abdomen is soft. There is no hepatomegaly, splenomegaly or mass.      Tenderness: There is abdominal tenderness (mild suprapubic). There is no right CVA tenderness, left CVA tenderness, guarding or rebound.   Musculoskeletal:      Right lower leg: No edema.      Left lower leg: No edema.   Neurological:      Mental Status: She is alert.   Psychiatric:         Mood and Affect: Mood normal.     Diabetic Foot Exam Performed  Monofilament Test Performed    Results for orders placed or performed in visit on 10/20/20   POC Microalbumin    Specimen: Urine   Result Value Ref Range    Microalbumin, Urine 10     Creatinine, Urine 50     A/C <30mg/g NORMAL     Lot Number [id number]     Expiration Date 6-30-21    POC Glycosylated Hemoglobin (Hb  A1C)    Specimen: Blood   Result Value Ref Range    Hemoglobin A1C 6.9 %    Lot Number [id number]     Expiration Date 4-22-22    POC Urinalysis Dipstick, Automated    Specimen: Urine   Result Value Ref Range    Color Yellow Yellow, Straw, Dark Yellow, Carol    Clarity, UA Clear Clear    Specific Gravity  1.000 (A) 1.005 - 1.030    pH, Urine 7.0 5.0 - 8.0    Leukocytes Negative Negative    Nitrite, UA Negative Negative    Protein, POC Negative Negative mg/dL    Glucose, UA Negative Negative, 1000 mg/dL (3+) mg/dL    Ketones, UA Negative Negative    Urobilinogen, UA Normal Normal    Bilirubin Negative Negative    Blood, UA Negative Negative    Lot Number [id number]     Expiration Date 5-31-21        Assessment / Plan:  Diagnoses and all orders for this visit:    1. Type 2 diabetes mellitus without complication, without long-term current use of insulin (CMS/[facility]) (Primary)  -     POC Microalbumin  -     POC Glycosylated Hemoglobin (Hb A1C)  -     CBC & Differential  -     Lipid Panel  -     CBC Auto Differential  -     POC Urinalysis Dipstick, Automated   Continue current medication(s) as noted in the history of present illness.   The patient agrees to schedule her ophthalmology appointment for the diabetic eye exam as soon as possible.    2. Essential hypertension  -     CBC & Differential  -     Lipid Panel  -     CBC Auto Differential  -     POC Urinalysis Dipstick, Automated   Continue current medication(s) as noted in the history of present illness.    3. Other hyperlipidemia  -     CBC & Differential  -     Lipid Panel  -     CBC Auto Differential   Continue current medication(s) as noted in the history of present illness.     4. Thyroid cancer (CMS/HCC)   Follow up per Endocrinology.    5. Mild intermittent asthma without complication   Stable, no medication.    6. Gastroesophageal reflux disease, unspecified whether esophagitis present   Continue current medication(s) as noted in the history of present  illness.    7. Benign colonic polyp   Colonoscopy up-to-date.    8. Primary osteoarthritis involving multiple joints   Continue current medication(s) as noted in the history of present illness.   Follow-up per Rheumatology.    9. Osteopenia of multiple sites  -     Vitamin D 25 Hydroxy  -     Magnesium   Continue Vitamin D supplementation, and weightbearing exercises.    10. Vitamin D deficiency  -     Vitamin D 25 Hydroxy  -     Magnesium   Continue Vitamin D supplementation.    11. Generalized anxiety disorder   Continue current medication(s) as noted in the history of present illness.    12. High risk medication use  -     Magnesium    13. Need for immunization against influenza  -     Fluad Quad >65 years    14. Need for hepatitis C screening test  -     Hepatitis C Antibody    15. Encounter for screening mammogram for breast cancer  -     Mammo Screening Digital Tomosynthesis Bilateral With CAD; Future      Recommended Shingrix (new Shingles vaccine), Td/Tdap vaccine (Tetanus booster), and Hepatitis A vaccination at the pharmacy.      Return in about 3 months (around 1/20/2021) for Recheck Diabetes, fasting, and schedule subseq Medicare Wellness Exam.

## 2020-10-20 NOTE — PATIENT INSTRUCTIONS
I recommend Shingrix (new Shingles vaccine), Td/Tdap vaccine (Tetanus booster), and Hepatitis A vaccination at the pharmacy.    Please schedule your Ophthalmology appointment for the diabetic eye exam as soon as possible, as we discussed.      Heart-Healthy Eating Plan  Many factors influence your heart (coronary) health, including eating and exercise habits. Coronary risk increases with abnormal blood fat (lipid) levels. Heart-healthy meal planning includes limiting unhealthy fats, increasing healthy fats, and making other diet and lifestyle changes.  What is my plan?  Your health care provider may recommend that you:  · Limit your fat intake to _________% or less of your total calories each day.  · Limit your saturated fat intake to _________% or less of your total calories each day.  · Limit the amount of cholesterol in your diet to less than _________ mg per day.  What are tips for following this plan?  Cooking  Cook foods using methods other than frying. Baking, boiling, grilling, and broiling are all good options. Other ways to reduce fat include:  · Removing the skin from poultry.  · Removing all visible fats from meats.  · Steaming vegetables in water or broth.  Meal planning    · At meals, imagine dividing your plate into fourths:  ? Fill one-half of your plate with vegetables and green salads.  ? Fill one-fourth of your plate with whole grains.  ? Fill one-fourth of your plate with lean protein foods.  · Eat 4-5 servings of vegetables per day. One serving equals 1 cup raw or cooked vegetable, or 2 cups raw leafy greens.  · Eat 4-5 servings of fruit per day. One serving equals 1 medium whole fruit, ¼ cup dried fruit, ½ cup fresh, frozen, or canned fruit, or ½ cup 100% fruit juice.  · Eat more foods that contain soluble fiber. Examples include apples, broccoli, carrots, beans, peas, and barley. Aim to get 25-30 g of fiber per day.  · Increase your consumption of legumes, nuts, and seeds to 4-5 servings per  week. One serving of dried beans or legumes equals ½ cup cooked, 1 serving of nuts is ¼ cup, and 1 serving of seeds equals 1 tablespoon.  Fats  · Choose healthy fats more often. Choose monounsaturated and polyunsaturated fats, such as olive and canola oils, flaxseeds, walnuts, almonds, and seeds.  · Eat more omega-3 fats. Choose salmon, mackerel, sardines, tuna, flaxseed oil, and ground flaxseeds. Aim to eat fish at least 2 times each week.  · Check food labels carefully to identify foods with trans fats or high amounts of saturated fat.  · Limit saturated fats. These are found in animal products, such as meats, butter, and cream. Plant sources of saturated fats include palm oil, palm kernel oil, and coconut oil.  · Avoid foods with partially hydrogenated oils in them. These contain trans fats. Examples are stick margarine, some tub margarines, cookies, crackers, and other baked goods.  · Avoid fried foods.  General information  · Eat more home-cooked food and less restaurant, buffet, and fast food.  · Limit or avoid alcohol.  · Limit foods that are high in starch and sugar.  · Lose weight if you are overweight. Losing just 5-10% of your body weight can help your overall health and prevent diseases such as diabetes and heart disease.  · Monitor your salt (sodium) intake, especially if you have high blood pressure. Talk with your health care provider about your sodium intake.  · Try to incorporate more vegetarian meals weekly.  What foods can I eat?  Fruits  All fresh, canned (in natural juice), or frozen fruits.  Vegetables  Fresh or frozen vegetables (raw, steamed, roasted, or grilled). Green salads.  Grains  Most grains. Choose whole wheat and whole grains most of the time. Rice and pasta, including brown rice and pastas made with whole wheat.  Meats and other proteins  Lean, well-trimmed beef, veal, pork, and lamb. Chicken and turkey without skin. All fish and shellfish. Wild duck, rabbit, pheasant, and venison.  Egg whites or low-cholesterol egg substitutes. Dried beans, peas, lentils, and tofu. Seeds and most nuts.  Dairy  Low-fat or nonfat cheeses, including ricotta and mozzarella. Skim or 1% milk (liquid, powdered, or evaporated). Buttermilk made with low-fat milk. Nonfat or low-fat yogurt.  Fats and oils  Non-hydrogenated (trans-free) margarines. Vegetable oils, including soybean, sesame, sunflower, olive, peanut, safflower, corn, canola, and cottonseed. Salad dressings or mayonnaise made with a vegetable oil.  Beverages  Water (mineral or sparkling). Coffee and tea. Diet carbonated beverages.  Sweets and desserts  Sherbet, gelatin, and fruit ice. Small amounts of dark chocolate.  Limit all sweets and desserts.  Seasonings and condiments  All seasonings and condiments.  The items listed above may not be a complete list of foods and beverages you can eat. Contact a dietitian for more options.  What foods are not recommended?  Fruits  Canned fruit in heavy syrup. Fruit in cream or butter sauce. Fried fruit. Limit coconut.  Vegetables  Vegetables cooked in cheese, cream, or butter sauce. Fried vegetables.  Grains  Breads made with saturated or trans fats, oils, or whole milk. Croissants. Sweet rolls. Donuts. High-fat crackers, such as cheese crackers.  Meats and other proteins  Fatty meats, such as hot dogs, ribs, sausage, ring, rib-eye roast or steak. High-fat deli meats, such as salami and bologna. Caviar. Domestic duck and goose. Organ meats, such as liver.  Dairy  Cream, sour cream, cream cheese, and creamed cottage cheese. Whole milk cheeses. Whole or 2% milk (liquid, evaporated, or condensed). Whole buttermilk. Cream sauce or high-fat cheese sauce. Whole-milk yogurt.  Fats and oils  Meat fat, or shortening. Cocoa butter, hydrogenated oils, palm oil, coconut oil, palm kernel oil. Solid fats and shortenings, including ring fat, salt pork, lard, and butter. Nondairy cream substitutes. Salad dressings with cheese or  sour cream.  Beverages  Regular sodas and any drinks with added sugar.  Sweets and desserts  Frosting. Pudding. Cookies. Cakes. Pies. Milk chocolate or white chocolate. Buttered syrups. Full-fat ice cream or ice cream drinks.  The items listed above may not be a complete list of foods and beverages to avoid. Contact a dietitian for more information.  Summary  · Heart-healthy meal planning includes limiting unhealthy fats, increasing healthy fats, and making other diet and lifestyle changes.  · Lose weight if you are overweight. Losing just 5-10% of your body weight can help your overall health and prevent diseases such as diabetes and heart disease.  · Focus on eating a balance of foods, including fruits and vegetables, low-fat or nonfat dairy, lean protein, nuts and legumes, whole grains, and heart-healthy oils and fats.  This information is not intended to replace advice given to you by your health care provider. Make sure you discuss any questions you have with your health care provider.  Document Released: 09/26/2009 Document Revised: 01/25/2019 Document Reviewed: 01/25/2019  [employer] Patient Education © 2020 [employer]      Exercising to Lose Weight  Exercise is structured, repetitive physical activity to improve fitness and health. Getting regular exercise is important for everyone. It is especially important if you are overweight. Being overweight increases your risk of heart disease, stroke, diabetes, high blood pressure, and several types of cancer. Reducing your calorie intake and exercising can help you lose weight.  Exercise is usually categorized as moderate or vigorous intensity. To lose weight, most people need to do a certain amount of moderate-intensity or vigorous-intensity exercise each week.  Moderate-intensity exercise    Moderate-intensity exercise is any activity that gets you moving enough to burn at least three times more energy (calories) than if you were sitting.  Examples of moderate  exercise include:  · Walking a mile in 15 minutes.  · Doing light yard work.  · Biking at an easy pace.  Most people should get at least 150 minutes (2 hours and 30 minutes) a week of moderate-intensity exercise to maintain their body weight.  Vigorous-intensity exercise  Vigorous-intensity exercise is any activity that gets you moving enough to burn at least six times more calories than if you were sitting. When you exercise at this intensity, you should be working hard enough that you are not able to carry on a conversation.  Examples of vigorous exercise include:  · Running.  · Playing a team sport, such as football, basketball, and soccer.  · Jumping rope.  Most people should get at least 75 minutes (1 hour and 15 minutes) a week of vigorous-intensity exercise to maintain their body weight.  How can exercise affect me?  When you exercise enough to burn more calories than you eat, you lose weight. Exercise also reduces body fat and builds muscle. The more muscle you have, the more calories you burn. Exercise also:  · Improves mood.  · Reduces stress and tension.  · Improves your overall fitness, flexibility, and endurance.  · Increases bone strength.  The amount of exercise you need to lose weight depends on:  · Your age.  · The type of exercise.  · Any health conditions you have.  · Your overall physical ability.  Talk to your health care provider about how much exercise you need and what types of activities are safe for you.  What actions can I take to lose weight?  Nutrition    · Make changes to your diet as told by your health care provider or diet and nutrition specialist (dietitian). This may include:  ? Eating fewer calories.  ? Eating more protein.  ? Eating less unhealthy fats.  ? Eating a diet that includes fresh fruits and vegetables, whole grains, low-fat dairy products, and lean protein.  ? Avoiding foods with added fat, salt, and sugar.  · Drink plenty of water while you exercise to prevent  dehydration or heat stroke.  Activity  · Choose an activity that you enjoy and set realistic goals. Your health care provider can help you make an exercise plan that works for you.  · Exercise at a moderate or vigorous intensity most days of the week.  ? The intensity of exercise may vary from person to person. You can tell how intense a workout is for you by paying attention to your breathing and heartbeat. Most people will notice their breathing and heartbeat get faster with more intense exercise.  · Do resistance training twice each week, such as:  ? Push-ups.  ? Sit-ups.  ? Lifting weights.  ? Using resistance bands.  · Getting short amounts of exercise can be just as helpful as long structured periods of exercise. If you have trouble finding time to exercise, try to include exercise in your daily routine.  ? Get up, stretch, and walk around every 30 minutes throughout the day.  ? Go for a walk during your lunch break.  ? Park your car farther away from your destination.  ? If you take public transportation, get off one stop early and walk the rest of the way.  ? Make phone calls while standing up and walking around.  ? Take the stairs instead of elevators or escalators.  · Wear comfortable clothes and shoes with good support.  · Do not exercise so much that you hurt yourself, feel dizzy, or get very short of breath.  Where to find more information  · [place] Department of Health and Human Services: www.hhs.gov  · Centers for Disease Control and Prevention (CDC): www.cdc.gov  Contact a health care provider:  · Before starting a new exercise program.  · If you have questions or concerns about your weight.  · If you have a medical problem that keeps you from exercising.  Get help right away if you have any of the following while exercising:  · Injury.  · Dizziness.  · Difficulty breathing or shortness of breath that does not go away when you stop exercising.  · Chest pain.  · Rapid heartbeat.  Summary  · Being  overweight increases your risk of heart disease, stroke, diabetes, high blood pressure, and several types of cancer.  · Losing weight happens when you burn more calories than you eat.  · Reducing the amount of calories you eat in addition to getting regular moderate or vigorous exercise each week helps you lose weight.  This information is not intended to replace advice given to you by your health care provider. Make sure you discuss any questions you have with your health care provider.  Document Released: 01/20/2012 Document Revised: 12/31/2018 Document Reviewed: 12/31/2018  [employer] Patient Education © 2020 [employer]

## 2020-10-21 LAB
25(OH)D3 SERPL-MCNC: 68.9 NG/ML (ref 30–100)
CHOLEST SERPL-MCNC: 168 MG/DL (ref 0–200)
HCV AB SER DONR QL: NORMAL
HDLC SERPL-MCNC: 56 MG/DL (ref 40–60)
LDLC SERPL CALC-MCNC: 81 MG/DL (ref 0–100)
LDLC/HDLC SERPL: 1.34 {RATIO}
MAGNESIUM SERPL-MCNC: 1.9 MG/DL (ref 1.6–2.4)
TRIGL SERPL-MCNC: 185 MG/DL (ref 0–150)
VLDLC SERPL-MCNC: 31 MG/DL (ref 5–40)

## 2020-10-23 ENCOUNTER — TELEPHONE (OUTPATIENT)
Dept: ENDOCRINOLOGY | Facility: CLINIC | Age: 75
End: 2020-10-23

## 2020-10-23 DIAGNOSIS — E55.9 VITAMIN D DEFICIENCY: ICD-10-CM

## 2020-10-23 DIAGNOSIS — E89.0 POST-SURGICAL HYPOTHYROIDISM: Primary | ICD-10-CM

## 2020-10-23 DIAGNOSIS — E83.51 HYPOCALCEMIA: ICD-10-CM

## 2020-10-23 NOTE — PROGRESS NOTES
Pt was taking 10,000 mcg of biotin when TSH, free T4 collected causing spurious results. Patient asked to hold biotin for one week and then redraw.

## 2020-10-23 NOTE — TELEPHONE ENCOUNTER
Spoke to patient about lab results. See clinic note from 10/14/2020 for details. Pt was taking 10,000 mcg of biotin when TSH, free T4 collected causing spurious results. Patient asked to hold biotin for one week and then redraw.   Signed: Maureen Aiken MD

## 2020-11-04 ENCOUNTER — LAB (OUTPATIENT)
Dept: LAB | Facility: HOSPITAL | Age: 75
End: 2020-11-04

## 2020-11-04 ENCOUNTER — LAB (OUTPATIENT)
Dept: ENDOCRINOLOGY | Facility: CLINIC | Age: 75
End: 2020-11-04

## 2020-11-04 DIAGNOSIS — E89.0 POST-SURGICAL HYPOTHYROIDISM: ICD-10-CM

## 2020-11-04 DIAGNOSIS — E83.51 HYPOCALCEMIA: ICD-10-CM

## 2020-11-04 LAB
ALBUMIN SERPL-MCNC: 4.3 G/DL (ref 3.5–5.2)
ANION GAP SERPL CALCULATED.3IONS-SCNC: 12.5 MMOL/L (ref 5–15)
BUN SERPL-MCNC: 11 MG/DL (ref 8–23)
BUN/CREAT SERPL: 9.6 (ref 7–25)
CALCIUM SPEC-SCNC: 7.9 MG/DL (ref 8.6–10.5)
CHLORIDE SERPL-SCNC: 101 MMOL/L (ref 98–107)
CO2 SERPL-SCNC: 26.5 MMOL/L (ref 22–29)
CREAT SERPL-MCNC: 1.15 MG/DL (ref 0.57–1)
GFR SERPL CREATININE-BSD FRML MDRD: 46 ML/MIN/1.73
GLUCOSE SERPL-MCNC: 112 MG/DL (ref 65–99)
PHOSPHATE SERPL-MCNC: 4 MG/DL (ref 2.5–4.5)
POTASSIUM SERPL-SCNC: 3.9 MMOL/L (ref 3.5–5.2)
SODIUM SERPL-SCNC: 140 MMOL/L (ref 136–145)
T4 FREE SERPL-MCNC: 1.24 NG/DL (ref 0.93–1.7)
TSH SERPL DL<=0.05 MIU/L-ACNC: 4.33 UIU/ML (ref 0.27–4.2)

## 2020-11-04 PROCEDURE — 84439 ASSAY OF FREE THYROXINE: CPT | Performed by: INTERNAL MEDICINE

## 2020-11-04 PROCEDURE — 80069 RENAL FUNCTION PANEL: CPT | Performed by: INTERNAL MEDICINE

## 2020-11-04 PROCEDURE — 84443 ASSAY THYROID STIM HORMONE: CPT | Performed by: INTERNAL MEDICINE

## 2020-11-10 ENCOUNTER — TELEPHONE (OUTPATIENT)
Dept: ENDOCRINOLOGY | Facility: CLINIC | Age: 75
End: 2020-11-10

## 2020-11-10 DIAGNOSIS — E89.2 POST-SURGICAL HYPOPARATHYROIDISM (HCC): ICD-10-CM

## 2020-11-10 DIAGNOSIS — E89.0 POST-SURGICAL HYPOTHYROIDISM: ICD-10-CM

## 2020-11-10 DIAGNOSIS — E89.0 POST-SURGICAL HYPOTHYROIDISM: Primary | ICD-10-CM

## 2020-11-10 DIAGNOSIS — E89.2 POST-SURGICAL HYPOPARATHYROIDISM (HCC): Primary | ICD-10-CM

## 2020-11-10 RX ORDER — CALCITRIOL 0.25 UG/1
0.25 CAPSULE, LIQUID FILLED ORAL DAILY
Qty: 90 CAPSULE | Refills: 3 | Status: SHIPPED | OUTPATIENT
Start: 2020-11-10 | End: 2021-10-18 | Stop reason: SDUPTHER

## 2020-11-10 RX ORDER — LEVOTHYROXINE SODIUM 0.12 MG/1
125 TABLET ORAL DAILY
Qty: 90 TABLET | Refills: 3 | Status: SHIPPED | OUTPATIENT
Start: 2020-11-10 | End: 2021-10-18

## 2020-11-10 NOTE — TELEPHONE ENCOUNTER
Spoke to patient about lab results. Increasing levothyroxine to 125 mcg daily. Starting calcitriol 0.25 mcg daily.   Will have her complete labs on 01/26/2021 on the same day as her PCP visit.   Signed: Maureen Aiken MD

## 2020-12-21 DIAGNOSIS — E11.9 TYPE 2 DIABETES MELLITUS WITHOUT COMPLICATION, WITHOUT LONG-TERM CURRENT USE OF INSULIN (HCC): Primary | ICD-10-CM

## 2020-12-21 NOTE — TELEPHONE ENCOUNTER
Caller: Cristy Luoie    Relationship: Self    Best call back number: [phone]    Medication needed:   Requested Prescriptions     Pending Prescriptions Disp Refills   • metFORMIN (GLUCOPHAGE) 500 MG tablet 180 tablet 3     Sig: Take 1 tablet by mouth 2 (Two) Times a Day With Meals.       When do you need the refill by: 12/21/20    What details did the patient provide when requesting the medication: Patient will run out of medication before her mail order pharmacy is able to get to her and would like to know if a small refill could be called in to a local pharmacy as well as her normal refill to [facility]    Does the patient have less than a 3 day supply:  [] Yes  [x] No    What is the patient's preferred pharmacy: [facility] - [facility] [address] - [phone] [facility] [phone] FX     Local pharmacy:  [facility] - [place] [address] - [phone] PH - [phone] FX

## 2020-12-31 DIAGNOSIS — E11.9 TYPE 2 DIABETES MELLITUS WITHOUT COMPLICATION, WITHOUT LONG-TERM CURRENT USE OF INSULIN (HCC): ICD-10-CM

## 2021-01-06 NOTE — TELEPHONE ENCOUNTER
Tried calling Cristy  Her phone rang busy   Unable to leave a message.  LMOVM for her son Sunday to have her call us back and ask for Sol

## 2021-01-26 ENCOUNTER — OFFICE VISIT (OUTPATIENT)
Dept: INTERNAL MEDICINE | Facility: CLINIC | Age: 76
End: 2021-01-26

## 2021-01-26 ENCOUNTER — TELEPHONE (OUTPATIENT)
Dept: INTERNAL MEDICINE | Facility: CLINIC | Age: 76
End: 2021-01-26

## 2021-01-26 VITALS
RESPIRATION RATE: 20 BRPM | TEMPERATURE: 97.1 F | DIASTOLIC BLOOD PRESSURE: 74 MMHG | WEIGHT: 162 LBS | SYSTOLIC BLOOD PRESSURE: 124 MMHG | BODY MASS INDEX: 30.61 KG/M2 | HEART RATE: 82 BPM

## 2021-01-26 DIAGNOSIS — L53.9 ERYTHEMA OF NASAL SKIN: ICD-10-CM

## 2021-01-26 DIAGNOSIS — E11.9 TYPE 2 DIABETES MELLITUS WITHOUT COMPLICATION, WITHOUT LONG-TERM CURRENT USE OF INSULIN (HCC): Primary | ICD-10-CM

## 2021-01-26 DIAGNOSIS — E89.0 POST-SURGICAL HYPOTHYROIDISM: ICD-10-CM

## 2021-01-26 DIAGNOSIS — I10 ESSENTIAL HYPERTENSION: ICD-10-CM

## 2021-01-26 DIAGNOSIS — E89.2 POST-SURGICAL HYPOPARATHYROIDISM (HCC): ICD-10-CM

## 2021-01-26 DIAGNOSIS — E78.49 OTHER HYPERLIPIDEMIA: ICD-10-CM

## 2021-01-26 LAB
EXPIRATION DATE: NORMAL
HBA1C MFR BLD: 7.2 %
Lab: NORMAL

## 2021-01-26 PROCEDURE — 80053 COMPREHEN METABOLIC PANEL: CPT | Performed by: INTERNAL MEDICINE

## 2021-01-26 PROCEDURE — 80061 LIPID PANEL: CPT | Performed by: INTERNAL MEDICINE

## 2021-01-26 PROCEDURE — 83036 HEMOGLOBIN GLYCOSYLATED A1C: CPT | Performed by: INTERNAL MEDICINE

## 2021-01-26 PROCEDURE — 84443 ASSAY THYROID STIM HORMONE: CPT | Performed by: INTERNAL MEDICINE

## 2021-01-26 PROCEDURE — 36415 COLL VENOUS BLD VENIPUNCTURE: CPT | Performed by: INTERNAL MEDICINE

## 2021-01-26 PROCEDURE — 84100 ASSAY OF PHOSPHORUS: CPT | Performed by: INTERNAL MEDICINE

## 2021-01-26 PROCEDURE — 99214 OFFICE O/P EST MOD 30 MIN: CPT | Performed by: INTERNAL MEDICINE

## 2021-01-26 NOTE — PROGRESS NOTES
Bairon Louie is a 75 y.o. female.     Chief Complaint   Patient presents with   • Diabetes mellitus (CMS/[facility])       History obtained from the patient.      History of Present Illness     The patient has labs ordered by Dr. Aiken, and was told to do them at this visit.    She reports a rash on the right side of her nose x 1 week.  She states it started with a cut.  There has been no drainage.  No fever or chills.  She has treated this with Neosporin without relief.    [facility]: The patient is here today for a follow-up visit.       Her Diabetes Mellitus Type 2 has been stable.   Medication(s): Metformin HCl, Lisinopril/HCT, and Simvastatin.    Her Hypertension has been stable.   Medication(s): Lisinopril/HCT.   Her Hyperlipidemia has been unstable.   Her LDL goal is < 70 mg/dL, last LDL was 81 mg/dL and TG 185.   Medication(s): Simvastatin.   The patient is adherent with her medication regimen. She denies medication side effects.       Interval Events: On 10/20/2020, Hemoglobin A1C was 6.9 (up from 6.5 previously).  No recent medication change.  The patient states her blood sugar at home has been 120-150, fasting, but she does not check it post prandial.  No episodes of hypoglycemia.  She does check her feet daily. She states saw the Ophthalmologist in October 2019, no retinopathy. She states her blood pressure at home has been 120's/70's.      Symptoms: Stable COOL.  Denies chest pain, dyspnea, orthopnea, PND, palpitations, syncope, lower extremity edema, intermittent leg claudication, lightheadedness, and dizziness.  Associated Symptoms: Weight up 3 pounds since last visit. Has stable fatigue, headaches, myalgias, and arthralgias. No polydipsia, polyuria, visual impairment, memory loss, concentration issues, or focal neurologic deficits. No foot pain, numbness of the feet, or foot ulcer.       Lifestyle and Disease Management: Diet: She has been  following a healthy diet. Weight Issues: She has weight concerns. Exercise: She walks twice per week.  Tobacco Use:Never a Smoker.     Current Outpatient Medications on File Prior to Visit   Medication Sig Dispense Refill   • Blood Glucose Monitoring Suppl (ACCU-CHEK RENZO PLUS) w/Device kit 1 each Daily. 1 kit 0   • calcitriol (ROCALTROL) 0.25 MCG capsule Take 1 capsule by mouth Daily. 90 capsule 3   • Cholecalciferol (VITAMIN D3) 1000 units capsule Take 1 capsule by mouth 2 (Two) Times a Day. 60 capsule    • dicyclomine (BENTYL) 20 MG tablet Take 1 tablet by mouth 4 (Four) Times a Day. 100 tablet 3   • DULoxetine (CYMBALTA) 30 MG capsule Take 30 mg by mouth Daily.     • fluticasone (FLONASE) 50 MCG/ACT nasal spray into each nostril.     • glucose blood (ACCU-CHEK RENZO) test strip Test once daily  DX  E 11.9 100 each 12   • Lancets (ACCU-CHEK MULTICLIX) lancets Test once daily  Dx   E11.9 102 each 12   • levothyroxine (SYNTHROID, LEVOTHROID) 125 MCG tablet Take 1 tablet by mouth Daily. 90 tablet 3   • lisinopril-hydrochlorothiazide (PRINZIDE,ZESTORETIC) 20-25 MG per tablet TAKE 1 TABLET EVERY DAY 90 tablet 3   • metFORMIN (GLUCOPHAGE) 500 MG tablet Take 1 tablet by mouth 2 (Two) Times a Day With Meals. 180 tablet 3   • omeprazole (priLOSEC) 20 MG capsule TAKE 1 CAPSULE TWICE DAILY 180 capsule 3   • ondansetron ODT (ZOFRAN-ODT) 8 MG disintegrating tablet Take 1 tablet by mouth Every 8 (Eight) Hours As Needed for Nausea or Vomiting. 90 tablet 3   • simvastatin (ZOCOR) 40 MG tablet TAKE 1 TABLET AT BEDTIME 90 tablet 3     No current facility-administered medications on file prior to visit.        Current outpatient and discharge medications have been reconciled for the patient.  Reviewed by: Marina Pederson MD        The following portions of the patient's history were reviewed and updated as appropriate: allergies, current medications, past family history, past medical history, past social history, past surgical  history and problem list.    Review of Systems   Constitutional: Positive for fatigue and unexpected weight change.   Eyes: Negative for visual disturbance.   Respiratory: Negative for cough, shortness of breath and wheezing.    Cardiovascular: Negative for chest pain, palpitations and leg swelling.        Stable COOL, no orthopnea or claudication.   Gastrointestinal: Positive for abdominal pain (chronic RUQ). Negative for blood in stool, constipation, diarrhea, nausea and vomiting.        Denies melena.   Endocrine: Negative for polydipsia and polyuria.   Musculoskeletal: Positive for arthralgias and myalgias.   Neurological: Positive for headaches. Negative for dizziness, syncope and light-headedness.        No memory issues.   Psychiatric/Behavioral: Negative for decreased concentration.         Objective       Blood pressure 124/74, pulse 82, temperature 97.1 °F (36.2 °C), resp. rate 20, weight 73.5 kg (162 lb).      Physical Exam  Vitals signs and nursing note reviewed.   Constitutional:       Appearance: She is well-developed.      Comments: Overweight.   Neck:      Musculoskeletal: Normal range of motion and neck supple.      Thyroid: No thyroid mass or thyromegaly.      Vascular: No carotid bruit.   Cardiovascular:      Rate and Rhythm: Normal rate and regular rhythm.      Pulses: Normal pulses.      Heart sounds: Normal heart sounds. No murmur. No friction rub. No gallop.    Pulmonary:      Effort: Pulmonary effort is normal.      Breath sounds: Normal breath sounds.   Abdominal:      General: Bowel sounds are normal. There is no distension or abdominal bruit.      Palpations: Abdomen is soft. There is no hepatomegaly, splenomegaly or mass.      Tenderness: There is no abdominal tenderness.   Musculoskeletal:      Right lower leg: No edema.      Left lower leg: No edema.   Skin:     Comments: Erythematous papular rash right lower nose.   Neurological:      Mental Status: She is alert.   Psychiatric:          Mood and Affect: Mood normal.       Advance Care Planning   ACP discussion was held with the patient during this visit. Patient does not have an advance directive, information provided.  ACP information pamphlet given to the patient.  ACP information provided on the AVS.  Results for orders placed or performed in visit on 01/26/21   POC Glycosylated Hemoglobin (Hb A1C)    Specimen: Blood   Result Value Ref Range    Hemoglobin A1C 7.2 %    Lot Number [id number]     Expiration Date 10/8/22        Assessment / Plan:  Diagnoses and all orders for this visit:    1. Type 2 diabetes mellitus without complication, without long-term current use of insulin (CMS/[facility]) (Primary)  -     POC Glycosylated Hemoglobin (Hb A1C)  -     Lipid Panel  -     Comprehensive Metabolic Panel   Continue current medication(s) as noted in the history of present illness.   The patient agrees to schedule her Ophthalmology appointment.    2. Essential hypertension  -     Lipid Panel  -     Comprehensive Metabolic Panel   Continue current medication(s) as noted in the history of present illness.    3. Other hyperlipidemia  -     Lipid Panel  -     Comprehensive Metabolic Panel   Continue current medication(s) as noted in the history of present illness.    4. Erythema of nasal skin  -     mupirocin (Bactroban) 2 % ointment; Apply  topically to the appropriate area as directed 3 (Three) Times a Day for 7 days.  Dispense: 30 g; Refill: 0    5. Post-surgical hypoparathyroidism (CMS/[facility])  -     Cancel: Renal Function Panel- duplicate  -     Phosphorus    6. Post-surgical hypothyroidism  -     TSH        Return in about 3 months (around 4/26/2021) for Recheck Diabetes, fasting, and schedule subseq Medicare Wellness Exam.

## 2021-01-27 LAB
ALBUMIN SERPL-MCNC: 4.4 G/DL (ref 3.5–5.2)
ALBUMIN/GLOB SERPL: 1.5 G/DL
ALP SERPL-CCNC: 76 U/L (ref 39–117)
ALT SERPL W P-5'-P-CCNC: 17 U/L (ref 1–33)
ANION GAP SERPL CALCULATED.3IONS-SCNC: 12.2 MMOL/L (ref 5–15)
AST SERPL-CCNC: 22 U/L (ref 1–32)
BILIRUB SERPL-MCNC: 0.3 MG/DL (ref 0–1.2)
BUN SERPL-MCNC: 17 MG/DL (ref 8–23)
BUN/CREAT SERPL: 14.8 (ref 7–25)
CALCIUM SPEC-SCNC: 9.6 MG/DL (ref 8.6–10.5)
CHLORIDE SERPL-SCNC: 100 MMOL/L (ref 98–107)
CHOLEST SERPL-MCNC: 153 MG/DL (ref 0–200)
CO2 SERPL-SCNC: 26.8 MMOL/L (ref 22–29)
CREAT SERPL-MCNC: 1.15 MG/DL (ref 0.57–1)
GFR SERPL CREATININE-BSD FRML MDRD: 46 ML/MIN/1.73
GLOBULIN UR ELPH-MCNC: 2.9 GM/DL
GLUCOSE SERPL-MCNC: 111 MG/DL (ref 65–99)
HDLC SERPL-MCNC: 43 MG/DL (ref 40–60)
LDLC SERPL CALC-MCNC: 72 MG/DL (ref 0–100)
LDLC/HDLC SERPL: 1.49 {RATIO}
PHOSPHATE SERPL-MCNC: 4.9 MG/DL (ref 2.5–4.5)
POTASSIUM SERPL-SCNC: 4.3 MMOL/L (ref 3.5–5.2)
PROT SERPL-MCNC: 7.3 G/DL (ref 6–8.5)
SODIUM SERPL-SCNC: 139 MMOL/L (ref 136–145)
TRIGL SERPL-MCNC: 229 MG/DL (ref 0–150)
TSH SERPL DL<=0.05 MIU/L-ACNC: 0.09 UIU/ML (ref 0.27–4.2)
VLDLC SERPL-MCNC: 38 MG/DL (ref 5–40)

## 2021-04-20 DIAGNOSIS — I10 ESSENTIAL HYPERTENSION: Primary | ICD-10-CM

## 2021-04-20 DIAGNOSIS — E78.49 OTHER HYPERLIPIDEMIA: ICD-10-CM

## 2021-04-20 NOTE — TELEPHONE ENCOUNTER
DELETE AFTER REVIEWING: Send the encounter HIGH priority, If patient has less than a 3 day supply. If the patient will run out of medication over the weekend add that information to the additional details line. Send this encounter to the clinical pool.    Caller: [facility] - [place], [place] - [address] - [phone]  - [phone] FX    Relationship: Pharmacy    Best call back number: [phone]    Medication needed:   Requested Prescriptions     Pending Prescriptions Disp Refills   • lisinopril-hydrochlorothiazide (PRINZIDE,ZESTORETIC) 20-25 MG per tablet 90 tablet 3     Sig: Take 1 tablet by mouth Daily.   • simvastatin (ZOCOR) 40 MG tablet 90 tablet 3     Sig: Take 1 tablet by mouth every night at bedtime.       When do you need the refill by: NOT KNOWN      Does the patient have less than a 3 day supply:  [] Yes  [] No    What is the patient's preferred pharmacy:     [facility]

## 2021-04-21 RX ORDER — LISINOPRIL AND HYDROCHLOROTHIAZIDE 25; 20 MG/1; MG/1
1 TABLET ORAL DAILY
Qty: 90 TABLET | Refills: 3 | Status: SHIPPED | OUTPATIENT
Start: 2021-04-21 | End: 2022-08-18 | Stop reason: SDUPTHER

## 2021-04-21 RX ORDER — SIMVASTATIN 40 MG
40 TABLET ORAL
Qty: 90 TABLET | Refills: 3 | Status: SHIPPED | OUTPATIENT
Start: 2021-04-21 | End: 2022-03-11

## 2021-04-27 ENCOUNTER — TELEPHONE (OUTPATIENT)
Dept: INTERNAL MEDICINE | Facility: CLINIC | Age: 76
End: 2021-04-27

## 2021-04-27 ENCOUNTER — OFFICE VISIT (OUTPATIENT)
Dept: INTERNAL MEDICINE | Facility: CLINIC | Age: 76
End: 2021-04-27

## 2021-04-27 VITALS
HEART RATE: 89 BPM | TEMPERATURE: 96.8 F | OXYGEN SATURATION: 96 % | BODY MASS INDEX: 30.3 KG/M2 | HEIGHT: 61 IN | RESPIRATION RATE: 20 BRPM | WEIGHT: 160.5 LBS | SYSTOLIC BLOOD PRESSURE: 142 MMHG | DIASTOLIC BLOOD PRESSURE: 80 MMHG

## 2021-04-27 DIAGNOSIS — I10 ESSENTIAL HYPERTENSION: ICD-10-CM

## 2021-04-27 DIAGNOSIS — E78.49 OTHER HYPERLIPIDEMIA: ICD-10-CM

## 2021-04-27 DIAGNOSIS — R13.19 ESOPHAGEAL DYSPHAGIA: ICD-10-CM

## 2021-04-27 DIAGNOSIS — K63.5 BENIGN COLONIC POLYP: ICD-10-CM

## 2021-04-27 DIAGNOSIS — K21.9 GASTROESOPHAGEAL REFLUX DISEASE, UNSPECIFIED WHETHER ESOPHAGITIS PRESENT: ICD-10-CM

## 2021-04-27 DIAGNOSIS — M85.89 OSTEOPENIA OF MULTIPLE SITES: ICD-10-CM

## 2021-04-27 DIAGNOSIS — E11.9 TYPE 2 DIABETES MELLITUS WITHOUT COMPLICATION, WITHOUT LONG-TERM CURRENT USE OF INSULIN (HCC): Primary | ICD-10-CM

## 2021-04-27 DIAGNOSIS — M15.9 PRIMARY OSTEOARTHRITIS INVOLVING MULTIPLE JOINTS: ICD-10-CM

## 2021-04-27 DIAGNOSIS — E55.9 VITAMIN D DEFICIENCY: ICD-10-CM

## 2021-04-27 DIAGNOSIS — F41.1 GENERALIZED ANXIETY DISORDER: ICD-10-CM

## 2021-04-27 DIAGNOSIS — M05.9 RHEUMATOID ARTHRITIS WITH POSITIVE RHEUMATOID FACTOR, INVOLVING UNSPECIFIED SITE (HCC): ICD-10-CM

## 2021-04-27 DIAGNOSIS — Z12.31 ENCOUNTER FOR SCREENING MAMMOGRAM FOR BREAST CANCER: ICD-10-CM

## 2021-04-27 DIAGNOSIS — Z12.11 SCREENING FOR COLON CANCER: ICD-10-CM

## 2021-04-27 DIAGNOSIS — J45.20 MILD INTERMITTENT ASTHMA WITHOUT COMPLICATION: ICD-10-CM

## 2021-04-27 LAB
ALBUMIN SERPL-MCNC: 4.5 G/DL (ref 3.5–5.2)
ALBUMIN/GLOB SERPL: 1.5 G/DL
ALP SERPL-CCNC: 86 U/L (ref 39–117)
ALT SERPL W P-5'-P-CCNC: 30 U/L (ref 1–33)
ANION GAP SERPL CALCULATED.3IONS-SCNC: 13 MMOL/L (ref 5–15)
AST SERPL-CCNC: 27 U/L (ref 1–32)
BASOPHILS # BLD AUTO: 0.09 10*3/MM3 (ref 0–0.2)
BASOPHILS NFR BLD AUTO: 0.9 % (ref 0–1.5)
BILIRUB SERPL-MCNC: 0.3 MG/DL (ref 0–1.2)
BUN SERPL-MCNC: 17 MG/DL (ref 8–23)
BUN/CREAT SERPL: 16 (ref 7–25)
CALCIUM SPEC-SCNC: 9.2 MG/DL (ref 8.6–10.5)
CHLORIDE SERPL-SCNC: 98 MMOL/L (ref 98–107)
CHOLEST SERPL-MCNC: 227 MG/DL (ref 0–200)
CO2 SERPL-SCNC: 28 MMOL/L (ref 22–29)
CREAT SERPL-MCNC: 1.06 MG/DL (ref 0.57–1)
DEPRECATED RDW RBC AUTO: 38.6 FL (ref 37–54)
EOSINOPHIL # BLD AUTO: 0.56 10*3/MM3 (ref 0–0.4)
EOSINOPHIL NFR BLD AUTO: 5.8 % (ref 0.3–6.2)
ERYTHROCYTE [DISTWIDTH] IN BLOOD BY AUTOMATED COUNT: 12.1 % (ref 12.3–15.4)
EXPIRATION DATE: NORMAL
GFR SERPL CREATININE-BSD FRML MDRD: 50 ML/MIN/1.73
GLOBULIN UR ELPH-MCNC: 3.1 GM/DL
GLUCOSE SERPL-MCNC: 113 MG/DL (ref 65–99)
HBA1C MFR BLD: 7 %
HCT VFR BLD AUTO: 38.7 % (ref 34–46.6)
HDLC SERPL-MCNC: 48 MG/DL (ref 40–60)
HGB BLD-MCNC: 12.8 G/DL (ref 12–15.9)
IMM GRANULOCYTES # BLD AUTO: 0.04 10*3/MM3 (ref 0–0.05)
IMM GRANULOCYTES NFR BLD AUTO: 0.4 % (ref 0–0.5)
LDLC SERPL CALC-MCNC: 132 MG/DL (ref 0–100)
LDLC/HDLC SERPL: 2.64 {RATIO}
LYMPHOCYTES # BLD AUTO: 2.41 10*3/MM3 (ref 0.7–3.1)
LYMPHOCYTES NFR BLD AUTO: 24.8 % (ref 19.6–45.3)
Lab: NORMAL
MCH RBC QN AUTO: 29.1 PG (ref 26.6–33)
MCHC RBC AUTO-ENTMCNC: 33.1 G/DL (ref 31.5–35.7)
MCV RBC AUTO: 88 FL (ref 79–97)
MONOCYTES # BLD AUTO: 0.91 10*3/MM3 (ref 0.1–0.9)
MONOCYTES NFR BLD AUTO: 9.4 % (ref 5–12)
NEUTROPHILS NFR BLD AUTO: 5.69 10*3/MM3 (ref 1.7–7)
NEUTROPHILS NFR BLD AUTO: 58.7 % (ref 42.7–76)
NRBC BLD AUTO-RTO: 0 /100 WBC (ref 0–0.2)
PLATELET # BLD AUTO: 359 10*3/MM3 (ref 140–450)
PMV BLD AUTO: 11.5 FL (ref 6–12)
POTASSIUM SERPL-SCNC: 4.3 MMOL/L (ref 3.5–5.2)
PROT SERPL-MCNC: 7.6 G/DL (ref 6–8.5)
RBC # BLD AUTO: 4.4 10*6/MM3 (ref 3.77–5.28)
SODIUM SERPL-SCNC: 139 MMOL/L (ref 136–145)
TRIGL SERPL-MCNC: 262 MG/DL (ref 0–150)
VLDLC SERPL-MCNC: 47 MG/DL (ref 5–40)
WBC # BLD AUTO: 9.7 10*3/MM3 (ref 3.4–10.8)

## 2021-04-27 PROCEDURE — 85025 COMPLETE CBC W/AUTO DIFF WBC: CPT | Performed by: INTERNAL MEDICINE

## 2021-04-27 PROCEDURE — 36415 COLL VENOUS BLD VENIPUNCTURE: CPT | Performed by: INTERNAL MEDICINE

## 2021-04-27 PROCEDURE — 84439 ASSAY OF FREE THYROXINE: CPT | Performed by: INTERNAL MEDICINE

## 2021-04-27 PROCEDURE — 83036 HEMOGLOBIN GLYCOSYLATED A1C: CPT | Performed by: INTERNAL MEDICINE

## 2021-04-27 PROCEDURE — 99214 OFFICE O/P EST MOD 30 MIN: CPT | Performed by: INTERNAL MEDICINE

## 2021-04-27 PROCEDURE — 80061 LIPID PANEL: CPT | Performed by: INTERNAL MEDICINE

## 2021-04-27 PROCEDURE — 84443 ASSAY THYROID STIM HORMONE: CPT | Performed by: INTERNAL MEDICINE

## 2021-04-27 PROCEDURE — 80053 COMPREHEN METABOLIC PANEL: CPT | Performed by: INTERNAL MEDICINE

## 2021-04-27 RX ORDER — PROCHLORPERAZINE 25 MG/1
SUPPOSITORY RECTAL
Qty: 3 EACH | Refills: 5 | Status: SHIPPED | OUTPATIENT
Start: 2021-04-27 | End: 2021-04-28 | Stop reason: SDUPTHER

## 2021-04-27 RX ORDER — PROCHLORPERAZINE 25 MG/1
1 SUPPOSITORY RECTAL CONTINUOUS
Qty: 1 EACH | Refills: 0 | Status: SHIPPED | OUTPATIENT
Start: 2021-04-27 | End: 2021-04-28 | Stop reason: SDUPTHER

## 2021-04-27 NOTE — PATIENT INSTRUCTIONS
I strongly recommend you get the COVID-19 vaccine.    Please schedule your Ophthalmology appointment, as we discussed.

## 2021-04-27 NOTE — PROGRESS NOTES
Bairon Louie is a 75 y.o. female.     Chief Complaint   Patient presents with   • Diabetes       History obtained from the patient.      History of Present Illness     On 11/4/2020, T4 was elevated (4.330), with a normal T4 (1.24). She does not have a history of thyroid disease. On 1/26/2021, TSH was low (0.886).    The patient has Asthma, stable on no medication.    Primary Care Cardiac Diagnostic Constellation: The patient is here today for a follow-up visit.       Her Diabetes Mellitus Type 2 has been stable.   Medication(s): Metformin HCl, Lisinopril/HCT, and Simvastatin.    Her Hypertension has been stable.   Medication(s): Lisinopril/HCT.   Her Hyperlipidemia has been unstable.   Her LDL goal is < 70 mg/dL, last LDL 72, TG 229.   Medication(s): Simvastatin.   The patient is adherent with her medication regimen. She denies medication side effects.       Interval Events: The patient is requesting a prescription for the Dexcom G6 Glucometer.  On  1/26/2021, Hemoglobin A1C was 7.2 (up from 6.9 previously).  No recent medication change.  The patient has not been checking her blood sugar at home. She does check her feet daily. She states saw the Ophthalmologist in October 2019, no retinopathy. She states her blood pressure at home has been 130's/80's. She states she had been out of her blood pressure medication, but it was recently refilled.      Symptoms: Stable COOL.  Denies chest pain, dyspnea, orthopnea, PND, palpitations, syncope, lower extremity edema, claudication, lightheadedness, and dizziness.  Associated Symptoms: No significant weight change since last visit. Has stable fatigue, headaches, myalgias, and arthralgias. No polydipsia, polyuria, visual impairment, memory loss, concentration issues, or focal neurologic deficits. No foot pain, numbness of the feet, or foot ulcer.       Lifestyle and Disease Management: Diet: She has not been following a healthy diet. Weight Issues: She has  weight concerns. Exercise: She walks twice per week.  Tobacco Use:Never a Smoker.     Gastroesophageal Reflux Disease Follow-up: The patient is being seen for a routine clinic follow-up of Gastroesophageal Reflux Disease, which is stable.  Previous Evaluation: Last EGD 1/25/16 showed Class A reflux esophagitis.  Interval Events: None.  Symptoms: She has chronic abdominal pain, intermittent heartburn, and intermittent nausea. She reports difficulty swallowing food and medication for the past 2 months. She denies odynophagia. No acid regurgitation, vomiting,  hematochezia, melena, early satiety, bloating, or belching.  Associated Symptoms: No chronic sore throat, hoarseness, cough, or wheezing.   Medication:  Omeprazole and Zofran.      Colonic Polyp Follow-up: The patient is being seen for a routine clinic follow-up of Colon Polyp(s), which is stable.   Interval Events:  Current diagnosis was determined by Colonoscopy and last 1/25/16- no polyps.   Symptoms: Has chronic intermittent constipation, now alternating with diarrhea.  No abdominal pain, hematochezia, melena, decreased stool caliber, or change in bowel habits.   Associated Symptoms: no rectal prolapse.   Medications:  Bentyl.     Osteoporosis Follow-up: The patient is being seen for routine follow-up of Osteoporosis, which is stable.   Interval Events:  The last DEXA scan was done 10/19/17, Osteopenia.   Symptoms:  She reports stable arthralgias, myalgias, back pain, hip pain, and neck pain.   No wrist pain, loss of balance, or gait instability.    Associated Symptoms: No new fracture.  Medication:   Vitamin D supplements.  She states Calcium causes constipation.           Vitamin D Deficiency: The patient is here for follow up of Vitamin D Deficiency, which is stable.   Interval Events: Vitamin D level on 10/20/2020 as 68.9.  Symptoms:  Stable fatigue, myalgias, and arthralgias.  Denies paresthesias, balance issues, and gait instability.       Medication:   Vitamin D supplements.        Osteoarthritis Follow-Up: The patient is here for follow up of Osteoarthritis, which is stable.  Comorbid Illnesses: Rheumatoid Arthritis and Fibromyalgia.   Interval Events:  The patient sees Dr. Brenner.  Symptoms:  Has arthralgias (hands, hips, and knees), myalgias, neck pain, and back pain.   Associated Symptoms: Denies joint swelling and joint stiffness.  Medications:  Cymbalta.    The patient is adherent with her medication regimen. She denies medication side effects.      Anxiety Disorder Follow-Up: The patient is being seen for follow-up of Anxiety, which is stable.  Interval Events:   None.  Symptoms: stable anxiety and insomnia.  Denies depression, panic attacks, anhedonia, memory loss, and difficulty concentrating.   Associated Symptoms: no suicidal ideation.   Medication(s): Cymbalta  The patient is adherent to her medication regimen, and she denies medication side effects.           Current Outpatient Medications on File Prior to Visit   Medication Sig Dispense Refill   • calcitriol (ROCALTROL) 0.25 MCG capsule Take 1 capsule by mouth Daily. 90 capsule 3   • Cholecalciferol (VITAMIN D3) 1000 units capsule Take 1 capsule by mouth 2 (Two) Times a Day. 60 capsule    • dicyclomine (BENTYL) 20 MG tablet Take 1 tablet by mouth 4 (Four) Times a Day. 100 tablet 3   • DULoxetine (CYMBALTA) 30 MG capsule Take 30 mg by mouth Daily.     • fluticasone (FLONASE) 50 MCG/ACT nasal spray into each nostril.     • levothyroxine (SYNTHROID, LEVOTHROID) 125 MCG tablet Take 1 tablet by mouth Daily. 90 tablet 3   • lisinopril-hydrochlorothiazide (PRINZIDE,ZESTORETIC) 20-25 MG per tablet Take 1 tablet by mouth Daily. 90 tablet 3   • metFORMIN (GLUCOPHAGE) 500 MG tablet Take 1 tablet by mouth 2 (Two) Times a Day With Meals. 180 tablet 3   • omeprazole (priLOSEC) 20 MG capsule TAKE 1 CAPSULE TWICE DAILY 180 capsule 3   • ondansetron ODT (ZOFRAN-ODT) 8 MG disintegrating tablet Take 1 tablet by  mouth Every 8 (Eight) Hours As Needed for Nausea or Vomiting. 90 tablet 3   • simvastatin (ZOCOR) 40 MG tablet Take 1 tablet by mouth every night at bedtime. 90 tablet 3   • [DISCONTINUED] Blood Glucose Monitoring Suppl (ACCU-CHEK RENZO PLUS) w/Device kit 1 each Daily. 1 kit 0   • [DISCONTINUED] glucose blood (ACCU-CHEK RENZO) test strip Test once daily  DX  E 11.9 100 each 12   • [DISCONTINUED] Lancets (ACCU-CHEK MULTICLIX) lancets Test once daily  Dx   E11.9 102 each 12     No current facility-administered medications on file prior to visit.       Current outpatient and discharge medications have been reconciled for the patient.  Reviewed by: Marina Pederson MD        The following portions of the patient's history were reviewed and updated as appropriate: allergies, current medications, past family history, past medical history, past social history, past surgical history and problem list.    Review of Systems   Constitutional: Positive for fatigue. Negative for unexpected weight change.   Eyes: Negative for visual disturbance.   Respiratory: Negative for cough, shortness of breath and wheezing.    Cardiovascular: Negative for chest pain, palpitations and leg swelling.        Stable COOL. No orthopnea or claudication.   Gastrointestinal: Positive for abdominal pain, constipation, diarrhea and nausea. Negative for blood in stool and vomiting.        Denies melena.   Endocrine: Negative for polydipsia and polyuria.   Musculoskeletal: Positive for arthralgias, back pain, myalgias and neck pain. Negative for joint swelling.   Neurological: Positive for headaches. Negative for dizziness, syncope and light-headedness.        No memory issues.   Psychiatric/Behavioral: Negative for decreased concentration, sleep disturbance and suicidal ideas. The patient is nervous/anxious.          Objective       Blood pressure 142/80, pulse 89, temperature 96.8 °F (36 °C), temperature source Temporal, resp. rate 20, height 154.9 cm  "(61\"), weight 72.8 kg (160 lb 8 oz), SpO2 96 %, not currently breastfeeding.  Body mass index is 30.33 kg/m².      Physical Exam  Vitals and nursing note reviewed.   Constitutional:       Appearance: She is well-developed.      Comments: Overweight, borderline obese.   Neck:      Thyroid: No thyroid mass or thyromegaly.      Vascular: No carotid bruit.   Cardiovascular:      Rate and Rhythm: Normal rate and regular rhythm.      Pulses: Normal pulses.      Heart sounds: Normal heart sounds. No murmur heard.   No friction rub. No gallop.    Pulmonary:      Effort: Pulmonary effort is normal.      Breath sounds: Normal breath sounds.   Musculoskeletal:      Right lower leg: No edema.      Left lower leg: No edema.   Neurological:      Mental Status: She is alert.   Psychiatric:         Mood and Affect: Mood normal.       Results for orders placed or performed in visit on 04/27/21   POC Glycosylated Hemoglobin (Hb A1C)    Specimen: Blood   Result Value Ref Range    Hemoglobin A1C 7.0 %    Lot Number [id number]     Expiration Date 2-10-23        Assessment / Plan:  Diagnoses and all orders for this visit:    1. Type 2 diabetes mellitus without complication, without long-term current use of insulin (CMS/[facility]) (Primary)  -     Lipid Panel  -     Comprehensive Metabolic Panel  -     CBC & Differential  -     T4, Free  -     TSH  -     POC Glycosylated Hemoglobin (Hb A1C)  -     Continuous Blood Gluc Sensor (Dexcom G6 Sensor); Every 10 (Ten) Days.  Dispense: 3 each; Refill: 5  -     Continuous Blood Gluc Transmit (Dexcom G6 Transmitter) misc; 1 each Continuous.  Dispense: 1 each; Refill: 0  -     Continuous Blood Gluc  (Dexcom G6 ) device; 1 each Continuous.  Dispense: 1 each; Refill: 0   Continue current medication(s) as noted in the history of present illness.   The patient agrees to schedule her Ophthalmology appointment.    2. Essential hypertension  -     Lipid Panel  -     Comprehensive Metabolic " Panel  -     CBC & Differential  -     T4, Free  -     TSH   Continue current medication(s) as noted in the history of present illness.    3. Other hyperlipidemia  -     Lipid Panel  -     Comprehensive Metabolic Panel  -     CBC & Differential  -     T4, Free  -     TSH   Continue current medication(s) as noted in the history of present illness.    4. Mild intermittent asthma without complication   Stable, no medication.    5. Gastroesophageal reflux disease, unspecified whether esophagitis present   Continue current medication(s) as noted in the history of present illness.    6. Benign colonic polyp  -     Ambulatory Referral For Screening Colonoscopy    7. Primary osteoarthritis involving multiple joints   Continue current medication(s) as noted in the history of present illness.    8. Rheumatoid arthritis with positive rheumatoid factor, involving unspecified site (CMS/HCC)   Continue current medication(s) as noted in the history of present illness.    9. Osteopenia of multiple sites  -     DEXA Bone Density Axial; Future   Continue Calcium and Vitamin D supplementation, as well as weight bearing exercises.    10. Vitamin D deficiency   Continue Vitamin D supplementation.    11. Esophageal dysphagia  -     Ambulatory referral for Screening EGD    12. Screening for colon cancer  -     Ambulatory Referral For Screening Colonoscopy    13. Generalized anxiety disorder    14. Encounter for screening mammogram for breast cancer  -     Mammo Screening Digital Tomosynthesis Bilateral With CAD; Future        Strongly recommend she get the COVID-19 vaccine.      Return in about 3 months (around 7/27/2021) for Recheck Diabetes, fasting.

## 2021-04-27 NOTE — TELEPHONE ENCOUNTER
Call patient please. At her visit today, I forgot to give her prescriptions for the Dexcom G6 glucometer, transmitter, and . I have printed these prescriptions and they are in the nurse box. The patient can pick these up, or we can mail them to her.

## 2021-04-28 DIAGNOSIS — E11.9 TYPE 2 DIABETES MELLITUS WITHOUT COMPLICATION, WITHOUT LONG-TERM CURRENT USE OF INSULIN (HCC): ICD-10-CM

## 2021-04-28 LAB
T4 FREE SERPL-MCNC: 1.73 NG/DL (ref 0.93–1.7)
TSH SERPL DL<=0.05 MIU/L-ACNC: 0.05 UIU/ML (ref 0.27–4.2)

## 2021-04-28 RX ORDER — PROCHLORPERAZINE 25 MG/1
1 SUPPOSITORY RECTAL CONTINUOUS
Qty: 1 EACH | Refills: 0 | Status: SHIPPED | OUTPATIENT
Start: 2021-04-28 | End: 2021-06-28 | Stop reason: SDUPTHER

## 2021-04-28 RX ORDER — PROCHLORPERAZINE 25 MG/1
SUPPOSITORY RECTAL
Qty: 3 EACH | Refills: 5 | Status: SHIPPED | OUTPATIENT
Start: 2021-04-28 | End: 2021-06-28 | Stop reason: SDUPTHER

## 2021-06-24 ENCOUNTER — TELEPHONE (OUTPATIENT)
Dept: INTERNAL MEDICINE | Facility: CLINIC | Age: 76
End: 2021-06-24

## 2021-06-24 DIAGNOSIS — E11.9 TYPE 2 DIABETES MELLITUS WITHOUT COMPLICATION, WITHOUT LONG-TERM CURRENT USE OF INSULIN (HCC): ICD-10-CM

## 2021-06-24 NOTE — TELEPHONE ENCOUNTER
Caller: Cristy Louie    Relationship: Self    Best call back number: [phone]    Which medication are you concerned about: Continuous Blood Gluc  (Dexcom G6 ) device, Continuous Blood Gluc Sensor (Dexcom G6 Sensor), AND Continuous Blood Gluc Transmit (Dexcom G6 Transmitter) misc     Who prescribed you this medication: DR. MCMILLAN    What are your concerns: PATIENT STATED THESE WERE CALLED IN FOR HER ABOUT 2 MONTHS AGO BUT WHEN SHE CHECKED WITH THE PHARMACY THEY INFORMED HER THEY NEVER RECEIVED THESE PRESCRIPTIONS      [address]  - [phone]  - [phone] FX

## 2021-06-28 RX ORDER — PROCHLORPERAZINE 25 MG/1
1 SUPPOSITORY RECTAL CONTINUOUS
Qty: 1 EACH | Refills: 0 | Status: SHIPPED | OUTPATIENT
Start: 2021-06-28 | End: 2021-06-28 | Stop reason: SDUPTHER

## 2021-06-28 RX ORDER — PROCHLORPERAZINE 25 MG/1
1 SUPPOSITORY RECTAL CONTINUOUS
Qty: 1 EACH | Refills: 0 | Status: SHIPPED | OUTPATIENT
Start: 2021-06-28 | End: 2021-08-02 | Stop reason: SDUPTHER

## 2021-06-28 RX ORDER — PROCHLORPERAZINE 25 MG/1
SUPPOSITORY RECTAL
Qty: 3 EACH | Refills: 5 | Status: SHIPPED | OUTPATIENT
Start: 2021-06-28 | End: 2021-06-28 | Stop reason: SDUPTHER

## 2021-06-28 RX ORDER — PROCHLORPERAZINE 25 MG/1
SUPPOSITORY RECTAL
Qty: 3 EACH | Refills: 5 | Status: SHIPPED | OUTPATIENT
Start: 2021-06-28 | End: 2021-08-02 | Stop reason: SDUPTHER

## 2021-07-29 ENCOUNTER — TELEPHONE (OUTPATIENT)
Dept: INTERNAL MEDICINE | Facility: CLINIC | Age: 76
End: 2021-07-29

## 2021-07-29 DIAGNOSIS — R32 URINARY INCONTINENCE, UNSPECIFIED TYPE: Primary | ICD-10-CM

## 2021-07-29 DIAGNOSIS — E11.9 TYPE 2 DIABETES MELLITUS WITHOUT COMPLICATION, WITHOUT LONG-TERM CURRENT USE OF INSULIN (HCC): ICD-10-CM

## 2021-08-02 RX ORDER — PROCHLORPERAZINE 25 MG/1
1 SUPPOSITORY RECTAL CONTINUOUS
Qty: 1 EACH | Refills: 0 | Status: SHIPPED | OUTPATIENT
Start: 2021-08-02 | End: 2021-10-18

## 2021-08-02 RX ORDER — PROCHLORPERAZINE 25 MG/1
SUPPOSITORY RECTAL
Qty: 3 EACH | Refills: 5 | Status: SHIPPED | OUTPATIENT
Start: 2021-08-02 | End: 2021-10-18

## 2021-08-03 ENCOUNTER — OFFICE VISIT (OUTPATIENT)
Dept: INTERNAL MEDICINE | Facility: CLINIC | Age: 76
End: 2021-08-03

## 2021-08-03 VITALS
BODY MASS INDEX: 30.58 KG/M2 | HEIGHT: 61 IN | DIASTOLIC BLOOD PRESSURE: 80 MMHG | WEIGHT: 162 LBS | HEART RATE: 78 BPM | SYSTOLIC BLOOD PRESSURE: 150 MMHG | TEMPERATURE: 97.8 F

## 2021-08-03 DIAGNOSIS — H93.13 TINNITUS OF BOTH EARS: ICD-10-CM

## 2021-08-03 DIAGNOSIS — H91.93 BILATERAL HEARING LOSS, UNSPECIFIED HEARING LOSS TYPE: ICD-10-CM

## 2021-08-03 DIAGNOSIS — Z00.00 MEDICARE ANNUAL WELLNESS VISIT, SUBSEQUENT: Primary | ICD-10-CM

## 2021-08-03 PROCEDURE — G0439 PPPS, SUBSEQ VISIT: HCPCS | Performed by: INTERNAL MEDICINE

## 2021-08-03 NOTE — PROGRESS NOTES
The ABCs of the Annual Wellness Visit  Subsequent Medicare Wellness Visit    Chief Complaint   Patient presents with   • Medicare Wellness-subsequent       Subjective   History of Present Illness:  Cristy Louie is a 75 y.o. female who presents for a Subsequent Medicare Wellness Visit.    HEALTH RISK ASSESSMENT    Recent Hospitalizations:  No hospitalization(s) within the last year.    Current Medical Providers:  Patient Care Team:  Marina Pederson MD as PCP - General  Marina Pederson MD as PCP - Family Medicine  RobertSunil Gaines MD as Consulting Physician (Dermatology)  Maureen Aiken MD as Consulting Physician (Endocrinology)  Rajat Kim MD as Consulting Physician (Gastroenterology)  Robby Valadez MD as Consulting Physician (Colon and Rectal Surgery)    Smoking Status:  Social History     Tobacco Use   Smoking Status Never Smoker   Smokeless Tobacco Never Used       Alcohol Consumption:  Social History     Substance and Sexual Activity   Alcohol Use No       Depression Screen:   PHQ-2/PHQ-9 Depression Screening 8/3/2021   Little interest or pleasure in doing things 0   Feeling down, depressed, or hopeless 0   Trouble falling or staying asleep, or sleeping too much -   Feeling tired or having little energy -   Poor appetite or overeating -   Feeling bad about yourself - or that you are a failure or have let yourself or your family down -   Trouble concentrating on things, such as reading the newspaper or watching television -   Moving or speaking so slowly that other people could have noticed. Or the opposite - being so fidgety or restless that you have been moving around a lot more than usual -   Thoughts that you would be better off dead, or of hurting yourself in some way -   Total Score 0   If you checked off any problems, how difficult have these problems made it for you to do your work, take care of things at home, or get along with other people? -       Fall Risk Screen:  CAROLE Fall  Risk Assessment was completed, and patient is at LOW risk for falls.Assessment completed on:8/3/2021    Health Habits and Functional and Cognitive Screening:  Functional & Cognitive Status 8/3/2021   Do you have difficulty preparing food and eating? No   Do you have difficulty bathing yourself, getting dressed or grooming yourself? No   Do you have difficulty using the toilet? No   Do you have difficulty moving around from place to place? No   Do you have trouble with steps or getting out of a bed or a chair? No   Current Diet Low Carb Diet        Current Diet Comment Fibre diet    Dental Exam Up to date        Dental Exam Comment -   Eye Exam Not up to date   Exercise (times per week) 7 times per week   Current Exercises Include Walking        Exercise Comment doing chores    Current Exercise Activities Include -   Do you need help using the phone?  No   Are you deaf or do you have serious difficulty hearing?  Yes   Do you need help with transportation? No   Do you need help shopping? No   Do you need help preparing meals?  No   Do you need help with housework?  No   Do you need help with laundry? No   Do you need help taking your medications? No   Do you need help managing money? No   Do you ever drive or ride in a car without wearing a seat belt? No   Have you felt unusual stress, anger or loneliness in the last month? No   Who do you live with? Alone   If you need help, do you have trouble finding someone available to you? No   Have you been bothered in the last four weeks by sexual problems? No   Do you have difficulty concentrating, remembering or making decisions? -         Does the patient have evidence of cognitive impairment? No    Asprin use counseling:Does not need ASA (and currently is not on it)    Age-appropriate Screening Schedule:  Refer to the list below for future screening recommendations based on patient's age, sex and/or medical conditions. Orders for these recommended tests are listed in the  plan section. The patient has been provided with a written plan.    Health Maintenance   Topic Date Due   • ZOSTER VACCINE (2 of 2) 04/25/2017   • TDAP/TD VACCINES (2 - Td or Tdap) 11/28/2017   • DIABETIC EYE EXAM  10/01/2020   • INFLUENZA VACCINE  10/01/2021   • URINE MICROALBUMIN  10/20/2021   • HEMOGLOBIN A1C  10/27/2021   • MAMMOGRAM  10/30/2021   • LIPID PANEL  04/27/2022   • DXA SCAN  10/19/2022          The following portions of the patient's history were reviewed and updated as appropriate: allergies, current medications, past family history, past medical history, past social history, past surgical history and problem list.    Outpatient Medications Prior to Visit   Medication Sig Dispense Refill   • calcitriol (ROCALTROL) 0.25 MCG capsule Take 1 capsule by mouth Daily. 90 capsule 3   • Continuous Blood Gluc  (Dexcom G6 ) device 1 each Continuous. 1 each 0   • Continuous Blood Gluc Sensor (Dexcom G6 Sensor) Every 10 (Ten) Days. 3 each 5   • Continuous Blood Gluc Transmit (Dexcom G6 Transmitter) misc 1 each Continuous. 1 each 0   • dicyclomine (BENTYL) 20 MG tablet Take 1 tablet by mouth 4 (Four) Times a Day. 100 tablet 3   • DULoxetine (CYMBALTA) 30 MG capsule Take 30 mg by mouth Daily.     • fluticasone (FLONASE) 50 MCG/ACT nasal spray into each nostril.     • levothyroxine (SYNTHROID, LEVOTHROID) 125 MCG tablet Take 1 tablet by mouth Daily. 90 tablet 3   • lisinopril-hydrochlorothiazide (PRINZIDE,ZESTORETIC) 20-25 MG per tablet Take 1 tablet by mouth Daily. 90 tablet 3   • omeprazole (priLOSEC) 20 MG capsule TAKE 1 CAPSULE TWICE DAILY 180 capsule 3   • ondansetron ODT (ZOFRAN-ODT) 8 MG disintegrating tablet Take 1 tablet by mouth Every 8 (Eight) Hours As Needed for Nausea or Vomiting. 90 tablet 3   • simvastatin (ZOCOR) 40 MG tablet Take 1 tablet by mouth every night at bedtime. 90 tablet 3   • metFORMIN (GLUCOPHAGE) 500 MG tablet Take 1 tablet by mouth 2 (Two) Times a Day With Meals. 180  "tablet 3   • Cholecalciferol (VITAMIN D3) 1000 units capsule Take 1 capsule by mouth 2 (Two) Times a Day. 60 capsule      No facility-administered medications prior to visit.       Patient Active Problem List   Diagnosis   • Cataract   • Abnormal liver function tests   • Allergic rhinitis   • Anemia   • Anxiety disorder   • Asthma   • Benign colonic polyp   • Diabetes mellitus (CMS/HCC)   • Diverticulosis of intestine   • Gastroesophageal reflux disease   • Fibromyalgia   • Hyperlipidemia   • Hypertension   • Osteoarthritis   • Osteopenia of multiple sites   • Rheumatoid arthritis (CMS/HCC)   • Vitamin D deficiency   • Thyroid cancer (CMS/HCC)   • Post-surgical hypothyroidism   • Post-surgical hypoparathyroidism (CMS/HCC)       Advanced Care Planning:  ACP discussion was held with the patient during this visit. Patient does not have an advance directive, information provided.  ACP information pamphlet given to the patient.  ACP information provided on the AVS.    Review of Systems   HENT: Positive for tinnitus (not new).        Compared to one year ago, the patient feels her physical health is the same.  Compared to one year ago, the patient feels her mental health is the same.    Reviewed chart for potential of high risk medication in the elderly: yes  Reviewed chart for potential of harmful drug interactions in the elderly:yes    Objective         Vitals:    08/03/21 1114   BP: 150/80   BP Location: Right arm   Pulse: 78   Temp: 97.8 °F (36.6 °C)   TempSrc: Temporal   Weight: 73.5 kg (162 lb)   Height: 153.7 cm (60.5\")       Body mass index is 31.12 kg/m².    Patient's Body mass index is 31.12 kg/m². indicating that she is obese (BMI >30). Obesity-related health conditions include the following: hypertension, diabetes mellitus, dyslipidemias, GERD and osteoarthritis. Obesity is unchanged. BMI is is above average; BMI management plan is completed. We discussed portion control and increasing exercise..    Finger Rub " Hearing{Test (right ear):failed  Finger Rub Hearing{Test (left ear):passed      Physical Exam          Assessment/Plan   Medicare Risks and Personalized Health Plan  CMS Preventative Services Quick Reference  Abdominal Aortic Aneurysm Screening  Advance Directive Discussion  Breast Cancer/Mammogram Screening  Depression/Dysphoria  Fall Risk  Hearing Problem  Immunizations Discussed/Encouraged (specific immunizations; Tdap, Hepatitis A Vaccine/Series, Shingrix and COVID19 )  Obesity/Overweight     The above risks/problems have been discussed with the patient.  Pertinent information has been shared with the patient in the After Visit Summary.  Follow up plans and orders are seen below in the Assessment/Plan Section.    Diagnoses and all orders for this visit:    1. Medicare annual wellness visit, subsequent (Primary)    2. Bilateral hearing loss, unspecified hearing loss type  -     Ambulatory Referral to Audiology    3. Tinnitus of both ears  -     Ambulatory Referral to Audiology    The patient was given the number for Central Scheduling to schedule her DEXA Scan and Mammogram.    Follow Up:  Return in about 1 year (around 8/3/2022) for schedule subseq Medicare Wellness Exam.     An After Visit Summary and PPPS were given to the patient.

## 2021-08-03 NOTE — PATIENT INSTRUCTIONS
I recommend Shingrix (new Shingles vaccine), Td/Tdap vaccine (Tetanus booster), and Hepatitis A vaccination at the pharmacy.  I also strongly recommend the COVID-19 vaccine, as we discussed.    Please call Central Scheduling ([phone]) and reschedule your DEXA Scan and Mammogram.      Health Maintenance for Postmenopausal Women  Menopause is a normal process in which your ability to get pregnant comes to an end. This process happens slowly over many months or years, usually between the ages of 48 and 55. Menopause is complete when you have missed your menstrual periods for 12 months.  It is important to talk with your health care provider about some of the most common conditions that affect women after menopause (postmenopausal women). These include heart disease, cancer, and bone loss (osteoporosis). Adopting a healthy lifestyle and getting preventive care can help to promote your health and wellness. The actions you take can also lower your chances of developing some of these common conditions.  What should I know about menopause?  During menopause, you may get a number of symptoms, such as:  · Hot flashes. These can be moderate or severe.  · Night sweats.  · Decrease in sex drive.  · Mood swings.  · Headaches.  · Tiredness.  · Irritability.  · Memory problems.  · Insomnia.  Choosing to treat or not to treat these symptoms is a decision that you make with your health care provider.  Do I need hormone replacement therapy?  · Hormone replacement therapy is effective in treating symptoms that are caused by menopause, such as hot flashes and night sweats.  · Hormone replacement carries certain risks, especially as you become older. If you are thinking about using estrogen or estrogen with progestin, discuss the benefits and risks with your health care provider.  What is my risk for heart disease and stroke?  The risk of heart disease, heart attack, and stroke increases as you age. One of the causes may be a change  in the body's hormones during menopause. This can affect how your body uses dietary fats, triglycerides, and cholesterol. Heart attack and stroke are medical emergencies. There are many things that you can do to help prevent heart disease and stroke.  Watch your blood pressure  · High blood pressure causes heart disease and increases the risk of stroke. This is more likely to develop in people who have high blood pressure readings, are of  descent, or are overweight.  · Have your blood pressure checked:  ? Every 3-5 years if you are 18-39 years of age.  ? Every year if you are 40 years old or older.  Eat a healthy diet    · Eat a diet that includes plenty of vegetables, fruits, low-fat dairy products, and lean protein.  · Do not eat a lot of foods that are high in solid fats, added sugars, or sodium.  Get regular exercise  Get regular exercise. This is one of the most important things you can do for your health. Most adults should:  · Try to exercise for at least 150 minutes each week. The exercise should increase your heart rate and make you sweat (moderate-intensity exercise).  · Try to do strengthening exercises at least twice each week. Do these in addition to the moderate-intensity exercise.  · Spend less time sitting. Even light physical activity can be beneficial.  Other tips  · Work with your health care provider to achieve or maintain a healthy weight.  · Do not use any products that contain nicotine or tobacco, such as cigarettes, e-cigarettes, and chewing tobacco. If you need help quitting, ask your health care provider.  · Know your numbers. Ask your health care provider to check your cholesterol and your blood sugar (glucose). Continue to have your blood tested as directed by your health care provider.  Do I need screening for cancer?  Depending on your health history and family history, you may need to have cancer screening at different stages of your life. This may include screening  for:  · Breast cancer.  · Cervical cancer.  · Lung cancer.  · Colorectal cancer.  What is my risk for osteoporosis?  After menopause, you may be at increased risk for osteoporosis. Osteoporosis is a condition in which bone destruction happens more quickly than new bone creation. To help prevent osteoporosis or the bone fractures that can happen because of osteoporosis, you may take the following actions:  · If you are 19-50 years old, get at least 1,000 mg of calcium and at least 600 mg of vitamin D per day.  · If you are older than age 50 but younger than age 70, get at least 1,200 mg of calcium and at least 600 mg of vitamin D per day.  · If you are older than age 70, get at least 1,200 mg of calcium and at least 800 mg of vitamin D per day.  Smoking and drinking excessive alcohol increase the risk of osteoporosis. Eat foods that are rich in calcium and vitamin D, and do weight-bearing exercises several times each week as directed by your health care provider.  How does menopause affect my mental health?  Depression may occur at any age, but it is more common as you become older. Common symptoms of depression include:  · Low or sad mood.  · Changes in sleep patterns.  · Changes in appetite or eating patterns.  · Feeling an overall lack of motivation or enjoyment of activities that you previously enjoyed.  · Frequent crying spells.  Talk with your health care provider if you think that you are experiencing depression.  General instructions  See your health care provider for regular wellness exams and vaccines. This may include:  · Scheduling regular health, dental, and eye exams.  · Getting and maintaining your vaccines. These include:  ? Influenza vaccine. Get this vaccine each year before the flu season begins.  ? Pneumonia vaccine.  ? Shingles vaccine.  ? Tetanus, diphtheria, and pertussis (Tdap) booster vaccine.  Your health care provider may also recommend other immunizations.  Tell your health care provider  if you have ever been abused or do not feel safe at home.  Summary  · Menopause is a normal process in which your ability to get pregnant comes to an end.  · This condition causes hot flashes, night sweats, decreased interest in sex, mood swings, headaches, or lack of sleep.  · Treatment for this condition may include hormone replacement therapy.  · Take actions to keep yourself healthy, including exercising regularly, eating a healthy diet, watching your weight, and checking your blood pressure and blood sugar levels.  · Get screened for cancer and depression. Make sure that you are up to date with all your vaccines.  This information is not intended to replace advice given to you by your health care provider. Make sure you discuss any questions you have with your health care provider.  Document Revised: 12/11/2019 Document Reviewed: 12/11/2019  [employer] Patient Education © 2021 [employer]      Obesity, Adult  Obesity is the condition of having too much total body fat. Being overweight or obese means that your weight is greater than what is considered healthy for your body size. Obesity is determined by a measurement called BMI. BMI is an estimate of body fat and is calculated from height and weight. For adults, a BMI of 30 or higher is considered obese.  Obesity can lead to other health concerns and major illnesses, including:  · Stroke.  · Coronary artery disease (CAD).  · Type 2 diabetes.  · Some types of cancer, including cancers of the colon, breast, uterus, and gallbladder.  · Osteoarthritis.  · High blood pressure (hypertension).  · High cholesterol.  · Sleep apnea.  · Gallbladder stones.  · Infertility problems.  What are the causes?  Common causes of this condition include:  · Eating daily meals that are high in calories, sugar, and fat.  · Being born with genes that may make you more likely to become obese.  · Having a medical condition that causes obesity, including:  ? Hypothyroidism.  ? Polycystic  ovarian syndrome (PCOS).  ? Binge-eating disorder.  ? Cushing syndrome.  · Taking certain medicines, such as steroids, antidepressants, and seizure medicines.  · Not being physically active (sedentary lifestyle).  · Not getting enough sleep.  · Drinking high amounts of sugar-sweetened beverages, such as soft drinks.  What increases the risk?  The following factors may make you more likely to develop this condition:  · Having a family history of obesity.  · Being a woman of  descent.  · Being a man of  descent.  · Living in an area with limited access to:  ? [place], recreation centers, or sidewalks.  ? Healthy food choices, such as grocery stores and farmers' markets.  What are the signs or symptoms?  The main sign of this condition is having too much body fat.  How is this diagnosed?  This condition is diagnosed based on:  · Your BMI. If you are an adult with a BMI of 30 or higher, you are considered obese.  · Your waist circumference. This measures the distance around your waistline.  · Your skinfold thickness. Your health care provider may gently pinch a fold of your skin and measure it.  You may have other tests to check for underlying conditions.  How is this treated?  Treatment for this condition often includes changing your lifestyle. Treatment may include some or all of the following:  · Dietary changes. This may include developing a healthy meal plan.  · Regular physical activity. This may include activity that causes your heart to beat faster (aerobic exercise) and strength training. Work with your health care provider to design an exercise program that works for you.  · Medicine to help you lose weight if you are unable to lose 1 pound a week after 6 weeks of healthy eating and more physical activity.  · Treating conditions that cause the obesity (underlying conditions).  · Surgery. Surgical options may include gastric banding and gastric bypass. Surgery may be done if:  ? Other  treatments have not helped to improve your condition.  ? You have a BMI of 40 or higher.  ? You have life-threatening health problems related to obesity.  Follow these instructions at home:  Eating and drinking    · Follow recommendations from your health care provider about what you eat and drink. Your health care provider may advise you to:  ? Limit fast food, sweets, and processed snack foods.  ? Choose low-fat options, such as low-fat milk instead of whole milk.  ? Eat 5 or more servings of fruits or vegetables every day.  ? Eat at home more often. This gives you more control over what you eat.  ? Choose healthy foods when you eat out.  ? Learn to read food labels. This will help you understand how much food is considered 1 serving.  ? Learn what a healthy serving size is.  ? Keep low-fat snacks available.  ? Limit sugary drinks, such as soda, fruit juice, sweetened iced tea, and flavored milk.  · Drink enough water to keep your urine pale yellow.  · Do not follow a fad diet. Fad diets can be unhealthy and even dangerous.  Physical activity  · Exercise regularly, as told by your health care provider.  ? Most adults should get up to 150 minutes of moderate-intensity exercise every week.  ? Ask your health care provider what types of exercise are safe for you and how often you should exercise.  · Warm up and stretch before being active.  · Cool down and stretch after being active.  · Rest between periods of activity.  Lifestyle  · Work with your health care provider and a dietitian to set a weight-loss goal that is healthy and reasonable for you.  · Limit your screen time.  · Find ways to reward yourself that do not involve food.  · Do not drink alcohol if:  ? Your health care provider tells you not to drink.  ? You are pregnant, may be pregnant, or are planning to become pregnant.  · If you drink alcohol:  ? Limit how much you use to:  § 0-1 drink a day for women.  § 0-2 drinks a day for men.  ? Be aware of how  much alcohol is in your drink. In the [place] one drink equals one 12 oz bottle of beer (355 mL), one 5 oz glass of wine (148 mL), or one 1½ oz glass of hard liquor (44 mL).  General instructions  · Keep a weight-loss journal to keep track of the food you eat and how much exercise you get.  · Take over-the-counter and prescription medicines only as told by your health care provider.  · Take vitamins and supplements only as told by your health care provider.  · Consider joining a support group. Your health care provider may be able to recommend a support group.  · Keep all follow-up visits as told by your health care provider. This is important.  Contact a health care provider if:  · You are unable to meet your weight loss goal after 6 weeks of dietary and lifestyle changes.  Get help right away if you are having:  · Trouble breathing.  · Suicidal thoughts or behaviors.  Summary  · Obesity is the condition of having too much total body fat.  · Being overweight or obese means that your weight is greater than what is considered healthy for your body size.  · Work with your health care provider and a dietitian to set a weight-loss goal that is healthy and reasonable for you.  · Exercise regularly, as told by your health care provider. Ask your health care provider what types of exercise are safe for you and how often you should exercise.  This information is not intended to replace advice given to you by your health care provider. Make sure you discuss any questions you have with your health care provider.  Document Revised: 08/22/2019 Document Reviewed: 08/22/2019  [employer] Patient Education © 2021 [employer]      Exercising to Lose Weight  Exercise is structured, repetitive physical activity to improve fitness and health. Getting regular exercise is important for everyone. It is especially important if you are overweight. Being overweight increases your risk of heart disease, stroke, diabetes, high blood pressure,  and several types of cancer. Reducing your calorie intake and exercising can help you lose weight.  Exercise is usually categorized as moderate or vigorous intensity. To lose weight, most people need to do a certain amount of moderate-intensity or vigorous-intensity exercise each week.  Moderate-intensity exercise    Moderate-intensity exercise is any activity that gets you moving enough to burn at least three times more energy (calories) than if you were sitting.  Examples of moderate exercise include:  · Walking a mile in 15 minutes.  · Doing light yard work.  · Biking at an easy pace.  Most people should get at least 150 minutes (2 hours and 30 minutes) a week of moderate-intensity exercise to maintain their body weight.  Vigorous-intensity exercise  Vigorous-intensity exercise is any activity that gets you moving enough to burn at least six times more calories than if you were sitting. When you exercise at this intensity, you should be working hard enough that you are not able to carry on a conversation.  Examples of vigorous exercise include:  · Running.  · Playing a team sport, such as football, basketball, and soccer.  · Jumping rope.  Most people should get at least 75 minutes (1 hour and 15 minutes) a week of vigorous-intensity exercise to maintain their body weight.  How can exercise affect me?  When you exercise enough to burn more calories than you eat, you lose weight. Exercise also reduces body fat and builds muscle. The more muscle you have, the more calories you burn. Exercise also:  · Improves mood.  · Reduces stress and tension.  · Improves your overall fitness, flexibility, and endurance.  · Increases bone strength.  The amount of exercise you need to lose weight depends on:  · Your age.  · The type of exercise.  · Any health conditions you have.  · Your overall physical ability.  Talk to your health care provider about how much exercise you need and what types of activities are safe for  you.  What actions can I take to lose weight?  Nutrition    · Make changes to your diet as told by your health care provider or diet and nutrition specialist (dietitian). This may include:  ? Eating fewer calories.  ? Eating more protein.  ? Eating less unhealthy fats.  ? Eating a diet that includes fresh fruits and vegetables, whole grains, low-fat dairy products, and lean protein.  ? Avoiding foods with added fat, salt, and sugar.  · Drink plenty of water while you exercise to prevent dehydration or heat stroke.  Activity  · Choose an activity that you enjoy and set realistic goals. Your health care provider can help you make an exercise plan that works for you.  · Exercise at a moderate or vigorous intensity most days of the week.  ? The intensity of exercise may vary from person to person. You can tell how intense a workout is for you by paying attention to your breathing and heartbeat. Most people will notice their breathing and heartbeat get faster with more intense exercise.  · Do resistance training twice each week, such as:  ? Push-ups.  ? Sit-ups.  ? Lifting weights.  ? Using resistance bands.  · Getting short amounts of exercise can be just as helpful as long structured periods of exercise. If you have trouble finding time to exercise, try to include exercise in your daily routine.  ? Get up, stretch, and walk around every 30 minutes throughout the day.  ? Go for a walk during your lunch break.  ? Park your car farther away from your destination.  ? If you take public transportation, get off one stop early and walk the rest of the way.  ? Make phone calls while standing up and walking around.  ? Take the stairs instead of elevators or escalators.  · Wear comfortable clothes and shoes with good support.  · Do not exercise so much that you hurt yourself, feel dizzy, or get very short of breath.  Where to find more information  · [place] Department of Health and Human Services: www.hhs.gov  · Centers for  Disease Control and Prevention (CDC): www.cdc.gov  Contact a health care provider:  · Before starting a new exercise program.  · If you have questions or concerns about your weight.  · If you have a medical problem that keeps you from exercising.  Get help right away if you have any of the following while exercising:  · Injury.  · Dizziness.  · Difficulty breathing or shortness of breath that does not go away when you stop exercising.  · Chest pain.  · Rapid heartbeat.  Summary  · Being overweight increases your risk of heart disease, stroke, diabetes, high blood pressure, and several types of cancer.  · Losing weight happens when you burn more calories than you eat.  · Reducing the amount of calories you eat in addition to getting regular moderate or vigorous exercise each week helps you lose weight.  This information is not intended to replace advice given to you by your health care provider. Make sure you discuss any questions you have with your health care provider.  Document Revised: 04/15/2021 Document Reviewed: 04/15/2021  [employer] Patient Education © 2021 [employer]      Advance Care Planning and Advance Directives     You make decisions on a daily basis - decisions about where you want to live, your career, your home, your life. Perhaps one of the most important decisions you face is your choice for future medical care. Take time to talk with your family and your healthcare team and start planning today.  Advance Care Planning is a process that can help you:  · Understand possible future healthcare decisions in light of your own experiences  · Reflect on those decision in light of your goals and values  · Discuss your decisions with those closest to you and the healthcare professionals that care for you  · Make a plan by creating a document that reflects your wishes    Surrogate Decision Maker  In the event of a medical emergency, which has left you unable to communicate or to make your own decisions,  you would need someone to make decisions for you.  It is important to discuss your preferences for medical treatment with this person while you are in good health.     Qualities of a surrogate decision maker:  • Willing to take on this role and responsibility  • Knows what you want for future medical care  • Willing to follow your wishes even if they don't agree with them  • Able to make difficult medical decisions under stressful circumstances    Advance Directives  These are legal documents you can create that will guide your healthcare team and decision maker(s) when needed. These documents can be stored in the electronic medical record.    · Living Will - a legal document to guide your care if you have a terminal condition or a serious illness and are unable to communicate. States vary by statute in document names/types, but most forms may include one or more of the following:        -  Directions regarding life-prolonging treatments        -  Directions regarding artificially provided nutrition/hydration        -  Choosing a healthcare decision maker        -  Direction regarding organ/tissue donation    · Durable Power of  for Healthcare - this document names an -in-fact to make medical decisions for you, but it may also allow this person to make personal and financial decisions for you. Please seek the advice of an  if you need this type of document.    **Advance Directives are not required and no one may discriminate against you if you do not sign one.    Medical Orders  Many states allow specific forms/orders signed by your physician to record your wishes for medical treatment in your current state of health. This form, signed in personal communication with your physician, addresses resuscitation and other medical interventions that you may or may not want.      For more information or to schedule a time with a [facility] Advance Care Planning Facilitator contact:  [facility] or call [phone] and someone will contact you directly.

## 2021-08-10 ENCOUNTER — OFFICE VISIT (OUTPATIENT)
Dept: UROLOGY | Facility: CLINIC | Age: 76
End: 2021-08-10

## 2021-08-10 VITALS
OXYGEN SATURATION: 98 % | SYSTOLIC BLOOD PRESSURE: 158 MMHG | BODY MASS INDEX: 30.73 KG/M2 | DIASTOLIC BLOOD PRESSURE: 72 MMHG | TEMPERATURE: 96.2 F | WEIGHT: 162.8 LBS | HEART RATE: 87 BPM | HEIGHT: 61 IN

## 2021-08-10 DIAGNOSIS — R32 URINARY INCONTINENCE, UNSPECIFIED TYPE: ICD-10-CM

## 2021-08-10 DIAGNOSIS — N39.46 URINARY INCONTINENCE, MIXED: Primary | ICD-10-CM

## 2021-08-10 DIAGNOSIS — N28.1 RENAL CYST, RIGHT: ICD-10-CM

## 2021-08-10 LAB
BILIRUB BLD-MCNC: NEGATIVE MG/DL
CLARITY, POC: CLEAR
COLOR UR: YELLOW
GLUCOSE UR STRIP-MCNC: NEGATIVE MG/DL
KETONES UR QL: NEGATIVE
LEUKOCYTE EST, POC: NEGATIVE
NITRITE UR-MCNC: NEGATIVE MG/ML
PH UR: 7 [PH] (ref 5–8)
PROT UR STRIP-MCNC: NEGATIVE MG/DL
RBC # UR STRIP: NEGATIVE /UL
SP GR UR: 1.01 (ref 1–1.03)
UROBILINOGEN UR QL: NORMAL

## 2021-08-10 PROCEDURE — 81003 URINALYSIS AUTO W/O SCOPE: CPT | Performed by: STUDENT IN AN ORGANIZED HEALTH CARE EDUCATION/TRAINING PROGRAM

## 2021-08-10 PROCEDURE — 99204 OFFICE O/P NEW MOD 45 MIN: CPT | Performed by: STUDENT IN AN ORGANIZED HEALTH CARE EDUCATION/TRAINING PROGRAM

## 2021-08-10 NOTE — PROGRESS NOTES
"       Office Visit New Urology      Patient Name: Cristy Louie  : 1945   MRN: [id number]     Chief Complaint:    Chief Complaint   Patient presents with   • New Patient   • R kidney cyst   • Urinary Incontinence       Referring Provider: Marina Pederson MD    History of Present Illness: Cristy Louie is a 75 y.o. female who presents to Urology today for the evaluation of urinary incontinence.  The patient has a medical history significant for stage III chronic kidney disease, her most recent creatinine was around 1.1, however her GFR is less than 50.  She has type 2 diabetes on Metformin therapy, her last hemoglobin A1c was 7..  She also reports struggling from significant constipation and may go multiple days without a bowel movement.  She also has diverticulosis of her intestine, she is following with a colorectal surgeon in [place] for evaluation of sigmoid colon thickening, this is being followed with serial CT scans.  On a recent CT scan performed 2021, there was noted sigmoid colonic dilation and thickening consistent with colitis however neoplasm is not excluded.  There was also mention of a 1.3 cm right lower pole renal lesion, \"favoring a cyst\" which was enlarged compared to 6 mm cyst on last imaging, however this imaging was done at another facility and I do not have the images to review personally.    The patient denies gross hematuria, significant urinary tract infections or recurrent urinary symptoms.  She underwent a urinalysis today which was completely negative, nitrite and leukocyte negative.    The patient reports ongoing stress urinary incontinence which has been present for years, she uses 1 heavy pad per day.  She also reports some urgency related incontinence which is less severe.  The patient notices leakage when she sneezes coughs laughs or walks.  She has been had a prior total hysterectomy but no other urologic or vaginal surgeries.    Addendum: I " personally reviewed the results of the patient's outside CT scan which was recently uploaded into the patient's chart.  She has a few left-sided simple appearing renal cysts, the lesion in question is a 1.3 cm lower pole right renal lesion, this appears most consistent with cyst, I agree with the radiology read previously.  We will plan to obtain CT abdomen pelvis with and without contrast in 3 months to ensure that this lesion is simple and not obviously enhancing.  I will call the patient with the results.      Subjective      Review of System: Review of Systems   Constitutional: Negative for chills, fatigue and fever.   HENT: Positive for trouble swallowing.    Eyes: Negative for visual disturbance.   Respiratory: Negative for cough, chest tightness and shortness of breath.    Cardiovascular: Negative for chest pain and leg swelling (Bilateral leg swelling).   Gastrointestinal: Positive for constipation. Negative for diarrhea, nausea and vomiting.   Genitourinary: Negative for decreased urine volume, difficulty urinating, dysuria, enuresis, flank pain, frequency, genital sores, hematuria and urgency.   Musculoskeletal: Positive for back pain. Negative for joint swelling.   Skin: Negative for rash and wound.   Neurological: Positive for headaches (migraines). Negative for seizures, speech difficulty and weakness.   Psychiatric/Behavioral: Positive for sleep disturbance. Negative for confusion and suicidal ideas. The patient is nervous/anxious.       I have reviewed the ROS documented by my clinical staff and agree. Sly Mariano MD    I have reviewed and the following portions of the patient's history were updated as appropriate: past family history, past medical history, past social history, past surgical history and problem list.    Past Medical History:   Past Medical History:   Diagnosis Date   • Abnormal liver function tests     Description: liver bx showed fatty liver 2008   • Allergic rhinitis    •  Anemia    • Anxiety disorder    • Asthma     Description: dx    • Benign colonic polyp     Description: dx    • Cataract    • Diabetes mellitus (CMS/HCC)     Description: dx 7/10   • Diverticulosis of intestine     Description: dx    • Fibromyalgia     Description: dx    • Gastroesophageal reflux disease     Description: dx .  EGD normal .   • H/O cardiovascular stress test     - normal dobutamine myoview. 13- acceptable negative Lexiscan Cardiolite test.   • History of echocardiogram     - normal except ? relaxation abnormality   • History of esophagogastroduodenoscopy 2021    Reflux esophagitis with a single erosion, HH. 3/12- chronic gastritis / reflux esophagitis. 16- LA Class A refux esophagitis, small HH   • History of varicella    • Hyperlipidemia     Description: dx    • Hyperparathyroidism (CMS/HCC)     Description: dx    • Osteoarthritis     Description: dx    • Osteoporosis     Description: dx    • Rheumatoid arthritis (CMS/HCC)     Description: dx    • Vitamin D deficiency     Description: dx 7/10 (mild)       Past Surgical History:   Past Surgical History:   Procedure Laterality Date   • BREAST BIOPSY     • CHOLECYSTECTOMY         • PARATHYROIDECTOMY Right 2018    Superior, path c/w hypercellular parathyroid- Dr. Ava Jacobs ([facility])   • THYROID SURGERY  08/2018    x 2 2018   • THYROIDECTOMY Right 2018    Papillary Thyroid Carcinoma- Dr. Ava Jacobs ([facility])   • THYROIDECTOMY Left 2018    Papillary Thyroid Carcinoma- Dr. Ava Jacobs ([facility])   • TOTAL ABDOMINAL HYSTERECTOMY WITH SALPINGO OOPHORECTOMY Bilateral        • WISDOM TOOTH EXTRACTION         Family History:   Family History   Problem Relation Age of Onset   • Coronary artery disease Father          MI age 59   • Hyperlipidemia Father    • Heart attack Father    • Hypertension Father    • Diabetes Brother    • Breast cancer  Paternal Aunt         ?   • Aneurysm Neg Hx         AAA       Social History:   Social History     Socioeconomic History   • Marital status:      Spouse name: Not on file   • Number of children: 4   • Years of education: Not on file   • Highest education level: Not on file   Tobacco Use   • Smoking status: Never Smoker   • Smokeless tobacco: Never Used   Vaping Use   • Vaping Use: Never used   Substance and Sexual Activity   • Alcohol use: No   • Drug use: No   • Sexual activity: Defer       Medications:     Current Outpatient Medications:   •  calcitriol (ROCALTROL) 0.25 MCG capsule, Take 1 capsule by mouth Daily., Disp: 90 capsule, Rfl: 3  •  dicyclomine (BENTYL) 20 MG tablet, Take 1 tablet by mouth 4 (Four) Times a Day. (Patient taking differently: Take 20 mg by mouth As Needed.), Disp: 100 tablet, Rfl: 3  •  DULoxetine (CYMBALTA) 30 MG capsule, Take 30 mg by mouth Daily., Disp: , Rfl:   •  fluticasone (FLONASE) 50 MCG/ACT nasal spray, 1 spray into the nostril(s) as directed by provider As Needed., Disp: , Rfl:   •  levothyroxine (SYNTHROID, LEVOTHROID) 125 MCG tablet, Take 1 tablet by mouth Daily., Disp: 90 tablet, Rfl: 3  •  lisinopril-hydrochlorothiazide (PRINZIDE,ZESTORETIC) 20-25 MG per tablet, Take 1 tablet by mouth Daily., Disp: 90 tablet, Rfl: 3  •  omeprazole (priLOSEC) 20 MG capsule, TAKE 1 CAPSULE TWICE DAILY (Patient taking differently: Take 20 mg by mouth 2 (two) times a day.), Disp: 180 capsule, Rfl: 3  •  ondansetron ODT (ZOFRAN-ODT) 8 MG disintegrating tablet, Take 1 tablet by mouth Every 8 (Eight) Hours As Needed for Nausea or Vomiting., Disp: 90 tablet, Rfl: 3  •  simvastatin (ZOCOR) 40 MG tablet, Take 1 tablet by mouth every night at bedtime. (Patient taking differently: Take 40 mg by mouth Daily.), Disp: 90 tablet, Rfl: 3  •  Continuous Blood Gluc  (Dexcom G6 ) device, 1 each Continuous., Disp: 1 each, Rfl: 0  •  Continuous Blood Gluc Sensor (Dexcom G6 Sensor), Every 10  "(Ten) Days., Disp: 3 each, Rfl: 5  •  Continuous Blood Gluc Transmit (Dexcom G6 Transmitter) misc, 1 each Continuous., Disp: 1 each, Rfl: 0  •  metFORMIN (GLUCOPHAGE) 500 MG tablet, Take 1 tablet by mouth 2 (Two) Times a Day With Meals., Disp: 180 tablet, Rfl: 3    Allergies:   Allergies   Allergen Reactions   • Escitalopram Dizziness          Objective     Physical Exam:   Vital Signs:   Vitals:    08/10/21 1526   BP: 158/72   Pulse: 87   Temp: 96.2 °F (35.7 °C)   TempSrc: Temporal   SpO2: 98%   Weight: 73.8 kg (162 lb 12.8 oz)   Height: 153.7 cm (60.5\")   PainSc: 0-No pain     Body mass index is 31.27 kg/m².     Physical Exam  Vitals and nursing note reviewed. Exam conducted with a chaperone present.   Constitutional:       Appearance: Normal appearance.   HENT:      Head: Normocephalic and atraumatic.      Mouth/Throat:      Mouth: Mucous membranes are moist.      Pharynx: Oropharynx is clear.   Eyes:      Extraocular Movements: Extraocular movements intact.      Conjunctiva/sclera: Conjunctivae normal.   Cardiovascular:      Rate and Rhythm: Normal rate and regular rhythm.   Pulmonary:      Effort: Pulmonary effort is normal. No respiratory distress.   Abdominal:      Palpations: Abdomen is soft.      Tenderness: There is no abdominal tenderness. There is no right CVA tenderness or left CVA tenderness.   Genitourinary:     Vagina: No vaginal discharge.      Comments: Deferred  Musculoskeletal:         General: Normal range of motion.      Cervical back: Normal range of motion.   Skin:     General: Skin is warm and dry.   Neurological:      General: No focal deficit present.      Mental Status: She is alert and oriented to person, place, and time.   Psychiatric:         Mood and Affect: Mood normal.         Behavior: Behavior normal.         Labs:   Brief Urine Lab Results  (Last result in the past 365 days)      Color   Clarity   Blood   Leuk Est   Nitrite   Protein   CREAT   Urine HCG        08/10/21 1557 " Yellow Clear Negative Negative Negative Negative                    Lab Results   Component Value Date    GLUCOSE 113 (H) 04/27/2021    CALCIUM 9.2 04/27/2021     04/27/2021    K 4.3 04/27/2021    CO2 28.0 04/27/2021    CL 98 04/27/2021    BUN 17 04/27/2021    CREATININE 1.06 (H) 04/27/2021    EGFRIFNONA 50 (L) 04/27/2021    BCR 16.0 04/27/2021    ANIONGAP 13.0 04/27/2021       Lab Results   Component Value Date    WBC 9.70 04/27/2021    HGB 12.8 04/27/2021    HCT 38.7 04/27/2021    MCV 88.0 04/27/2021     04/27/2021       Images:   No Images in the past 120 days found..    Assessment / Plan      Assessment/Plan: 75-year-old female with history of colonic diverticulosis, sigmoid thickening being followed by colorectal surgeon, underwent recent CT imaging which I do not have images to review and will need to obtain these, also with incidental 1.3 cm right renal lesion which is favoring a simple cyst however we will need to review these images to be sure.  She also has stress urinary and urgency related incontinence.     Diagnoses and all orders for this visit:    1. Urinary incontinence, mixed (Primary)    75-year-old female with mixed urinary incontinence, stress predominant, we talked about options for management including lifestyle changes, weight loss, better control of her diabetes which seems to be a problem, Kegel exercises which were explained to her and I also provided her educational materials for this.  We also talked about medical options for urgency related incontinence and overactive bladder which would include anticholinergic medications.  Since she has severe constipation at this point, I want to avoid any anticholinergic, discussed mirabegron or Myrbetriq however this is prohibitively expensive with the patient's insurance currently.  For these reasons I discussed attempting Kegels and lifestyle changes and attempting weight loss as well as increasing her fluid intake given her relative  "dehydration which was self-reported.  We will plan to see her back in 3 months and discuss surgical options if conservative measures have not had any appreciable effect.  Surgical options were briefly discussed including female urethral sling, urethral bulking agents, and nonsurgical options include pessary fitting which would need to be performed in the gynecology clinic likely.    -Return to clinic 3 months for symptom evaluation  -Weight loss, Kegel exercise encouraged    -     POC Urinalysis Dipstick, Automated    2. Right renal cyst    The patient has a 1.3 cm right renal cyst, we do not have any records from her outside CT scan and I will need to obtain these images to review, the radiology read reports \"favor cyst\", however so this is a little bit unclear and will need to be personally reviewed.  We will have our office obtain these images and I will review these and discussed with the patient if she needs further imaging to evaluate.     - Obtain outside CT imaging ([facility]), call patient to review when available     Addendum: I personally reviewed the results of the patient's outside CT scan which was recently uploaded into the patient's chart.  She has a few left-sided simple appearing renal cysts, the lesion in question is a 1.3 cm lower pole right renal lesion, this appears most consistent with cyst, I agree with the radiology read previously.  We will plan to obtain CT abdomen pelvis with and without contrast in 3 months to ensure that this lesion is simple and not obviously enhancing.  I will call the patient with the results.     Follow Up:   Return in about 3 months (around 11/10/2021) for symptom check .     I spent approximately 45 minutes providing clinical care for this patient; including review of patient's chart and provider documentation, face to face time spent with patient in examination room (obtaining history, performing physical exam, discussing diagnosis and management " options), placing orders, and completing patient documentation.       Sly Mariano MD  [facility] [place]

## 2021-08-11 ENCOUNTER — TELEPHONE (OUTPATIENT)
Dept: UROLOGY | Facility: CLINIC | Age: 76
End: 2021-08-11

## 2021-08-11 NOTE — TELEPHONE ENCOUNTER
Received a call from [facility].  CD has been scanned to patient's chart.    Dr. Mariano, CD has been scanned to patient's chart.

## 2021-08-11 NOTE — TELEPHONE ENCOUNTER
Requested CD done on at [facility] via Fax.    [facility]  [phone]  Fax:  [phone]    Once, CD is received will import into PACS.

## 2021-08-11 NOTE — TELEPHONE ENCOUNTER
----- Message from Sly Mariano MD sent at 8/10/2021  4:43 PM EDT -----  Regarding: Need CT disc  This patient had a CT scan performed at [facility] in June. We will need to fax a request to [facility] (see Sandra Arita for forms), and then have them mail the disc to us to upload in our chart.     Thank you!     Sly Mariano MD

## 2021-08-12 ENCOUNTER — TELEPHONE (OUTPATIENT)
Dept: UROLOGY | Facility: CLINIC | Age: 76
End: 2021-08-12

## 2021-08-12 DIAGNOSIS — N28.1 RENAL CYST, ACQUIRED, RIGHT: Primary | ICD-10-CM

## 2021-08-12 NOTE — TELEPHONE ENCOUNTER
I contacted Mrs. Louie with the results of her CT scan which were scanned into our system.  She has a 1.3 cm right renal cyst in the lower pole, this is poorly evaluated on the current CT scan as this is only 1 phase image.  I did talk to the patient and let her know that we would perform a CT with and without contrast in 3 months to make sure this is not a concerning or malignant lesion.  She expresses understanding.  I let her know that we faxed my clinic note to Dr[facility] Valade[facility]'s [facility].    Sly Mariano MD

## 2021-08-12 NOTE — TELEPHONE ENCOUNTER
Contacted Dr. Valadez's office, they have not received the note that Dr. Mariano faxed today.  Was asked to refax note to fax number  [phone].

## 2021-10-14 PROBLEM — H90.3 SENSORINEURAL HEARING LOSS (SNHL) OF BOTH EARS: Status: ACTIVE | Noted: 2021-10-14

## 2021-10-18 ENCOUNTER — OFFICE VISIT (OUTPATIENT)
Dept: ENDOCRINOLOGY | Facility: CLINIC | Age: 76
End: 2021-10-18

## 2021-10-18 VITALS
DIASTOLIC BLOOD PRESSURE: 72 MMHG | HEART RATE: 81 BPM | OXYGEN SATURATION: 98 % | HEIGHT: 61 IN | SYSTOLIC BLOOD PRESSURE: 132 MMHG | BODY MASS INDEX: 29.64 KG/M2 | WEIGHT: 157 LBS

## 2021-10-18 DIAGNOSIS — E89.0 POST-SURGICAL HYPOTHYROIDISM: Primary | ICD-10-CM

## 2021-10-18 DIAGNOSIS — E89.2 POST-SURGICAL HYPOPARATHYROIDISM (HCC): ICD-10-CM

## 2021-10-18 DIAGNOSIS — C73 THYROID CANCER (HCC): ICD-10-CM

## 2021-10-18 DIAGNOSIS — E11.9 TYPE 2 DIABETES MELLITUS WITHOUT COMPLICATION, WITHOUT LONG-TERM CURRENT USE OF INSULIN (HCC): ICD-10-CM

## 2021-10-18 PROCEDURE — 99214 OFFICE O/P EST MOD 30 MIN: CPT | Performed by: INTERNAL MEDICINE

## 2021-10-18 RX ORDER — BLOOD-GLUCOSE METER
1 EACH MISCELLANEOUS TAKE AS DIRECTED
Qty: 1 KIT | Refills: 0 | Status: SHIPPED | OUTPATIENT
Start: 2021-10-18 | End: 2022-04-20 | Stop reason: SDUPTHER

## 2021-10-18 RX ORDER — LANCETS
1 EACH MISCELLANEOUS DAILY
Qty: 102 EACH | Refills: 1 | Status: SHIPPED | OUTPATIENT
Start: 2021-10-18 | End: 2022-04-20 | Stop reason: SDUPTHER

## 2021-10-18 RX ORDER — CALCITRIOL 0.25 UG/1
0.25 CAPSULE, LIQUID FILLED ORAL DAILY
Qty: 90 CAPSULE | Refills: 3 | Status: SHIPPED | OUTPATIENT
Start: 2021-10-18 | End: 2022-04-20 | Stop reason: SDUPTHER

## 2021-10-18 RX ORDER — LEVOTHYROXINE SODIUM 112 UG/1
112 TABLET ORAL EVERY MORNING
Qty: 30 TABLET | Refills: 0
Start: 2021-10-18 | End: 2021-11-30 | Stop reason: DRUGHIGH

## 2021-10-18 RX ORDER — BLOOD SUGAR DIAGNOSTIC
1 STRIP MISCELLANEOUS DAILY
Qty: 100 EACH | Refills: 11 | Status: SHIPPED | OUTPATIENT
Start: 2021-10-18 | End: 2022-04-20 | Stop reason: SDUPTHER

## 2021-10-18 NOTE — PROGRESS NOTES
Chief Complaint   Patient presents with   • Hyperthyroidism     follow up       HPI:   Cristy Louie is a 76 y.o.female who returns to Endocrine Clinic for f/u evaluation of her thyroid cancer and post-surgical hypothyroidism, and post-surgical hypoparathyroidism. Last visit 10/14/2020.  Her history is as follows:    Interim Events:   - overall has been in good health  - Is taking Levothyroxine and Calcitriol as prescribed.     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 Ava Jacobs, Endocrine Surgery, at [facility] for surgical treatment of pt's primary hyperparathyroidism.  - (05/24/2018) had pre-operative localization imaging with Thyroid US at  by Dr. Jacobs and an US-Guided FNA of a right inferior thyroid lobe nodule was completed. 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  - pt underwent right superior parathyroidectomy and partial right thyroidectomy on 08/07/2018  - underwent completion thyroidectomy 11/27/2018    Surgical pathology:  (08/07/2018)  A. Thyroid, right, lobectomy:  - papillary thyroid carcinoma, oncocytic variant (pT1a, N0)  - Tumor Size: 1.0 x 0.8 x 0.7 cm, single focus  - no extrathyroidal extension  - no lymphatic or angioinvasion  - no invasion of the surgical resection margin or to the thyroid capsule  - one benign perithyroidal lymph node (0/1)  - benign adenomatoid nodules.    B. Parathyroid, right superior, excision:  - hypercellular parathyroid (0.45 gm)    (11/27/2018)  A. Thyroid, left lobe:   - multifocal papillary thyroid carcinoma, classic type, involving bilateral lobes (pT1b, Nx)  - Tumor Size: 1.2 x 0.8 x 0.6 cm, single focus  - no extrathyroidal extension  - no lymphatic or angioinvasion  - no invasion of the surgical resection margin or to the thyroid capsule.  - Follicular adenoma of the superior pole (1.5 mm in the greatest dimension).    B. Parathyroid Left  "Inferior:   - hypercellular parathyroid gland (0.03 gm) with no tumor seen    C. Parathyroid Left Superior:   - hypercellular parathyroid gland (0.04 gm) with no tumor seen    Lab summary:   (01/2019, [facility]) TG <0.1, TG Ab < 1.0, TSH 0.55, free T4 1.6, Calcium 7.7  (04/2019, [facility]) TG 0.1, TG Ab <0 1.0, TSH 0.397, PTH 43.9, Ca 8.6  (10/2019) TSH 0.296 (0.270  - 4.200) - on levothyroxine 112 mcg  (10/2020) TSH 0.098, free T4 1.85. [facility]: TG <0.1, TG Ab < 1.0  (11/2020) TSH 4.330, free T4 1.24, increased to levothyroxine 125 mcg  (01/2021) TSH 0.086 - on levothyroxine 125 mcg  (04/2021) TSH 0.049,  Free T4 1.73 - on levothyroxine 125 mcg    2) post-surgical hypothyroidism:  Current Dose: levothyroxine 125mcg  - Pt taking in AM on an empty stomach as directed without interfering substances  - Is not on high dose biotin    3) post-surgical hypoparathyroidism:  - h/o  primary hyperparathyroidism: s/p right superior parathyroidectomy 08/2018, s/p left superior and inferior parathyroidectomy 11/2018  - On Calcitriol 0.25 mcg daily  Serum Calcium at lower end of normal range since 01/2021    Review of Systems   Constitutional: Negative for fatigue.        Weight loss, mild   HENT: Negative.    Eyes: Negative.    Respiratory: Negative.    Cardiovascular: Negative.    Gastrointestinal: Positive for constipation. Negative for abdominal pain.   Endocrine: Negative.    Genitourinary: Negative.    Musculoskeletal: Positive for arthralgias and myalgias. Negative for joint swelling.   Skin: Negative.    Allergic/Immunologic: Negative.    Neurological: Negative.    Hematological: Negative.    Psychiatric/Behavioral: Negative.      The following portions of the patient's history were reviewed and updated as appropriate: allergies, current medications, past family history, past medical history, past social history, past surgical history and problem list.    /72   Pulse 81   Ht 153.7 cm (60.5\")   Wt 71.2 kg (157 lb)   SpO2 98%  "  BMI 30.16 kg/m²   Physical Exam   Constitutional: She is oriented to person, place, and time. She appears well-developed. No distress.   HENT:   Head: Normocephalic.   Eyes: Pupils are equal, round, and reactive to light. Conjunctivae are normal.   Neck: No tracheal deviation present.   No palpable thyroid tissue      Cardiovascular: Normal rate, regular rhythm and normal heart sounds.   No murmur heard.  Pulmonary/Chest: Effort normal and breath sounds normal. No respiratory distress.   Musculoskeletal:      Comments: Arthritic changes in hands   Lymphadenopathy:     She has no cervical adenopathy.   Neurological: She is alert and oriented to person, place, and time. No cranial nerve deficit.   Skin: Skin is warm and dry. She is not diaphoretic. No erythema.   Psychiatric: Her behavior is normal.   Vitals reviewed.    LABS/IMAGING: outside records reviewed and summarized in HPI    TSH, free T4, Hgb A1C%, TG + TG Ab ([facility]) ordered for 12/01/2021    ASSESSMENT/PLAN:  1) post-surgical hypothyroidism:  - Recent TSH levels have been suppressed  - Will decrease levothyroxine to 112 mcg daily.  - Will check TSH, free T4. TG + TG Ab in 8 weeks. Will adjust dose as indicated  - Reviewed proper thyroid hormone administration, and factors to avoid that decrease medication potency and medication absorption.     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 Liban current American Thyroid Association 2015 guidelines for differentiated thyroid carcinoma.   - discussed that the need for additional treatment (in particular, radioiodine therapy) after surgery, per these guidelines, is based upon an initial risk stratification system which estimates the risk of persistent/recurrent disease. Per this stratification system, she is at low risk for recurrence.  I did not recommend  REESE remnant ablation.     10/2020: TG < 0.1, TG Ab < 1.0, good response to surgical resection  - goal will be to keep TSH in the lower end of the range (0.5 - 1.5)     3) post-surgical hypoparathyroidism:   h/o hyperparathyroidism: - s/p parathyroid resection  X 3 glands  - on calcitriol 0.25 mg daily  - Renal function panel planned for 12/01/2021  Discussed with patient that her goal calcium can range between slightly below the the normal range to the low end of the normal range    4) Type 2 diabetes w/o complications: controlled  Lab Results   Component Value Date    HGBA1C 7.0 04/27/2021   - pt stopped her metformin on her own after dietary changes and weight loss  - Will checked her A1C% with other labs in 12/2021  - pt requested meter. Rx for glucometer and supplies sent to [facility] mail order pharmacy    RTC 6 months.     Signed: Maureen Aiken MD

## 2021-11-19 ENCOUNTER — APPOINTMENT (OUTPATIENT)
Dept: CT IMAGING | Facility: HOSPITAL | Age: 76
End: 2021-11-19

## 2021-11-29 ENCOUNTER — LAB (OUTPATIENT)
Dept: ENDOCRINOLOGY | Facility: CLINIC | Age: 76
End: 2021-11-29

## 2021-11-29 ENCOUNTER — OFFICE VISIT (OUTPATIENT)
Dept: UROLOGY | Facility: CLINIC | Age: 76
End: 2021-11-29

## 2021-11-29 ENCOUNTER — LAB (OUTPATIENT)
Dept: LAB | Facility: HOSPITAL | Age: 76
End: 2021-11-29

## 2021-11-29 VITALS
DIASTOLIC BLOOD PRESSURE: 80 MMHG | HEIGHT: 61 IN | SYSTOLIC BLOOD PRESSURE: 132 MMHG | WEIGHT: 158 LBS | BODY MASS INDEX: 29.83 KG/M2

## 2021-11-29 DIAGNOSIS — E89.2 POST-SURGICAL HYPOPARATHYROIDISM (HCC): ICD-10-CM

## 2021-11-29 DIAGNOSIS — E89.0 POST-SURGICAL HYPOTHYROIDISM: ICD-10-CM

## 2021-11-29 DIAGNOSIS — N39.46 URINARY INCONTINENCE, MIXED: Primary | ICD-10-CM

## 2021-11-29 DIAGNOSIS — C73 THYROID CANCER (HCC): ICD-10-CM

## 2021-11-29 DIAGNOSIS — E11.9 TYPE 2 DIABETES MELLITUS WITHOUT COMPLICATION, WITHOUT LONG-TERM CURRENT USE OF INSULIN (HCC): ICD-10-CM

## 2021-11-29 LAB
BILIRUB BLD-MCNC: NEGATIVE MG/DL
CLARITY, POC: CLEAR
COLOR UR: YELLOW
EXPIRATION DATE: NORMAL
GLUCOSE UR STRIP-MCNC: NEGATIVE MG/DL
KETONES UR QL: NEGATIVE
LEUKOCYTE EST, POC: NEGATIVE
Lab: NORMAL
NITRITE UR-MCNC: NEGATIVE MG/ML
PH UR: 6.5 [PH] (ref 5–8)
PROT UR STRIP-MCNC: NEGATIVE MG/DL
RBC # UR STRIP: NEGATIVE /UL
SP GR UR: 1.01 (ref 1–1.03)
UROBILINOGEN UR QL: NORMAL

## 2021-11-29 PROCEDURE — 84432 ASSAY OF THYROGLOBULIN: CPT

## 2021-11-29 PROCEDURE — 51798 US URINE CAPACITY MEASURE: CPT | Performed by: STUDENT IN AN ORGANIZED HEALTH CARE EDUCATION/TRAINING PROGRAM

## 2021-11-29 PROCEDURE — 80069 RENAL FUNCTION PANEL: CPT

## 2021-11-29 PROCEDURE — 84443 ASSAY THYROID STIM HORMONE: CPT

## 2021-11-29 PROCEDURE — 81003 URINALYSIS AUTO W/O SCOPE: CPT | Performed by: STUDENT IN AN ORGANIZED HEALTH CARE EDUCATION/TRAINING PROGRAM

## 2021-11-29 PROCEDURE — 99213 OFFICE O/P EST LOW 20 MIN: CPT | Performed by: STUDENT IN AN ORGANIZED HEALTH CARE EDUCATION/TRAINING PROGRAM

## 2021-11-29 PROCEDURE — 86800 THYROGLOBULIN ANTIBODY: CPT

## 2021-11-29 PROCEDURE — 84439 ASSAY OF FREE THYROXINE: CPT

## 2021-11-29 PROCEDURE — 83036 HEMOGLOBIN GLYCOSYLATED A1C: CPT

## 2021-11-29 NOTE — PROGRESS NOTES
"     Follow Up Office Visit      Patient Name: Cristy Louie  : 1945   MRN: [id number]     Chief Complaint:    Chief Complaint   Patient presents with   • Urinary Incontinence       Referring Provider: No ref. provider found    History of Present Illness: Cristy Louie is a 76 y.o. female who presents today for follow up regarding stress urinary incontinence.  Patient has a past medical history significant for stage III chronic kidney disease, type 2 diabetes on Metformin, last hemoglobin A1c 7., chronic constipation, diverticulosis, right renal lesion of unclear significance.     Patient was previously evaluated 8/10/2021, she reported ongoing stress urinary incontinence using 1-2 heavy pads per day.  She mainly leaks when she has coughing, sneezing, lifting or bending over.  She reports \"urine just streams out of me\" at random times.     She has had a previous hysterectomy.     At last visit we discussed Kegel exercises and other surgical options including mid urethral mesh sling placement versus urethral bulking injections, she reports she has been doing some Kegels and still uses about 1-2 heavy pads per day.  Patient also has some urgency related incontinence but is not a candidate for anticholinergics given constipation and age, and mirabegron/Myrbetriq is prohibitively expensive.    Patient is interested in moving forward surgical options given her ongoing incontinence.    Subjective      Review of System: Review of Systems   Constitutional: Negative.  Negative for chills, fatigue, fever and unexpected weight change.   HENT: Negative.  Negative for sore throat.    Eyes: Negative.  Negative for visual disturbance.   Respiratory: Negative.  Negative for cough, chest tightness and shortness of breath.    Cardiovascular: Negative.  Negative for chest pain and leg swelling.   Gastrointestinal: Negative.  Negative for blood in stool, constipation, diarrhea, nausea, rectal pain and vomiting. "   Genitourinary: Negative.  Negative for decreased urine volume, difficulty urinating, dysuria, enuresis, flank pain, frequency, genital sores, hematuria and urgency.   Musculoskeletal: Negative.  Negative for back pain and joint swelling.   Skin: Negative.  Negative for rash and wound.   Neurological: Negative.  Negative for seizures, speech difficulty, weakness and headaches.   Psychiatric/Behavioral: Negative.  Negative for confusion, sleep disturbance and suicidal ideas. The patient is not nervous/anxious.       I have reviewed the ROS documented by my clinical staff, I have updated appropriately and I agree. Sly Mariano MD    I have reviewed and the following portions of the patient's history were updated as appropriate: past family history, past medical history, past social history, past surgical history and problem list.    Medications:     Current Outpatient Medications:   •  Accu-Chek FastClix Lancets misc, 1 each Daily., Disp: 102 each, Rfl: 1  •  Accu-Chek Guide test strip, 1 each by Other route Daily., Disp: 100 each, Rfl: 11  •  Blood Glucose Monitoring Suppl (Accu-Chek Guide Me) w/Device kit, 1 Device Take As Directed., Disp: 1 kit, Rfl: 0  •  calcitriol (ROCALTROL) 0.25 MCG capsule, Take 1 capsule by mouth Daily., Disp: 90 capsule, Rfl: 3  •  dicyclomine (BENTYL) 20 MG tablet, Take 1 tablet by mouth 4 (Four) Times a Day. (Patient taking differently: Take 20 mg by mouth As Needed.), Disp: 100 tablet, Rfl: 3  •  DULoxetine (CYMBALTA) 30 MG capsule, Take 30 mg by mouth Daily., Disp: , Rfl:   •  fluticasone (FLONASE) 50 MCG/ACT nasal spray, 1 spray into the nostril(s) as directed by provider As Needed., Disp: , Rfl:   •  levothyroxine (SYNTHROID, LEVOTHROID) 112 MCG tablet, Take 1 tablet by mouth Every Morning., Disp: 30 tablet, Rfl: 0  •  lisinopril-hydrochlorothiazide (PRINZIDE,ZESTORETIC) 20-25 MG per tablet, Take 1 tablet by mouth Daily., Disp: 90 tablet, Rfl: 3  •  omeprazole (priLOSEC) 20 MG  "capsule, TAKE 1 CAPSULE TWICE DAILY (Patient taking differently: Take 20 mg by mouth 2 (two) times a day.), Disp: 180 capsule, Rfl: 3  •  ondansetron ODT (ZOFRAN-ODT) 8 MG disintegrating tablet, Take 1 tablet by mouth Every 8 (Eight) Hours As Needed for Nausea or Vomiting., Disp: 90 tablet, Rfl: 3  •  simvastatin (ZOCOR) 40 MG tablet, Take 1 tablet by mouth every night at bedtime. (Patient taking differently: Take 40 mg by mouth Daily.), Disp: 90 tablet, Rfl: 3    Allergies:   Allergies   Allergen Reactions   • Escitalopram Dizziness       Post void residual bladder scan:    00mL    Objective     Physical Exam:   Vital Signs:   Vitals:    11/29/21 1002   BP: 132/80   BP Location: Right arm   Patient Position: Sitting   Cuff Size: Adult   Weight: 71.7 kg (158 lb)   Height: 153.7 cm (60.5\")   PainSc: 0-No pain     Body mass index is 30.35 kg/m².     Physical Exam  Vitals and nursing note reviewed.   Constitutional:       Appearance: Normal appearance.   HENT:      Head: Normocephalic and atraumatic.      Mouth/Throat:      Mouth: Mucous membranes are moist.      Pharynx: Oropharynx is clear.   Eyes:      Extraocular Movements: Extraocular movements intact.      Conjunctiva/sclera: Conjunctivae normal.   Cardiovascular:      Rate and Rhythm: Normal rate and regular rhythm.   Pulmonary:      Effort: Pulmonary effort is normal. No respiratory distress.   Abdominal:      Palpations: Abdomen is soft.      Tenderness: There is no abdominal tenderness. There is no right CVA tenderness or left CVA tenderness.   Genitourinary:     Comments:    Musculoskeletal:         General: Normal range of motion.      Cervical back: Normal range of motion.   Skin:     General: Skin is warm and dry.   Neurological:      General: No focal deficit present.      Mental Status: She is alert and oriented to person, place, and time.   Psychiatric:         Mood and Affect: Mood normal.         Behavior: Behavior normal.         Labs:   Brief Urine " Lab Results  (Last result in the past 365 days)      Color   Clarity   Blood   Leuk Est   Nitrite   Protein   CREAT   Urine HCG        11/29/21 1005 Yellow   Clear   Negative   Negative   Negative   Negative                      Lab Results   Component Value Date    GLUCOSE 113 (H) 04/27/2021    CALCIUM 9.2 04/27/2021     04/27/2021    K 4.3 04/27/2021    CO2 28.0 04/27/2021    CL 98 04/27/2021    BUN 17 04/27/2021    CREATININE 1.06 (H) 04/27/2021    EGFRIFNONA 50 (L) 04/27/2021    BCR 16.0 04/27/2021    ANIONGAP 13.0 04/27/2021       Lab Results   Component Value Date    WBC 9.70 04/27/2021    HGB 12.8 04/27/2021    HCT 38.7 04/27/2021    MCV 88.0 04/27/2021     04/27/2021       Images:   No Images in the past 120 days found..    Measures:   Tobacco:   Cristy Louie  reports that she has never smoked. She has never used smokeless tobacco..       Urine Incontinence: ( NOUI)  Patient reports that she is currently experiencing mainly stress urinary incontinence.      Assessment / Plan      Assessment/Plan:   76 y.o. female who presented today for follow up of stress urinary incontinence.  Patient has had ongoing issues with leakage with cough, sneeze, lifting and bending.  She is using 1-2 heavy pads per day.  She reports her leakage is worst when she is on her feet most of the day, lifting anything or laughing.  She would like to move forward with discussion of surgical option.  We discussed mid urethral sling placement with mesh versus urethral bulking injections.  Discussed bulking injections with a new option called Bulkamid.  The material is composed of 97.5% water and 2.5% cross-linked polyacrylamide, 92% of women experience significant improvement in incontinence, and there are no significant side effects or complications currently noted to be associated with this injection.  There is good data out to 7 years at least at this point.  Patient is interested in moving forward with this.  I  discussed with her that I will have to get through the approval process with the hospital and she will be placed on my list for procedure, she reports she is amenable to proceeding anytime this is available.  Also discussed mid urethral sling placement with mesh which may have a slightly superior long-term success rate compared to urethral bulking injections but comes at the risk of possible mesh erosion into vagina or urethra (very low risk less than 2%) versus urinary retention requiring catheter placement longer-term.    Risks of procedure include bleeding, infection, urethral injury, need for prolonged Roper catheter, urinary retention requiring catheterization.      Diagnoses and all orders for this visit:     1. Stress Urinary Incontinence (Primary)    - Patient will be contacted once approval confirmed for cystoscopy and urethral bulking injection (Bulkamid)  - surgery center vs main OR pending approval, insurance     -     POC Urinalysis Dipstick, Automated           Follow Up:   Return in about 3 months (around 2/28/2022).    I spent approximately 30 minutes providing clinical care for this patient; including review of patient's chart and provider documentation, face to face time spent with patient in examination room (obtaining history, performing physical exam, discussing diagnosis and management options), placing orders, and completing patient documentation.     Sly Mariano MD  [facility] [place] [place] [facility] [place]

## 2021-11-30 ENCOUNTER — TELEPHONE (OUTPATIENT)
Dept: ENDOCRINOLOGY | Facility: CLINIC | Age: 76
End: 2021-11-30

## 2021-11-30 DIAGNOSIS — E11.9 TYPE 2 DIABETES MELLITUS WITHOUT COMPLICATION, WITHOUT LONG-TERM CURRENT USE OF INSULIN (HCC): Primary | ICD-10-CM

## 2021-11-30 DIAGNOSIS — E89.0 POST-SURGICAL HYPOTHYROIDISM: ICD-10-CM

## 2021-11-30 LAB
ALBUMIN SERPL-MCNC: 4.7 G/DL (ref 3.5–5.2)
ANION GAP SERPL CALCULATED.3IONS-SCNC: 13.3 MMOL/L (ref 5–15)
BUN SERPL-MCNC: 13 MG/DL (ref 8–23)
BUN/CREAT SERPL: 12.5 (ref 7–25)
CALCIUM SPEC-SCNC: 8.6 MG/DL (ref 8.6–10.5)
CHLORIDE SERPL-SCNC: 102 MMOL/L (ref 98–107)
CO2 SERPL-SCNC: 26.7 MMOL/L (ref 22–29)
CREAT SERPL-MCNC: 1.04 MG/DL (ref 0.57–1)
GFR SERPL CREATININE-BSD FRML MDRD: 51 ML/MIN/1.73
GLUCOSE SERPL-MCNC: 105 MG/DL (ref 65–99)
HBA1C MFR BLD: 8.2 % (ref 4.8–5.6)
PHOSPHATE SERPL-MCNC: 3.9 MG/DL (ref 2.5–4.5)
POTASSIUM SERPL-SCNC: 4.4 MMOL/L (ref 3.5–5.2)
SODIUM SERPL-SCNC: 142 MMOL/L (ref 136–145)
T4 FREE SERPL-MCNC: 1.58 NG/DL (ref 0.93–1.7)
TSH SERPL DL<=0.05 MIU/L-ACNC: 0.14 UIU/ML (ref 0.27–4.2)

## 2021-11-30 RX ORDER — METFORMIN HYDROCHLORIDE 500 MG/1
500 TABLET, EXTENDED RELEASE ORAL
Qty: 90 TABLET | Refills: 3 | Status: SHIPPED | OUTPATIENT
Start: 2021-11-30 | End: 2022-04-20 | Stop reason: SDUPTHER

## 2021-11-30 RX ORDER — LEVOTHYROXINE SODIUM 0.1 MG/1
100 TABLET ORAL
Qty: 90 TABLET | Refills: 3 | Status: SHIPPED | OUTPATIENT
Start: 2021-11-30 | End: 2022-04-20 | Stop reason: SDUPTHER

## 2021-11-30 NOTE — TELEPHONE ENCOUNTER
Spoke to patient about lab results. Starting metformin  mg daily. Decreasing levothyroxine to 100 mcg daily.  Signed: Maureen Aiken MD

## 2021-12-01 LAB — REF LAB TEST METHOD: NORMAL

## 2021-12-02 ENCOUNTER — TRANSCRIBE ORDERS (OUTPATIENT)
Dept: ADMINISTRATIVE | Facility: HOSPITAL | Age: 76
End: 2021-12-02

## 2021-12-02 DIAGNOSIS — K57.90 DIVERTICULOSIS: Primary | ICD-10-CM

## 2021-12-10 ENCOUNTER — HOSPITAL ENCOUNTER (OUTPATIENT)
Dept: CT IMAGING | Facility: HOSPITAL | Age: 76
Discharge: HOME | End: 2021-12-10
Admitting: STUDENT IN AN ORGANIZED HEALTH CARE EDUCATION/TRAINING PROGRAM

## 2021-12-10 DIAGNOSIS — N28.1 RENAL CYST, ACQUIRED, RIGHT: ICD-10-CM

## 2021-12-10 DIAGNOSIS — K57.90 DIVERTICULOSIS: ICD-10-CM

## 2021-12-10 PROCEDURE — 72194 CT PELVIS W/O & W/DYE: CPT

## 2021-12-10 PROCEDURE — 0 IOPAMIDOL PER 1 ML: Performed by: STUDENT IN AN ORGANIZED HEALTH CARE EDUCATION/TRAINING PROGRAM

## 2021-12-10 PROCEDURE — 74170 CT ABD WO CNTRST FLWD CNTRST: CPT

## 2021-12-10 RX ADMIN — IOPAMIDOL 80 ML: 755 INJECTION, SOLUTION INTRAVENOUS at 13:45

## 2021-12-16 ENCOUNTER — TELEPHONE (OUTPATIENT)
Dept: UROLOGY | Facility: CLINIC | Age: 76
End: 2021-12-16

## 2021-12-17 NOTE — TELEPHONE ENCOUNTER
Received the results the patient's repeat CT scan, there are no concerning lesions, only bilateral simple cysts which is great news.  I called the patient twice and was unable to get a hold of her, was unable to leave a voicemail as no mailbox set up.    Plan  -Please call the patient and let her know her CT scan was normal, she has some simple cyst on both kidneys but otherwise no tumors or concerning lesions.  -Still waiting for approval for urethral bulking (Bulkamid) for her stress incontinence, and she is on my list for possible surgery in the near future. She will be contacted regarding this.     Sly Mariano MD

## 2021-12-29 NOTE — TELEPHONE ENCOUNTER
Left message for patient's son, Sunday informing him of CT scan results.  If patient has any questions, she can give us a call, provided number for clinic.

## 2022-02-28 ENCOUNTER — LAB (OUTPATIENT)
Dept: LAB | Facility: HOSPITAL | Age: 77
End: 2022-02-28

## 2022-02-28 ENCOUNTER — OFFICE VISIT (OUTPATIENT)
Dept: UROLOGY | Facility: CLINIC | Age: 77
End: 2022-02-28

## 2022-02-28 VITALS — WEIGHT: 158 LBS | BODY MASS INDEX: 29.83 KG/M2 | HEIGHT: 61 IN

## 2022-02-28 DIAGNOSIS — N39.3 STRESS INCONTINENCE: ICD-10-CM

## 2022-02-28 DIAGNOSIS — N39.46 URINARY INCONTINENCE, MIXED: Primary | ICD-10-CM

## 2022-02-28 DIAGNOSIS — N39.46 URINARY INCONTINENCE, MIXED: ICD-10-CM

## 2022-02-28 LAB
BILIRUB BLD-MCNC: NEGATIVE MG/DL
BILIRUB UR QL STRIP: NEGATIVE
CLARITY UR: CLEAR
CLARITY, POC: CLEAR
COLOR UR: YELLOW
COLOR UR: YELLOW
EXPIRATION DATE: ABNORMAL
GLUCOSE UR STRIP-MCNC: NEGATIVE MG/DL
GLUCOSE UR STRIP-MCNC: NEGATIVE MG/DL
HGB UR QL STRIP.AUTO: NEGATIVE
KETONES UR QL STRIP: NEGATIVE
KETONES UR QL: NEGATIVE
LEUKOCYTE EST, POC: NEGATIVE
LEUKOCYTE ESTERASE UR QL STRIP.AUTO: NEGATIVE
Lab: ABNORMAL
NITRITE UR QL STRIP: NEGATIVE
NITRITE UR-MCNC: NEGATIVE MG/ML
PH UR STRIP.AUTO: 5.5 [PH] (ref 5–8)
PH UR: 6 [PH] (ref 5–8)
PROT UR QL STRIP: ABNORMAL
PROT UR STRIP-MCNC: ABNORMAL MG/DL
RBC # UR STRIP: NEGATIVE /UL
SP GR UR STRIP: 1.02 (ref 1–1.03)
SP GR UR: 1.02 (ref 1–1.03)
UROBILINOGEN UR QL STRIP: ABNORMAL
UROBILINOGEN UR QL: NORMAL

## 2022-02-28 PROCEDURE — 81003 URINALYSIS AUTO W/O SCOPE: CPT

## 2022-02-28 PROCEDURE — 99214 OFFICE O/P EST MOD 30 MIN: CPT | Performed by: STUDENT IN AN ORGANIZED HEALTH CARE EDUCATION/TRAINING PROGRAM

## 2022-02-28 PROCEDURE — 81003 URINALYSIS AUTO W/O SCOPE: CPT | Performed by: STUDENT IN AN ORGANIZED HEALTH CARE EDUCATION/TRAINING PROGRAM

## 2022-02-28 PROCEDURE — 87086 URINE CULTURE/COLONY COUNT: CPT

## 2022-02-28 NOTE — PATIENT INSTRUCTIONS
We will be planning for cystoscopy (bladder scope) and urethral bulking injections on March 25, 2022.    You will be contacted by the schedulers to set this up.

## 2022-02-28 NOTE — PROGRESS NOTES
"     Follow Up Office Visit      Patient Name: Cristy Louie  : 1945   MRN: [id number]     Chief Complaint:    Chief Complaint   Patient presents with   • Follow up   • Urinary Incontinence       Referring Provider: No ref. provider found    History of Present Illness: Cristy Louie is a 76 y.o. female who presents today for follow up of stress urinary incontinence. Patient has a past medical history significant for stage III chronic kidney disease, type 2 diabetes on Metformin, last hemoglobin A1c 7., chronic constipation, diverticulosis, right renal cysts.       Patient was previously evaluated 8/10/2021, she reported ongoing stress urinary incontinence using 1-2 heavy pads per day.  She mainly leaks when she has coughing, sneezing, lifting or bending over.  She reports \"urine just streams out of me\" at random times.     Patient reports intermittent nausea and dehydration of unclear etiology.    Still going through 2 thick pads per day. She would like to move forward with surgical planning with urethral bulking injections.     On metamucil for constipation, goes 2-3 days between BMs.     Subjective      Review of System: Review of Systems   Constitutional: Negative.  Negative for chills, fatigue, fever and unexpected weight change.   HENT: Negative.  Negative for sore throat.    Eyes: Negative.  Negative for visual disturbance.   Respiratory: Negative.  Negative for cough, chest tightness and shortness of breath.    Cardiovascular: Negative.  Negative for chest pain and leg swelling.   Gastrointestinal: Positive for diarrhea. Negative for blood in stool, constipation, nausea, rectal pain and vomiting.   Genitourinary: Positive for flank pain. Negative for decreased urine volume, difficulty urinating, dysuria, enuresis, frequency, genital sores, hematuria and urgency.   Musculoskeletal: Positive for back pain. Negative for joint swelling.   Skin: Negative for rash and wound.   Neurological: " Negative.  Negative for seizures, speech difficulty, weakness and headaches.   Psychiatric/Behavioral: Negative.  Negative for confusion, sleep disturbance and suicidal ideas. The patient is not nervous/anxious.       I have reviewed the ROS documented by my clinical staff, I have updated appropriately and I agree. Sly Mariano MD    I have reviewed and the following portions of the patient's history were updated as appropriate: past family history, past medical history, past social history, past surgical history and problem list.    Medications:     Current Outpatient Medications:   •  Accu-Chek FastClix Lancets misc, 1 each Daily., Disp: 102 each, Rfl: 1  •  Accu-Chek Guide test strip, 1 each by Other route Daily., Disp: 100 each, Rfl: 11  •  Blood Glucose Monitoring Suppl (Accu-Chek Guide Me) w/Device kit, 1 Device Take As Directed., Disp: 1 kit, Rfl: 0  •  calcitriol (ROCALTROL) 0.25 MCG capsule, Take 1 capsule by mouth Daily., Disp: 90 capsule, Rfl: 3  •  dicyclomine (BENTYL) 20 MG tablet, Take 1 tablet by mouth 4 (Four) Times a Day. (Patient taking differently: Take 20 mg by mouth As Needed.), Disp: 100 tablet, Rfl: 3  •  DULoxetine (CYMBALTA) 30 MG capsule, Take 30 mg by mouth Daily., Disp: , Rfl:   •  fluticasone (FLONASE) 50 MCG/ACT nasal spray, 1 spray into the nostril(s) as directed by provider As Needed., Disp: , Rfl:   •  levothyroxine (SYNTHROID, LEVOTHROID) 100 MCG tablet, Take 1 tablet by mouth Every Morning., Disp: 90 tablet, Rfl: 3  •  lisinopril-hydrochlorothiazide (PRINZIDE,ZESTORETIC) 20-25 MG per tablet, Take 1 tablet by mouth Daily., Disp: 90 tablet, Rfl: 3  •  metFORMIN ER (GLUCOPHAGE-XR) 500 MG 24 hr tablet, Take 1 tablet by mouth Daily With Dinner., Disp: 90 tablet, Rfl: 3  •  omeprazole (priLOSEC) 20 MG capsule, TAKE 1 CAPSULE TWICE DAILY (Patient taking differently: Take 20 mg by mouth 2 (two) times a day.), Disp: 180 capsule, Rfl: 3  •  ondansetron ODT (ZOFRAN-ODT) 8 MG  "disintegrating tablet, Take 1 tablet by mouth Every 8 (Eight) Hours As Needed for Nausea or Vomiting., Disp: 90 tablet, Rfl: 3  •  simvastatin (ZOCOR) 40 MG tablet, Take 1 tablet by mouth every night at bedtime. (Patient taking differently: Take 40 mg by mouth Daily.), Disp: 90 tablet, Rfl: 3    Allergies:   Allergies   Allergen Reactions   • Escitalopram Dizziness       Objective     Physical Exam:   Vital Signs:   Vitals:    02/28/22 1334   Weight: 71.7 kg (158 lb)   Height: 153.7 cm (60.5\")   PainSc: 0-No pain     Body mass index is 30.35 kg/m².     Physical Exam  Vitals and nursing note reviewed. Exam conducted with a chaperone present.   Constitutional:       Appearance: Normal appearance.   HENT:      Head: Normocephalic and atraumatic.      Mouth/Throat:      Mouth: Mucous membranes are moist.      Pharynx: Oropharynx is clear.   Eyes:      Extraocular Movements: Extraocular movements intact.      Conjunctiva/sclera: Conjunctivae normal.   Cardiovascular:      Rate and Rhythm: Normal rate and regular rhythm.   Pulmonary:      Effort: Pulmonary effort is normal. No respiratory distress.   Abdominal:      Palpations: Abdomen is soft.      Tenderness: There is no abdominal tenderness. There is no right CVA tenderness or left CVA tenderness.   Genitourinary:     Comments:    Musculoskeletal:         General: Normal range of motion.      Cervical back: Normal range of motion.   Skin:     General: Skin is warm and dry.   Neurological:      General: No focal deficit present.      Mental Status: She is alert and oriented to person, place, and time.   Psychiatric:         Mood and Affect: Mood normal.         Behavior: Behavior normal.         Labs:   Brief Urine Lab Results  (Last result in the past 365 days)      Color   Clarity   Blood   Leuk Est   Nitrite   Protein   CREAT   Urine HCG        11/29/21 1005 Yellow   Clear   Negative   Negative   Negative   Negative                      Lab Results   Component Value " Date    GLUCOSE 105 (H) 11/29/2021    CALCIUM 8.6 11/29/2021     11/29/2021    K 4.4 11/29/2021    CO2 26.7 11/29/2021     11/29/2021    BUN 13 11/29/2021    CREATININE 1.04 (H) 11/29/2021    EGFRIFNONA 51 (L) 11/29/2021    BCR 12.5 11/29/2021    ANIONGAP 13.3 11/29/2021       Lab Results   Component Value Date    WBC 9.70 04/27/2021    HGB 12.8 04/27/2021    HCT 38.7 04/27/2021    MCV 88.0 04/27/2021     04/27/2021       Images:   CT Pelvis With & Without Contrast    Result Date: 12/14/2021  Diverticulosis of the colon with no evidence of diverticulitis. No CT evidence of acute intrapelvic abnormality.  DICTATED:   12/11/2021 EDITED/lfs:   12/11/2021   This report was finalized on 12/14/2021 11:39 AM by Dr. Yanira Snowden MD.      CT Abdomen With & Without Contrast    Result Date: 12/14/2021  Small cysts identified in the kidneys bilaterally. No solid mass or lesion. Old healed granulomatous disease seen within the liver and spleen. Remainder of the exam is grossly unremarkable.  DICTATED:   12/11/2021 EDITED/lfs:   12/11/2021  This report was finalized on 12/14/2021 11:39 AM by Dr. Yanira Snowden MD.        Measures:   Tobacco:   Cristy Louie  reports that she has never smoked. She has never used smokeless tobacco..     Urine Incontinence: ( NOUI)  Patient reports that she is currently having stress urinary incontinence.       Assessment / Plan      Assessment/Plan:   76 y.o. female who presented today for follow up of stress urinary incontinence. Leakage is persistent with cough, sneeze, walking, lifting, using 2 thick pads per day. She complains of a strong urine odor preventing her from getting out much in public.  She would like to move forward with urethral bulking injections.  Discussed that given that she has likely mixed incontinence that her urgency may persist after urethral bulking injections.  If the urgency incontinence becomes significant after urethral bulking  injections, we can reassess with intravesical Botox down the line.  We discussed she is not a candidate for anticholinergics given her age and severe constipation.  Medicare is unlikely to cover mirabegron.    [place] risk of surgery which includes anesthetic related complications, DVT, PE, MI, CVA, death, bleeding, hyper continence requiring catheter placement, dysuria, infection.  Urine culture be sent today from clinic.    Diagnoses and all orders for this visit:    1. Urinary incontinence, mixed (Primary)  - primarily stress related incontinence   - would like to move forward with Bukamid, urethral bulking injections after discussion  - Cysto, Buklamid urethral bulking injections 3/25/22          Follow Up:   Return in about 3 months (around 5/28/2022).    I spent approximately 30 minutes providing clinical care for this patient; including review of patient's chart and provider documentation, face to face time spent with patient in examination room (obtaining history, performing physical exam, discussing diagnosis and management options), placing orders, and completing patient documentation.     Sly Mariano MD  [facility] [place] [facility] [place]

## 2022-03-01 LAB — BACTERIA SPEC AEROBE CULT: NO GROWTH

## 2022-03-11 ENCOUNTER — OFFICE VISIT (OUTPATIENT)
Dept: INTERNAL MEDICINE | Facility: CLINIC | Age: 77
End: 2022-03-11

## 2022-03-11 VITALS
BODY MASS INDEX: 30.55 KG/M2 | HEART RATE: 93 BPM | HEIGHT: 61 IN | DIASTOLIC BLOOD PRESSURE: 60 MMHG | SYSTOLIC BLOOD PRESSURE: 132 MMHG | TEMPERATURE: 97.3 F | WEIGHT: 161.8 LBS | RESPIRATION RATE: 16 BRPM | OXYGEN SATURATION: 97 %

## 2022-03-11 DIAGNOSIS — K21.9 GASTROESOPHAGEAL REFLUX DISEASE WITHOUT ESOPHAGITIS: ICD-10-CM

## 2022-03-11 DIAGNOSIS — M85.88 OTHER SPECIFIED DISORDERS OF BONE DENSITY AND STRUCTURE, OTHER SITE: ICD-10-CM

## 2022-03-11 DIAGNOSIS — N39.46 URINARY INCONTINENCE, MIXED: ICD-10-CM

## 2022-03-11 DIAGNOSIS — C73 THYROID CANCER: ICD-10-CM

## 2022-03-11 DIAGNOSIS — M05.9 RHEUMATOID ARTHRITIS WITH POSITIVE RHEUMATOID FACTOR, INVOLVING UNSPECIFIED SITE: ICD-10-CM

## 2022-03-11 DIAGNOSIS — E11.9 TYPE 2 DIABETES MELLITUS WITHOUT COMPLICATION, WITHOUT LONG-TERM CURRENT USE OF INSULIN: Primary | ICD-10-CM

## 2022-03-11 DIAGNOSIS — N18.31 STAGE 3A CHRONIC KIDNEY DISEASE: ICD-10-CM

## 2022-03-11 DIAGNOSIS — F41.1 GENERALIZED ANXIETY DISORDER: ICD-10-CM

## 2022-03-11 DIAGNOSIS — H90.3 SENSORINEURAL HEARING LOSS (SNHL) OF BOTH EARS: ICD-10-CM

## 2022-03-11 DIAGNOSIS — E89.2 POST-SURGICAL HYPOPARATHYROIDISM: ICD-10-CM

## 2022-03-11 DIAGNOSIS — E78.49 OTHER HYPERLIPIDEMIA: ICD-10-CM

## 2022-03-11 DIAGNOSIS — J30.89 SEASONAL ALLERGIC RHINITIS DUE TO OTHER ALLERGIC TRIGGER: ICD-10-CM

## 2022-03-11 DIAGNOSIS — M85.89 OSTEOPENIA OF MULTIPLE SITES: ICD-10-CM

## 2022-03-11 DIAGNOSIS — Z12.31 ENCOUNTER FOR SCREENING MAMMOGRAM FOR MALIGNANT NEOPLASM OF BREAST: ICD-10-CM

## 2022-03-11 DIAGNOSIS — I10 PRIMARY HYPERTENSION: ICD-10-CM

## 2022-03-11 DIAGNOSIS — E89.0 POST-SURGICAL HYPOTHYROIDISM: ICD-10-CM

## 2022-03-11 DIAGNOSIS — H93.13 TINNITUS OF BOTH EARS: ICD-10-CM

## 2022-03-11 PROCEDURE — 99214 OFFICE O/P EST MOD 30 MIN: CPT | Performed by: INTERNAL MEDICINE

## 2022-03-11 RX ORDER — SPIRONOLACTONE 25 MG
20 TABLET ORAL DAILY
COMMUNITY

## 2022-03-11 RX ORDER — HYDROXYCHLOROQUINE SULFATE 200 MG/1
200 TABLET, FILM COATED ORAL 2 TIMES DAILY
COMMUNITY
Start: 2021-12-23

## 2022-03-11 NOTE — PROGRESS NOTES
Internal Medicine New Patient  Cristy Louie is a 76 y.o. female who presents today to establish care and with concerns as outlined below.    Chief Complaint  Chief Complaint   Patient presents with   • Establish Care     Ear pain        HPI  Ms. Louie comes in today to establish care. She was previously a patient of Dr. Pederson and also follows with Dr. Aiken, Dr. Valadez, Dr. Brenner, and Dr. Mariano. She has T2DM on metformin with recent A1c 8.2, HTN controlled on lisinopril-HCTZ 20-25mg daily, hyperparathyroidism s/p parathyroidectomy now hypoparathyroid on calcitriol, thyroid cancer s/p thyroidectomy and now hypothyroid on levothyroxine 100mcg daily, chronic mixed urinary incontinence with planned procedure, GERD on omeprazole, HLD no longer on statin due to noncompliance, CKD3a, osteopenia, RA on plaquenil, and SNHL with tinnitus. She notes more recently having itchy ears, congestion and headache. She thought she may have an ear infection.        Review of Systems  Review of Systems   Constitutional: Negative.    HENT: Positive for ear pain (itching), hearing loss, rhinorrhea and tinnitus.    Respiratory: Negative.    Cardiovascular: Negative.    Gastrointestinal: Negative.    Genitourinary: Positive for urinary incontinence.   Musculoskeletal: Positive for arthralgias, gait problem and joint swelling.   Skin: Negative.    Neurological: Positive for headache.   Psychiatric/Behavioral: Negative.         Past Medical History  Past Medical History:   Diagnosis Date   • Abnormal liver function tests     Description: liver bx showed fatty liver 2008   • Allergic rhinitis    • Anemia    • Anxiety disorder    • Asthma     Description: dx 1998   • Benign colonic polyp     Description: dx 2007   • Cataract    • Diabetes mellitus (HCC)     Description: dx 7/10   • Diverticulosis of intestine     Description: dx 2007   • Fibromyalgia     Description: dx 2003   • Gastroesophageal reflux disease     Description: dx 2003.   EGD normal .   • H/O cardiovascular stress test     - normal dobutamine myoview. 13- acceptable negative Lexiscan Cardiolite test.   • History of echocardiogram     - normal except ? relaxation abnormality   • History of esophagogastroduodenoscopy 2021    Reflux esophagitis with a single erosion, HH. 3/12- chronic gastritis / reflux esophagitis. 16- LA Class A refux esophagitis, small HH   • History of varicella    • Hyperlipidemia     Description: dx    • Hyperparathyroidism (HCC)     Description: dx    • Osteoarthritis     Description: dx    • Osteoporosis     Description: dx    • Rheumatoid arthritis (HCC)     Description: dx    • Sensorineural hearing loss (SNHL) of both ears 10/14/2021    DX 2021 by [facility] (mild to severe loss)   • Vitamin D deficiency     Description: dx 7/10 (mild)        Surgical History  Past Surgical History:   Procedure Laterality Date   • BREAST BIOPSY     • CHOLECYSTECTOMY         • PARATHYROIDECTOMY Right 2018    [place], path c/w hypercellular parathyroid- Dr. Ava Jacobs ([facility])   • THYROID SURGERY  08/2018    x 2 2018   • THYROIDECTOMY Right 2018    Papillary Thyroid Carcinoma- Dr. Ava Jacobs ([facility])   • THYROIDECTOMY Left 2018    Papillary Thyroid Carcinoma- Dr. Ava Jacobs ([facility])   • TOTAL ABDOMINAL HYSTERECTOMY WITH SALPINGO OOPHORECTOMY Bilateral        • WISDOM TOOTH EXTRACTION          Family History  Family History   Problem Relation Age of Onset   • Coronary artery disease Father          MI age 59   • Hyperlipidemia Father    • Heart attack Father    • Hypertension Father    • Diabetes Brother    • Breast cancer Paternal Aunt         ?   • Aneurysm Neg Hx         AAA        Social History  Social History     Socioeconomic History   • Marital status:    • Number of children: 4   Tobacco Use   • Smoking status: Never Smoker   • Smokeless  tobacco: Never Used   Vaping Use   • Vaping Use: Never used   Substance and Sexual Activity   • Alcohol use: No   • Drug use: No   • Sexual activity: Defer        Current Medications  Current Outpatient Medications on File Prior to Visit   Medication Sig Dispense Refill   • calcitriol (ROCALTROL) 0.25 MCG capsule Take 1 capsule by mouth Daily. 90 capsule 3   • dicyclomine (BENTYL) 20 MG tablet Take 1 tablet by mouth 4 (Four) Times a Day. (Patient taking differently: Take 20 mg by mouth As Needed.) 100 tablet 3   • DULoxetine (CYMBALTA) 30 MG capsule Take 30 mg by mouth Daily.     • fluticasone (FLONASE) 50 MCG/ACT nasal spray 1 spray into the nostril(s) as directed by provider As Needed.     • hydroxychloroquine (PLAQUENIL) 200 MG tablet Take 200 mg by mouth 2 (Two) Times a Day.     • levothyroxine (SYNTHROID, LEVOTHROID) 100 MCG tablet Take 1 tablet by mouth Every Morning. 90 tablet 3   • lisinopril-hydrochlorothiazide (PRINZIDE,ZESTORETIC) 20-25 MG per tablet Take 1 tablet by mouth Daily. 90 tablet 3   • Lutein 20 MG capsule Take 20 mg by mouth Daily.     • metFORMIN ER (GLUCOPHAGE-XR) 500 MG 24 hr tablet Take 1 tablet by mouth Daily With Dinner. 90 tablet 3   • omeprazole (priLOSEC) 20 MG capsule TAKE 1 CAPSULE TWICE DAILY (Patient taking differently: Take 20 mg by mouth.) 180 capsule 3   • ondansetron ODT (ZOFRAN-ODT) 8 MG disintegrating tablet Take 1 tablet by mouth Every 8 (Eight) Hours As Needed for Nausea or Vomiting. 90 tablet 3   • Accu-Chek FastClix Lancets misc 1 each Daily. 102 each 1   • Accu-Chek Guide test strip 1 each by Other route Daily. 100 each 11   • Blood Glucose Monitoring Suppl (Accu-Chek Guide Me) w/Device kit 1 Device Take As Directed. 1 kit 0   • Multiple Minerals-Vitamins (CALCIUM-MAGNESIUM-ZINC-D3 PO) Take  by mouth.     • simvastatin (ZOCOR) 40 MG tablet Take 1 tablet by mouth every night at bedtime. (Patient taking differently: Take 40 mg by mouth Daily.) 90 tablet 3     No current  "facility-administered medications on file prior to visit.       Allergies  Allergies   Allergen Reactions   • Escitalopram Dizziness        Objective  Visit Vitals  /60   Pulse 93   Temp 97.3 °F (36.3 °C)   Resp 16   Ht 153.7 cm (60.51\")   Wt 73.4 kg (161 lb 12.8 oz)   SpO2 97%   BMI 31.07 kg/m²        Physical Exam  Physical Exam  Vitals and nursing note reviewed.   Constitutional:       General: She is not in acute distress.     Appearance: Normal appearance. She is well-developed. She is not ill-appearing or toxic-appearing.   HENT:      Head: Normocephalic and atraumatic.      Right Ear: Ear canal and external ear normal.      Left Ear: Ear canal and external ear normal.      Ears:      Comments: TM scarring bilaterally  Eyes:      Conjunctiva/sclera: Conjunctivae normal.   Cardiovascular:      Rate and Rhythm: Normal rate and regular rhythm.      Heart sounds: Normal heart sounds.   Pulmonary:      Effort: Pulmonary effort is normal. No respiratory distress.      Breath sounds: Normal breath sounds.   Musculoskeletal:      Right lower leg: No edema.      Left lower leg: No edema.   Skin:     General: Skin is warm and dry.   Neurological:      General: No focal deficit present.      Mental Status: She is alert and oriented to person, place, and time.      Gait: Gait abnormal (slow but steady and without need for assist device).          Results  Results for orders placed or performed in visit on 02/28/22   Urine Culture - Urine, Urine, Clean Catch    Specimen: Urine, Clean Catch   Result Value Ref Range    Urine Culture No growth    Urinalysis without microscopic (no culture) - Urine, Clean Catch    Specimen: Urine, Clean Catch   Result Value Ref Range    Color, UA Yellow Yellow, Straw    Appearance, UA Clear Clear    pH, UA 5.5 5.0 - 8.0    Specific Gravity, UA 1.018 1.005 - 1.030    Glucose, UA Negative Negative    Ketones, UA Negative Negative    Bilirubin, UA Negative Negative    Blood, UA Negative " Negative    Protein, UA 30 mg/dL (1+) (A) Negative    Leuk Esterase, UA Negative Negative    Nitrite, UA Negative Negative    Urobilinogen, UA 0.2 E.U./dL 0.2 - 1.0 E.U./dL        Assessment and Plan  Diagnoses and all orders for this visit:    Type 2 diabetes mellitus without complication, without long-term current use of insulin ([facility])  - Following with Dr. Aiken, on metformin XR 500mg daily with last A1c 8.2    Primary hypertension  - BP controlled on lisinopril-HCTZ 20-25mg daily    Other hyperlipidemia  - Lipid panel 4/2021 with triglycerides 262 and   - She has quit statin    Gastroesophageal reflux disease without esophagitis  - Controlled on omeprazole 20mg daily    Osteopenia of multiple sites  - DEXA due, ordered today    Rheumatoid arthritis with positive rheumatoid factor, involving unspecified site ([facility])  - Following with Dr. Brenner and on plaquenil 200mg BID as well as cymbalta    History of thyroid cancer, Post-surgical hypothyroidism  - s/p thyroidectomy  - Following with Dr. Aiken and on levothyroxine 100mcg for subsequent hypothyroidism    Post-surgical hypoparathyroidism ([facility])  - s/p parathyroidectomy for primary hyperparathyroidism. Now with postsurgical hypoparathyroidism on calcitriol 0.25mcg daily. Managed by Dr. Aiken.  - Calcium normal in December    Urinary incontinence, mixed  - Following with urology. Not a candidate for anticholinergics and due to insurance cannot afford mirabegron so will have urethral bulking injections.    Generalized anxiety disorder  - On cymbalta 30mg daily, mood stable    Stage 3a chronic kidney disease ([facility])  - gfr 50-55 however last was 61. Will monitor. Avoid NSAIDs.    Sensorineural hearing loss (SNHL) of both ears  - Has deferred hearing aids    Tinnitus of both ears  - Likely due to hearing loss    Seasonal allergic rhinitis due to other allergic trigger  - Likely cause of current ear pruritus, headache, congestion. Recommend flonase and  antihistamine.    Encounter for screening mammogram for malignant neoplasm of breast  -     Mammo Screening Digital Tomosynthesis Bilateral With CAD; Future    Health Maintenance   Topic Date Due   • ZOSTER VACCINE (2 of 2) 04/25/2017   • TDAP/TD VACCINES (2 - Td or Tdap) 11/28/2017   • DIABETIC EYE EXAM  10/01/2020   • INFLUENZA VACCINE  08/01/2021   • URINE MICROALBUMIN  10/20/2021   • MAMMOGRAM  10/30/2021   • COVID-19 Vaccine (1) 04/26/2022 (Originally 9/6/1950)   • LIPID PANEL  04/27/2022   • HEMOGLOBIN A1C  05/29/2022   • ANNUAL WELLNESS VISIT  08/03/2022   • DXA SCAN  10/19/2022   • COLORECTAL CANCER SCREENING  06/21/2031   • HEPATITIS C SCREENING  Completed   • Pneumococcal Vaccine 65+  Completed     Health Maintenance  - Pap smear: no longer indicated  - Mammogram: ordered  - Colonoscopy: 6/2021  - HCV: negative  - Immunizations: discuss next visit  - Depression screening: negative 3/2022      Return in about 5 months (around 8/3/2022) for Medicare Wellness 45 minutes.

## 2022-03-23 ENCOUNTER — APPOINTMENT (OUTPATIENT)
Dept: PREADMISSION TESTING | Facility: HOSPITAL | Age: 77
End: 2022-03-23

## 2022-04-06 ENCOUNTER — PRE-ADMISSION TESTING (OUTPATIENT)
Dept: PREADMISSION TESTING | Facility: HOSPITAL | Age: 77
End: 2022-04-06

## 2022-04-06 VITALS — HEIGHT: 61 IN | BODY MASS INDEX: 30.14 KG/M2 | WEIGHT: 159.61 LBS

## 2022-04-06 DIAGNOSIS — N39.3 STRESS INCONTINENCE: ICD-10-CM

## 2022-04-06 LAB
ANION GAP SERPL CALCULATED.3IONS-SCNC: 14 MMOL/L (ref 5–15)
BUN SERPL-MCNC: 19 MG/DL (ref 8–23)
BUN/CREAT SERPL: 17.4 (ref 7–25)
CALCIUM SPEC-SCNC: 8.9 MG/DL (ref 8.6–10.5)
CHLORIDE SERPL-SCNC: 101 MMOL/L (ref 98–107)
CO2 SERPL-SCNC: 26 MMOL/L (ref 22–29)
CREAT SERPL-MCNC: 1.09 MG/DL (ref 0.57–1)
DEPRECATED RDW RBC AUTO: 41.6 FL (ref 37–54)
EGFRCR SERPLBLD CKD-EPI 2021: 52.1 ML/MIN/1.73
ERYTHROCYTE [DISTWIDTH] IN BLOOD BY AUTOMATED COUNT: 12.4 % (ref 12.3–15.4)
GLUCOSE SERPL-MCNC: 100 MG/DL (ref 65–99)
HCT VFR BLD AUTO: 40.3 % (ref 34–46.6)
HGB BLD-MCNC: 13.5 G/DL (ref 12–15.9)
MCH RBC QN AUTO: 30.7 PG (ref 26.6–33)
MCHC RBC AUTO-ENTMCNC: 33.5 G/DL (ref 31.5–35.7)
MCV RBC AUTO: 91.6 FL (ref 79–97)
PLATELET # BLD AUTO: 308 10*3/MM3 (ref 140–450)
PMV BLD AUTO: 11.3 FL (ref 6–12)
POTASSIUM SERPL-SCNC: 3.8 MMOL/L (ref 3.5–5.2)
QT INTERVAL: 438 MS
QTC INTERVAL: 492 MS
RBC # BLD AUTO: 4.4 10*6/MM3 (ref 3.77–5.28)
SARS-COV-2 RNA PNL SPEC NAA+PROBE: NOT DETECTED
SODIUM SERPL-SCNC: 141 MMOL/L (ref 136–145)
WBC NRBC COR # BLD: 9.19 10*3/MM3 (ref 3.4–10.8)

## 2022-04-06 PROCEDURE — 93005 ELECTROCARDIOGRAM TRACING: CPT

## 2022-04-06 PROCEDURE — U0004 COV-19 TEST NON-CDC HGH THRU: HCPCS

## 2022-04-06 PROCEDURE — 80048 BASIC METABOLIC PNL TOTAL CA: CPT

## 2022-04-06 PROCEDURE — C9803 HOPD COVID-19 SPEC COLLECT: HCPCS

## 2022-04-06 PROCEDURE — 93010 ELECTROCARDIOGRAM REPORT: CPT | Performed by: INTERNAL MEDICINE

## 2022-04-06 PROCEDURE — 36415 COLL VENOUS BLD VENIPUNCTURE: CPT

## 2022-04-06 PROCEDURE — U0005 INFEC AGEN DETEC AMPLI PROBE: HCPCS

## 2022-04-06 PROCEDURE — 85027 COMPLETE CBC AUTOMATED: CPT

## 2022-04-06 NOTE — PAT
Patient viewed general PAT education video as instructed in their preoperative information received from their surgeon.  Patient stated the general [facility] education video was viewed in its entirety and survey completed.  Copies of [facility] general education handouts (Incentive Spirometry, Meds to Beds Program, Patient Belongings, Pre-op skin preparation instructions, Blood Glucose testing, Visitor policy, Surgery FAQ, Code H) distributed to patient if not printed. Education related to the PAT pass and skin preparation for surgery (if applicable) completed in PAT as a reinforcement to PAT education video. Patient instructed to return PAT pass provided today as well as completed skin preparation sheet (if applicable) on the day of procedure.     Additionally if patient had not viewed video yet but intended to view it at home or in our waiting area, then referred them to the handout with QR code/link provided during PAT visit.  Instructed patient to complete survey after viewing the video in its entirety.  Encouraged patient/family to read [facility] general education handouts thoroughly and notify PAT staff with any questions or concerns. Patient verbalized understanding of all information and priority content.    Patient instructed to drink 20 ounces (or until full) of Gatorade and it needs to be completed 1 hour (for Main OR patients) or 2 hours (scheduled  section patients) before given arrival time for procedure (NO RED Gatorade)    Patient verbalized understanding.    COVID TEST PERFORMED IN PAT TODAY    Per Anesthesia Request, patient instructed not to take their ACE/ARB medications on the AM of surgery.

## 2022-04-06 NOTE — DISCHARGE INSTRUCTIONS
The following information and instructions were given:    Do not eat, drink, smoke or chew gum after midnight the night before surgery. This includes no mints.  Take all routine, prescribed medications including heart and blood pressure medicines with a sip of water unless otherwise instructed by your physician.   Do NOT take diabetic medication unless instructed by your physician.    DO NOT shave for two days before your procedure.  Do not wear makeup.      DO NOT wear fingernail polish (gel/regular) and/or acrylic/artificial nails on the day of surgery.   If you had a recent manicure and would rather not remove polish or artificial nails, the minimum requirement is that the polish/artificial nails must be removed from the middle finger on each hand.      If you are having surgery/procedure on an upper extremity, fingernail polish/artificial fingernails must be removed for surgery.  NO EXCEPTIONS.      If you are having surgery/procedure on a lower extremity, toenail polish on both extremities must be removed for surgery.  NO EXCEPTIONS.    Remove all jewelry (advise to go to jeweler if unable to remove).  Jewelry, especially rings, can no longer be taped for surgery.    Leave anything you consider valuable at home.    Leave your suitcase in the car until after your surgery.    Bring the following with you the day of your procedure (when applicable):       -Picture ID and insurance cards       -Co-pay/deductible required by insurance       -Medications in the original bottles (not a list) including all over-the-counter medications if not brought to PAT       -Copy of advance directive, living will or power of  documents if not brought to PAT       -CPAP or BIPAP mask and tubing (do not bring machine)       -Skin prep instruction(s) sheet       -PAT Pass    Education booklet (when applicable), brochure, handout or binder related to procedure given to patient.  Education booklet also includes general  information related to their recovery that mentions signs and symptoms of infection and when to call the doctor.    When applicable, an ERAS handout was given to patient.    Respirex use and pneumonia prevention education provided in Pre Admission Testing general education video.    Signs and Symptoms of infection discussed with patient in Pre Admission Testing. Information related to infection and hand hygiene mentioned in Pre Admission Testing general education video. Patient instructed to call their doctor if any of the following symptoms are noted during recovery:  Fever of 100.4 F or higher, incision that is warm or has increasing bleeding, redness or drainage.    DVT Prevention instructions given in general education video presentation during Pre Admission Testing appointment that stress the importance of ambulation to improve blood circulation.  Also encouraged patient to perform foot exercises when in bed and application of a sequential device may be applied to lower extremities to improve circulation.      Please apply Chlorhexidine wipes to surgical area (if instructed) the night before procedure and the AM of procedure and document date/time of applications on skin prep instruction sheet.

## 2022-04-07 ENCOUNTER — ANESTHESIA EVENT (OUTPATIENT)
Dept: PERIOP | Facility: HOSPITAL | Age: 77
End: 2022-04-07

## 2022-04-07 RX ORDER — FAMOTIDINE 10 MG/ML
20 INJECTION, SOLUTION INTRAVENOUS ONCE
Status: CANCELLED | OUTPATIENT
Start: 2022-04-07 | End: 2022-04-07

## 2022-04-08 ENCOUNTER — TELEPHONE (OUTPATIENT)
Dept: UROLOGY | Facility: CLINIC | Age: 77
End: 2022-04-08

## 2022-04-08 ENCOUNTER — HOSPITAL ENCOUNTER (OUTPATIENT)
Facility: HOSPITAL | Age: 77
Setting detail: HOSPITAL OUTPATIENT SURGERY
Discharge: HOME | End: 2022-04-08
Attending: STUDENT IN AN ORGANIZED HEALTH CARE EDUCATION/TRAINING PROGRAM | Admitting: STUDENT IN AN ORGANIZED HEALTH CARE EDUCATION/TRAINING PROGRAM

## 2022-04-08 ENCOUNTER — ANESTHESIA (OUTPATIENT)
Dept: PERIOP | Facility: HOSPITAL | Age: 77
End: 2022-04-08

## 2022-04-08 VITALS
WEIGHT: 159.61 LBS | OXYGEN SATURATION: 91 % | SYSTOLIC BLOOD PRESSURE: 146 MMHG | BODY MASS INDEX: 30.14 KG/M2 | HEART RATE: 70 BPM | TEMPERATURE: 97.4 F | HEIGHT: 61 IN | RESPIRATION RATE: 16 BRPM | DIASTOLIC BLOOD PRESSURE: 69 MMHG

## 2022-04-08 DIAGNOSIS — N39.3 STRESS INCONTINENCE: ICD-10-CM

## 2022-04-08 LAB — GLUCOSE BLDC GLUCOMTR-MCNC: 122 MG/DL (ref 70–130)

## 2022-04-08 PROCEDURE — 25010000002 ONDANSETRON PER 1 MG: Performed by: NURSE ANESTHETIST, CERTIFIED REGISTERED

## 2022-04-08 PROCEDURE — L8606 SYNTHETIC IMPLNT URINARY 1ML: HCPCS | Performed by: STUDENT IN AN ORGANIZED HEALTH CARE EDUCATION/TRAINING PROGRAM

## 2022-04-08 PROCEDURE — 25010000002 PROPOFOL 10 MG/ML EMULSION: Performed by: NURSE ANESTHETIST, CERTIFIED REGISTERED

## 2022-04-08 PROCEDURE — 25010000002 FENTANYL CITRATE (PF) 50 MCG/ML SOLUTION: Performed by: NURSE ANESTHETIST, CERTIFIED REGISTERED

## 2022-04-08 PROCEDURE — 25010000002 CEFAZOLIN IN DEXTROSE 2-4 GM/100ML-% SOLUTION: Performed by: STUDENT IN AN ORGANIZED HEALTH CARE EDUCATION/TRAINING PROGRAM

## 2022-04-08 PROCEDURE — 25010000002 FENTANYL CITRATE (PF) 50 MCG/ML SOLUTION

## 2022-04-08 PROCEDURE — 51715 ENDOSCOPIC INJECTION/IMPLANT: CPT | Performed by: STUDENT IN AN ORGANIZED HEALTH CARE EDUCATION/TRAINING PROGRAM

## 2022-04-08 PROCEDURE — 82962 GLUCOSE BLOOD TEST: CPT

## 2022-04-08 PROCEDURE — S0260 H&P FOR SURGERY: HCPCS | Performed by: PHYSICIAN ASSISTANT

## 2022-04-08 PROCEDURE — 25010000002 DEXAMETHASONE PER 1 MG: Performed by: NURSE ANESTHETIST, CERTIFIED REGISTERED

## 2022-04-08 RX ORDER — PROMETHAZINE HYDROCHLORIDE 25 MG/1
25 TABLET ORAL ONCE AS NEEDED
Status: DISCONTINUED | OUTPATIENT
Start: 2022-04-08 | End: 2022-04-08 | Stop reason: HOSPADM

## 2022-04-08 RX ORDER — DEXAMETHASONE SODIUM PHOSPHATE 10 MG/ML
INJECTION INTRAMUSCULAR; INTRAVENOUS AS NEEDED
Status: DISCONTINUED | OUTPATIENT
Start: 2022-04-08 | End: 2022-04-08 | Stop reason: SURG

## 2022-04-08 RX ORDER — CEFAZOLIN SODIUM 2 G/100ML
2 INJECTION, SOLUTION INTRAVENOUS ONCE
Status: COMPLETED | OUTPATIENT
Start: 2022-04-08 | End: 2022-04-08

## 2022-04-08 RX ORDER — SODIUM CHLORIDE, SODIUM LACTATE, POTASSIUM CHLORIDE, CALCIUM CHLORIDE 600; 310; 30; 20 MG/100ML; MG/100ML; MG/100ML; MG/100ML
INJECTION, SOLUTION INTRAVENOUS CONTINUOUS PRN
Status: DISCONTINUED | OUTPATIENT
Start: 2022-04-08 | End: 2022-04-08 | Stop reason: SURG

## 2022-04-08 RX ORDER — NITROFURANTOIN 25; 75 MG/1; MG/1
100 CAPSULE ORAL 2 TIMES DAILY
Qty: 10 CAPSULE | Refills: 0 | Status: SHIPPED | OUTPATIENT
Start: 2022-04-08 | End: 2022-04-20

## 2022-04-08 RX ORDER — NALOXONE HCL 0.4 MG/ML
0.4 VIAL (ML) INJECTION AS NEEDED
Status: DISCONTINUED | OUTPATIENT
Start: 2022-04-08 | End: 2022-04-08 | Stop reason: HOSPADM

## 2022-04-08 RX ORDER — MIDAZOLAM HYDROCHLORIDE 1 MG/ML
0.5 INJECTION INTRAMUSCULAR; INTRAVENOUS
Status: DISCONTINUED | OUTPATIENT
Start: 2022-04-08 | End: 2022-04-08 | Stop reason: HOSPADM

## 2022-04-08 RX ORDER — HYDRALAZINE HYDROCHLORIDE 20 MG/ML
5 INJECTION INTRAMUSCULAR; INTRAVENOUS
Status: DISCONTINUED | OUTPATIENT
Start: 2022-04-08 | End: 2022-04-08 | Stop reason: HOSPADM

## 2022-04-08 RX ORDER — HYDROMORPHONE HYDROCHLORIDE 1 MG/ML
0.5 INJECTION, SOLUTION INTRAMUSCULAR; INTRAVENOUS; SUBCUTANEOUS
Status: DISCONTINUED | OUTPATIENT
Start: 2022-04-08 | End: 2022-04-08 | Stop reason: HOSPADM

## 2022-04-08 RX ORDER — PROPOFOL 10 MG/ML
VIAL (ML) INTRAVENOUS AS NEEDED
Status: DISCONTINUED | OUTPATIENT
Start: 2022-04-08 | End: 2022-04-08 | Stop reason: SURG

## 2022-04-08 RX ORDER — SODIUM CHLORIDE 0.9 % (FLUSH) 0.9 %
3 SYRINGE (ML) INJECTION EVERY 12 HOURS SCHEDULED
Status: DISCONTINUED | OUTPATIENT
Start: 2022-04-08 | End: 2022-04-08 | Stop reason: HOSPADM

## 2022-04-08 RX ORDER — ONDANSETRON 2 MG/ML
INJECTION INTRAMUSCULAR; INTRAVENOUS AS NEEDED
Status: DISCONTINUED | OUTPATIENT
Start: 2022-04-08 | End: 2022-04-08 | Stop reason: SURG

## 2022-04-08 RX ORDER — HYOSCYAMINE SULFATE 0.12 MG/1
0.12 TABLET SUBLINGUAL EVERY 4 HOURS PRN
Qty: 30 EACH | Refills: 0 | Status: SHIPPED | OUTPATIENT
Start: 2022-04-08 | End: 2022-08-19

## 2022-04-08 RX ORDER — IPRATROPIUM BROMIDE AND ALBUTEROL SULFATE 2.5; .5 MG/3ML; MG/3ML
3 SOLUTION RESPIRATORY (INHALATION) ONCE AS NEEDED
Status: DISCONTINUED | OUTPATIENT
Start: 2022-04-08 | End: 2022-04-08 | Stop reason: HOSPADM

## 2022-04-08 RX ORDER — MAGNESIUM HYDROXIDE 1200 MG/15ML
LIQUID ORAL AS NEEDED
Status: DISCONTINUED | OUTPATIENT
Start: 2022-04-08 | End: 2022-04-08 | Stop reason: HOSPADM

## 2022-04-08 RX ORDER — LIDOCAINE HYDROCHLORIDE 10 MG/ML
0.5 INJECTION, SOLUTION EPIDURAL; INFILTRATION; INTRACAUDAL; PERINEURAL ONCE AS NEEDED
Status: COMPLETED | OUTPATIENT
Start: 2022-04-08 | End: 2022-04-08

## 2022-04-08 RX ORDER — FENTANYL CITRATE 50 UG/ML
INJECTION, SOLUTION INTRAMUSCULAR; INTRAVENOUS AS NEEDED
Status: DISCONTINUED | OUTPATIENT
Start: 2022-04-08 | End: 2022-04-08 | Stop reason: SURG

## 2022-04-08 RX ORDER — SODIUM CHLORIDE, SODIUM LACTATE, POTASSIUM CHLORIDE, CALCIUM CHLORIDE 600; 310; 30; 20 MG/100ML; MG/100ML; MG/100ML; MG/100ML
9 INJECTION, SOLUTION INTRAVENOUS CONTINUOUS
Status: DISCONTINUED | OUTPATIENT
Start: 2022-04-08 | End: 2022-04-08 | Stop reason: HOSPADM

## 2022-04-08 RX ORDER — DROPERIDOL 2.5 MG/ML
0.62 INJECTION, SOLUTION INTRAMUSCULAR; INTRAVENOUS ONCE AS NEEDED
Status: DISCONTINUED | OUTPATIENT
Start: 2022-04-08 | End: 2022-04-08 | Stop reason: HOSPADM

## 2022-04-08 RX ORDER — LIDOCAINE HYDROCHLORIDE 20 MG/ML
INJECTION, SOLUTION INFILTRATION; PERINEURAL AS NEEDED
Status: DISCONTINUED | OUTPATIENT
Start: 2022-04-08 | End: 2022-04-08 | Stop reason: SURG

## 2022-04-08 RX ORDER — FENTANYL CITRATE 50 UG/ML
INJECTION, SOLUTION INTRAMUSCULAR; INTRAVENOUS
Status: COMPLETED
Start: 2022-04-08 | End: 2022-04-08

## 2022-04-08 RX ORDER — MEPERIDINE HYDROCHLORIDE 25 MG/ML
12.5 INJECTION INTRAMUSCULAR; INTRAVENOUS; SUBCUTANEOUS
Status: DISCONTINUED | OUTPATIENT
Start: 2022-04-08 | End: 2022-04-08 | Stop reason: HOSPADM

## 2022-04-08 RX ORDER — SODIUM CHLORIDE 0.9 % (FLUSH) 0.9 %
3-10 SYRINGE (ML) INJECTION AS NEEDED
Status: DISCONTINUED | OUTPATIENT
Start: 2022-04-08 | End: 2022-04-08 | Stop reason: HOSPADM

## 2022-04-08 RX ORDER — DEXTROSE MONOHYDRATE 25 G/50ML
25 INJECTION, SOLUTION INTRAVENOUS
Status: DISCONTINUED | OUTPATIENT
Start: 2022-04-08 | End: 2022-04-08 | Stop reason: HOSPADM

## 2022-04-08 RX ORDER — SODIUM CHLORIDE 0.9 % (FLUSH) 0.9 %
10 SYRINGE (ML) INJECTION AS NEEDED
Status: DISCONTINUED | OUTPATIENT
Start: 2022-04-08 | End: 2022-04-08 | Stop reason: HOSPADM

## 2022-04-08 RX ORDER — HYDROCODONE BITARTRATE AND ACETAMINOPHEN 5; 325 MG/1; MG/1
1 TABLET ORAL ONCE AS NEEDED
Status: DISCONTINUED | OUTPATIENT
Start: 2022-04-08 | End: 2022-04-08 | Stop reason: HOSPADM

## 2022-04-08 RX ORDER — SODIUM CHLORIDE 0.9 % (FLUSH) 0.9 %
10 SYRINGE (ML) INJECTION EVERY 12 HOURS SCHEDULED
Status: DISCONTINUED | OUTPATIENT
Start: 2022-04-08 | End: 2022-04-08 | Stop reason: HOSPADM

## 2022-04-08 RX ORDER — DROPERIDOL 2.5 MG/ML
0.62 INJECTION, SOLUTION INTRAMUSCULAR; INTRAVENOUS AS NEEDED
Status: DISCONTINUED | OUTPATIENT
Start: 2022-04-08 | End: 2022-04-08 | Stop reason: HOSPADM

## 2022-04-08 RX ORDER — NICOTINE POLACRILEX 4 MG
15 LOZENGE BUCCAL
Status: DISCONTINUED | OUTPATIENT
Start: 2022-04-08 | End: 2022-04-08 | Stop reason: HOSPADM

## 2022-04-08 RX ORDER — PROMETHAZINE HYDROCHLORIDE 25 MG/1
25 SUPPOSITORY RECTAL ONCE AS NEEDED
Status: DISCONTINUED | OUTPATIENT
Start: 2022-04-08 | End: 2022-04-08 | Stop reason: HOSPADM

## 2022-04-08 RX ORDER — FENTANYL CITRATE 50 UG/ML
50 INJECTION, SOLUTION INTRAMUSCULAR; INTRAVENOUS
Status: DISCONTINUED | OUTPATIENT
Start: 2022-04-08 | End: 2022-04-08 | Stop reason: HOSPADM

## 2022-04-08 RX ORDER — SIMVASTATIN 40 MG
40 TABLET ORAL NIGHTLY
COMMUNITY
End: 2022-08-19

## 2022-04-08 RX ORDER — ONDANSETRON 2 MG/ML
4 INJECTION INTRAMUSCULAR; INTRAVENOUS ONCE AS NEEDED
Status: DISCONTINUED | OUTPATIENT
Start: 2022-04-08 | End: 2022-04-08 | Stop reason: HOSPADM

## 2022-04-08 RX ORDER — FAMOTIDINE 20 MG/1
20 TABLET, FILM COATED ORAL ONCE
Status: COMPLETED | OUTPATIENT
Start: 2022-04-08 | End: 2022-04-08

## 2022-04-08 RX ORDER — LABETALOL HYDROCHLORIDE 5 MG/ML
5 INJECTION, SOLUTION INTRAVENOUS
Status: DISCONTINUED | OUTPATIENT
Start: 2022-04-08 | End: 2022-04-08 | Stop reason: HOSPADM

## 2022-04-08 RX ORDER — PHENAZOPYRIDINE HYDROCHLORIDE 200 MG/1
200 TABLET, FILM COATED ORAL 3 TIMES DAILY PRN
Qty: 20 TABLET | Refills: 0 | Status: SHIPPED | OUTPATIENT
Start: 2022-04-08 | End: 2022-08-19

## 2022-04-08 RX ADMIN — PROPOFOL 150 MCG/KG/MIN: 10 INJECTION, EMULSION INTRAVENOUS at 07:36

## 2022-04-08 RX ADMIN — DEXAMETHASONE SODIUM PHOSPHATE 4 MG: 10 INJECTION INTRAMUSCULAR; INTRAVENOUS at 07:34

## 2022-04-08 RX ADMIN — FENTANYL CITRATE 50 MCG: 50 INJECTION, SOLUTION INTRAMUSCULAR; INTRAVENOUS at 08:21

## 2022-04-08 RX ADMIN — SODIUM CHLORIDE, POTASSIUM CHLORIDE, SODIUM LACTATE AND CALCIUM CHLORIDE 9 ML/HR: 600; 310; 30; 20 INJECTION, SOLUTION INTRAVENOUS at 06:56

## 2022-04-08 RX ADMIN — ONDANSETRON 4 MG: 2 INJECTION INTRAMUSCULAR; INTRAVENOUS at 07:32

## 2022-04-08 RX ADMIN — PROPOFOL 50 MG: 10 INJECTION, EMULSION INTRAVENOUS at 07:35

## 2022-04-08 RX ADMIN — FENTANYL CITRATE 50 MCG: 50 INJECTION, SOLUTION INTRAMUSCULAR; INTRAVENOUS at 07:41

## 2022-04-08 RX ADMIN — PROPOFOL 150 MG: 10 INJECTION, EMULSION INTRAVENOUS at 07:34

## 2022-04-08 RX ADMIN — LIDOCAINE HYDROCHLORIDE 0.5 ML: 10 INJECTION, SOLUTION EPIDURAL; INFILTRATION; INTRACAUDAL; PERINEURAL at 06:56

## 2022-04-08 RX ADMIN — FAMOTIDINE 20 MG: 20 TABLET ORAL at 06:56

## 2022-04-08 RX ADMIN — LIDOCAINE HYDROCHLORIDE 80 MG: 20 INJECTION, SOLUTION INFILTRATION; PERINEURAL at 07:34

## 2022-04-08 RX ADMIN — CEFAZOLIN SODIUM 2 G: 2 INJECTION, SOLUTION INTRAVENOUS at 07:36

## 2022-04-08 RX ADMIN — SODIUM CHLORIDE, POTASSIUM CHLORIDE, SODIUM LACTATE AND CALCIUM CHLORIDE: 600; 310; 30; 20 INJECTION, SOLUTION INTRAVENOUS at 07:32

## 2022-04-08 NOTE — OP NOTE
CYSTOSCOPY WITH BULKING AGENT INJECTION  Procedure Report    Patient Name:  Cristy Louie  YOB: 1945    Date of Surgery:  4/8/2022     Indications: 76-year-old female who has ongoing stress urinary incontinence requiring the use of heavy pads daily presents for urethral bulking injections.  Discussion of risk benefits and alternatives performed, informed consent was reviewed and signed.    Pre-op Diagnosis:   Stress incontinence [N39.3]       Post-Op Diagnosis Codes:     * Stress incontinence [N39.3]    Procedure/CPT® Codes: 88008,  x 2 units       Procedure(s):  CYSTOSCOPY WITH BULKING AGENT INJECTION (BULKAMID)    Staff:  Surgeon(s):  Sly Mariano MD         Anesthesia: General    Estimated Blood Loss: none    Implants:    Implant Name Type Inv. Item Serial No.  Lot No. LRB No. Used Action   Bulkamid Urethral Bulking System    [employer] [facility] [id number] Bilateral 2 Implanted       Specimen:          None        Findings: Patulous bladder neck at initiation of case, uncomplicated bulkamid urethral bulking injections with adequate coaptation of bladder neck at conclusion of case     Complications: None    Description of Procedure:   The patient was identified in the preoperative holding area where informed consent was reviewed and signed.  She was taken back to the operating room.  She was intubated and sedated without issue per anesthesia.  A brief timeout was performed identifying the correct patient procedure and laterality.  Preoperative antibiotics were administered.  Patient was positioned on the table in the lithotomy position, and was prepped and draped in a usual sterile fashion.      At the start of the procedure, anaesthetic gel was added to the end rotatable Bulkamid sheath and the water inflow was adjusted to produce an adequate stream. The Bulkamid Needle was then placed ¾ of the way into the needle channel of the rotatable sheath and the entire  Bulkamid system was then advanced into the urethra until the bladder is visualised and inspected.  The bladder had no significant abnormalities or mucosal lesions noted.      The Bulkamid needle was then advanced into the needle channel on the rotatable sheath until the tip of the sheath was adjacent to the bladder neck.     The sheath was then rotated to the 7 o’clock position. The needle was then extended into the bladder until the 2 cm sade on the needle was visible. The Bulkamid system was then retracted until the tip of the needle was resting on the bladder neck. The needle was then retracted into the sheath and approximately 1.5 cm from the bladder neck the Bulkamid system was pressed parallel against the urethral wall and the needle is then advanced into the submucosal tissue ensuring that the bevel of the needle was facing towards the lumen. The needle was advanced approximately 0.5 cm, and the Bulkamid hydrogel was then injected until the Bulkamid cushion was visible and reached the midline of the urethral lumen.    The needle was then retracted back into the rotatable sheath, and rotated to the next injection site. Subsequent injections were preformed at 5 o’clock, 2 o’clock and 10 o’clock all along the same plane as the original injection until all (4) cushions met at the midline of the urethral lumen. 2mls Total of Bulkamid Hydrogel was used.    Approximately 300cc of fluid was left in the bladder and upon completion, the patient emptied her bladder without issues. The procedure was well tolerated and EBL was minimal.        PLAN  -We will ensure the patient is able to urinate in the postoperative area, she can return home today  The patient will see me back in 2 weeks for follow-up      Sly Mariano MD     Date: 4/8/2022  Time: 19:09 EDT

## 2022-04-08 NOTE — TELEPHONE ENCOUNTER
Provider: DR RICH  Caller: NEVIN  Relationship to Patient: GRANDDAUGHTER  Phone Number: [phone]  Reason for Call: MED QUESTIONS  When was the patient last seen: CYTO DONE THIS A.MMak

## 2022-04-08 NOTE — BRIEF OP NOTE
CYSTOSCOPY WITH BULKING AGENT INJECTION  Progress Note    Cristy E Liban  4/8/2022    Pre-op Diagnosis:   Stress incontinence [N39.3]       Post-Op Diagnosis Codes:     * Stress incontinence [N39.3]    Procedure/CPT® Codes:    Procedure(s):  CYSTOSCOPY WITH BULKING AGENT INJECTION (BULKAMID)    Surgeon(s):  Sly Mariano MD    Anesthesia: General    Staff:   Circulator: Diamante Link RN; Sheila Chiang RN  Scrub Person: Alan Rodriguez  Nursing Assistant: Hanna Alexander    Estimated Blood Loss: none    Urine Voided: * No values recorded between 4/8/2022  7:32 AM and 4/8/2022  8:07 AM *    Specimens:                None    Drains: * No LDAs found *    Findings: Uncomplicated bladder neck urethral bulking, 5, 7, 10, 2 o clock positions.     Complications: None    Sly Mariano MD     Date: 4/8/2022  Time: 08:16 EDT

## 2022-04-08 NOTE — H&P
Patient Care Team:      Chief complaint:  Urinary Stress Incontinence     Subjective:  Patient is a 76 y.o.female presents with chronic history stress urinary incontinence. She complains of urine  leakage with cough, sneeze, walking, lifting, using 2 thick pads per day and a strong urine odor preventing her from getting out much in public. See office note dated 02/28/2022.  She is here today for scheduled CYSTOSCOPY WITH BULKING AGENT INJECTION (BULKAMID).      Review of Systems:  General ROS: negative for fever, chills, weakness, dizziness, headache, fatigue, weight changes  Cardiovascular ROS: no chest pain or dyspnea on exertion  Respiratory ROS: no cough, shortness of breath, or wheezing  GI ROS: no abdominal pain/discomfort, nausea, vomiting or diarrhea   ROS: no dysuria, hematuria or complaints  Skin ROS: no itching, rash or open wounds.        Allergies:   Allergies   Allergen Reactions   • Escitalopram Dizziness   Latex: no known allergy  Contrast Dye:  no known allergy      Home Meds    Medications Prior to Admission   Medication Sig Dispense Refill Last Dose   • Accu-Chek FastClix Lancets misc 1 each Daily. 102 each 1    • Accu-Chek Guide test strip 1 each by Other route Daily. 100 each 11    • Blood Glucose Monitoring Suppl (Accu-Chek Guide Me) w/Device kit 1 Device Take As Directed. 1 kit 0    • calcitriol (ROCALTROL) 0.25 MCG capsule Take 1 capsule by mouth Daily. 90 capsule 3    • dicyclomine (BENTYL) 20 MG tablet Take 1 tablet by mouth 4 (Four) Times a Day. (Patient taking differently: Take 20 mg by mouth As Needed (DIVERTICULITIS).) 100 tablet 3    • DULoxetine (CYMBALTA) 30 MG capsule Take 30 mg by mouth Daily.      • fluticasone (FLONASE) 50 MCG/ACT nasal spray 1 spray into the nostril(s) as directed by provider As Needed for Rhinitis or Allergies.      • hydroxychloroquine (PLAQUENIL) 200 MG tablet Take 200 mg by mouth 2 (Two) Times a Day.      • levothyroxine (SYNTHROID, LEVOTHROID) 100 MCG  tablet Take 1 tablet by mouth Every Morning. 90 tablet 3    • lisinopril-hydrochlorothiazide (PRINZIDE,ZESTORETIC) 20-25 MG per tablet Take 1 tablet by mouth Daily. 90 tablet 3    • Lutein 20 MG capsule Take 20 mg by mouth Daily.      • metFORMIN ER (GLUCOPHAGE-XR) 500 MG 24 hr tablet Take 1 tablet by mouth Daily With Dinner. 90 tablet 3    • omeprazole (priLOSEC) 20 MG capsule TAKE 1 CAPSULE TWICE DAILY (Patient taking differently: Take 20 mg by mouth Daily.) 180 capsule 3    • ondansetron ODT (ZOFRAN-ODT) 8 MG disintegrating tablet Take 1 tablet by mouth Every 8 (Eight) Hours As Needed for Nausea or Vomiting. 90 tablet 3      PMH:   Past Medical History:   Diagnosis Date   • Abnormal liver function tests     Description: liver bx showed fatty liver 2008   • Allergic rhinitis    • Anemia    • Anxiety disorder    • Asthma     Description: dx 1998   • Benign colonic polyp     Description: dx 2007   • Cancer (HCC)    • Cataract    • Diabetes mellitus (HCC)     Description: dx 7/10   • Disease of thyroid gland    • Diverticulosis of intestine     Description: dx 2007   • Fibromyalgia     Description: dx 2003   • Gastroesophageal reflux disease     Description: dx 2003.  EGD normal 2007.   • H/O cardiovascular stress test     12/06- normal dobutamine myoview. 12/16/13- acceptable negative Lexiscan Cardiolite test.   • History of echocardiogram     2/06- normal except ? relaxation abnormality   • History of esophagogastroduodenoscopy 06/21/2021    Reflux esophagitis with a single erosion, HH. 3/12- chronic gastritis / reflux esophagitis. 1/25/16- LA Class A refux esophagitis, small HH   • History of varicella    • Hyperlipidemia     Description: dx 1980's   • Hyperparathyroidism (HCC)     Description: dx 8/11   • Hypertension    • Osteoarthritis     Description: dx 1990's   • Osteoporosis     Description: dx 2007   • PONV (postoperative nausea and vomiting)    • Rheumatoid arthritis (HCC)     Description: dx 2003   •  "Sensorineural hearing loss (SNHL) of both ears 10/14/2021    DX October 2021 by [facility] (mild to severe loss)   • Vitamin D deficiency     Description: dx 7/10 (mild)     PSH:    Past Surgical History:   Procedure Laterality Date   • BREAST BIOPSY Right     benign   • CHOLECYSTECTOMY      1978   • COLONOSCOPY     • PARATHYROIDECTOMY Right 08/07/2018    [place], path c/w hypercellular parathyroid- Dr. Ava Jacobs ([facility])   • THYROID SURGERY  08/2018    x 2 11/2018   • THYROIDECTOMY Right 08/07/2018    Papillary Thyroid Carcinoma- Dr. Ava Jacobs ([facility])   • THYROIDECTOMY Left 11/27/2018    Papillary Thyroid Carcinoma- Dr. Ava Jacobs ([facility])   • TOTAL ABDOMINAL HYSTERECTOMY WITH SALPINGO OOPHORECTOMY Bilateral     1987, menorrhagia   • WISDOM TOOTH EXTRACTION       Immunization History: pneumonia=yes   Influenza=yes   Covid-19=yes   Tetanus=UTD    Social History:  Social History     Tobacco Use   • Smoking status: Never Smoker   • Smokeless tobacco: Never Used   Substance Use Topics   • Alcohol use: No         Physical Exam:/72 (BP Location: Right arm, Patient Position: Lying)   Pulse 76   Temp 98.4 °F (36.9 °C) (Temporal)   Resp 16   Ht 154.9 cm (61\")   Wt 72.4 kg (159 lb 9.8 oz)   SpO2 95%   BMI 30.16 kg/m²       General Appearance:    Alert, cooperative, no distress, appears stated age   Head:    Normocephalic, without obvious abnormality, atraumatic   Lungs:     Clear to auscultation bilaterally, respirations unlabored    Heart: Regular rate and rhythm, S1 and S2 normal, no murmur, rub    or gallop    Abdomen:    Soft without tenderness  +bowel sounds   Breast Exam:    deferred   Genitalia:    deferred   Extremities:   Extremities normal, atraumatic, no cyanosis or edema   Skin:   Skin color, texture, turgor normal, no rashes or lesions   Neurologic:   Grossly intact     Results Review:   LABS:  Lab Results   Component Value Date    WBC 9.19 04/06/2022    HGB 13.5 " 04/06/2022    HCT 40.3 04/06/2022    MCV 91.6 04/06/2022     04/06/2022    NEUTROABS 5.69 04/27/2021    GLUCOSE 100 (H) 04/06/2022    BUN 19 04/06/2022    CREATININE 1.09 (H) 04/06/2022    EGFRIFNONA 51 (L) 11/29/2021     04/06/2022    K 3.8 04/06/2022     04/06/2022    CO2 26.0 04/06/2022    MG 1.9 10/20/2020    PHOS 3.9 11/29/2021    CALCIUM 8.9 04/06/2022    ALBUMIN 4.70 11/29/2021    AST 27 04/27/2021    ALT 30 04/27/2021    BILITOT 0.3 04/27/2021       RADIOLOGY:  Imaging Results (Last 72 Hours)     ** No results found for the last 72 hours. **          Cancer Staging (if applicable):  Cancer Patient: __ yes __no __unknown; If yes, clinical stage T:__ N:__M:__, stage group    Impression:  Urinary Stress Incontinence    Plan:  CYSTOSCOPY WITH BULKING AGENT INJECTION (BULKAMID)      ALEE Gonzales 4/8/2022 06:47 EDT

## 2022-04-08 NOTE — DISCHARGE INSTRUCTIONS
URETHRAL BULKING INJECTIONS Post-Operative Instructions    Please follow these guidelines after your procedure in order to assist with your recovery.     Anesthesia Precautions and Expectations  - Rest for 24 hours after receiving general anesthesia, make sure you have someone at home with you that can monitor you  - Do not operate a vehicle, drink alcohol, or make 'important decisions'/sign legal documentation during the immediate recovery period if you received sedation for your procedure  - You may experience a sore throat, jaw discomfort, or muscle aches related to anesthesia, these symptoms may last a few days    Activity  - Light activity is encouraged for 1 week to prevent urinary bleeding  - Avoid lifting heavy objects more than 20 lbs, running, or endurance exercise for 1 week     Bathing/Showering  - You may resume normal bathing and showering post-procedure    Pain/Urinary Symptoms  - You may experience burning urinary pain for a few days, and/or increased urinary urgency/frequency post-procedure for a few weeks which is expected  - A medication to treat burning urinary pain (Phenazopyridine) may be prescribed by your doctor, take as directed  - A medication to prevent urinary urgency/frequency (sometimes referred to as “bladder spasms”) (Hyoscyamine, Oxybutynin, Mirabegron, Solifenacin, etc.) may be prescribed by your doctor for up to 1 month, take as directed     Urinary Bleeding (Hematuria)   - Some degree of light urinary bleeding (hematuria) is expected for up to 1 weeks (this may be as light as pink lemonade or somewhat darker like clear/pale red Gatorade); a good rule of thumb is that your urine should remain see-through    - If you experience heavy urinary bleeding (like the color and consistency of tomato juice, or red wine), large blood clots, or you are unable to urinate for more than 8 hours you should contact your doctor and present to the nearest Emergency Department  - Drink plenty of water  at home and stay hydrated, as this will help naturally flush out your bladder and urethra    Please contact your physician if you are experiencing the following symptoms after your procedure:    Fever >100 Farenheit, chills, nausea, vomiting, severe abdominal or chest pain, severe urinary bleeding (darker than red fruit punch), urinary clots, catheter stops draining, or unable to urinate for more than 8 hours.     Please call the [facility] mainline at [phone], the Urology office at [phone], or present to your nearest Emergency Department if you have ongoing concerns mentioned above.

## 2022-04-08 NOTE — TELEPHONE ENCOUNTER
carla Mccoy of pt call and states pt had surgery today, and would like to know if its normal for pt to now have urine incontinence.        Dr. Mariano, please advise. Thank you.

## 2022-04-08 NOTE — ANESTHESIA POSTPROCEDURE EVALUATION
Patient: Cristy Louie    Procedure Summary     Date: 04/08/22 Room / Location:  [facility] [address] [place] [address] [facility]    Anesthesia Start: 0732 Anesthesia Stop: 0813    Procedure: CYSTOSCOPY WITH BULKING AGENT INJECTION (BULKAMID) (N/A ) Diagnosis:       Stress incontinence      (Stress incontinence [N39.3])    Surgeons: Sly Mariano MD Provider: Jeffrey Beard MD    Anesthesia Type: general ASA Status: 3          Anesthesia Type: general    Vitals  Vitals Value Taken Time   /84 04/08/22 0835   Temp 97.1 °F (36.2 °C) 04/08/22 0815   Pulse 72 04/08/22 0839   Resp 16 04/08/22 0815   SpO2 92 % 04/08/22 0839   Vitals shown include unvalidated device data.        Post Anesthesia Care and Evaluation    Patient location during evaluation: PACU  Patient participation: complete - patient participated  Level of consciousness: awake and alert  Pain management: adequate  Airway patency: patent  Anesthetic complications: No anesthetic complications  PONV Status: none  Cardiovascular status: hemodynamically stable and acceptable  Respiratory status: nonlabored ventilation, acceptable and nasal cannula  Hydration status: acceptable

## 2022-04-08 NOTE — ANESTHESIA PREPROCEDURE EVALUATION
Anesthesia Evaluation     Patient summary reviewed and Nursing notes reviewed   history of anesthetic complications: PONV               Airway   Mallampati: II  TM distance: >3 FB  Neck ROM: full  No difficulty expected  Dental - normal exam     Pulmonary - normal exam   (+) asthma,  Cardiovascular - normal exam    (+) hypertension, hyperlipidemia,       Neuro/Psych- negative ROS  GI/Hepatic/Renal/Endo    (+)  GERD,  renal disease CRI, diabetes mellitus, thyroid problem hypothyroidism    Musculoskeletal (-) negative ROS    Abdominal  - normal exam    Bowel sounds: normal.   Substance History - negative use     OB/GYN negative ob/gyn ROS         Other                        Anesthesia Plan    ASA 3     general     intravenous induction     Anesthetic plan, all risks, benefits, and alternatives have been provided, discussed and informed consent has been obtained with: patient.    Plan discussed with CRNA.        CODE STATUS:

## 2022-04-08 NOTE — ANESTHESIA PROCEDURE NOTES
Airway  Urgency: elective    Date/Time: 4/8/2022 7:37 AM  End Time:4/8/2022 7:40 AM  Airway not difficult    General Information and Staff    Patient location during procedure: OR  CRNA: Donna Rae CRNA    Indications and Patient Condition  Indications for airway management: airway protection    Preoxygenated: yes  Mask difficulty assessment: 0 - not attempted    Final Airway Details  Final airway type: supraglottic airway      Successful airway: I-gel  Size 4    Number of attempts at approach: 1  Assessment: lips, teeth, and gum same as pre-op    Additional Comments  LMA placed without difficulty, ventilation with assist, equal breath sounds and symmetric chest rise and fall

## 2022-04-20 ENCOUNTER — OFFICE VISIT (OUTPATIENT)
Dept: ENDOCRINOLOGY | Facility: CLINIC | Age: 77
End: 2022-04-20

## 2022-04-20 VITALS
SYSTOLIC BLOOD PRESSURE: 130 MMHG | HEIGHT: 61 IN | OXYGEN SATURATION: 97 % | DIASTOLIC BLOOD PRESSURE: 74 MMHG | HEART RATE: 85 BPM | WEIGHT: 162 LBS | BODY MASS INDEX: 30.58 KG/M2

## 2022-04-20 DIAGNOSIS — C73 THYROID CANCER: ICD-10-CM

## 2022-04-20 DIAGNOSIS — E89.2 POST-SURGICAL HYPOPARATHYROIDISM: ICD-10-CM

## 2022-04-20 DIAGNOSIS — E11.9 TYPE 2 DIABETES MELLITUS WITHOUT COMPLICATION, WITHOUT LONG-TERM CURRENT USE OF INSULIN: ICD-10-CM

## 2022-04-20 DIAGNOSIS — E89.0 POST-SURGICAL HYPOTHYROIDISM: Primary | ICD-10-CM

## 2022-04-20 LAB
EXPIRATION DATE: NORMAL
GLUCOSE BLDC GLUCOMTR-MCNC: 126 MG/DL (ref 70–130)
HBA1C MFR BLD: 7.4 %
Lab: NORMAL

## 2022-04-20 PROCEDURE — 99214 OFFICE O/P EST MOD 30 MIN: CPT | Performed by: INTERNAL MEDICINE

## 2022-04-20 PROCEDURE — 3051F HG A1C>EQUAL 7.0%<8.0%: CPT | Performed by: INTERNAL MEDICINE

## 2022-04-20 PROCEDURE — 82947 ASSAY GLUCOSE BLOOD QUANT: CPT | Performed by: INTERNAL MEDICINE

## 2022-04-20 PROCEDURE — 83036 HEMOGLOBIN GLYCOSYLATED A1C: CPT | Performed by: INTERNAL MEDICINE

## 2022-04-20 RX ORDER — LEVOTHYROXINE SODIUM 0.1 MG/1
100 TABLET ORAL
Qty: 90 TABLET | Refills: 3 | Status: SHIPPED | OUTPATIENT
Start: 2022-04-20 | End: 2022-06-23 | Stop reason: DRUGHIGH

## 2022-04-20 RX ORDER — METFORMIN HYDROCHLORIDE 500 MG/1
500 TABLET, EXTENDED RELEASE ORAL
Qty: 90 TABLET | Refills: 3 | Status: SHIPPED | OUTPATIENT
Start: 2022-04-20 | End: 2022-10-26 | Stop reason: SDUPTHER

## 2022-04-20 RX ORDER — BLOOD-GLUCOSE METER
1 EACH MISCELLANEOUS TAKE AS DIRECTED
Qty: 1 KIT | Refills: 0 | Status: SHIPPED | OUTPATIENT
Start: 2022-04-20

## 2022-04-20 RX ORDER — LANCETS
1 EACH MISCELLANEOUS DAILY
Qty: 50 EACH | Refills: 11 | Status: SHIPPED | OUTPATIENT
Start: 2022-04-20

## 2022-04-20 RX ORDER — CALCITRIOL 0.25 UG/1
0.25 CAPSULE, LIQUID FILLED ORAL DAILY
Qty: 90 CAPSULE | Refills: 3 | Status: SHIPPED | OUTPATIENT
Start: 2022-04-20

## 2022-04-20 RX ORDER — BLOOD SUGAR DIAGNOSTIC
1 STRIP MISCELLANEOUS DAILY
Qty: 50 EACH | Refills: 11 | Status: SHIPPED | OUTPATIENT
Start: 2022-04-20

## 2022-04-25 ENCOUNTER — OFFICE VISIT (OUTPATIENT)
Dept: UROLOGY | Facility: CLINIC | Age: 77
End: 2022-04-25

## 2022-04-25 VITALS
HEART RATE: 87 BPM | DIASTOLIC BLOOD PRESSURE: 78 MMHG | OXYGEN SATURATION: 97 % | BODY MASS INDEX: 30.58 KG/M2 | WEIGHT: 162 LBS | SYSTOLIC BLOOD PRESSURE: 122 MMHG | HEIGHT: 61 IN

## 2022-04-25 DIAGNOSIS — N39.3 STRESS INCONTINENCE: Primary | ICD-10-CM

## 2022-04-25 LAB
BILIRUB BLD-MCNC: ABNORMAL MG/DL
CLARITY, POC: ABNORMAL
COLOR UR: YELLOW
EXPIRATION DATE: ABNORMAL
GLUCOSE UR STRIP-MCNC: NEGATIVE MG/DL
KETONES UR QL: ABNORMAL
LEUKOCYTE EST, POC: ABNORMAL
Lab: ABNORMAL
NITRITE UR-MCNC: NEGATIVE MG/ML
PH UR: 6 [PH] (ref 5–8)
PROT UR STRIP-MCNC: ABNORMAL MG/DL
RBC # UR STRIP: NEGATIVE /UL
SP GR UR: 1.03 (ref 1–1.03)
UROBILINOGEN UR QL: NORMAL

## 2022-04-25 PROCEDURE — 51798 US URINE CAPACITY MEASURE: CPT | Performed by: STUDENT IN AN ORGANIZED HEALTH CARE EDUCATION/TRAINING PROGRAM

## 2022-04-25 PROCEDURE — 81003 URINALYSIS AUTO W/O SCOPE: CPT | Performed by: STUDENT IN AN ORGANIZED HEALTH CARE EDUCATION/TRAINING PROGRAM

## 2022-04-25 PROCEDURE — 99213 OFFICE O/P EST LOW 20 MIN: CPT | Performed by: STUDENT IN AN ORGANIZED HEALTH CARE EDUCATION/TRAINING PROGRAM

## 2022-04-25 NOTE — PROGRESS NOTES
"     Follow Up Office Visit      Patient Name: Cristy Louie  : 1945   MRN: [id number]     Chief Complaint:    Chief Complaint   Patient presents with   • follow up   • Urinary Incontinence       Referring Provider: No ref. provider found    History of Present Illness: Cristy Louie is a 76 y.o. female who presents today for post-operative follow up.     Patient is s/p Bulkamid urethral bulking [facility].     She reports after Bulkamid injections she stopped wearing heavy pads altogether as her stress incontinence had resolved. Prior to surgery was using 1-2 heavy pads per day.     Patient reports she's \"now disappointed\" in results because she is having a severe allergy attack with persistent coughing and reports she's starting to have some urinary leakage again and she put a pad back on her underwear.  Otherwise she is not having any dysuria, hematuria, no fevers or chills at home.    I discussed with the patient that the limitation of pad use after urethral bulking injections would be considered significant improvement, and that given her significant coughing fits which are ongoing in the clinic visit today, this will explain some persistent stress incontinence despite surgery.    PVR 7 mL, no concerns for retention at this time.  Patient reports she has a stable strong urinary stream.      Subjective      Review of System: Review of Systems   Constitutional: Negative for chills, fatigue, fever and unexpected weight change.   HENT: Negative for sore throat.    Eyes: Negative for visual disturbance.   Respiratory: Negative for cough, chest tightness and shortness of breath.    Cardiovascular: Negative for chest pain and leg swelling.   Gastrointestinal: Negative for blood in stool, constipation, diarrhea, nausea, rectal pain and vomiting.   Genitourinary: Positive for urgency. Negative for decreased urine volume, difficulty urinating, dysuria, enuresis, flank pain, frequency, " genital sores and hematuria.   Musculoskeletal: Negative for back pain and joint swelling.   Skin: Negative for rash and wound.   Neurological: Negative for seizures, speech difficulty, weakness and headaches.   Psychiatric/Behavioral: Negative for confusion, sleep disturbance and suicidal ideas. The patient is not nervous/anxious.       I have reviewed the ROS documented by my clinical staff, I have updated appropriately and I agree. Sly Mariano MD    I have reviewed and the following portions of the patient's history were updated as appropriate: past family history, past medical history, past social history, past surgical history and problem list.    Medications:     Current Outpatient Medications:   •  Accu-Chek FastClix Lancets misc, 1 each Daily., Disp: 50 each, Rfl: 11  •  Accu-Chek Guide test strip, 1 each by Other route Daily., Disp: 50 each, Rfl: 11  •  Blood Glucose Monitoring Suppl (Accu-Chek Guide Me) w/Device kit, 1 Device Take As Directed., Disp: 1 kit, Rfl: 0  •  calcitriol (ROCALTROL) 0.25 MCG capsule, Take 1 capsule by mouth Daily., Disp: 90 capsule, Rfl: 3  •  dicyclomine (BENTYL) 20 MG tablet, Take 1 tablet by mouth 4 (Four) Times a Day. (Patient taking differently: Take 20 mg by mouth As Needed (DIVERTICULITIS).), Disp: 100 tablet, Rfl: 3  •  DULoxetine (CYMBALTA) 30 MG capsule, Take 30 mg by mouth Daily., Disp: , Rfl:   •  fluticasone (FLONASE) 50 MCG/ACT nasal spray, 1 spray into the nostril(s) as directed by provider As Needed for Rhinitis or Allergies., Disp: , Rfl:   •  hydroxychloroquine (PLAQUENIL) 200 MG tablet, Take 200 mg by mouth 2 (Two) Times a Day., Disp: , Rfl:   •  Hyoscyamine Sulfate SL (Levsin/SL) 0.125 MG sublingual tablet, Place 0.125 mg under the tongue Every 4 (Four) Hours As Needed (bladder urgency/frequency)., Disp: 30 each, Rfl: 0  •  levothyroxine (SYNTHROID, LEVOTHROID) 100 MCG tablet, Take 1 tablet by mouth Every Morning., Disp: 90 tablet, Rfl: 3  •   "lisinopril-hydrochlorothiazide (PRINZIDE,ZESTORETIC) 20-25 MG per tablet, Take 1 tablet by mouth Daily., Disp: 90 tablet, Rfl: 3  •  Lutein 20 MG capsule, Take 20 mg by mouth Daily., Disp: , Rfl:   •  metFORMIN ER (GLUCOPHAGE-XR) 500 MG 24 hr tablet, Take 1 tablet by mouth Daily With Dinner., Disp: 90 tablet, Rfl: 3  •  omeprazole (priLOSEC) 20 MG capsule, TAKE 1 CAPSULE TWICE DAILY (Patient taking differently: Take 20 mg by mouth Daily.), Disp: 180 capsule, Rfl: 3  •  ondansetron ODT (ZOFRAN-ODT) 8 MG disintegrating tablet, Take 1 tablet by mouth Every 8 (Eight) Hours As Needed for Nausea or Vomiting., Disp: 90 tablet, Rfl: 3  •  phenazopyridine (Pyridium) 200 MG tablet, Take 1 tablet by mouth 3 (Three) Times a Day As Needed (Pain with urination)., Disp: 20 tablet, Rfl: 0  •  simvastatin (ZOCOR) 40 MG tablet, Take 40 mg by mouth Every Night., Disp: , Rfl:     Allergies:   Allergies   Allergen Reactions   • Escitalopram Dizziness          Post void residual bladder scan:   7 mL    Objective     Physical Exam:   Vital Signs:   Vitals:    04/25/22 1630   BP: 122/78   Pulse: 87   SpO2: 97%   Weight: 73.5 kg (162 lb)   Height: 154.9 cm (61\")     Body mass index is 30.61 kg/m².     Physical Exam  Vitals and nursing note reviewed. Exam conducted with a chaperone present.   Constitutional:       Appearance: Normal appearance.   HENT:      Head: Normocephalic and atraumatic.      Mouth/Throat:      Mouth: Mucous membranes are moist.      Pharynx: Oropharynx is clear.   Eyes:      Extraocular Movements: Extraocular movements intact.      Conjunctiva/sclera: Conjunctivae normal.   Cardiovascular:      Rate and Rhythm: Normal rate and regular rhythm.   Pulmonary:      Effort: Pulmonary effort is normal. No respiratory distress.   Abdominal:      Palpations: Abdomen is soft.      Tenderness: There is no abdominal tenderness. There is no right CVA tenderness or left CVA tenderness.   Genitourinary:     Comments: " Deferred  Musculoskeletal:         General: Normal range of motion.      Cervical back: Normal range of motion.   Skin:     General: Skin is warm and dry.   Neurological:      General: No focal deficit present.      Mental Status: She is alert and oriented to person, place, and time.   Psychiatric:         Mood and Affect: Mood normal.         Behavior: Behavior normal.         Labs:   Brief Urine Lab Results  (Last result in the past 365 days)      Color   Clarity   Blood   Leuk Est   Nitrite   Protein   CREAT   Urine HCG        04/25/22 1648 Yellow   Slightly Cloudy   Negative   Trace   Negative   1+                 Urine Culture    Urine Culture 2/28/22   Urine Culture No growth              Lab Results   Component Value Date    GLUCOSE 100 (H) 04/06/2022    CALCIUM 8.9 04/06/2022     04/06/2022    K 3.8 04/06/2022    CO2 26.0 04/06/2022     04/06/2022    BUN 19 04/06/2022    CREATININE 1.09 (H) 04/06/2022    EGFRIFNONA 51 (L) 11/29/2021    BCR 17.4 04/06/2022    ANIONGAP 14.0 04/06/2022       Lab Results   Component Value Date    WBC 9.19 04/06/2022    HGB 13.5 04/06/2022    HCT 40.3 04/06/2022    MCV 91.6 04/06/2022     04/06/2022       Images:   No Images in the past 120 days found..    Measures:   Tobacco:   Cristy Louie  reports that she has never smoked. She has never used smokeless tobacco..          Urine Incontinence: ( NOUI)  Patient reports that she is not currently experiencing any symptoms of urinary incontinence.      Assessment / Plan      Assessment/Plan:   76 y.o. female who presented today for follow up of stress urinary incontinence s/p urethral bulking injections with Bulkamid on 4/8/22.  Significant improvement in her incontinence, for the last few weeks she has not been wearing any heavy pads and has denied any incontinence whatsoever.  Unfortunately she is dealing with a severe allergy attack, she has some nasal and bronchial congestion and is having a coughing fit in  my clinic.  She states this is intermittent.  As she is coughing and straining significantly she is having a little bit of leakage requiring a heavy pad.  Discussed with the patient we will monitor her, when she is over her allergies I would like for her to see how her incontinence goes over the next 3 months we will reassess, if no significant improvement or continued leakage we can consider repeating Bulkamid, which is a viable option for completion urethral bulking if there are still some bladder neck areas that would benefit.    Diagnoses and all orders for this visit:    1. Stress incontinence (Primary)  -Status post urethral bulking injection w/Bulkamid, significant initial improvement for the last 3 weeks, some residual stress incontinence that she is having persistent coughing attacks related to allergies  -Kegels discussed, continue monitoring  -See me back in 3 months for reevaluation  -     POC Urinalysis Dipstick, Automated      Follow Up:   Return in about 3 months (around 7/25/2022).    I spent approximately 30 minutes providing clinical care for this patient; including review of patient's chart and provider documentation, face to face time spent with patient in examination room (obtaining history, performing physical exam, discussing diagnosis and management options), placing orders, and completing patient documentation.     Sly Mariano MD  [facility] [place]

## 2022-05-27 NOTE — TELEPHONE ENCOUNTER
----- Message from Heidi Diaz V sent at 2/21/2018  2:25 PM EST -----  PT WANTS TO KNOW WHY HE STOMACH MEDICATION WAS DENIED.    SHE CAN BE REACHED [phone]   intact

## 2022-06-21 ENCOUNTER — LAB (OUTPATIENT)
Dept: LAB | Facility: HOSPITAL | Age: 77
End: 2022-06-21

## 2022-06-21 ENCOUNTER — HOSPITAL ENCOUNTER (OUTPATIENT)
Dept: MAMMOGRAPHY | Facility: HOSPITAL | Age: 77
Discharge: HOME | End: 2022-06-21

## 2022-06-21 ENCOUNTER — HOSPITAL ENCOUNTER (OUTPATIENT)
Dept: BONE DENSITY | Facility: HOSPITAL | Age: 77
Discharge: HOME | End: 2022-06-21

## 2022-06-21 DIAGNOSIS — E89.2 POST-SURGICAL HYPOPARATHYROIDISM: ICD-10-CM

## 2022-06-21 DIAGNOSIS — Z12.31 ENCOUNTER FOR SCREENING MAMMOGRAM FOR MALIGNANT NEOPLASM OF BREAST: ICD-10-CM

## 2022-06-21 DIAGNOSIS — E89.0 POST-SURGICAL HYPOTHYROIDISM: ICD-10-CM

## 2022-06-21 DIAGNOSIS — M85.89 OSTEOPENIA OF MULTIPLE SITES: ICD-10-CM

## 2022-06-21 DIAGNOSIS — M85.88 OTHER SPECIFIED DISORDERS OF BONE DENSITY AND STRUCTURE, OTHER SITE: ICD-10-CM

## 2022-06-21 PROCEDURE — 77080 DXA BONE DENSITY AXIAL: CPT

## 2022-06-21 PROCEDURE — 84443 ASSAY THYROID STIM HORMONE: CPT

## 2022-06-21 PROCEDURE — 77067 SCR MAMMO BI INCL CAD: CPT | Performed by: RADIOLOGY

## 2022-06-21 PROCEDURE — 77067 SCR MAMMO BI INCL CAD: CPT

## 2022-06-21 PROCEDURE — 80053 COMPREHEN METABOLIC PANEL: CPT

## 2022-06-21 PROCEDURE — 77063 BREAST TOMOSYNTHESIS BI: CPT

## 2022-06-21 PROCEDURE — 77063 BREAST TOMOSYNTHESIS BI: CPT | Performed by: RADIOLOGY

## 2022-06-22 LAB
ALBUMIN SERPL-MCNC: 4.7 G/DL (ref 3.5–5.2)
ALBUMIN/GLOB SERPL: 1.5 G/DL
ALP SERPL-CCNC: 100 U/L (ref 39–117)
ALT SERPL W P-5'-P-CCNC: 18 U/L (ref 1–33)
ANION GAP SERPL CALCULATED.3IONS-SCNC: 13 MMOL/L (ref 5–15)
AST SERPL-CCNC: 19 U/L (ref 1–32)
BILIRUB SERPL-MCNC: 0.4 MG/DL (ref 0–1.2)
BUN SERPL-MCNC: 16 MG/DL (ref 8–23)
BUN/CREAT SERPL: 16.8 (ref 7–25)
CALCIUM SPEC-SCNC: 9.3 MG/DL (ref 8.6–10.5)
CHLORIDE SERPL-SCNC: 99 MMOL/L (ref 98–107)
CO2 SERPL-SCNC: 28 MMOL/L (ref 22–29)
CREAT SERPL-MCNC: 0.95 MG/DL (ref 0.57–1)
EGFRCR SERPLBLD CKD-EPI 2021: 61.5 ML/MIN/1.73
GLOBULIN UR ELPH-MCNC: 3.1 GM/DL
GLUCOSE SERPL-MCNC: 151 MG/DL (ref 65–99)
POTASSIUM SERPL-SCNC: 3.5 MMOL/L (ref 3.5–5.2)
PROT SERPL-MCNC: 7.8 G/DL (ref 6–8.5)
SODIUM SERPL-SCNC: 140 MMOL/L (ref 136–145)
TSH SERPL DL<=0.05 MIU/L-ACNC: 0.14 UIU/ML (ref 0.27–4.2)

## 2022-06-23 DIAGNOSIS — E89.0 POST-SURGICAL HYPOTHYROIDISM: Primary | ICD-10-CM

## 2022-06-23 RX ORDER — LEVOTHYROXINE SODIUM 88 UG/1
88 TABLET ORAL EVERY MORNING
Qty: 90 TABLET | Refills: 1 | Status: SHIPPED | OUTPATIENT
Start: 2022-06-23 | End: 2022-10-26 | Stop reason: DRUGHIGH

## 2022-07-25 ENCOUNTER — OFFICE VISIT (OUTPATIENT)
Dept: UROLOGY | Facility: CLINIC | Age: 77
End: 2022-07-25

## 2022-07-25 VITALS
BODY MASS INDEX: 30.58 KG/M2 | SYSTOLIC BLOOD PRESSURE: 138 MMHG | HEART RATE: 74 BPM | WEIGHT: 162 LBS | HEIGHT: 61 IN | DIASTOLIC BLOOD PRESSURE: 76 MMHG | OXYGEN SATURATION: 95 %

## 2022-07-25 DIAGNOSIS — N32.81 OVERACTIVE BLADDER: Primary | ICD-10-CM

## 2022-07-25 DIAGNOSIS — N39.3 STRESS INCONTINENCE: ICD-10-CM

## 2022-07-25 LAB
BILIRUB BLD-MCNC: NEGATIVE MG/DL
CLARITY, POC: CLEAR
COLOR UR: YELLOW
EXPIRATION DATE: NORMAL
GLUCOSE UR STRIP-MCNC: NEGATIVE MG/DL
KETONES UR QL: NEGATIVE
LEUKOCYTE EST, POC: NEGATIVE
Lab: NORMAL
NITRITE UR-MCNC: NEGATIVE MG/ML
PH UR: 6 [PH] (ref 5–8)
PROT UR STRIP-MCNC: NEGATIVE MG/DL
RBC # UR STRIP: NEGATIVE /UL
SP GR UR: 1.03 (ref 1–1.03)
UROBILINOGEN UR QL: NORMAL

## 2022-07-25 PROCEDURE — 51798 US URINE CAPACITY MEASURE: CPT | Performed by: STUDENT IN AN ORGANIZED HEALTH CARE EDUCATION/TRAINING PROGRAM

## 2022-07-25 PROCEDURE — 99214 OFFICE O/P EST MOD 30 MIN: CPT | Performed by: STUDENT IN AN ORGANIZED HEALTH CARE EDUCATION/TRAINING PROGRAM

## 2022-07-25 PROCEDURE — 81003 URINALYSIS AUTO W/O SCOPE: CPT | Performed by: STUDENT IN AN ORGANIZED HEALTH CARE EDUCATION/TRAINING PROGRAM

## 2022-07-25 NOTE — PROGRESS NOTES
Follow Up Office Visit      Patient Name: Cristy Louie  : 1945   MRN: [id number]     Chief Complaint:    Chief Complaint   Patient presents with   • Urinary Incontinence       Referring Provider: No ref. provider found    History of Present Illness: Cristy Louie is a 76 y.o. female who presents today for follow up of urinary incontinence.  Patient has a history    history significant for stage III chronic kidney disease, type 2 diabetes on Metformin, last hemoglobin A1c 7., chronic constipation, diverticulosis, right renal cysts.     Patient has a history of mixed urinary incontinence likely, she was primarily struggle with stress urinary incontinence and was originally evaluated in 2021.  For treatment of her symptoms she proceeded to the operating room 2022 for cystoscopy and urethral bulking injections with Bulkamid.     She saw me for postoperative follow-up 2022 at which point her stress urinary incontinence had significantly improved originally however given ongoing allergies and chronic coughing, she states she was still leaking quite a bit.    Since I have last seen her, she states things have calmed down a bit but she does still have some un-sensed urinary incontinence, and she is only using roughly 2 medium pads per day.  She states these are somewhat damp throughout the day and she changes them at least once.    She states she is disappointed in the results of her procedure, and still struggling with leakage.  She is a poor historian in regards to her urinary symptoms at this time, she is not sure if her incontinence is preceded by sensation of urgency or whether this is pure stress incontinence associated with abdominal pressure.  She seems to suggest the urine comes out randomly.    She reports a fairly strong stream and a sensation that she is completely emptying her bladder.  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    Subjective      Review of System: Review of Systems   Constitutional: Negative for chills, fatigue, fever and unexpected weight change.   HENT: Negative for sore throat.    Eyes: Negative for visual disturbance.   Respiratory: Negative for cough, chest tightness and shortness of breath.    Cardiovascular: Negative for chest pain and leg swelling.   Gastrointestinal: Negative for blood in stool, constipation, diarrhea, nausea, rectal pain and vomiting.   Genitourinary: Positive for urgency. Negative for decreased urine volume, difficulty urinating, dysuria, enuresis, flank pain, frequency, genital sores and hematuria.   Musculoskeletal: Negative for back pain and joint swelling.   Skin: Negative for rash and wound.   Neurological: Negative for seizures, speech difficulty, weakness and headaches.   Psychiatric/Behavioral: Negative for confusion, sleep disturbance and suicidal ideas. The patient is not nervous/anxious.       I have reviewed the ROS documented by my clinical staff, I have updated appropriately and I agree. Sly Mariano MD    I have reviewed and the following portions of the patient's history were updated as appropriate: past family history, past medical history, past social history, past surgical history and problem list.    Medications:     Current Outpatient Medications:   •  Accu-Chek FastClix Lancets misc, 1 each Daily., Disp: 50 each, Rfl: 11  •  Accu-Chek Guide test strip, 1 each by Other route Daily., Disp: 50 each, Rfl: 11  •  Blood Glucose Monitoring Suppl (Accu-Chek Guide Me) w/Device kit, 1 Device Take As Directed., Disp: 1 kit, Rfl: 0  •  calcitriol (ROCALTROL) 0.25 MCG capsule, Take 1 capsule by mouth Daily., Disp: 90 capsule, Rfl: 3  •  dicyclomine (BENTYL) 20 MG tablet, Take 1 tablet by mouth 4 (Four) Times a Day. (Patient taking differently: Take 20 mg by mouth As Needed (DIVERTICULITIS).), Disp: 100 tablet, Rfl: 3  •  DULoxetine  "(CYMBALTA) 30 MG capsule, Take 30 mg by mouth Daily., Disp: , Rfl:   •  fluticasone (FLONASE) 50 MCG/ACT nasal spray, 1 spray into the nostril(s) as directed by provider As Needed for Rhinitis or Allergies., Disp: , Rfl:   •  hydroxychloroquine (PLAQUENIL) 200 MG tablet, Take 200 mg by mouth 2 (Two) Times a Day., Disp: , Rfl:   •  Hyoscyamine Sulfate SL (Levsin/SL) 0.125 MG sublingual tablet, Place 0.125 mg under the tongue Every 4 (Four) Hours As Needed (bladder urgency/frequency)., Disp: 30 each, Rfl: 0  •  levothyroxine (SYNTHROID, LEVOTHROID) 88 MCG tablet, Take 1 tablet by mouth Every Morning., Disp: 90 tablet, Rfl: 1  •  lisinopril-hydrochlorothiazide (PRINZIDE,ZESTORETIC) 20-25 MG per tablet, Take 1 tablet by mouth Daily., Disp: 90 tablet, Rfl: 3  •  Lutein 20 MG capsule, Take 20 mg by mouth Daily., Disp: , Rfl:   •  metFORMIN ER (GLUCOPHAGE-XR) 500 MG 24 hr tablet, Take 1 tablet by mouth Daily With Dinner., Disp: 90 tablet, Rfl: 3  •  omeprazole (priLOSEC) 20 MG capsule, TAKE 1 CAPSULE TWICE DAILY (Patient taking differently: Take 20 mg by mouth Daily.), Disp: 180 capsule, Rfl: 3  •  ondansetron ODT (ZOFRAN-ODT) 8 MG disintegrating tablet, Take 1 tablet by mouth Every 8 (Eight) Hours As Needed for Nausea or Vomiting., Disp: 90 tablet, Rfl: 3  •  phenazopyridine (Pyridium) 200 MG tablet, Take 1 tablet by mouth 3 (Three) Times a Day As Needed (Pain with urination)., Disp: 20 tablet, Rfl: 0  •  simvastatin (ZOCOR) 40 MG tablet, Take 40 mg by mouth Every Night., Disp: , Rfl:   •  Mirabegron ER (Myrbetriq) 25 MG tablet sustained-release 24 hour 24 hr tablet, Take 1 tablet by mouth Daily., Disp: 30 tablet, Rfl: 0    Allergies:   Allergies   Allergen Reactions   • Escitalopram Dizziness         Post void residual bladder scan:   0 mL    Objective     Physical Exam:   Vital Signs:   Vitals:    07/25/22 1534   BP: 138/76   Pulse: 74   SpO2: 95%   Weight: 73.5 kg (162 lb)   Height: 154.9 cm (61\")     Body mass index " is 30.61 kg/m².     Physical Exam  Constitutional:       Appearance: Normal appearance.   HENT:      Head: Normocephalic and atraumatic.      Nose: Nose normal.      Mouth/Throat:      Mouth: Mucous membranes are moist.      Pharynx: Oropharynx is clear.   Eyes:      Extraocular Movements: Extraocular movements intact.      Pupils: Pupils are equal, round, and reactive to light.   Pulmonary:      Effort: Pulmonary effort is normal. No respiratory distress.   Musculoskeletal:         General: No swelling or deformity. Normal range of motion.      Cervical back: Normal range of motion and neck supple.   Skin:     General: Skin is warm and dry.   Neurological:      General: No focal deficit present.      Mental Status: She is alert and oriented to person, place, and time. Mental status is at baseline.   Psychiatric:         Mood and Affect: Mood normal.         Behavior: Behavior normal.         Labs:   Brief Urine Lab Results  (Last result in the past 365 days)      Color   Clarity   Blood   Leuk Est   Nitrite   Protein   CREAT   Urine HCG        07/25/22 1556 Yellow   Clear   Negative   Negative   Negative   Negative                 Urine Culture    Urine Culture 2/28/22   Urine Culture No growth              Lab Results   Component Value Date    GLUCOSE 151 (H) 06/21/2022    CALCIUM 9.3 06/21/2022     06/21/2022    K 3.5 06/21/2022    CO2 28.0 06/21/2022    CL 99 06/21/2022    BUN 16 06/21/2022    CREATININE 0.95 06/21/2022    EGFRIFNONA 51 (L) 11/29/2021    BCR 16.8 06/21/2022    ANIONGAP 13.0 06/21/2022       Lab Results   Component Value Date    WBC 9.19 04/06/2022    HGB 13.5 04/06/2022    HCT 40.3 04/06/2022    MCV 91.6 04/06/2022     04/06/2022       Images:   DEXA Bone Density Axial    Result Date: 6/22/2022  Osteopenia of the L1-L4 vertebrae, and right femoral neck.  The ten year fracture risk assessment is calculated at 15% for major systemic osteoporotic fracture and 3.4% for a hip fracture.  Less than 3% risk in the [place] for hip fracture and less than 20% risk of any systemic osteoporotic fracture is considered less than the threshold for where pharmacological therapy is recommended by the National Osteoporosis Foundation.  All the treatment decisions require clinical judgment and consideration of individual patient factors, including patient preferences, co-morbidities, previous drug use, risk factors not captured in the FRAX model (frailty, falls, vitamin D deficiency, increased bone turnover, interval significant decline in bone density) and possible under or over estimation of fracture risk by FRAX. Approaches to reduce osteoporosis related fracture risk include optimizing calcium and vitamin D status, appropriate weight bearing exercises and fall-prevention measurements. The National Osteoporosis Foundation recommends (http://www.nof.org/hcp/practice/practice-and-clinical-guidelines/clinic ans-guide) that FDA-approved medical therapies be considered in postmenopausal women and men aged equal or greater than 50 years with : a) hip or vertebral (clinical or morphometric) fracture; b) T-score of -2.5 or less at the spine or hip; c) Ten-year fracture probability by FRAX of greater than 3% for hip fracture of greater than 20% for major osteoporotic fracture.   Secondary causes of bone loss should be evaluated if clinically indicated since the etiology of low BMD cannot be determined by BMD measurement alone.  FOLLOWUP: Consider repeating the study in 2-3 years to reassess the patient's status or sooner if there is some new clinical indication.  INTERVAL CHANGE:   There was an increase in bone mineral density of the L1-L4 vertebrae by 10.0%, total right hip by 15.4%, one third right forearm by 1.3% when compared to previous study performed on 10/19/2017. Significant increase in bone mineral density may be due to sclerotic, and/or arthropathic changes, as the patient has not reported a history  with osteoporosis treatment.    At this facility, the least significant change in the BMD at the left hip with 95% confidence is 0.033417 gm/cm2 at the hip and 0.276522 g/cm2 at the lumbar spine.  This report was finalized on 6/22/2022 5:49 PM by Malik Mariano MD.      Mammo Screening Digital Tomosynthesis Bilateral With CAD    Result Date: 6/25/2022  No findings suspicious for malignancy.  ACR BI-RADS CATEGORY:  1, NEGATIVE  RECOMMENDATION: Yearly mammogram, yearly clinical breast exam, and encourage self breast awareness.  CAD was used.  The standard false negative rate of mammography is between 10% and 25%. Complex patterns or increased breast density will markedly elevate the false negative rate of mammography.  A letter, in lay terminology, with the results of this exam will be mailed to the patient.   If there is a palpable area of concern, biopsy should be considered regardless of imaging findings.    This report was finalized on 6/25/2022 12:26 PM by Cori Lyles MD.        Measures:   Tobacco:   Cristy Louie  reports that she has never smoked. She has never used smokeless tobacco.           Urine Incontinence: ( NOUI)  Patient reports that she is not currently experiencing likely mixed urinary incontinence.      Assessment / Plan      Assessment/Plan:   76 y.o. female who presented today for follow up of stress predominant mixed urinary incontinence.  Patient is status post urethral bulking injections with some improvement initially in April 2022.  Patient returns in follow-up, still struggling with some incontinence using 2 pads per day which is fairly light.  She states she is disappointed.  She is a poor historian in regards to her urinary leakage.  Unsure if this is pure stress incontinence or a mixed component of urgency.  She is not on anticholinergics or beta 3 agonist at this time.  I discussed with the situation with the patient and I recommend empiric treatment with beta 3 agonist mirabegron  given the patient's age and the possibility of bothersome side effects with anticholinergics, as well as scheduling urodynamics next available to get a better sense of where her leakage is coming from.  If she has residual stress urinary incontinence, she may be a candidate for repeat urethral bulking injections, if it is a primary urgency problem, she may be a candidate for intravesical Botox or sacral neuromodulation.    Diagnoses and all orders for this visit:    1. Overactive bladder (Primary)    - Urodynamics next available to assess etiology of persistent leakage, suspect overactive detrusor component  - empiric mirabegron trial x 30 days now    -     Mirabegron ER (Myrbetriq) 25 MG tablet sustained-release 24 hour 24 hr tablet; Take 1 tablet by mouth Daily.  Dispense: 30 tablet; Refill: 0    2. Stress incontinence    - s/p urethral bulking injections with Bulkamid April 2022    -     POC Urinalysis Dipstick, Automated           Follow Up:   Return in about 6 weeks (around 9/5/2022).    I spent approximately 30 minutes providing clinical care for this patient; including review of patient's chart and provider documentation, face to face time spent with patient in examination room (obtaining history, performing physical exam, discussing diagnosis and management options), placing orders, and completing patient documentation.     Sly Mariano MD  [facility] [place]

## 2022-08-18 DIAGNOSIS — I10 ESSENTIAL HYPERTENSION: ICD-10-CM

## 2022-08-18 RX ORDER — LISINOPRIL AND HYDROCHLOROTHIAZIDE 25; 20 MG/1; MG/1
1 TABLET ORAL DAILY
Qty: 90 TABLET | Refills: 3 | Status: SHIPPED | OUTPATIENT
Start: 2022-08-18 | End: 2023-03-07 | Stop reason: SDUPTHER

## 2022-08-18 NOTE — TELEPHONE ENCOUNTER
LOV 3/11/22 to establish care  Next appt is tomorrow  Last filled 4/21/21 #90 and 3 refills by Marina Pederson

## 2022-08-18 NOTE — TELEPHONE ENCOUNTER
Caller: LibanCristy monteiro    Relationship: Self    Best call back number: [phone]    Requested Prescriptions:   Requested Prescriptions     Pending Prescriptions Disp Refills   • lisinopril-hydrochlorothiazide (PRINZIDE,ZESTORETIC) 20-25 MG per tablet 90 tablet 3     Sig: Take 1 tablet by mouth Daily.        Pharmacy where request should be sent: [place] [address] - [phone]  - [phone]      Additional details provided by patient: PATIENT IS COMPLETELY OUT OF THE MEDICATION    Does the patient have less than a 3 day supply:  [x] Yes  [] No    Kat Harden Rep   08/18/22 11:10 EDT

## 2022-08-19 ENCOUNTER — OFFICE VISIT (OUTPATIENT)
Dept: INTERNAL MEDICINE | Facility: CLINIC | Age: 77
End: 2022-08-19

## 2022-08-19 VITALS
HEIGHT: 61 IN | HEART RATE: 85 BPM | WEIGHT: 161 LBS | SYSTOLIC BLOOD PRESSURE: 142 MMHG | OXYGEN SATURATION: 98 % | DIASTOLIC BLOOD PRESSURE: 80 MMHG | TEMPERATURE: 97.4 F | BODY MASS INDEX: 30.4 KG/M2

## 2022-08-19 DIAGNOSIS — M85.89 OSTEOPENIA OF MULTIPLE SITES: ICD-10-CM

## 2022-08-19 DIAGNOSIS — Z00.00 MEDICARE ANNUAL WELLNESS VISIT, SUBSEQUENT: Primary | ICD-10-CM

## 2022-08-19 DIAGNOSIS — E89.2 POST-SURGICAL HYPOPARATHYROIDISM: ICD-10-CM

## 2022-08-19 DIAGNOSIS — M79.7 FIBROMYALGIA: ICD-10-CM

## 2022-08-19 DIAGNOSIS — E89.0 POST-SURGICAL HYPOTHYROIDISM: ICD-10-CM

## 2022-08-19 DIAGNOSIS — N39.46 URINARY INCONTINENCE, MIXED: ICD-10-CM

## 2022-08-19 DIAGNOSIS — M05.9 RHEUMATOID ARTHRITIS WITH POSITIVE RHEUMATOID FACTOR, INVOLVING UNSPECIFIED SITE: ICD-10-CM

## 2022-08-19 DIAGNOSIS — K21.9 GASTROESOPHAGEAL REFLUX DISEASE WITHOUT ESOPHAGITIS: ICD-10-CM

## 2022-08-19 DIAGNOSIS — Z85.850 HISTORY OF THYROID CANCER: ICD-10-CM

## 2022-08-19 DIAGNOSIS — I10 PRIMARY HYPERTENSION: ICD-10-CM

## 2022-08-19 DIAGNOSIS — E11.9 TYPE 2 DIABETES MELLITUS WITHOUT COMPLICATION, WITHOUT LONG-TERM CURRENT USE OF INSULIN: ICD-10-CM

## 2022-08-19 DIAGNOSIS — H93.13 TINNITUS OF BOTH EARS: ICD-10-CM

## 2022-08-19 DIAGNOSIS — N18.31 STAGE 3A CHRONIC KIDNEY DISEASE: ICD-10-CM

## 2022-08-19 DIAGNOSIS — H90.3 SENSORINEURAL HEARING LOSS (SNHL) OF BOTH EARS: ICD-10-CM

## 2022-08-19 DIAGNOSIS — F41.1 GENERALIZED ANXIETY DISORDER: ICD-10-CM

## 2022-08-19 DIAGNOSIS — E78.49 OTHER HYPERLIPIDEMIA: ICD-10-CM

## 2022-08-19 PROCEDURE — G0439 PPPS, SUBSEQ VISIT: HCPCS | Performed by: INTERNAL MEDICINE

## 2022-08-19 PROCEDURE — 1170F FXNL STATUS ASSESSED: CPT | Performed by: INTERNAL MEDICINE

## 2022-08-19 PROCEDURE — 1159F MED LIST DOCD IN RCRD: CPT | Performed by: INTERNAL MEDICINE

## 2022-08-19 NOTE — PROGRESS NOTES
The ABCs of the Annual Wellness Visit  Subsequent Medicare Wellness Visit    Chief Complaint   Patient presents with   • Medicare Wellness-subsequent       Subjective   History of Present Illness:  Cristy Louie is a 76 y.o. female who presents for a Subsequent Medicare Wellness Visit.    HEALTH RISK ASSESSMENT    Recent Hospitalizations:  No hospitalization(s) within the last year.    Current Medical Providers:  Patient Care Team:  Sandra Daniel MD as PCP - General (Internal Medicine)  Sunil Jones MD as Consulting Physician (Dermatology)  Maureen Aiken MD as Consulting Physician (Endocrinology)  Rajat Kim MD as Consulting Physician (Gastroenterology)  Robby Valadez MD as Consulting Physician (Colon and Rectal Surgery)  Sly Mariano MD as Consulting Physician (Urology)  Maureen Aiken MD as Consulting Physician (Endocrinology)    Smoking Status:  Social History     Tobacco Use   Smoking Status Never Smoker   Smokeless Tobacco Never Used       Alcohol Consumption:  Social History     Substance and Sexual Activity   Alcohol Use No       Depression Screen:   PHQ-2/PHQ-9 Depression Screening 8/19/2022   Retired PHQ-9 Total Score -   Retired Total Score -   Little Interest or Pleasure in Doing Things 0-->not at all   Feeling Down, Depressed or Hopeless 0-->not at all   PHQ-9: Brief Depression Severity Measure Score 0       Fall Risk Screen:  CAROLE Fall Risk Assessment was completed, and patient is at LOW risk for falls.Assessment completed on:8/19/2022    Health Habits and Functional and Cognitive Screening:  Functional & Cognitive Status 8/19/2022   Do you have difficulty preparing food and eating? No   Do you have difficulty bathing yourself, getting dressed or grooming yourself? No   Do you have difficulty using the toilet? No   Do you have difficulty moving around from place to place? No   Do you have trouble with steps or getting out of a bed or a chair? No   Current Diet  Well Balanced Diet        Current Diet Comment -   Dental Exam Not up to date        Dental Exam Comment -   Eye Exam Not up to date   Exercise (times per week) 0 times per week   Current Exercises Include No Regular Exercise        Exercise Comment -   Current Exercise Activities Include -   Do you need help using the phone?  No   Are you deaf or do you have serious difficulty hearing?  Yes   Do you need help with transportation? No   Do you need help shopping? No   Do you need help preparing meals?  No   Do you need help with housework?  No   Do you need help with laundry? No   Do you need help taking your medications? No   Do you need help managing money? No   Do you ever drive or ride in a car without wearing a seat belt? No   Have you felt unusual stress, anger or loneliness in the last month? No   Who do you live with? Alone   If you need help, do you have trouble finding someone available to you? No   Have you been bothered in the last four weeks by sexual problems? No   Do you have difficulty concentrating, remembering or making decisions? No         Does the patient have evidence of cognitive impairment? No    Asprin use counseling:Does not need ASA (and currently is not on it)    Age-appropriate Screening Schedule:  Refer to the list below for future screening recommendations based on patient's age, sex and/or medical conditions. Orders for these recommended tests are listed in the plan section. The patient has been provided with a written plan.    Health Maintenance   Topic Date Due   • TDAP/TD VACCINES (2 - Td or Tdap) 11/28/2017   • DIABETIC EYE EXAM  10/01/2020   • URINE MICROALBUMIN  10/20/2021   • LIPID PANEL  04/27/2022   • INFLUENZA VACCINE  10/01/2022   • HEMOGLOBIN A1C  10/20/2022   • MAMMOGRAM  06/21/2023   • DXA SCAN  06/21/2024   • ZOSTER VACCINE  Discontinued          The following portions of the patient's history were reviewed and updated as appropriate: allergies, current medications, past  family history, past medical history, past social history, past surgical history and problem list.    Outpatient Medications Prior to Visit   Medication Sig Dispense Refill   • Accu-Chek FastClix Lancets misc 1 each Daily. 50 each 11   • Accu-Chek Guide test strip 1 each by Other route Daily. 50 each 11   • Blood Glucose Monitoring Suppl (Accu-Chek Guide Me) w/Device kit 1 Device Take As Directed. 1 kit 0   • calcitriol (ROCALTROL) 0.25 MCG capsule Take 1 capsule by mouth Daily. 90 capsule 3   • DULoxetine (CYMBALTA) 30 MG capsule Take 30 mg by mouth Daily.     • fluticasone (FLONASE) 50 MCG/ACT nasal spray 1 spray into the nostril(s) as directed by provider As Needed for Rhinitis or Allergies.     • hydroxychloroquine (PLAQUENIL) 200 MG tablet Take 200 mg by mouth 2 (Two) Times a Day.     • levothyroxine (SYNTHROID, LEVOTHROID) 88 MCG tablet Take 1 tablet by mouth Every Morning. 90 tablet 1   • lisinopril-hydrochlorothiazide (PRINZIDE,ZESTORETIC) 20-25 MG per tablet Take 1 tablet by mouth Daily. 90 tablet 3   • Lutein 20 MG capsule Take 20 mg by mouth Daily.     • metFORMIN ER (GLUCOPHAGE-XR) 500 MG 24 hr tablet Take 1 tablet by mouth Daily With Dinner. 90 tablet 3   • omeprazole (priLOSEC) 20 MG capsule TAKE 1 CAPSULE TWICE DAILY (Patient taking differently: Take 20 mg by mouth Daily.) 180 capsule 3   • ondansetron ODT (ZOFRAN-ODT) 8 MG disintegrating tablet Take 1 tablet by mouth Every 8 (Eight) Hours As Needed for Nausea or Vomiting. 90 tablet 3   • dicyclomine (BENTYL) 20 MG tablet Take 1 tablet by mouth 4 (Four) Times a Day. (Patient taking differently: Take 20 mg by mouth As Needed (DIVERTICULITIS).) 100 tablet 3   • Hyoscyamine Sulfate SL (Levsin/SL) 0.125 MG sublingual tablet Place 0.125 mg under the tongue Every 4 (Four) Hours As Needed (bladder urgency/frequency). 30 each 0   • Mirabegron ER (Myrbetriq) 25 MG tablet sustained-release 24 hour 24 hr tablet Take 1 tablet by mouth Daily. 30 tablet 0   •  phenazopyridine (Pyridium) 200 MG tablet Take 1 tablet by mouth 3 (Three) Times a Day As Needed (Pain with urination). 20 tablet 0   • simvastatin (ZOCOR) 40 MG tablet Take 40 mg by mouth Every Night.       No facility-administered medications prior to visit.       Patient Active Problem List   Diagnosis   • Allergic rhinitis   • Anxiety disorder   • Asthma   • Diverticulosis of intestine   • Gastroesophageal reflux disease   • Fibromyalgia   • Hyperlipidemia   • Hypertension   • Osteoarthritis   • Osteopenia of multiple sites   • Rheumatoid arthritis (HCC)   • Vitamin D deficiency   • Thyroid cancer (HCC)   • Post-surgical hypothyroidism   • Post-surgical hypoparathyroidism (HCC)   • Urinary incontinence, mixed   • Renal cyst, right   • Sensorineural hearing loss (SNHL) of both ears   • Type 2 diabetes mellitus without complication, without long-term current use of insulin (HCC)   • Stage 3a chronic kidney disease (HCC)   • Tinnitus of both ears   • History of thyroid cancer       Advanced Care Planning:  ACP discussion was held with the patient during this visit. Patient does not have an advance directive, information provided.    Review of Systems   Constitutional: Negative.    HENT: Positive for hearing loss.    Eyes: Negative.    Respiratory: Positive for wheezing (audible wheeze with exertion). Negative for cough and shortness of breath.    Cardiovascular: Negative.    Gastrointestinal: Negative.    Genitourinary: Positive for urinary incontinence. Negative for decreased urine volume and difficulty urinating.   Musculoskeletal: Negative.    Skin: Negative.    Neurological: Negative.    Psychiatric/Behavioral: Negative for depressed mood. The patient is nervous/anxious (occasionally).        Compared to one year ago, the patient feels her physical health is worse. A little slower over time.  Compared to one year ago, the patient feels her mental health is the same.    Reviewed chart for potential of high risk  "medication in the elderly: yes  Reviewed chart for potential of harmful drug interactions in the elderly:yes    Objective         Vitals:    08/19/22 1539   BP: 142/80   Pulse: 85   Temp: 97.4 °F (36.3 °C)   SpO2: 98%   Weight: 73 kg (161 lb)   Height: 154.9 cm (61\")       Body mass index is 30.42 kg/m².  Discussed the patient's BMI with her. The BMI is above average; BMI management plan is completed.    Physical Exam  Vitals and nursing note reviewed.   Constitutional:       General: She is not in acute distress.     Appearance: She is well-developed. She is not ill-appearing, toxic-appearing or diaphoretic.   HENT:      Head: Normocephalic and atraumatic.      Right Ear: Tympanic membrane, ear canal and external ear normal.      Left Ear: Tympanic membrane, ear canal and external ear normal.      Nose: Nose normal.   Eyes:      General: No scleral icterus.     Conjunctiva/sclera: Conjunctivae normal.   Cardiovascular:      Rate and Rhythm: Normal rate and regular rhythm.      Heart sounds: Normal heart sounds. No murmur heard.  Pulmonary:      Effort: Pulmonary effort is normal. No respiratory distress.      Breath sounds: Normal breath sounds. No wheezing.   Abdominal:      General: Bowel sounds are normal. There is no distension.      Palpations: Abdomen is soft. There is no mass.      Tenderness: There is abdominal tenderness (mild upper abdominal tenderness). There is no guarding or rebound.      Hernia: No hernia is present.   Musculoskeletal:         General: No deformity.      Cervical back: Neck supple.      Right lower leg: No edema.      Left lower leg: No edema.   Lymphadenopathy:      Cervical: No cervical adenopathy.   Skin:     General: Skin is warm and dry.      Findings: No rash.   Neurological:      Mental Status: She is alert and oriented to person, place, and time. Mental status is at baseline.      Gait: Gait normal.   Psychiatric:         Mood and Affect: Mood normal.         Behavior: " Behavior normal.         Thought Content: Thought content normal.         Judgment: Judgment normal.               Assessment & Plan   Medicare Risks and Personalized Health Plan  CMS Preventative Services Quick Reference  Advance Directive Discussion  Hearing Problem  Immunizations Discussed/Encouraged (specific immunizations; Tdap, Shingrix and COVID19 )    The above risks/problems have been discussed with the patient.  Pertinent information has been shared with the patient in the After Visit Summary.  Follow up plans and orders are seen below in the Assessment/Plan Section.    Diagnoses and all orders for this visit:    Medicare annual wellness visit, subsequent    Type 2 diabetes mellitus without complication, without long-term current use of insulin (HCC)  - Following with Dr. Aiken, on metformin XR 500mg daily with last A1c 7.4    Primary hypertension  - BP slightly elevated today but usually well controlled on lisinopril-HCTZ 20-25mg daily    Other hyperlipidemia  - Lipid panel 4/2021 with triglycerides 262 and   - She has quit statin  - Would like for her to have repeat lipid panel with her next labs    Gastroesophageal reflux disease without esophagitis  - Controlled on omeprazole 20mg BID    Osteopenia of multiple sites  - DEXA 6/2022 with osteopenia, FRAX 15% and 3.4%. BMD improved at all sites but this may be secondary to her arthritis. Defer bisphosphonate and continue weight bearing exercise, calcitriol.  - repeat DEXA in 2024    Rheumatoid arthritis with positive rheumatoid factor, involving unspecified site (HCC)  - Following with Dr. Brenner and on plaquenil 200mg BID as well as cymbalta  - Has been recommended to try humira but she is concerned with cost    Fibromyalgia   Following with Dr. Brenner and on cymbalta    History of thyroid cancer, Post-surgical hypothyroidism  - s/p thyroidectomy  - Following with Dr. Aiken and on levothyroxine 88mcg for subsequent  hypothyroidism    Post-surgical hypoparathyroidism (HCC)  - s/p parathyroidectomy for primary hyperparathyroidism. Now with postsurgical hypoparathyroidism on calcitriol 0.25mcg daily. Managed by Dr. Aiken.  - Calcium normal on most recent labs    Stage 3a chronic kidney disease (HCC)  - gfr 50-55 however last was 61. Will monitor. Avoid NSAIDs.    Sensorineural hearing loss (SNHL) of both ears  - Hearing aids too costly    Tinnitus of both ears  - Likely due to hearing loss    Urinary incontinence, mixed  - Following with urology. Had urethral bulking injections but still with some incontinence. Had tried myrbetriq but has had headaches so discontinued. Not a candidate for anticholinergics.  - Plan for urodynamics to determine next best step.    Generalized anxiety disorder  - On cymbalta 30mg daily, mood stable     Health Maintenance  - Pap smear: no longer indicated  - Mammogram: 6/2022 BIRADS 1  - Colonoscopy: 6/2021  - HCV: negative  - Immunizations: Pneumococcal complete. Declines COVID19. Shingrix too expensive. Discussed Tdap.  - Depression screening: negative 8/2022    Follow Up:  Return in about 6 months (around 2/19/2023) for Follow up, 1 year for wellness 45 minutes.     An After Visit Summary and PPPS were given to the patient.

## 2022-09-08 ENCOUNTER — OFFICE VISIT (OUTPATIENT)
Dept: UROLOGY | Facility: CLINIC | Age: 77
End: 2022-09-08

## 2022-09-08 VITALS — BODY MASS INDEX: 30.42 KG/M2 | HEIGHT: 61 IN

## 2022-09-08 DIAGNOSIS — N39.3 STRESS INCONTINENCE: ICD-10-CM

## 2022-09-08 DIAGNOSIS — N32.81 OVERACTIVE BLADDER: Primary | ICD-10-CM

## 2022-09-08 PROCEDURE — 51798 US URINE CAPACITY MEASURE: CPT | Performed by: STUDENT IN AN ORGANIZED HEALTH CARE EDUCATION/TRAINING PROGRAM

## 2022-09-08 PROCEDURE — 81003 URINALYSIS AUTO W/O SCOPE: CPT | Performed by: STUDENT IN AN ORGANIZED HEALTH CARE EDUCATION/TRAINING PROGRAM

## 2022-09-08 PROCEDURE — 99214 OFFICE O/P EST MOD 30 MIN: CPT | Performed by: STUDENT IN AN ORGANIZED HEALTH CARE EDUCATION/TRAINING PROGRAM

## 2022-09-08 NOTE — PROGRESS NOTES
Follow Up Office Visit      Patient Name: Cristy Louie  : 1945   MRN: [id number]     Chief Complaint:    Chief Complaint   Patient presents with   • Overactive bladder   • Urinary Incontinence       Referring Provider: No ref. provider found    History of Present Illness: Cristy Louie is a 77 y.o. female who presents today for follow up regarding continued lower Mandeep tract symptoms.  Patient has a history of mixed incontinence with both urgency and stress relation.  Patient had primary bothersome stress incontinence and underwent cystoscopy with urethral bulking injections on 2022.  Immediately after surgery, patient stopped using heavy pads because her incontinence had resolved.  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  She states she has some leakage when she is lifting.  Patient also states she would like a vaginal exam today, she states she has a sensation like something is trying to fall out of her urethra or vagina.  She is not sure.    Pelvic exam demonstrates no prolapse and preserved apical support with no significant anterior posterior prolapse.  Urethra appears orthotopic    Urinalysis is negative today.    Patient also complains of episodes where she has random voids that she is not sure when they happen, this sounds more like urgency incontinence.  She was placed on Myrbetriq and she is not sure if this helped and she was also unable to afford the medication after sample.  The patient was scheduled for urodynamics for complete evaluation but did not present for her appointment.        Subjective      Review of System: Review of Systems   Genitourinary: Positive for enuresis, urgency and vaginal pain.      I have reviewed the ROS documented by my clinical staff, I have updated appropriately and I agree. Sly Mariano MD    I have reviewed and the following portions of the  "patient's history were updated as appropriate: past family history, past medical history, past social history, past surgical history and problem list.    Medications:     Current Outpatient Medications:   •  Accu-Chek FastClix Lancets misc, 1 each Daily., Disp: 50 each, Rfl: 11  •  Accu-Chek Guide test strip, 1 each by Other route Daily., Disp: 50 each, Rfl: 11  •  Blood Glucose Monitoring Suppl (Accu-Chek Guide Me) w/Device kit, 1 Device Take As Directed., Disp: 1 kit, Rfl: 0  •  calcitriol (ROCALTROL) 0.25 MCG capsule, Take 1 capsule by mouth Daily., Disp: 90 capsule, Rfl: 3  •  DULoxetine (CYMBALTA) 30 MG capsule, Take 30 mg by mouth Daily., Disp: , Rfl:   •  fluticasone (FLONASE) 50 MCG/ACT nasal spray, 1 spray into the nostril(s) as directed by provider As Needed for Rhinitis or Allergies., Disp: , Rfl:   •  hydroxychloroquine (PLAQUENIL) 200 MG tablet, Take 200 mg by mouth 2 (Two) Times a Day., Disp: , Rfl:   •  levothyroxine (SYNTHROID, LEVOTHROID) 88 MCG tablet, Take 1 tablet by mouth Every Morning., Disp: 90 tablet, Rfl: 1  •  lisinopril-hydrochlorothiazide (PRINZIDE,ZESTORETIC) 20-25 MG per tablet, Take 1 tablet by mouth Daily., Disp: 90 tablet, Rfl: 3  •  Lutein 20 MG capsule, Take 20 mg by mouth Daily., Disp: , Rfl:   •  metFORMIN ER (GLUCOPHAGE-XR) 500 MG 24 hr tablet, Take 1 tablet by mouth Daily With Dinner., Disp: 90 tablet, Rfl: 3  •  omeprazole (priLOSEC) 20 MG capsule, TAKE 1 CAPSULE TWICE DAILY (Patient taking differently: Take 20 mg by mouth Daily.), Disp: 180 capsule, Rfl: 3  •  ondansetron ODT (ZOFRAN-ODT) 8 MG disintegrating tablet, Take 1 tablet by mouth Every 8 (Eight) Hours As Needed for Nausea or Vomiting., Disp: 90 tablet, Rfl: 3    Allergies:   Allergies   Allergen Reactions   • Escitalopram Dizziness         Post void residual bladder scan:   0 mL    Objective     Physical Exam:   Vital Signs:   Vitals:    09/08/22 1304   Height: 154.9 cm (61\")     Body mass index is 30.42 kg/m². "     Physical Exam  Constitutional:       Appearance: Normal appearance.   HENT:      Head: Normocephalic and atraumatic.      Nose: Nose normal.      Mouth/Throat:      Mouth: Mucous membranes are moist.      Pharynx: Oropharynx is clear.   Eyes:      Extraocular Movements: Extraocular movements intact.      Pupils: Pupils are equal, round, and reactive to light.   Pulmonary:      Effort: Pulmonary effort is normal. No respiratory distress.   Genitourinary:     Comments: Moderate vaginal atrophy  Orthotopic urethra  No pelvic organ prolapse noted on Valsalva  Musculoskeletal:         General: No swelling or deformity. Normal range of motion.      Cervical back: Normal range of motion and neck supple.   Skin:     General: Skin is warm and dry.   Neurological:      General: No focal deficit present.      Mental Status: She is alert and oriented to person, place, and time. Mental status is at baseline.   Psychiatric:         Mood and Affect: Mood normal.         Behavior: Behavior normal.         Labs:   Brief Urine Lab Results  (Last result in the past 365 days)      Color   Clarity   Blood   Leuk Est   Nitrite   Protein   CREAT   Urine HCG        09/08/22 1320 Yellow   Clear   Negative   Negative   Negative   Negative                 Urine Culture    Urine Culture 2/28/22   Urine Culture No growth              Lab Results   Component Value Date    GLUCOSE 151 (H) 06/21/2022    CALCIUM 9.3 06/21/2022     06/21/2022    K 3.5 06/21/2022    CO2 28.0 06/21/2022    CL 99 06/21/2022    BUN 16 06/21/2022    CREATININE 0.95 06/21/2022    EGFRIFNONA 51 (L) 11/29/2021    BCR 16.8 06/21/2022    ANIONGAP 13.0 06/21/2022       Lab Results   Component Value Date    WBC 9.19 04/06/2022    HGB 13.5 04/06/2022    HCT 40.3 04/06/2022    MCV 91.6 04/06/2022     04/06/2022       Images:   DEXA Bone Density Axial    Result Date: 6/22/2022  Osteopenia of the L1-L4 vertebrae, and right femoral neck.  The ten year fracture risk  assessment is calculated at 15% for major systemic osteoporotic fracture and 3.4% for a hip fracture. Less than 3% risk in the [place] for hip fracture and less than 20% risk of any systemic osteoporotic fracture is considered less than the threshold for where pharmacological therapy is recommended by the National Osteoporosis Foundation.  All the treatment decisions require clinical judgment and consideration of individual patient factors, including patient preferences, co-morbidities, previous drug use, risk factors not captured in the FRAX model (frailty, falls, vitamin D deficiency, increased bone turnover, interval significant decline in bone density) and possible under or over estimation of fracture risk by FRAX. Approaches to reduce osteoporosis related fracture risk include optimizing calcium and vitamin D status, appropriate weight bearing exercises and fall-prevention measurements. The National Osteoporosis Foundation recommends (http://www.nof.org/hcp/practice/practice-and-clinical-guidelines/clinic ans-guide) that FDA-approved medical therapies be considered in postmenopausal women and men aged equal or greater than 50 years with : a) hip or vertebral (clinical or morphometric) fracture; b) T-score of -2.5 or less at the spine or hip; c) Ten-year fracture probability by FRAX of greater than 3% for hip fracture of greater than 20% for major osteoporotic fracture.   Secondary causes of bone loss should be evaluated if clinically indicated since the etiology of low BMD cannot be determined by BMD measurement alone.  FOLLOWUP: Consider repeating the study in 2-3 years to reassess the patient's status or sooner if there is some new clinical indication.  INTERVAL CHANGE:   There was an increase in bone mineral density of the L1-L4 vertebrae by 10.0%, total right hip by 15.4%, one third right forearm by 1.3% when compared to previous study performed on 10/19/2017. Significant increase in bone mineral  density may be due to sclerotic, and/or arthropathic changes, as the patient has not reported a history with osteoporosis treatment.    At this facility, the least significant change in the BMD at the left hip with 95% confidence is 0.811901 gm/cm2 at the hip and 0.693547 g/cm2 at the lumbar spine.  This report was finalized on 2022 5:49 PM by Malik Mariano MD.      Mammo Screening Digital Tomosynthesis Bilateral With CAD    Result Date: 2022  No findings suspicious for malignancy.  ACR BI-RADS CATEGORY:  1, NEGATIVE  RECOMMENDATION: Yearly mammogram, yearly clinical breast exam, and encourage self breast awareness.  CAD was used.  The standard false negative rate of mammography is between 10% and 25%. Complex patterns or increased breast density will markedly elevate the false negative rate of mammography.  A letter, in lay terminology, with the results of this exam will be mailed to the patient.   If there is a palpable area of concern, biopsy should be considered regardless of imaging findings.    This report was finalized on 2022 12:26 PM by Cori Lyles MD.        Measures:   Tobacco:   Cristy Louie  reports that she has never smoked. She has never used smokeless tobacco.    Assessment / Plan      Assessment/Plan:   77 y.o. female who presented today for follow up of mixed urine incontinence.  She is status post cystoscopy and urethral bulking injections with Bulkamid.  She had significant initial improvement but states she has had ongoing mixed incontinence with both urgency and stress component.  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  Discussed at this time given the confusing clinical history, I recommend proceeding with flexible cystoscopy to evaluate urethral coaptation, we will fill her bladder at that time to determine if she has residual stress  incontinence which may benefit from a topping off procedure with repeat urethral bulking injections.  Patient is amenable to this plan.  She is unable to afford Myrbetriq and she cannot tolerate anticholinergics given severe baseline chronic dry dry mouth.  Her only options for her urgency component are intravesical Botox or sacral neuromodulation.  We discussed risk benefits alternatives of flexible cystoscopy.  She may still require urodynamics in the future if the clinical picture is clouded.    Diagnoses and all orders for this visit:    1. Overactive bladder (Primary)  -     POC Urinalysis Dipstick, Automated    2. Stress incontinence  -     POC Urinalysis Dipstick, Automated           Follow Up:   Return in about 26 days (around 10/4/2022) for Flexible cystoscopy 10/4/22 .    I spent approximately 30 minutes providing clinical care for this patient; including review of patient's chart and provider documentation, face to face time spent with patient in examination room (obtaining history, performing physical exam, discussing diagnosis and management options), placing orders, and completing patient documentation.     Sly Mariano MD  [facility] [place]

## 2022-09-22 NOTE — TELEPHONE ENCOUNTER
September 22, 2022      [facility] [place] [facility]  [address]  [place] [address]  Phone: [phone]       Patient: Orville Gr   YOB: 2002  Date of Visit: 09/26/2022    To Whom It May Concern:     This patient, Van Gr  is under our care for pregnancy. Please excuse patient, Orville Gr from work due to pregnancy symptoms until her appointment with provider on 9/26/2022 for assessment. If you have any questions or concerns, or if I can be of further assistance, please do not hesitate to contact me.        Sincerely,    Cristela Maldonado LPN      Dr Pederson   Have you seen the forms

## 2022-10-04 ENCOUNTER — PROCEDURE VISIT (OUTPATIENT)
Dept: UROLOGY | Facility: CLINIC | Age: 77
End: 2022-10-04

## 2022-10-04 DIAGNOSIS — N32.81 OVERACTIVE BLADDER: ICD-10-CM

## 2022-10-04 DIAGNOSIS — N39.3 STRESS INCONTINENCE IN FEMALE: ICD-10-CM

## 2022-10-04 PROCEDURE — 52000 CYSTOURETHROSCOPY: CPT | Performed by: STUDENT IN AN ORGANIZED HEALTH CARE EDUCATION/TRAINING PROGRAM

## 2022-10-04 NOTE — PROGRESS NOTES
Preprocedure diagnosis  Mixed urinary incontinence    Postprocedure diagnosis  Mixed urinary incontinence    Procedure  Flexible Cystourethroscopy    Attending surgeon  Sly Mariano MD    Anesthesia  2% lidocaine jelly intraurethrally    Complications  None    Indications  77 y.o. female undergoing a flexible cystoscopy for the above mentioned indications.  Patient has previously undergone urethral bulking injections for stress urinary incontinence.  She has residual incontinence and now complains of uncontrolled voids and difficulty making it to the restroom with a sense of urgency.  She has previously failed Myrbetriq.    Informed consent was obtained.      Findings  Cystoscopy revealed one right and left ureteral orifice in the normal anatomic position, normal bladder mucosa and no tumors, masses or stones.      With a full bladder patient has mild residual stress urinary incontinence on cough and Valsalva    Procedure  The patient was placed in supine position and prepped and draped in sterile fashion with lidocaine jelly per urethra for anesthesia.  A timeout was performed.  The 14F flexible cystoscope was lubricated and gently placed through the urethra and into the bladder.  The bladder was completely visualized.  No bladder abnormalities were identified.  The cystoscope was retroflexed and the bladder neck visualized, there was evidence of prior urethral bulking injection and there was moderate bladder neck coaptation.  The scope was withdrawn and the procedure terminated.  The patient tolerated the procedure well.    Plan  The patient describes ongoing urgency incontinence.  She has mild stress urinary incontinence.  In discussion with the patient the most bothersome aspect is uncontrolled voids with a sense of urgency.  She has previously failed medications.  She was counseled regarding intravesical Botox versus sacral neuromodulation.  She is potentially interested in sacral neuromodulation for  refractory overactive bladder and urgency incontinence.  She has seen some improvement with urethral bulking injections in regard to stress urinary incontinence which is less bothersome at this time.    Patient will call me if she would like to schedule intravesical Botox or sacral neuromodulation.    Sly Mariano MD

## 2022-10-20 ENCOUNTER — TELEPHONE (OUTPATIENT)
Dept: ENDOCRINOLOGY | Facility: CLINIC | Age: 77
End: 2022-10-20

## 2022-10-20 NOTE — TELEPHONE ENCOUNTER
[facility] CALLED THEY WANTED TO KNOW IF WE HAVE THESE DOCUMENTS WE CAN SEND TO THEM  FOR HER CGM   THEY NEED NOTES, HOW MANY TIMES DAILY AND A1C    PLEASE CALL THEM BACK [phone]

## 2022-10-24 ENCOUNTER — OFFICE VISIT (OUTPATIENT)
Dept: ENDOCRINOLOGY | Facility: CLINIC | Age: 77
End: 2022-10-24

## 2022-10-24 ENCOUNTER — LAB (OUTPATIENT)
Dept: LAB | Facility: HOSPITAL | Age: 77
End: 2022-10-24

## 2022-10-24 VITALS
DIASTOLIC BLOOD PRESSURE: 76 MMHG | OXYGEN SATURATION: 98 % | HEIGHT: 61 IN | BODY MASS INDEX: 30.96 KG/M2 | SYSTOLIC BLOOD PRESSURE: 132 MMHG | HEART RATE: 95 BPM | WEIGHT: 164 LBS

## 2022-10-24 DIAGNOSIS — E89.2 POST-SURGICAL HYPOPARATHYROIDISM: ICD-10-CM

## 2022-10-24 DIAGNOSIS — C73 THYROID CANCER: Primary | ICD-10-CM

## 2022-10-24 DIAGNOSIS — E11.9 TYPE 2 DIABETES MELLITUS WITHOUT COMPLICATION, WITHOUT LONG-TERM CURRENT USE OF INSULIN: ICD-10-CM

## 2022-10-24 DIAGNOSIS — E89.0 POST-SURGICAL HYPOTHYROIDISM: ICD-10-CM

## 2022-10-24 PROCEDURE — 84432 ASSAY OF THYROGLOBULIN: CPT | Performed by: INTERNAL MEDICINE

## 2022-10-24 PROCEDURE — 99214 OFFICE O/P EST MOD 30 MIN: CPT | Performed by: INTERNAL MEDICINE

## 2022-10-24 PROCEDURE — 84439 ASSAY OF FREE THYROXINE: CPT | Performed by: INTERNAL MEDICINE

## 2022-10-24 PROCEDURE — 86800 THYROGLOBULIN ANTIBODY: CPT | Performed by: INTERNAL MEDICINE

## 2022-10-24 PROCEDURE — 80053 COMPREHEN METABOLIC PANEL: CPT | Performed by: INTERNAL MEDICINE

## 2022-10-24 PROCEDURE — 83036 HEMOGLOBIN GLYCOSYLATED A1C: CPT | Performed by: INTERNAL MEDICINE

## 2022-10-24 PROCEDURE — 84443 ASSAY THYROID STIM HORMONE: CPT | Performed by: INTERNAL MEDICINE

## 2022-10-25 LAB
ALBUMIN SERPL-MCNC: 4.5 G/DL (ref 3.5–5.2)
ALBUMIN/GLOB SERPL: 1.3 G/DL
ALP SERPL-CCNC: 88 U/L (ref 39–117)
ALT SERPL W P-5'-P-CCNC: 29 U/L (ref 1–33)
ANION GAP SERPL CALCULATED.3IONS-SCNC: 13.6 MMOL/L (ref 5–15)
AST SERPL-CCNC: 27 U/L (ref 1–32)
BILIRUB SERPL-MCNC: 0.3 MG/DL (ref 0–1.2)
BUN SERPL-MCNC: 19 MG/DL (ref 8–23)
BUN/CREAT SERPL: 18.3 (ref 7–25)
CALCIUM SPEC-SCNC: 9.5 MG/DL (ref 8.6–10.5)
CHLORIDE SERPL-SCNC: 96 MMOL/L (ref 98–107)
CO2 SERPL-SCNC: 29.4 MMOL/L (ref 22–29)
CREAT SERPL-MCNC: 1.04 MG/DL (ref 0.57–1)
EGFRCR SERPLBLD CKD-EPI 2021: 54.8 ML/MIN/1.73
GLOBULIN UR ELPH-MCNC: 3.5 GM/DL
GLUCOSE SERPL-MCNC: 130 MG/DL (ref 65–99)
HBA1C MFR BLD: 8.7 % (ref 4.8–5.6)
POTASSIUM SERPL-SCNC: 4.1 MMOL/L (ref 3.5–5.2)
PROT SERPL-MCNC: 8 G/DL (ref 6–8.5)
SODIUM SERPL-SCNC: 139 MMOL/L (ref 136–145)
T4 FREE SERPL-MCNC: 1.09 NG/DL (ref 0.93–1.7)
TSH SERPL DL<=0.05 MIU/L-ACNC: 7.87 UIU/ML (ref 0.27–4.2)

## 2022-10-26 ENCOUNTER — TELEPHONE (OUTPATIENT)
Dept: ENDOCRINOLOGY | Facility: CLINIC | Age: 77
End: 2022-10-26

## 2022-10-26 LAB
THYROGLOB AB SERPL-ACNC: <1 IU/ML (ref 0–0.9)
THYROGLOB SERPL-MCNC: 0.2 NG/ML (ref 1.5–38.5)

## 2022-10-26 RX ORDER — LEVOTHYROXINE SODIUM 0.1 MG/1
100 TABLET ORAL EVERY MORNING
Qty: 90 TABLET | Refills: 1 | Status: SHIPPED | OUTPATIENT
Start: 2022-10-26

## 2022-10-26 RX ORDER — METFORMIN HYDROCHLORIDE 500 MG/1
500 TABLET, EXTENDED RELEASE ORAL 2 TIMES DAILY WITH MEALS
Qty: 180 TABLET | Refills: 3 | Status: SHIPPED | OUTPATIENT
Start: 2022-10-26

## 2022-10-26 NOTE — TELEPHONE ENCOUNTER
Patient called inquiring about a refill on her prescription for levothyroxine 88 MCG. She stated that she only has 2 doses left. Please advise.     Patient was last seen in office on 10/24/2022. Next appointment is schedule for 04/24/2023.

## 2022-10-27 NOTE — TELEPHONE ENCOUNTER
Spoke to patient about lab results. See clinic note from 10/24/2022 for details.   Signed: Maureen Aiken MD

## 2022-12-30 ENCOUNTER — LAB (OUTPATIENT)
Dept: LAB | Facility: HOSPITAL | Age: 77
End: 2022-12-30
Payer: MEDICARE

## 2022-12-30 ENCOUNTER — LAB (OUTPATIENT)
Dept: ENDOCRINOLOGY | Facility: CLINIC | Age: 77
End: 2022-12-30
Payer: MEDICARE

## 2022-12-30 DIAGNOSIS — E89.0 POST-SURGICAL HYPOTHYROIDISM: ICD-10-CM

## 2022-12-30 LAB — TSH SERPL DL<=0.05 MIU/L-ACNC: 1.28 UIU/ML (ref 0.27–4.2)

## 2022-12-30 PROCEDURE — 84443 ASSAY THYROID STIM HORMONE: CPT

## 2023-01-23 ENCOUNTER — TELEPHONE (OUTPATIENT)
Dept: INTERNAL MEDICINE | Facility: CLINIC | Age: 78
End: 2023-01-23
Payer: MEDICARE

## 2023-01-23 NOTE — TELEPHONE ENCOUNTER
Pt called stating she needs new prescriptions for her medications d/t switching from [employer] to [employer]; pt would like those sent to her

## 2023-01-24 NOTE — TELEPHONE ENCOUNTER
Spoke with patient and updated pharmacy in chart.  Patient has enough meds at this time to last until appointment.  Will call us if she needs refill before appointment.

## 2023-02-24 ENCOUNTER — LAB (OUTPATIENT)
Dept: LAB | Facility: HOSPITAL | Age: 78
End: 2023-02-24
Payer: MEDICARE

## 2023-02-24 ENCOUNTER — OFFICE VISIT (OUTPATIENT)
Dept: INTERNAL MEDICINE | Facility: CLINIC | Age: 78
End: 2023-02-24
Payer: MEDICARE

## 2023-02-24 VITALS
DIASTOLIC BLOOD PRESSURE: 74 MMHG | SYSTOLIC BLOOD PRESSURE: 130 MMHG | OXYGEN SATURATION: 97 % | WEIGHT: 165 LBS | TEMPERATURE: 97.8 F | HEIGHT: 61 IN | BODY MASS INDEX: 31.15 KG/M2 | HEART RATE: 81 BPM

## 2023-02-24 DIAGNOSIS — I10 PRIMARY HYPERTENSION: Primary | ICD-10-CM

## 2023-02-24 DIAGNOSIS — E78.49 OTHER HYPERLIPIDEMIA: ICD-10-CM

## 2023-02-24 DIAGNOSIS — E89.0 POST-SURGICAL HYPOTHYROIDISM: ICD-10-CM

## 2023-02-24 DIAGNOSIS — N18.31 STAGE 3A CHRONIC KIDNEY DISEASE: ICD-10-CM

## 2023-02-24 DIAGNOSIS — Z85.850 HISTORY OF THYROID CANCER: ICD-10-CM

## 2023-02-24 DIAGNOSIS — E11.9 TYPE 2 DIABETES MELLITUS WITHOUT COMPLICATION, WITHOUT LONG-TERM CURRENT USE OF INSULIN: ICD-10-CM

## 2023-02-24 DIAGNOSIS — J30.89 SEASONAL ALLERGIC RHINITIS DUE TO OTHER ALLERGIC TRIGGER: ICD-10-CM

## 2023-02-24 DIAGNOSIS — I10 PRIMARY HYPERTENSION: ICD-10-CM

## 2023-02-24 PROCEDURE — 85027 COMPLETE CBC AUTOMATED: CPT

## 2023-02-24 PROCEDURE — 99214 OFFICE O/P EST MOD 30 MIN: CPT | Performed by: INTERNAL MEDICINE

## 2023-02-24 PROCEDURE — 80048 BASIC METABOLIC PNL TOTAL CA: CPT

## 2023-02-24 PROCEDURE — 84443 ASSAY THYROID STIM HORMONE: CPT

## 2023-02-24 PROCEDURE — 80061 LIPID PANEL: CPT

## 2023-02-24 NOTE — PROGRESS NOTES
Internal Medicine Follow Up    Chief Complaint  Cristy Louie is a 77 y.o. female who presents today for follow up of chronic medical conditions outlined below.    Chief Complaint   Patient presents with   • Diabetes   • Hypertension   • Hyperlipidemia        HPI  Ms. Louie comes in today for follow up. She complains of clear rhinorrhea, post nasal drainage, and sinus pressure for 2 months. Using flonase has helped but she notes that she has been using mucinex sinus max to get the thick mucus from her sinuses. She notes recent elevated BP reading but unsure if this was due to the mucinex sinus max or missed BP medication. She had an episode of back and chest pain in January which improved with chiropractor. She has ongoing joint pain and swelling from her RA. She was seen by Dr. Aiken last fall with rising A1c so metformin was increased.        Review of Systems  Review of Systems   Constitutional: Negative.    HENT: Positive for congestion, postnasal drip, rhinorrhea and sinus pressure. Negative for sore throat.    Respiratory: Negative.    Cardiovascular: Negative.    Musculoskeletal: Positive for arthralgias.        Current Medications  Current Outpatient Medications on File Prior to Visit   Medication Sig Dispense Refill   • Accu-Chek FastClix Lancets misc 1 each Daily. 50 each 11   • Accu-Chek Guide test strip 1 each by Other route Daily. 50 each 11   • Blood Glucose Monitoring Suppl (Accu-Chek Guide Me) w/Device kit 1 Device Take As Directed. 1 kit 0   • calcitriol (ROCALTROL) 0.25 MCG capsule Take 1 capsule by mouth Daily. 90 capsule 3   • DULoxetine (CYMBALTA) 30 MG capsule Take 30 mg by mouth Daily.     • fluticasone (FLONASE) 50 MCG/ACT nasal spray 1 spray into the nostril(s) as directed by provider As Needed for Rhinitis or Allergies.     • hydroxychloroquine (PLAQUENIL) 200 MG tablet Take 200 mg by mouth 2 (Two) Times a Day.     • levothyroxine (SYNTHROID, LEVOTHROID) 100 MCG tablet Take 1 tablet by  "mouth Every Morning. 90 tablet 1   • lisinopril-hydrochlorothiazide (PRINZIDE,ZESTORETIC) 20-25 MG per tablet Take 1 tablet by mouth Daily. 90 tablet 3   • Lutein 20 MG capsule Take 20 mg by mouth Daily.     • metFORMIN ER (GLUCOPHAGE-XR) 500 MG 24 hr tablet Take 1 tablet by mouth 2 (Two) Times a Day With Meals. 180 tablet 3   • omeprazole (priLOSEC) 20 MG capsule TAKE 1 CAPSULE TWICE DAILY (Patient taking differently: Take 20 mg by mouth Daily.) 180 capsule 3   • ondansetron ODT (ZOFRAN-ODT) 8 MG disintegrating tablet Take 1 tablet by mouth Every 8 (Eight) Hours As Needed for Nausea or Vomiting. 90 tablet 3     No current facility-administered medications on file prior to visit.       Allergies  Allergies   Allergen Reactions   • Escitalopram Dizziness       Objective  Visit Vitals  /74   Pulse 81   Temp 97.8 °F (36.6 °C)   Ht 154.9 cm (61\")   Wt 74.8 kg (165 lb)   SpO2 97%   BMI 31.18 kg/m²        Physical Exam  Physical Exam  Vitals and nursing note reviewed.   Constitutional:       General: She is not in acute distress.     Appearance: Normal appearance. She is well-developed. She is not ill-appearing or toxic-appearing.   HENT:      Head: Normocephalic and atraumatic.      Right Ear: Tympanic membrane, ear canal and external ear normal.      Left Ear: Tympanic membrane, ear canal and external ear normal.      Nose: Congestion and rhinorrhea (clear) present.      Mouth/Throat:      Mouth: Mucous membranes are moist.      Pharynx: Posterior oropharyngeal erythema present. No oropharyngeal exudate.   Eyes:      Conjunctiva/sclera: Conjunctivae normal.   Cardiovascular:      Rate and Rhythm: Normal rate and regular rhythm.      Heart sounds: Normal heart sounds.   Pulmonary:      Effort: Pulmonary effort is normal. No respiratory distress.      Breath sounds: Normal breath sounds.   Musculoskeletal:         General: Deformity (enlarged MCP joints) present.   Skin:     General: Skin is warm and dry. "   Neurological:      Mental Status: She is alert and oriented to person, place, and time. Mental status is at baseline.         Results  Results for orders placed or performed in visit on 12/30/22   TSH    Specimen: Blood   Result Value Ref Range    TSH 1.280 0.270 - 4.200 uIU/mL        Assessment and Plan  Diagnoses and all orders for this visit:    Primary hypertension  - controlled on lisinopril-HCTZ 20-25mg daily  - avoid decongestants, prefer she use plain mucinex if needed    Other hyperlipidemia  - quit taking statin  - recheck lipids    History of thyroid cancer, Post-surgical hypothyroidism  - s/p thyroidectomy  - Following with Dr. Aiken and now on levothyroxine 100mcg for subsequent hypothyroidism  - per Dr. Aiken's last note plan for TSH today, will order    Stage 3a chronic kidney disease (HCC)  - gfr 50-55.   - CBC, BMP today    Type 2 diabetes mellitus without complication, without long-term current use of insulin (HCC)  - Following with Dr. Aiken, last A1c 8.7 so metformin XR increased to 500mg BID    Seasonal allergic rhinitis due to other allergic trigger  - continue use of flonase.  - start saline sinus rinses  - stop mucinex sinus max due to decongestant and report of high BP reading. Prefer she use plain mucinex if needed.     Health Maintenance  - Pap smear: no longer indicated  - Mammogram: 6/2022 BIRADS 1  - Colonoscopy: 6/2021  - HCV: negative  - Immunizations: Pneumococcal complete. Declines COVID19 and flu. Shingrix too expensive. Discussed Tdap.  - Depression screening: negative 8/2022    Return for Next scheduled follow up, Labs today.

## 2023-02-25 LAB
ANION GAP SERPL CALCULATED.3IONS-SCNC: 11 MMOL/L (ref 5–15)
BUN SERPL-MCNC: 17 MG/DL (ref 8–23)
BUN/CREAT SERPL: 18.1 (ref 7–25)
CALCIUM SPEC-SCNC: 9.6 MG/DL (ref 8.6–10.5)
CHLORIDE SERPL-SCNC: 96 MMOL/L (ref 98–107)
CHOLEST SERPL-MCNC: 257 MG/DL (ref 0–200)
CO2 SERPL-SCNC: 30 MMOL/L (ref 22–29)
CREAT SERPL-MCNC: 0.94 MG/DL (ref 0.57–1)
DEPRECATED RDW RBC AUTO: 39.7 FL (ref 37–54)
EGFRCR SERPLBLD CKD-EPI 2021: 61.9 ML/MIN/1.73
ERYTHROCYTE [DISTWIDTH] IN BLOOD BY AUTOMATED COUNT: 11.6 % (ref 12.3–15.4)
GLUCOSE SERPL-MCNC: 147 MG/DL (ref 65–99)
HCT VFR BLD AUTO: 39.6 % (ref 34–46.6)
HDLC SERPL-MCNC: 46 MG/DL (ref 40–60)
HGB BLD-MCNC: 13.6 G/DL (ref 12–15.9)
LDLC SERPL CALC-MCNC: 160 MG/DL (ref 0–100)
LDLC/HDLC SERPL: 3.39 {RATIO}
MCH RBC QN AUTO: 31.5 PG (ref 26.6–33)
MCHC RBC AUTO-ENTMCNC: 34.3 G/DL (ref 31.5–35.7)
MCV RBC AUTO: 91.7 FL (ref 79–97)
PLATELET # BLD AUTO: 348 10*3/MM3 (ref 140–450)
PMV BLD AUTO: 11.3 FL (ref 6–12)
POTASSIUM SERPL-SCNC: 3.8 MMOL/L (ref 3.5–5.2)
RBC # BLD AUTO: 4.32 10*6/MM3 (ref 3.77–5.28)
SODIUM SERPL-SCNC: 137 MMOL/L (ref 136–145)
TRIGL SERPL-MCNC: 275 MG/DL (ref 0–150)
TSH SERPL DL<=0.05 MIU/L-ACNC: 0.71 UIU/ML (ref 0.27–4.2)
VLDLC SERPL-MCNC: 51 MG/DL (ref 5–40)
WBC NRBC COR # BLD: 11.93 10*3/MM3 (ref 3.4–10.8)

## 2023-03-05 DIAGNOSIS — D72.829 LEUKOCYTOSIS, UNSPECIFIED TYPE: Primary | ICD-10-CM

## 2023-03-06 ENCOUNTER — TELEPHONE (OUTPATIENT)
Dept: INTERNAL MEDICINE | Facility: CLINIC | Age: 78
End: 2023-03-06
Payer: MEDICARE

## 2023-03-06 NOTE — TELEPHONE ENCOUNTER
----- Message from Sandra Daniel MD sent at 3/5/2023 11:46 PM EST -----  Please call patient and let her know that her kidney function is stable. Her cholesterol has worsened since stopping her statin. Is she willing to resume? Her white blood cell count is elevated and will need rechecked in 4-6 weeks.

## 2023-03-07 DIAGNOSIS — I10 ESSENTIAL HYPERTENSION: ICD-10-CM

## 2023-03-07 DIAGNOSIS — E78.2 MIXED HYPERLIPIDEMIA: Primary | ICD-10-CM

## 2023-03-07 RX ORDER — SIMVASTATIN 40 MG
40 TABLET ORAL NIGHTLY
Qty: 90 TABLET | Refills: 3 | Status: SHIPPED | OUTPATIENT
Start: 2023-03-07

## 2023-03-07 RX ORDER — LISINOPRIL AND HYDROCHLOROTHIAZIDE 25; 20 MG/1; MG/1
1 TABLET ORAL DAILY
Qty: 90 TABLET | Refills: 3 | Status: SHIPPED | OUTPATIENT
Start: 2023-03-07

## 2023-03-07 NOTE — TELEPHONE ENCOUNTER
Patient advised of results, she is willing to restart a statin, she is needing a refill on her bp medication, her insurance and pharmacy has changed

## 2023-03-08 NOTE — TELEPHONE ENCOUNTER
Sandra Daniel MD  [facility] 9 hours ago (11:08 PM)       Both medications sent to pharmacy.       Danielle Murcia MA routed conversation to Sandra Daniel MD 16 hours ago (4:16 PM)     Danielle Murcia MA 16 hours ago (4:16 PM)     RF  Patient advised of results, she is willing to restart a statin, she is needing a refill on her bp medication, her insurance and pharmacy has changed

## 2023-04-04 ENCOUNTER — OFFICE VISIT (OUTPATIENT)
Dept: UROLOGY | Facility: CLINIC | Age: 78
End: 2023-04-04
Payer: MEDICARE

## 2023-04-04 VITALS
BODY MASS INDEX: 31.15 KG/M2 | SYSTOLIC BLOOD PRESSURE: 142 MMHG | WEIGHT: 165 LBS | DIASTOLIC BLOOD PRESSURE: 74 MMHG | HEART RATE: 74 BPM | HEIGHT: 61 IN | OXYGEN SATURATION: 96 %

## 2023-04-04 DIAGNOSIS — N39.41 URGENCY INCONTINENCE: ICD-10-CM

## 2023-04-04 DIAGNOSIS — N32.81 OVERACTIVE BLADDER: Primary | ICD-10-CM

## 2023-04-04 DIAGNOSIS — N39.46 URINARY INCONTINENCE, MIXED: ICD-10-CM

## 2023-04-04 LAB
BILIRUB BLD-MCNC: NEGATIVE MG/DL
CLARITY, POC: CLEAR
COLOR UR: YELLOW
EXPIRATION DATE: ABNORMAL
GLUCOSE UR STRIP-MCNC: NEGATIVE MG/DL
KETONES UR QL: NEGATIVE
LEUKOCYTE EST, POC: ABNORMAL
Lab: ABNORMAL
NITRITE UR-MCNC: NEGATIVE MG/ML
PH UR: 6.5 [PH] (ref 5–8)
PROT UR STRIP-MCNC: NEGATIVE MG/DL
RBC # UR STRIP: NEGATIVE /UL
SP GR UR: 1.01 (ref 1–1.03)
UROBILINOGEN UR QL: NORMAL

## 2023-04-04 PROCEDURE — 3078F DIAST BP <80 MM HG: CPT | Performed by: STUDENT IN AN ORGANIZED HEALTH CARE EDUCATION/TRAINING PROGRAM

## 2023-04-04 PROCEDURE — 99214 OFFICE O/P EST MOD 30 MIN: CPT | Performed by: STUDENT IN AN ORGANIZED HEALTH CARE EDUCATION/TRAINING PROGRAM

## 2023-04-04 PROCEDURE — 1159F MED LIST DOCD IN RCRD: CPT | Performed by: STUDENT IN AN ORGANIZED HEALTH CARE EDUCATION/TRAINING PROGRAM

## 2023-04-04 PROCEDURE — 1160F RVW MEDS BY RX/DR IN RCRD: CPT | Performed by: STUDENT IN AN ORGANIZED HEALTH CARE EDUCATION/TRAINING PROGRAM

## 2023-04-04 PROCEDURE — 81003 URINALYSIS AUTO W/O SCOPE: CPT | Performed by: STUDENT IN AN ORGANIZED HEALTH CARE EDUCATION/TRAINING PROGRAM

## 2023-04-04 PROCEDURE — 3077F SYST BP >= 140 MM HG: CPT | Performed by: STUDENT IN AN ORGANIZED HEALTH CARE EDUCATION/TRAINING PROGRAM

## 2023-04-04 NOTE — PROGRESS NOTES
"     Follow Up Office Visit      Patient Name: Cristy Louie  : 1945   MRN: [id number]     Chief Complaint:    Chief Complaint   Patient presents with   • Overactive bladder       Referring Provider: No ref. provider found    History of Present Illness: Cristy Louie is a 77 y.o. female who presents today for follow up of mixed urinary incontinence.  She has a history of chronic stress and urgency related incontinence.  She underwent urethral bulking injections with Bulkamid with some improvement in her stress incontinence.  Her primary complaint remains urgency related uncontrolled voids, she states she is using multiple pads per day.  She states she does not leave her house much anymore because she is worried about voiding in public.    Patient has a history significant for stage III chronic kidney disease, type 2 diabetes on Metformin, last hemoglobin A1c 7., chronic constipation, diverticulosis, right renal cysts    She is not a candidate for anticholinergics due to constipation issues and age.  She has failed previous Myrbetriq trial.    Bowel and bladder Symptom Questionnaire   - severe symptoms, indicates urgency, nocturia 3x, and \"no bladder control\".    We have previously discussed her only remaining options for her urgency related incontinence include intravesical Botox or sacral neuromodulation.  The patient reports she would like to move forward with sacral neuromodulation trial.      Subjective      Review of System: Review of Systems   Constitutional: Negative.    Eyes: Negative.    Respiratory: Positive for cough and shortness of breath.    Cardiovascular: Positive for leg swelling.   Gastrointestinal: Positive for constipation.   Endocrine: Negative.    Genitourinary: Positive for urgency.   Musculoskeletal: Negative.    Skin: Negative.    Allergic/Immunologic: Negative.    Neurological: Positive for headaches.   Hematological: Negative.    Psychiatric/Behavioral: " Negative.       I have reviewed the ROS documented by my clinical staff, I have updated appropriately and I agree. Sly Mariano MD    I have reviewed and the following portions of the patient's history were updated as appropriate: past family history, past medical history, past social history, past surgical history and problem list.    Medications:     Current Outpatient Medications:   •  Accu-Chek FastClix Lancets misc, 1 each Daily., Disp: 50 each, Rfl: 11  •  Accu-Chek Guide test strip, 1 each by Other route Daily., Disp: 50 each, Rfl: 11  •  Blood Glucose Monitoring Suppl (Accu-Chek Guide Me) w/Device kit, 1 Device Take As Directed., Disp: 1 kit, Rfl: 0  •  calcitriol (ROCALTROL) 0.25 MCG capsule, Take 1 capsule by mouth Daily., Disp: 90 capsule, Rfl: 3  •  DULoxetine (CYMBALTA) 30 MG capsule, Take 1 capsule by mouth Daily., Disp: , Rfl:   •  fluticasone (FLONASE) 50 MCG/ACT nasal spray, 1 spray into the nostril(s) as directed by provider As Needed for Rhinitis or Allergies., Disp: , Rfl:   •  hydroxychloroquine (PLAQUENIL) 200 MG tablet, Take 1 tablet by mouth 2 (Two) Times a Day., Disp: , Rfl:   •  levothyroxine (SYNTHROID, LEVOTHROID) 100 MCG tablet, Take 1 tablet by mouth Every Morning., Disp: 90 tablet, Rfl: 1  •  lisinopril-hydrochlorothiazide (PRINZIDE,ZESTORETIC) 20-25 MG per tablet, Take 1 tablet by mouth Daily., Disp: 90 tablet, Rfl: 3  •  Lutein 20 MG capsule, Take 20 mg by mouth Daily., Disp: , Rfl:   •  metFORMIN ER (GLUCOPHAGE-XR) 500 MG 24 hr tablet, Take 1 tablet by mouth 2 (Two) Times a Day With Meals., Disp: 180 tablet, Rfl: 3  •  omeprazole (priLOSEC) 20 MG capsule, TAKE 1 CAPSULE TWICE DAILY (Patient taking differently: Take 1 capsule by mouth Daily.), Disp: 180 capsule, Rfl: 3  •  ondansetron ODT (ZOFRAN-ODT) 8 MG disintegrating tablet, Take 1 tablet by mouth Every 8 (Eight) Hours As Needed for Nausea or Vomiting., Disp: 90 tablet, Rfl: 3  •  simvastatin (Zocor) 40 MG tablet, Take 1  "tablet by mouth Every Night., Disp: 90 tablet, Rfl: 3    Allergies:   Allergies   Allergen Reactions   • Escitalopram Dizziness         Objective     Physical Exam:   Vital Signs:   Vitals:    04/04/23 1414   BP: 142/74   Pulse: 74   SpO2: 96%   Weight: 74.8 kg (165 lb)   Height: 154.9 cm (60.98\")   PainSc: 0-No pain     Body mass index is 31.19 kg/m².     Physical Exam  Constitutional:       Appearance: Normal appearance.   HENT:      Head: Normocephalic and atraumatic.      Nose: Nose normal.      Mouth/Throat:      Mouth: Mucous membranes are moist.      Pharynx: Oropharynx is clear.   Eyes:      Extraocular Movements: Extraocular movements intact.      Pupils: Pupils are equal, round, and reactive to light.   Pulmonary:      Effort: Pulmonary effort is normal. No respiratory distress.   Musculoskeletal:         General: No swelling or deformity. Normal range of motion.      Cervical back: Normal range of motion and neck supple.   Skin:     General: Skin is warm and dry.   Neurological:      General: No focal deficit present.      Mental Status: She is alert and oriented to person, place, and time. Mental status is at baseline.   Psychiatric:         Mood and Affect: Mood normal.         Behavior: Behavior normal.         Labs:   Brief Urine Lab Results  (Last result in the past 365 days)      Color   Clarity   Blood   Leuk Est   Nitrite   Protein   CREAT   Urine HCG        04/04/23 1424 Yellow   Clear   Negative   Large (3+)   Negative   Negative                      Lab Results   Component Value Date    GLUCOSE 147 (H) 02/24/2023    CALCIUM 9.6 02/24/2023     02/24/2023    K 3.8 02/24/2023    CO2 30.0 (H) 02/24/2023    CL 96 (L) 02/24/2023    BUN 17 02/24/2023    CREATININE 0.94 02/24/2023    EGFRIFNONA 51 (L) 11/29/2021    BCR 18.1 02/24/2023    ANIONGAP 11.0 02/24/2023       Lab Results   Component Value Date    WBC 11.93 (H) 02/24/2023    HGB 13.6 02/24/2023    HCT 39.6 02/24/2023    MCV 91.7 " 02/24/2023     02/24/2023       Images:   No Images in the past 120 days found..    Measures:   Tobacco:   Cristy Louie  reports that she has never smoked. She has been exposed to tobacco smoke. She has never used smokeless tobacco.         Urine Incontinence:   Patient reports that she is currently experiencing symptoms urgency urinary incontinence.      Assessment / Plan      Assessment/Plan:   77 y.o. female who presented today for follow up of mixed urinary incontinence.  She has been treated for stress urinary incontinence with urethral bulking injections/Bulkamid.  Her primary complaint at this time is urgency and overactive bladder with urgency related incontinence requiring the use of pads.  She states that she will have uncontrolled voids with standing as well.  She avoids public due to her urgency related incontinence.  Not a candidate for anticholinergic given age and constipation and she has previously failed Myrbetriq.  She would like to move forward with Axonics trial, basic PNE.  We discussed that sacral neuromodulation is a two-stage procedure.  She has already been given educational materials and has read these at length.  She would like to avoid intravesical Botox.    Patient states she is fairly busy with multiple medical issues, she needs a cataract surgery in the near future, and I gave her available dates and she will call me whenever she would like to schedule her PNE at the outpatient surgery center.  Risks discussed include bleeding, infection, pain, anesthetic related complications.      Diagnoses and all orders for this visit:    1. Overactive bladder (Primary)  -     POC Urinalysis Dipstick, Automated    2. Urinary incontinence, mixed    3. Urgency incontinence         Follow Up:   No follow-ups on file.    I spent approximately 30 minutes providing clinical care for this patient; including review of patient's chart and provider documentation, face to face time spent with  patient in examination room (obtaining history, performing physical exam, discussing diagnosis and management options), placing orders, and completing patient documentation.     Sly Mariano MD  [facility] [place] [facility] [place]

## 2023-04-23 DIAGNOSIS — E89.0 POST-SURGICAL HYPOTHYROIDISM: ICD-10-CM

## 2023-04-24 ENCOUNTER — LAB (OUTPATIENT)
Dept: LAB | Facility: HOSPITAL | Age: 78
End: 2023-04-24
Payer: MEDICARE

## 2023-04-24 ENCOUNTER — OFFICE VISIT (OUTPATIENT)
Dept: ENDOCRINOLOGY | Facility: CLINIC | Age: 78
End: 2023-04-24
Payer: MEDICARE

## 2023-04-24 VITALS
HEART RATE: 96 BPM | OXYGEN SATURATION: 100 % | DIASTOLIC BLOOD PRESSURE: 76 MMHG | BODY MASS INDEX: 31.15 KG/M2 | HEIGHT: 61 IN | WEIGHT: 165 LBS | SYSTOLIC BLOOD PRESSURE: 130 MMHG

## 2023-04-24 DIAGNOSIS — E89.0 POST-SURGICAL HYPOTHYROIDISM: ICD-10-CM

## 2023-04-24 DIAGNOSIS — E89.2 POST-SURGICAL HYPOPARATHYROIDISM: ICD-10-CM

## 2023-04-24 DIAGNOSIS — E11.65 TYPE 2 DIABETES MELLITUS WITH HYPERGLYCEMIA, WITHOUT LONG-TERM CURRENT USE OF INSULIN: Primary | ICD-10-CM

## 2023-04-24 DIAGNOSIS — D72.829 LEUKOCYTOSIS, UNSPECIFIED TYPE: ICD-10-CM

## 2023-04-24 DIAGNOSIS — C73 THYROID CANCER: ICD-10-CM

## 2023-04-24 LAB
BASOPHILS # BLD AUTO: 0.11 10*3/MM3 (ref 0–0.2)
BASOPHILS NFR BLD AUTO: 1.1 % (ref 0–1.5)
DEPRECATED RDW RBC AUTO: 36.5 FL (ref 37–54)
EOSINOPHIL # BLD AUTO: 0.5 10*3/MM3 (ref 0–0.4)
EOSINOPHIL NFR BLD AUTO: 4.8 % (ref 0.3–6.2)
ERYTHROCYTE [DISTWIDTH] IN BLOOD BY AUTOMATED COUNT: 11.3 % (ref 12.3–15.4)
EXPIRATION DATE: NORMAL
GLUCOSE BLDC GLUCOMTR-MCNC: 223 MG/DL (ref 70–130)
HBA1C MFR BLD: 9.4 %
HCT VFR BLD AUTO: 40.6 % (ref 34–46.6)
HGB BLD-MCNC: 14 G/DL (ref 12–15.9)
IMM GRANULOCYTES # BLD AUTO: 0.06 10*3/MM3 (ref 0–0.05)
IMM GRANULOCYTES NFR BLD AUTO: 0.6 % (ref 0–0.5)
LYMPHOCYTES # BLD AUTO: 2.94 10*3/MM3 (ref 0.7–3.1)
LYMPHOCYTES NFR BLD AUTO: 28.5 % (ref 19.6–45.3)
Lab: NORMAL
MCH RBC QN AUTO: 30.8 PG (ref 26.6–33)
MCHC RBC AUTO-ENTMCNC: 34.5 G/DL (ref 31.5–35.7)
MCV RBC AUTO: 89.4 FL (ref 79–97)
MONOCYTES # BLD AUTO: 1.04 10*3/MM3 (ref 0.1–0.9)
MONOCYTES NFR BLD AUTO: 10.1 % (ref 5–12)
NEUTROPHILS NFR BLD AUTO: 5.66 10*3/MM3 (ref 1.7–7)
NEUTROPHILS NFR BLD AUTO: 54.9 % (ref 42.7–76)
NRBC BLD AUTO-RTO: 0.1 /100 WBC (ref 0–0.2)
PLATELET # BLD AUTO: 346 10*3/MM3 (ref 140–450)
PMV BLD AUTO: 11.6 FL (ref 6–12)
RBC # BLD AUTO: 4.54 10*6/MM3 (ref 3.77–5.28)
WBC NRBC COR # BLD: 10.31 10*3/MM3 (ref 3.4–10.8)

## 2023-04-24 PROCEDURE — 82947 ASSAY GLUCOSE BLOOD QUANT: CPT | Performed by: INTERNAL MEDICINE

## 2023-04-24 PROCEDURE — 99214 OFFICE O/P EST MOD 30 MIN: CPT | Performed by: INTERNAL MEDICINE

## 2023-04-24 PROCEDURE — 3046F HEMOGLOBIN A1C LEVEL >9.0%: CPT | Performed by: INTERNAL MEDICINE

## 2023-04-24 PROCEDURE — 3075F SYST BP GE 130 - 139MM HG: CPT | Performed by: INTERNAL MEDICINE

## 2023-04-24 PROCEDURE — 83036 HEMOGLOBIN GLYCOSYLATED A1C: CPT | Performed by: INTERNAL MEDICINE

## 2023-04-24 PROCEDURE — 85025 COMPLETE CBC W/AUTO DIFF WBC: CPT

## 2023-04-24 PROCEDURE — 3078F DIAST BP <80 MM HG: CPT | Performed by: INTERNAL MEDICINE

## 2023-04-24 RX ORDER — LEVOTHYROXINE SODIUM 0.1 MG/1
100 TABLET ORAL EVERY MORNING
Qty: 90 TABLET | Refills: 3 | Status: SHIPPED | OUTPATIENT
Start: 2023-04-24

## 2023-04-24 RX ORDER — METFORMIN HYDROCHLORIDE 500 MG/1
500 TABLET, EXTENDED RELEASE ORAL 2 TIMES DAILY WITH MEALS
Qty: 180 TABLET | Refills: 3 | Status: SHIPPED | OUTPATIENT
Start: 2023-04-24

## 2023-04-24 RX ORDER — GLIMEPIRIDE 2 MG/1
2 TABLET ORAL
Qty: 90 TABLET | Refills: 3 | Status: SHIPPED | OUTPATIENT
Start: 2023-04-24

## 2023-04-24 RX ORDER — SIMVASTATIN 40 MG
40 TABLET ORAL DAILY
COMMUNITY
Start: 2023-03-07

## 2023-04-24 RX ORDER — CALCITRIOL 0.25 UG/1
0.25 CAPSULE, LIQUID FILLED ORAL DAILY
Qty: 90 CAPSULE | Refills: 3 | Status: SHIPPED | OUTPATIENT
Start: 2023-04-24

## 2023-04-24 NOTE — PROGRESS NOTES
Chief Complaint   Patient presents with   • Hypothyroidism     Follow up        HPI:   Cristy Louie is a 77 y.o.female who returns to Endocrine Clinic for f/u evaluation of her Type 2 DM,  thyroid cancer, post-surgical hypothyroidism, and post-surgical hypoparathyroidism. Last visit 10/24/2022.  Her history is as follows:    Interim Events:   - Pt is taking her levothyroxine and calcitriol as directed  - pt has been missing multiple doses of metformin ER.  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 Ava Jacobs, Endocrine Surgery, at [facility] for surgical treatment of pt's primary hyperparathyroidism.  - (05/24/2018) had pre-operative localization imaging with Thyroid US at  by Dr. Jacobs and an US-Guided FNA of a right inferior thyroid lobe nodule was completed. 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  - pt underwent right superior parathyroidectomy and partial right thyroidectomy on 08/07/2018  - underwent completion thyroidectomy 11/27/2018    Surgical pathology:  (08/07/2018)  A. Thyroid, right, lobectomy:  - papillary thyroid carcinoma, oncocytic variant (pT1a, N0)  - Tumor Size: 1.0 x 0.8 x 0.7 cm, single focus  - no extrathyroidal extension  - no lymphatic or angioinvasion  - no invasion of the surgical resection margin or to the thyroid capsule  - one benign perithyroidal lymph node (0/1)  - benign adenomatoid nodules.    B. Parathyroid, right superior, excision:  - hypercellular parathyroid (0.45 gm)    (11/27/2018)  A. Thyroid, left lobe:   - multifocal papillary thyroid carcinoma, classic type, involving bilateral lobes (pT1b, Nx)  - Tumor Size: 1.2 x 0.8 x 0.6 cm, single focus  - no extrathyroidal extension  - no lymphatic or angioinvasion  - no invasion of the surgical resection margin or to the thyroid capsule.  - Follicular adenoma of the superior  pole (1.5 mm in the greatest dimension).    B. Parathyroid Left Inferior:   - hypercellular parathyroid gland (0.03 gm) with no tumor seen    C. Parathyroid Left Superior:   - hypercellular parathyroid gland (0.04 gm) with no tumor seen    Lab summary:   (01/2019, [facility]) TG <0.1, TG Ab < 1.0, TSH 0.55, free T4 1.6, Calcium 7.7  (04/2019, [facility]) TG 0.1, TG Ab <0 1.0, TSH 0.397, PTH 43.9, Ca 8.6  (10/2019) TSH 0.296 (0.270  - 4.200) - on levothyroxine 112 mcg  (10/2020) TSH 0.098, free T4 1.85. [facility]: TG <0.1, TG Ab < 1.0  (11/2020) TSH 4.330, free T4 1.24, increased to levothyroxine 125 mcg  (01/2021) TSH 0.086 - on levothyroxine 125 mcg  (04/2021) TSH 0.049,  Free T4 1.73 - on levothyroxine 125 mcg  (11/2021) TSH 0.138, free T4 1.58, [facility]: TG <0.1, TG Ab < 1.0 - on levothyroxine 112 mcg, dose changed to 100 mcg.  (10/2022) TSH 7.870, free T4 1.58, [facility]: TG by ROGELIO 0.2, TG Ab < 1.0 - on levothyroxine 88 mcg    2) post-surgical hypothyroidism:  Current Dose: [id number] mcg  - Pt taking in AM on an empty stomach as directed without interfering substances  - Is not on high dose biotin  - No missed doses    Lab Results   Component Value Date    TSH 0.715 02/24/2023       3) post-surgical hypoparathyroidism:  - h/o  primary hyperparathyroidism: s/p right superior parathyroidectomy 08/2018, s/p left superior and inferior parathyroidectomy 11/2018  - On Calcitriol 0.25 mcg daily   Serum Calcium at lower end of normal range since 01/2021    (02/24/2023) BMP - Ca 9.6    4) Type 2 diabetes without complication:  - had pre-diabetes for several years. Diagnosed with overt T2DM in 2013, A1C% 6.5    - Is supposed to be taking Metformin  mg BID. Has missed doses she reports.      Review of Systems   Constitutional: Negative for fatigue.        Weight stable   HENT: Negative.    Eyes: Negative.    Respiratory: Negative.    Cardiovascular: Negative.    Gastrointestinal: Positive for constipation. Negative for abdominal  "pain.   Endocrine: Negative.    Genitourinary: Negative.    Musculoskeletal: Positive for arthralgias and myalgias. Negative for joint swelling.   Skin: Negative.    Allergic/Immunologic: Negative.    Neurological: Negative.    Hematological: Negative.    Psychiatric/Behavioral: Negative.      The following portions of the patient's history were reviewed and updated as appropriate: allergies, current medications, past family history, past medical history, past social history, past surgical history and problem list.      /76   Pulse 96   Ht 154.9 cm (61\")   Wt 74.8 kg (165 lb)   SpO2 100%   BMI 31.18 kg/m²   Physical Exam   Constitutional: She is oriented to person, place, and time. She appears well-developed. No distress.   HENT:   Head: Normocephalic.   Eyes: Pupils are equal, round, and reactive to light. Conjunctivae are normal.   Neck: No tracheal deviation present.   No palpable thyroid tissue      Cardiovascular: Normal rate, regular rhythm and normal heart sounds.   No murmur heard.  Pulmonary/Chest: Effort normal and breath sounds normal. No respiratory distress.   Musculoskeletal:      Comments: Arthritic changes in hands   Lymphadenopathy:     She has no cervical adenopathy.   Neurological: She is alert and oriented to person, place, and time. No cranial nerve deficit.   Skin: Skin is warm and dry. She is not diaphoretic. No erythema.   Psychiatric: Her behavior is normal.   Vitals reviewed.    LABS/IMAGING: outside records reviewed and summarized in HPI  Lab on 04/24/2023   Component Date Value Ref Range Status   • WBC 04/24/2023 10.31  3.40 - 10.80 10*3/mm3 Final   • RBC 04/24/2023 4.54  3.77 - 5.28 10*6/mm3 Final   • Hemoglobin 04/24/2023 14.0  12.0 - 15.9 g/dL Final   • Hematocrit 04/24/2023 40.6  34.0 - 46.6 % Final   • MCV 04/24/2023 89.4  79.0 - 97.0 fL Final   • MCH 04/24/2023 30.8  26.6 - 33.0 pg Final   • MCHC 04/24/2023 34.5  31.5 - 35.7 g/dL Final   • RDW 04/24/2023 11.3 (L)  12.3 - " 15.4 % Final   • RDW-SD 04/24/2023 36.5 (L)  37.0 - 54.0 fl Final   • MPV 04/24/2023 11.6  6.0 - 12.0 fL Final   • Platelets 04/24/2023 346  140 - 450 10*3/mm3 Final   • Neutrophil % 04/24/2023 54.9  42.7 - 76.0 % Final   • Lymphocyte % 04/24/2023 28.5  19.6 - 45.3 % Final   • Monocyte % 04/24/2023 10.1  5.0 - 12.0 % Final   • Eosinophil % 04/24/2023 4.8  0.3 - 6.2 % Final   • Basophil % 04/24/2023 1.1  0.0 - 1.5 % Final   • Immature Grans % 04/24/2023 0.6 (H)  0.0 - 0.5 % Final   • Neutrophils, Absolute 04/24/2023 5.66  1.70 - 7.00 10*3/mm3 Final   • Lymphocytes, Absolute 04/24/2023 2.94  0.70 - 3.10 10*3/mm3 Final   • Monocytes, Absolute 04/24/2023 1.04 (H)  0.10 - 0.90 10*3/mm3 Final   • Eosinophils, Absolute 04/24/2023 0.50 (H)  0.00 - 0.40 10*3/mm3 Final   • Basophils, Absolute 04/24/2023 0.11  0.00 - 0.20 10*3/mm3 Final   • Immature Grans, Absolute 04/24/2023 0.06 (H)  0.00 - 0.05 10*3/mm3 Final   • nRBC 04/24/2023 0.1  0.0 - 0.2 /100 WBC Final   Office Visit on 04/24/2023   Component Date Value Ref Range Status   • Hemoglobin A1C 04/24/2023 9.4  % Final   • Lot Number 04/24/2023 [id number]   Final   • Expiration Date 04/24/2023 12/12/24   Final   • Glucose 04/24/2023 223 (A)  70 - 130 mg/dL Final     Lab Results   Component Value Date    GLUCOSE 147 (H) 02/24/2023    CALCIUM 9.6 02/24/2023     02/24/2023    K 3.8 02/24/2023    CO2 30.0 (H) 02/24/2023    CL 96 (L) 02/24/2023    BUN 17 02/24/2023    CREATININE 0.94 02/24/2023    EGFR 61.9 02/24/2023    BCR 18.1 02/24/2023    ANIONGAP 11.0 02/24/2023     Lab Results   Component Value Date    TSH 0.715 02/24/2023           ASSESSMENT/PLAN:    1) Type 2 diabetes w/o complications: uncontrolled, A1C% 9.40 today  - continue Metformin  mg to BID with food. Encouraged pt not to miss doses  - starting glimepiride 2 mg qAM. Reviewed potential for hypoglycemia on this medication. Advised to not skip meals    2) post-surgical hypothyroidism: controlled  -  clinically euthyroid on exam  (02/24/2023): TSH 0.715 - on levothyroxine 100 mcg. Continue the same dose  - Reviewed proper thyroid hormone administration, and factors to avoid that decrease medication potency and medication absorption.     3) post-surgical hypoparathyroidism:   h/o hyperparathyroidism: - s/p parathyroid resection  X 3 glands  - on calcitriol 0.25 mg daily  - BMP 02/24/2023. 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   - discussed that the need for additional treatment (in particular, radioiodine therapy) after surgery, per these guidelines, is based upon an initial risk stratification system which estimates the risk of persistent/recurrent disease. Per this stratification system, she is at low risk for recurrence.  I did not recommend REESE remnant ablation.     (02/24/2023) TSH 0.715 - on levothyroxine 100 mcg.  - goal will be to keep TSH in the lower end of the range       RTC 4 months.     Signed: Maureen Aiken MD

## 2023-06-01 ENCOUNTER — OFFICE VISIT (OUTPATIENT)
Dept: INTERNAL MEDICINE | Facility: CLINIC | Age: 78
End: 2023-06-01

## 2023-06-01 ENCOUNTER — APPOINTMENT (OUTPATIENT)
Dept: CT IMAGING | Facility: HOSPITAL | Age: 78
End: 2023-06-01
Payer: MEDICARE

## 2023-06-01 ENCOUNTER — HOSPITAL ENCOUNTER (EMERGENCY)
Facility: HOSPITAL | Age: 78
Discharge: HOME | End: 2023-06-01
Attending: EMERGENCY MEDICINE
Payer: MEDICARE

## 2023-06-01 VITALS
HEART RATE: 72 BPM | BODY MASS INDEX: 30.02 KG/M2 | HEIGHT: 61 IN | WEIGHT: 159 LBS | SYSTOLIC BLOOD PRESSURE: 122 MMHG | TEMPERATURE: 97.4 F | OXYGEN SATURATION: 96 % | DIASTOLIC BLOOD PRESSURE: 78 MMHG | RESPIRATION RATE: 16 BRPM

## 2023-06-01 VITALS
WEIGHT: 161 LBS | TEMPERATURE: 98.4 F | BODY MASS INDEX: 30.4 KG/M2 | DIASTOLIC BLOOD PRESSURE: 69 MMHG | RESPIRATION RATE: 17 BRPM | SYSTOLIC BLOOD PRESSURE: 160 MMHG | HEIGHT: 61 IN | HEART RATE: 77 BPM | OXYGEN SATURATION: 96 %

## 2023-06-01 DIAGNOSIS — M54.6 ACUTE LEFT-SIDED THORACIC BACK PAIN: ICD-10-CM

## 2023-06-01 DIAGNOSIS — R10.32 ACUTE BILATERAL LOWER ABDOMINAL PAIN: ICD-10-CM

## 2023-06-01 DIAGNOSIS — R10.31 ACUTE BILATERAL LOWER ABDOMINAL PAIN: ICD-10-CM

## 2023-06-01 DIAGNOSIS — R53.1 WEAKNESS: ICD-10-CM

## 2023-06-01 DIAGNOSIS — K57.92 ACUTE DIVERTICULITIS: Primary | ICD-10-CM

## 2023-06-01 DIAGNOSIS — R19.7 DIARRHEA, UNSPECIFIED TYPE: ICD-10-CM

## 2023-06-01 DIAGNOSIS — R10.9 ABDOMINAL PAIN, UNSPECIFIED ABDOMINAL LOCATION: Primary | ICD-10-CM

## 2023-06-01 DIAGNOSIS — R07.9 CHEST PAIN, UNSPECIFIED TYPE: ICD-10-CM

## 2023-06-01 LAB
ALBUMIN SERPL-MCNC: 4.4 G/DL (ref 3.5–5.2)
ALBUMIN/GLOB SERPL: 1.3 G/DL
ALP SERPL-CCNC: 83 U/L (ref 39–117)
ALT SERPL W P-5'-P-CCNC: 21 U/L (ref 1–33)
ANION GAP SERPL CALCULATED.3IONS-SCNC: 15 MMOL/L (ref 5–15)
AST SERPL-CCNC: 26 U/L (ref 1–32)
BACTERIA UR QL AUTO: ABNORMAL /HPF
BASOPHILS # BLD AUTO: 0.08 10*3/MM3 (ref 0–0.2)
BASOPHILS NFR BLD AUTO: 0.8 % (ref 0–1.5)
BILIRUB SERPL-MCNC: 0.3 MG/DL (ref 0–1.2)
BILIRUB UR QL STRIP: NEGATIVE
BUN SERPL-MCNC: 19 MG/DL (ref 8–23)
BUN/CREAT SERPL: 20.2 (ref 7–25)
CALCIUM SPEC-SCNC: 9.8 MG/DL (ref 8.6–10.5)
CHLORIDE SERPL-SCNC: 104 MMOL/L (ref 98–107)
CLARITY UR: CLEAR
CO2 SERPL-SCNC: 23 MMOL/L (ref 22–29)
COLOR UR: YELLOW
CREAT SERPL-MCNC: 0.94 MG/DL (ref 0.57–1)
DEPRECATED RDW RBC AUTO: 39.3 FL (ref 37–54)
EGFRCR SERPLBLD CKD-EPI 2021: 61.9 ML/MIN/1.73
EOSINOPHIL # BLD AUTO: 0.47 10*3/MM3 (ref 0–0.4)
EOSINOPHIL NFR BLD AUTO: 4.7 % (ref 0.3–6.2)
ERYTHROCYTE [DISTWIDTH] IN BLOOD BY AUTOMATED COUNT: 11.5 % (ref 12.3–15.4)
GLOBULIN UR ELPH-MCNC: 3.5 GM/DL
GLUCOSE SERPL-MCNC: 130 MG/DL (ref 65–99)
GLUCOSE UR STRIP-MCNC: NEGATIVE MG/DL
HCT VFR BLD AUTO: 40.4 % (ref 34–46.6)
HGB BLD-MCNC: 13 G/DL (ref 12–15.9)
HGB UR QL STRIP.AUTO: NEGATIVE
HYALINE CASTS UR QL AUTO: ABNORMAL /LPF
IMM GRANULOCYTES # BLD AUTO: 0.06 10*3/MM3 (ref 0–0.05)
IMM GRANULOCYTES NFR BLD AUTO: 0.6 % (ref 0–0.5)
KETONES UR QL STRIP: NEGATIVE
LEUKOCYTE ESTERASE UR QL STRIP.AUTO: ABNORMAL
LIPASE SERPL-CCNC: 80 U/L (ref 13–60)
LYMPHOCYTES # BLD AUTO: 3.5 10*3/MM3 (ref 0.7–3.1)
LYMPHOCYTES NFR BLD AUTO: 35 % (ref 19.6–45.3)
MCH RBC QN AUTO: 30.3 PG (ref 26.6–33)
MCHC RBC AUTO-ENTMCNC: 32.2 G/DL (ref 31.5–35.7)
MCV RBC AUTO: 94.2 FL (ref 79–97)
MONOCYTES # BLD AUTO: 0.77 10*3/MM3 (ref 0.1–0.9)
MONOCYTES NFR BLD AUTO: 7.7 % (ref 5–12)
NEUTROPHILS NFR BLD AUTO: 5.13 10*3/MM3 (ref 1.7–7)
NEUTROPHILS NFR BLD AUTO: 51.2 % (ref 42.7–76)
NITRITE UR QL STRIP: NEGATIVE
NRBC BLD AUTO-RTO: 0 /100 WBC (ref 0–0.2)
PH UR STRIP.AUTO: <=5 [PH] (ref 5–8)
PLATELET # BLD AUTO: 409 10*3/MM3 (ref 140–450)
PMV BLD AUTO: 10.5 FL (ref 6–12)
POTASSIUM SERPL-SCNC: 4.2 MMOL/L (ref 3.5–5.2)
PROT SERPL-MCNC: 7.9 G/DL (ref 6–8.5)
PROT UR QL STRIP: NEGATIVE
RBC # BLD AUTO: 4.29 10*6/MM3 (ref 3.77–5.28)
RBC # UR STRIP: ABNORMAL /HPF
REF LAB TEST METHOD: ABNORMAL
SODIUM SERPL-SCNC: 142 MMOL/L (ref 136–145)
SP GR UR STRIP: 1.01 (ref 1–1.03)
SQUAMOUS #/AREA URNS HPF: ABNORMAL /HPF
UROBILINOGEN UR QL STRIP: ABNORMAL
WBC # UR STRIP: ABNORMAL /HPF
WBC NRBC COR # BLD: 10.01 10*3/MM3 (ref 3.4–10.8)

## 2023-06-01 PROCEDURE — 74176 CT ABD & PELVIS W/O CONTRAST: CPT

## 2023-06-01 PROCEDURE — 3078F DIAST BP <80 MM HG: CPT | Performed by: NURSE PRACTITIONER

## 2023-06-01 PROCEDURE — 99214 OFFICE O/P EST MOD 30 MIN: CPT | Performed by: NURSE PRACTITIONER

## 2023-06-01 PROCEDURE — 99283 EMERGENCY DEPT VISIT LOW MDM: CPT

## 2023-06-01 PROCEDURE — 1160F RVW MEDS BY RX/DR IN RCRD: CPT | Performed by: NURSE PRACTITIONER

## 2023-06-01 PROCEDURE — 83690 ASSAY OF LIPASE: CPT | Performed by: EMERGENCY MEDICINE

## 2023-06-01 PROCEDURE — 3074F SYST BP LT 130 MM HG: CPT | Performed by: NURSE PRACTITIONER

## 2023-06-01 PROCEDURE — 85025 COMPLETE CBC W/AUTO DIFF WBC: CPT | Performed by: EMERGENCY MEDICINE

## 2023-06-01 PROCEDURE — 1159F MED LIST DOCD IN RCRD: CPT | Performed by: NURSE PRACTITIONER

## 2023-06-01 PROCEDURE — 80053 COMPREHEN METABOLIC PANEL: CPT | Performed by: EMERGENCY MEDICINE

## 2023-06-01 PROCEDURE — 36415 COLL VENOUS BLD VENIPUNCTURE: CPT

## 2023-06-01 PROCEDURE — 81001 URINALYSIS AUTO W/SCOPE: CPT | Performed by: EMERGENCY MEDICINE

## 2023-06-01 RX ORDER — AMOXICILLIN AND CLAVULANATE POTASSIUM 875; 125 MG/1; MG/1
1 TABLET, FILM COATED ORAL 2 TIMES DAILY
Qty: 14 TABLET | Refills: 0 | Status: SHIPPED | OUTPATIENT
Start: 2023-06-01 | End: 2023-06-08

## 2023-06-01 NOTE — ED PROVIDER NOTES
[place]    EMERGENCY DEPARTMENT ENCOUNTER      Pt Name: Cristy Louie  MRN: [id number]  YOB: 1945  Date of evaluation: 6/1/2023  Provider: Vlad Dong MD    CHIEF COMPLAINT       Chief Complaint   Patient presents with   • Testing         HISTORY OF PRESENT ILLNESS   Cristy Louie is a 77 y.o. female who presents to the emergency department with intermittent mild left lower quadrant abdominal pain over the course of the past 2 weeks.  She was seen at her PCPs office today who sent her here for further work-up.  She denies any ongoing abdominal pain at this time and also denies any chest pain, shortness of breath, fever, chills, nausea, vomiting, diarrhea, or urinary symptoms.  She has history of significant diverticulosis and recurrent bouts of diverticulitis.      Nursing notes were reviewed.    REVIEW OF SYSTEMS     ROS:  A chief complaint appropriate review of systems was completed and is negative except as noted in the HPI.      PAST MEDICAL HISTORY     Past Medical History:   Diagnosis Date   • Abnormal liver function tests     Description: liver bx showed fatty liver 2008   • Allergic rhinitis    • Anemia    • Anxiety disorder    • Asthma     Description: dx 1998   • Benign colonic polyp     Description: dx 2007   • Cancer    • Cataract    • Diabetes mellitus     Description: dx 7/10   • Disease of thyroid gland    • Diverticulosis of intestine     Description: dx 2007   • Fibromyalgia     Description: dx 2003   • Gastroesophageal reflux disease     Description: dx 2003.  EGD normal 2007.   • H/O cardiovascular stress test     12/06- normal dobutamine myoview. 12/16/13- acceptable negative Lexiscan Cardiolite test.   • History of echocardiogram     2/06- normal except ? relaxation abnormality   • History of esophagogastroduodenoscopy 06/21/2021    Reflux esophagitis with a single erosion, HH. 3/12- chronic gastritis / reflux esophagitis. 1/25/16- LA Class A refux esophagitis, small HH    • History of varicella    • Hyperlipidemia     Description: dx 1980's   • Hyperparathyroidism     Description: dx 8/11   • Hypertension    • Osteoarthritis     Description: dx 1990's   • Osteoporosis     Description: dx 2007   • PONV (postoperative nausea and vomiting)    • Rheumatoid arthritis     Description: dx 2003   • Sensorineural hearing loss (SNHL) of both ears 10/14/2021    DX October 2021 by [facility] (mild to severe loss)   • Vitamin D deficiency     Description: dx 7/10 (mild)         SURGICAL HISTORY       Past Surgical History:   Procedure Laterality Date   • BLADDER SURGERY      urethral bulking injections   • BREAST BIOPSY Right     benign   • CHOLECYSTECTOMY      1978   • COLONOSCOPY     • CYSTOSCOPY W/ BULKING AGENT INJECTION N/A 04/08/2022    Procedure: CYSTOSCOPY WITH BULKING AGENT INJECTION (BULKAMID);  Surgeon: Sly Mariano MD;  Location: [facility];  Service: Urology;  Laterality: N/A;   • PARATHYROIDECTOMY Right 08/07/2018    [place], path c/w hypercellular parathyroid- Dr. Ava Jacobs ([facility])   • THYROID SURGERY  08/2018    x 2 11/2018   • THYROIDECTOMY Right 08/07/2018    Papillary Thyroid Carcinoma- Dr. Ava Jacobs ([facility])   • THYROIDECTOMY Left 11/27/2018    Papillary Thyroid Carcinoma- Dr. Ava Jacobs ([facility])   • TOTAL ABDOMINAL HYSTERECTOMY WITH SALPINGO OOPHORECTOMY Bilateral     1987, menorrhagia   • WISDOM TOOTH EXTRACTION           CURRENT MEDICATIONS     No current facility-administered medications for this encounter.    Current Outpatient Medications:   •  Accu-Chek FastClix Lancets misc, 1 each Daily., Disp: 50 each, Rfl: 11  •  Accu-Chek Guide test strip, 1 each by Other route Daily., Disp: 50 each, Rfl: 11  •  amoxicillin-clavulanate (AUGMENTIN) 875-125 MG per tablet, Take 1 tablet by mouth 2 (Two) Times a Day for 7 days., Disp: 14 tablet, Rfl: 0  •  Blood Glucose Monitoring Suppl (Accu-Chek Guide Me) w/Device kit, 1 Device Take As  Directed., Disp: 1 kit, Rfl: 0  •  calcitriol (ROCALTROL) 0.25 MCG capsule, Take 1 capsule by mouth Daily., Disp: 90 capsule, Rfl: 3  •  DULoxetine (CYMBALTA) 30 MG capsule, Take 1 capsule by mouth Daily., Disp: , Rfl:   •  fluticasone (FLONASE) 50 MCG/ACT nasal spray, 1 spray into the nostril(s) as directed by provider As Needed for Rhinitis or Allergies., Disp: , Rfl:   •  glimepiride (AMARYL) 2 MG tablet, Take 1 tablet by mouth Every Morning Before Breakfast., Disp: 90 tablet, Rfl: 3  •  hydroxychloroquine (PLAQUENIL) 200 MG tablet, Take 1 tablet by mouth 2 (Two) Times a Day., Disp: , Rfl:   •  levothyroxine (SYNTHROID, LEVOTHROID) 100 MCG tablet, TAKE 1 TABLET BY MOUTH EVERY MORNING, Disp: 90 tablet, Rfl: 3  •  lisinopril-hydrochlorothiazide (PRINZIDE,ZESTORETIC) 20-25 MG per tablet, Take 1 tablet by mouth Daily., Disp: 90 tablet, Rfl: 3  •  Lutein 20 MG capsule, Take 20 mg by mouth Daily., Disp: , Rfl:   •  metFORMIN ER (GLUCOPHAGE-XR) 500 MG 24 hr tablet, Take 1 tablet by mouth 2 (Two) Times a Day With Meals., Disp: 180 tablet, Rfl: 3  •  omeprazole (priLOSEC) 20 MG capsule, TAKE 1 CAPSULE TWICE DAILY (Patient taking differently: Take 1 capsule by mouth Daily.), Disp: 180 capsule, Rfl: 3  •  ondansetron ODT (ZOFRAN-ODT) 8 MG disintegrating tablet, Take 1 tablet by mouth Every 8 (Eight) Hours As Needed for Nausea or Vomiting., Disp: 90 tablet, Rfl: 3  •  simvastatin (ZOCOR) 40 MG tablet, Take 1 tablet by mouth Daily., Disp: , Rfl:     ALLERGIES     Escitalopram    FAMILY HISTORY       Family History   Problem Relation Age of Onset   • No Known Problems Mother    • Coronary artery disease Father          MI age 59   • Hyperlipidemia Father    • Heart attack Father 59   • Hypertension Father    • Diabetes Brother    • Breast cancer Paternal Aunt         ?   • Aneurysm Neg Hx         AAA          SOCIAL HISTORY       Social History     Socioeconomic History   • Marital status:    • Number of children: 4  "  Tobacco Use   • Smoking status: Never     Passive exposure: Past   • Smokeless tobacco: Never   Vaping Use   • Vaping Use: Never used   Substance and Sexual Activity   • Alcohol use: No   • Drug use: No   • Sexual activity: Defer         PHYSICAL EXAM    (up to 7 for level 4, 8 or more for level 5)     Vitals:    06/01/23 1715   BP: 160/69   BP Location: Left arm   Patient Position: Sitting   Pulse: 77   Resp: 17   Temp: 98.4 °F (36.9 °C)   TempSrc: Oral   SpO2: 96%   Weight: 73 kg (161 lb)   Height: 154.9 cm (61\")       General: Awake, alert, no acute distress.  HEENT: Conjunctivae normal.  Neck: Trachea midline.  Cardiac: Heart regular rate, rhythm, no murmurs, rubs, or gallops  Lungs: Lungs are clear to auscultation, there is no wheezing, rhonchi, or rales. There is no use of accessory muscles.  Chest wall: There is no tenderness to palpation over the chest wall or over ribs  Abdomen: Mild tenderness in the left lower quadrant without distention, rebound, or guarding  Musculoskeletal: No deformity.  Neuro: Alert and oriented x 4.  Dermatology: Skin is warm and dry  Psych: Mentation is grossly normal, cognition is grossly normal. Affect is appropriate.        DIAGNOSTIC RESULTS     EKG: All EKGs are interpreted by the Emergency Department Physician who either signs or Co-signs this chart in the absence of a cardiologist.    No orders to display         RADIOLOGY:   [x] Radiologist's Report Reviewed:  CT Abdomen Pelvis Without Contrast   Final Result   Impression:      1. Widespread colonic diverticulosis with subtle pericolonic fat stranding noted in the sigmoid portion may indicate early acute diverticulitis. Correlate clinically. Please see above discussion.   2. No evidence of appendicitis.   3. Small hiatal hernia.   4. Additional findings as given above.                  Electronically Signed: Ebenezer Aiken     6/1/2023 6:40 PM EDT     Workstation ID: [id number]          I ordered and independently reviewed the " above noted radiographic studies.        LABS:    I have reviewed and interpreted all of the currently available lab results from this visit (if applicable):  Results for orders placed or performed during the hospital encounter of 06/01/23   Comprehensive Metabolic Panel    Specimen: Blood   Result Value Ref Range    Glucose 130 (H) 65 - 99 mg/dL    BUN 19 8 - 23 mg/dL    Creatinine 0.94 0.57 - 1.00 mg/dL    Sodium 142 136 - 145 mmol/L    Potassium 4.2 3.5 - 5.2 mmol/L    Chloride 104 98 - 107 mmol/L    CO2 23.0 22.0 - 29.0 mmol/L    Calcium 9.8 8.6 - 10.5 mg/dL    Total Protein 7.9 6.0 - 8.5 g/dL    Albumin 4.4 3.5 - 5.2 g/dL    ALT (SGPT) 21 1 - 33 U/L    AST (SGOT) 26 1 - 32 U/L    Alkaline Phosphatase 83 39 - 117 U/L    Total Bilirubin 0.3 0.0 - 1.2 mg/dL    Globulin 3.5 gm/dL    A/G Ratio 1.3 g/dL    BUN/Creatinine Ratio 20.2 7.0 - 25.0    Anion Gap 15.0 5.0 - 15.0 mmol/L    eGFR 61.9 >60.0 mL/min/1.73   Lipase    Specimen: Blood   Result Value Ref Range    Lipase 80 (H) 13 - 60 U/L   Urinalysis With Microscopic If Indicated (No Culture) - Urine, Clean Catch    Specimen: Urine, Clean Catch   Result Value Ref Range    Color, UA Yellow Yellow, Straw    Appearance, UA Clear Clear    pH, UA <=5.0 5.0 - 8.0    Specific Gravity, UA 1.010 1.001 - 1.030    Glucose, UA Negative Negative    Ketones, UA Negative Negative    Bilirubin, UA Negative Negative    Blood, UA Negative Negative    Protein, UA Negative Negative    Leuk Esterase, UA Trace (A) Negative    Nitrite, UA Negative Negative    Urobilinogen, UA 0.2 E.U./dL 0.2 - 1.0 E.U./dL   CBC Auto Differential    Specimen: Blood   Result Value Ref Range    WBC 10.01 3.40 - 10.80 10*3/mm3    RBC 4.29 3.77 - 5.28 10*6/mm3    Hemoglobin 13.0 12.0 - 15.9 g/dL    Hematocrit 40.4 34.0 - 46.6 %    MCV 94.2 79.0 - 97.0 fL    MCH 30.3 26.6 - 33.0 pg    MCHC 32.2 31.5 - 35.7 g/dL    RDW 11.5 (L) 12.3 - 15.4 %    RDW-SD 39.3 37.0 - 54.0 fl    MPV 10.5 6.0 - 12.0 fL    Platelets  409 140 - 450 10*3/mm3    Neutrophil % 51.2 42.7 - 76.0 %    Lymphocyte % 35.0 19.6 - 45.3 %    Monocyte % 7.7 5.0 - 12.0 %    Eosinophil % 4.7 0.3 - 6.2 %    Basophil % 0.8 0.0 - 1.5 %    Immature Grans % 0.6 (H) 0.0 - 0.5 %    Neutrophils, Absolute 5.13 1.70 - 7.00 10*3/mm3    Lymphocytes, Absolute 3.50 (H) 0.70 - 3.10 10*3/mm3    Monocytes, Absolute 0.77 0.10 - 0.90 10*3/mm3    Eosinophils, Absolute 0.47 (H) 0.00 - 0.40 10*3/mm3    Basophils, Absolute 0.08 0.00 - 0.20 10*3/mm3    Immature Grans, Absolute 0.06 (H) 0.00 - 0.05 10*3/mm3    nRBC 0.0 0.0 - 0.2 /100 WBC   Urinalysis, Microscopic Only - Urine, Clean Catch    Specimen: Urine, Clean Catch   Result Value Ref Range    RBC, UA 0-2 None Seen, 0-2 /HPF    WBC, UA 3-5 (A) None Seen, 0-2 /HPF    Bacteria, UA None Seen None Seen, Trace /HPF    Squamous Epithelial Cells, UA 7-12 (A) None Seen, 0-2 /HPF    Hyaline Casts, UA 0-6 0 - 6 /LPF    Methodology Automated Microscopy         If labs were ordered, I independently reviewed the results and considered them in treating the patient.      EMERGENCY DEPARTMENT COURSE and DIFFERENTIAL DIAGNOSIS/MDM:   Vitals:  AS OF 18:52 EDT    BP - 160/69  HR - 77  TEMP - 98.4 °F (36.9 °C) (Oral)  O2 SATS - 96%      Discussion below represents my analysis of pertinent findings related to patient's condition, differential diagnosis, treatment plan and final disposition.      Differential diagnosis:  The differential diagnosis associated with the patient's presentation includes: Diverticulitis, complicated diverticulitis, pericolonic abscess, intestinal perforation, appendicitis, cholecystitis      Independent interpretations (ECG/rhythm strip/X-ray/US/CT scan): Independently interpreted the patient's abdominal CT and see evidence for left lower quadrant inflammation.      Additional sources:  Discussed/obtained information from independent historians:   [] Spouse:   [] Parent:   [] Friend:   [] EMS:   [] Other:  External (non-ED)  record review:   [] Inpatient record:   [x] Office record: Reviewed documentation from patient PCP visit.  She was complaining about left-sided abdominal pain.   [] Outpatient record:   [] Prior Outpatient labs:   [] Prior Outpatient radiology:   [] Primary Care record:   [] Outside ED record:   [] Other:       Patient's care impacted by:   [x] Diabetes   [x] Hypertension   [] Coronary Artery Disease   [] Cancer   [x] Other: History of diverticulosis with recurrent diverticulitis    Care significantly affected by Social Determinants of Health (housing and economic circumstances, unemployment)    [] Yes     [x] No   If yes, Patient's care significantly limited by  Social Determinants of Health including:    [] Inadequate housing    [] Low income    [] Alcoholism and drug addiction in family    [] Problems related to primary support group    [] Unemployment    [] Problems related to employment    [] Other Social Determinants of Health:       I considered prescription management with:    [] Pain medication:   [] Antiviral:   [x] Antibiotic: Patient prescribed Augmentin for treatment of diverticulitis   [] Other:    ED Course:    ED Course as of 06/01/23 1852 Thu Jun 01, 2023 1851 This patient's history, exam, and work-up are consistent with mild early diverticulitis with pain in the left lower quadrant.  No evidence of abscess or perforation.  She is otherwise well-appearing with normal vital signs and no suggestion of systemic infection or sepsis.  No other findings identified on CT scan.  Patient is appropriate for discharge home at this time with close outpatient follow-up. [NS]      ED Course User Index  [NS] Vlad Dong MD             I had a discussion with the patient/family regarding diagnosis, diagnostic results, treatment plan, and medications.  The patient/family indicated understanding of these instructions.  I spent adequate time at the bedside preceding discharge necessary to personally discuss  the aftercare instructions, giving patient education, providing explanations of the results of our evaluations/findings, and my decision making to assure that the patient/family understand the plan of care.  Time was allotted to answer questions at that time and throughout the ED course.  Emphasis was placed on timely follow-up after discharge.  I also discussed the potential for the development of an acute emergent condition requiring further evaluation, admission, or even surgical intervention. I discussed that we found nothing during the visit today indicating the need for further workup, admission, or the presence of an unstable medical condition.  I encouraged the patient to return to the emergency department immediately for ANY concerns, worsening, new complaints, or if symptoms persist and unable to seek follow-up in a timely fashion.  The patient/family expressed understanding and agreement with this plan.  The patient will follow-up with their PCP in 1-2 days for reevaluation.           PROCEDURES:  Procedures    CRITICAL CARE TIME        FINAL IMPRESSION      1. Acute diverticulitis    2. Acute bilateral lower abdominal pain          DISPOSITION/PLAN     ED Disposition     ED Disposition   Discharge    Condition   Stable    Comment   --               Comment: Please note this report has been produced using speech recognition software.      Vlad Dong MD  Attending Emergency Physician           Vlad Dong MD  06/01/23 [id number]

## 2023-06-01 NOTE — PROGRESS NOTES
Follow Up Office Visit      Date: 2023   Patient Name: Cristy Louie  : 1945   MRN: [id number]     Chief Complaint:    Chief Complaint   Patient presents with   • Diarrhea   • Rectal bleeding  Abdominal pain  Chest pain       History of Present Illness: Cristy Louie is a 77 y.o. female who is here today for a same day sick visit for acute complaints and exacerbation of chronic conditions outlined below.       Diarrhea  Patient reports being sick for the last 2 weeks, and she thinks she has diverticulitis.   Reports having a history of diverticulitis.   Spoke with her colorectal surgeon, Dr. Valadez, earlier today regarding sxs who reportedly suggested she go to the hospital, but she did not want to go to ER so she scheduled apt with PCP office bc she actually feels slightly better today.   Patient experienced diarrhea yesterday morning that was orange in color and contained mucus.   She also has periods of constipation.   Dr. Valadez will not perform surgery due to issues with her colon per pt.   She strains when having a bowel movement and this is painful.   Endorses intermittent fever and chills when her symptoms first started.   Reports fatigue, nausea, decreased appetite, and vomiting 1 time.   She has issues with acid reflux that occasionally causes her to vomit.   Denies noticing any bright red hematochezia.   Reports her bowels are always black in color.   She endorses rectal pain that she relates to hemorrhoids.     Back pain  Endorses bilateral thoracic back pain, worse in the left side, which makes it difficult to sit or ambulate.     Chest pain, Palpitations   Notes having chest pain that she relates to her back pain.   Denies any dyspnea.   Reports palpitations and her heart rate increases when she lays down.     Abdominal pain  She takes Bentyl that makes her feel weird but is effective for her abdominal pain.   Reports right upper and left lower abdominal pain.   Patient has a history  "of cholecystectomy and hysterectomy.     Health maintenance  Reports having a tooth pulled that is bothersome due to dry socket.   Recently, she had surgery on her bilateral eyes.   Patient lost 6 pounds since her visit in 04/2023.   Reports issues with dehydration for the last 2 to 3 years.     Fatigue  The patient feels weak.   Reports feeling better today.   She did a lot of housework yesterday.     Diabetes  She discontinued metformin because it was causing her to gain weight.   Reports elevated A1c before starting metformin.       Subjective      Review of Systems:   Review of Systems   Constitutional: Positive for chills, fever and unexpected weight loss (weight down 6lb since April). Negative for fatigue.   HENT: Positive for dental problem (dry socket).    Respiratory: Positive for shortness of breath (baseline sob, worse dyspnea during acute pain attacks ).    Cardiovascular: Positive for chest pain (pt attributes this to severe back pain).   Gastrointestinal: Positive for abdominal pain (RUQ and LLQ, worst at LLQ), blood in stool (melena, denies BRBPR), constipation, diarrhea (x2 weeks intermittently. Orange color. With mucous), nausea, rectal pain (\"from my hemorrhoids\" ), vomiting (once), GERD and indigestion.   Musculoskeletal: Positive for back pain (thoracic, mid to left ).   Neurological: Positive for weakness.       I have reviewed the patients family history, social history, past medical history, past surgical history and have updated it as appropriate.     Medications:     Current Outpatient Medications:   •  Accu-Chek FastClix Lancets misc, 1 each Daily., Disp: 50 each, Rfl: 11  •  Accu-Chek Guide test strip, 1 each by Other route Daily., Disp: 50 each, Rfl: 11  •  Blood Glucose Monitoring Suppl (Accu-Chek Guide Me) w/Device kit, 1 Device Take As Directed., Disp: 1 kit, Rfl: 0  •  calcitriol (ROCALTROL) 0.25 MCG capsule, Take 1 capsule by mouth Daily., Disp: 90 capsule, Rfl: 3  •  DULoxetine " "(CYMBALTA) 30 MG capsule, Take 1 capsule by mouth Daily., Disp: , Rfl:   •  fluticasone (FLONASE) 50 MCG/ACT nasal spray, 1 spray into the nostril(s) as directed by provider As Needed for Rhinitis or Allergies., Disp: , Rfl:   •  glimepiride (AMARYL) 2 MG tablet, Take 1 tablet by mouth Every Morning Before Breakfast., Disp: 90 tablet, Rfl: 3  •  hydroxychloroquine (PLAQUENIL) 200 MG tablet, Take 1 tablet by mouth 2 (Two) Times a Day., Disp: , Rfl:   •  levothyroxine (SYNTHROID, LEVOTHROID) 100 MCG tablet, TAKE 1 TABLET BY MOUTH EVERY MORNING, Disp: 90 tablet, Rfl: 3  •  lisinopril-hydrochlorothiazide (PRINZIDE,ZESTORETIC) 20-25 MG per tablet, Take 1 tablet by mouth Daily., Disp: 90 tablet, Rfl: 3  •  Lutein 20 MG capsule, Take 20 mg by mouth Daily., Disp: , Rfl:   •  metFORMIN ER (GLUCOPHAGE-XR) 500 MG 24 hr tablet, Take 1 tablet by mouth 2 (Two) Times a Day With Meals., Disp: 180 tablet, Rfl: 3  •  omeprazole (priLOSEC) 20 MG capsule, TAKE 1 CAPSULE TWICE DAILY (Patient taking differently: Take 1 capsule by mouth Daily.), Disp: 180 capsule, Rfl: 3  •  ondansetron ODT (ZOFRAN-ODT) 8 MG disintegrating tablet, Take 1 tablet by mouth Every 8 (Eight) Hours As Needed for Nausea or Vomiting., Disp: 90 tablet, Rfl: 3  •  simvastatin (ZOCOR) 40 MG tablet, Take 1 tablet by mouth Daily., Disp: , Rfl:   •  amoxicillin-clavulanate (AUGMENTIN) 875-125 MG per tablet, Take 1 tablet by mouth 2 (Two) Times a Day for 7 days., Disp: 14 tablet, Rfl: 0    Allergies:   Allergies   Allergen Reactions   • Escitalopram Dizziness       Objective     Physical Exam: Please see above  Vital Signs:   Vitals:    06/01/23 1440   BP: 122/78   Pulse: 72   Resp: 16   Temp: 97.4 °F (36.3 °C)   SpO2: 96%   Weight: 72.1 kg (159 lb)   Height: 154.9 cm (61\")   PainSc:   2     Body mass index is 30.04 kg/m².    Physical Exam  Vitals and nursing note reviewed.   Constitutional:       General: She is not in acute distress.     Appearance: Normal appearance. " She is not ill-appearing or diaphoretic.   HENT:      Head: Normocephalic and atraumatic.      Mouth/Throat:      Mouth: Mucous membranes are moist.      Pharynx: Oropharynx is clear. No oropharyngeal exudate or posterior oropharyngeal erythema.   Cardiovascular:      Rate and Rhythm: Normal rate and regular rhythm.      Heart sounds: Normal heart sounds.   Pulmonary:      Effort: Pulmonary effort is normal. No respiratory distress.      Breath sounds: Normal breath sounds. No wheezing.   Abdominal:      General: Bowel sounds are normal. There is no distension.      Palpations: Abdomen is soft. There is no fluid wave, hepatomegaly, splenomegaly or mass.      Tenderness: There is abdominal tenderness in the right upper quadrant. There is no right CVA tenderness, left CVA tenderness, guarding or rebound.   Skin:     General: Skin is warm and dry.   Neurological:      General: No focal deficit present.      Mental Status: She is alert and oriented to person, place, and time. Mental status is at baseline.      Motor: No weakness.   Psychiatric:         Mood and Affect: Mood normal.         Behavior: Behavior normal.         Thought Content: Thought content normal.         Judgment: Judgment normal.             Results:   Imaging:     Labs:       Assessment / Plan      Assessment/Plan:   Diagnoses and all orders for this visit:    Abdominal pain, unspecified abdominal location  Diarrhea, unspecified type   Chest pain, unspecified type   Acute left-sided thoracic back pain  Weakness    I acknowledged her desire to avoid going to the ER. Further advised on concerning potential life threatening etiology of various sxs that are most appropriately to be further eval in the ED. Discussed risk of not going to the ER right now. Pt expressed that she is very disappointed I will not work these problems up OP, and that she came in today for an OV to adress and is instead being sent to ER. She again verbalized her disappointment in  me and this treatment plan when we exited exam room to staff at nurses station. Pt states she will go to [facility] ER and f/u with Dr Daniel in 1 week for recheck.        Follow Up:   Return in about 1 week (around 6/8/2023) for Recheck.      JULIANO Tracy  [facility] [place]     Transcribed from ambient dictation for JULIANO Coombs by Tahir Fried.  06/01/23   17:26 EDT    Patient or patient representative verbalized consent to the visit recording.  I have personally performed the services described in this document as transcribed by the above individual, and it is both accurate and complete.

## 2023-06-06 ENCOUNTER — TELEPHONE (OUTPATIENT)
Dept: CASE MANAGEMENT | Facility: OTHER | Age: 78
End: 2023-06-06
Payer: MEDICARE

## 2023-06-06 ENCOUNTER — PATIENT OUTREACH (OUTPATIENT)
Dept: CASE MANAGEMENT | Facility: OTHER | Age: 78
End: 2023-06-06
Payer: MEDICARE

## 2023-06-06 DIAGNOSIS — M15.9 PRIMARY OSTEOARTHRITIS INVOLVING MULTIPLE JOINTS: ICD-10-CM

## 2023-06-06 DIAGNOSIS — E11.9 TYPE 2 DIABETES MELLITUS WITHOUT COMPLICATION, WITHOUT LONG-TERM CURRENT USE OF INSULIN: Primary | ICD-10-CM

## 2023-06-06 NOTE — OUTREACH NOTE
AMBULATORY CASE MANAGEMENT NOTE    Name and Relationship of Patient/Support Person: LibanCristy monteiro E - Self    Patient Outreach    Received a return call from patient. She stated she continues to have abdominal pain especially with bowel movements. She stated she has heartburn/reflux with nearly everything she eats. She will follow up with her PCP later this month. Patient reports it has been several years since her last EGD. She reports her diet is not healthy. She denied questions/needs at this time and is agreeable for follow up during her next PCP visit.     Adult Patient Profile  Questions/Answers      Flowsheet Row Most Recent Value   How to be Addressed Cristy   How Well Do You Speak English? very well   Source of Information patient   Patient Aware of Diagnosis yes   Admission in Past 90 Days none   Hearing Difficulty or Deaf no   Wear Glasses or Blind yes   Concentrating, Remembering or Making Decisions Difficulty no   Difficulty Communicating no   Difficulty Eating/Swallowing no   Walking or Climbing Stairs Difficulty no   Dressing/Bathing Difficulty no   Doing Errands Independently Difficulty (such as shopping) no   Equipment Currently Used at Home none   Change in Functional Status Since Onset of Current Illness/Injury no   Primary Source of Support/Comfort child(mary)   Name of Support/Comfort Primary Source Sunday Yie   People in Home alone   Family Caregiver if Needed child(mary), adult   Primary Roles/Responsibilities retired   Current Living Arrangements home   Resource/Environmental Concerns none        SDOH updated and reviewed with the patient during this program:    Financial Resource Strain: Low Risk     Difficulty of Paying Living Expenses: Not very hard      Food Insecurity: No Food Insecurity    Worried About Running Out of Food in the Last Year: Never true    Ran Out of Food in the Last Year: Never true      Transportation Needs: No Transportation Needs    Lack of Transportation (Medical):  No    Lack of Transportation (Non-Medical): Ana BARRON  Ambulatory Case Management    6/6/2023, 12:09 EDT

## 2023-07-21 ENCOUNTER — LAB (OUTPATIENT)
Dept: LAB | Facility: HOSPITAL | Age: 78
End: 2023-07-21
Payer: MEDICARE

## 2023-07-21 DIAGNOSIS — N17.9 AKI (ACUTE KIDNEY INJURY): ICD-10-CM

## 2023-07-21 LAB
ANION GAP SERPL CALCULATED.3IONS-SCNC: 13.3 MMOL/L (ref 5–15)
BUN SERPL-MCNC: 22 MG/DL (ref 8–23)
BUN/CREAT SERPL: 21.4 (ref 7–25)
CALCIUM SPEC-SCNC: 8.6 MG/DL (ref 8.6–10.5)
CHLORIDE SERPL-SCNC: 101 MMOL/L (ref 98–107)
CO2 SERPL-SCNC: 24.7 MMOL/L (ref 22–29)
CREAT SERPL-MCNC: 1.03 MG/DL (ref 0.57–1)
EGFRCR SERPLBLD CKD-EPI 2021: 55.4 ML/MIN/1.73
GLUCOSE SERPL-MCNC: 120 MG/DL (ref 65–99)
POTASSIUM SERPL-SCNC: 5 MMOL/L (ref 3.5–5.2)
SODIUM SERPL-SCNC: 139 MMOL/L (ref 136–145)

## 2023-07-21 PROCEDURE — 80048 BASIC METABOLIC PNL TOTAL CA: CPT

## 2023-08-11 ENCOUNTER — LAB (OUTPATIENT)
Dept: LAB | Facility: HOSPITAL | Age: 78
End: 2023-08-11
Payer: MEDICARE

## 2023-08-11 ENCOUNTER — OFFICE VISIT (OUTPATIENT)
Dept: INTERNAL MEDICINE | Facility: CLINIC | Age: 78
End: 2023-08-11
Payer: MEDICARE

## 2023-08-11 VITALS
OXYGEN SATURATION: 100 % | SYSTOLIC BLOOD PRESSURE: 118 MMHG | HEIGHT: 61 IN | HEART RATE: 82 BPM | TEMPERATURE: 97.8 F | BODY MASS INDEX: 29.83 KG/M2 | DIASTOLIC BLOOD PRESSURE: 62 MMHG | WEIGHT: 158 LBS

## 2023-08-11 DIAGNOSIS — R10.84 GENERALIZED ABDOMINAL PAIN: Primary | ICD-10-CM

## 2023-08-11 DIAGNOSIS — M1A.0720 IDIOPATHIC CHRONIC GOUT OF LEFT FOOT WITHOUT TOPHUS: ICD-10-CM

## 2023-08-11 DIAGNOSIS — R19.7 DIARRHEA, UNSPECIFIED TYPE: ICD-10-CM

## 2023-08-11 DIAGNOSIS — K21.9 GASTROESOPHAGEAL REFLUX DISEASE WITHOUT ESOPHAGITIS: ICD-10-CM

## 2023-08-11 DIAGNOSIS — R10.84 GENERALIZED ABDOMINAL PAIN: ICD-10-CM

## 2023-08-11 DIAGNOSIS — K57.30 DIVERTICULOSIS OF LARGE INTESTINE WITHOUT HEMORRHAGE: ICD-10-CM

## 2023-08-11 LAB
ALBUMIN SERPL-MCNC: 4 G/DL (ref 3.5–5.2)
ALBUMIN/GLOB SERPL: 1.3 G/DL
ALP SERPL-CCNC: 72 U/L (ref 39–117)
ALT SERPL W P-5'-P-CCNC: 20 U/L (ref 1–33)
ANION GAP SERPL CALCULATED.3IONS-SCNC: 13.5 MMOL/L (ref 5–15)
AST SERPL-CCNC: 17 U/L (ref 1–32)
BILIRUB SERPL-MCNC: 0.3 MG/DL (ref 0–1.2)
BUN SERPL-MCNC: 15 MG/DL (ref 8–23)
BUN/CREAT SERPL: 14.6 (ref 7–25)
CALCIUM SPEC-SCNC: 9.7 MG/DL (ref 8.6–10.5)
CHLORIDE SERPL-SCNC: 97 MMOL/L (ref 98–107)
CO2 SERPL-SCNC: 25.5 MMOL/L (ref 22–29)
CREAT SERPL-MCNC: 1.03 MG/DL (ref 0.57–1)
DEPRECATED RDW RBC AUTO: 39.9 FL (ref 37–54)
EGFRCR SERPLBLD CKD-EPI 2021: 55.4 ML/MIN/1.73
ERYTHROCYTE [DISTWIDTH] IN BLOOD BY AUTOMATED COUNT: 12.1 % (ref 12.3–15.4)
GLOBULIN UR ELPH-MCNC: 3 GM/DL
GLUCOSE SERPL-MCNC: 257 MG/DL (ref 65–99)
HCT VFR BLD AUTO: 38.2 % (ref 34–46.6)
HGB BLD-MCNC: 13 G/DL (ref 12–15.9)
MCH RBC QN AUTO: 31.4 PG (ref 26.6–33)
MCHC RBC AUTO-ENTMCNC: 34 G/DL (ref 31.5–35.7)
MCV RBC AUTO: 92.3 FL (ref 79–97)
PLATELET # BLD AUTO: 312 10*3/MM3 (ref 140–450)
PMV BLD AUTO: 12 FL (ref 6–12)
POTASSIUM SERPL-SCNC: 4.8 MMOL/L (ref 3.5–5.2)
PROT SERPL-MCNC: 7 G/DL (ref 6–8.5)
RBC # BLD AUTO: 4.14 10*6/MM3 (ref 3.77–5.28)
SODIUM SERPL-SCNC: 136 MMOL/L (ref 136–145)
WBC NRBC COR # BLD: 12.91 10*3/MM3 (ref 3.4–10.8)

## 2023-08-11 PROCEDURE — 99214 OFFICE O/P EST MOD 30 MIN: CPT | Performed by: INTERNAL MEDICINE

## 2023-08-11 PROCEDURE — 3078F DIAST BP <80 MM HG: CPT | Performed by: INTERNAL MEDICINE

## 2023-08-11 PROCEDURE — 3074F SYST BP LT 130 MM HG: CPT | Performed by: INTERNAL MEDICINE

## 2023-08-11 PROCEDURE — 85027 COMPLETE CBC AUTOMATED: CPT

## 2023-08-11 PROCEDURE — 80053 COMPREHEN METABOLIC PANEL: CPT

## 2023-08-11 RX ORDER — ALLOPURINOL 100 MG/1
100 TABLET ORAL DAILY
Qty: 90 TABLET | Refills: 0 | Status: SHIPPED | OUTPATIENT
Start: 2023-08-11

## 2023-08-11 RX ORDER — OMEPRAZOLE 20 MG/1
20 CAPSULE, DELAYED RELEASE ORAL DAILY
Qty: 90 CAPSULE | Refills: 3 | Status: SHIPPED | OUTPATIENT
Start: 2023-08-11

## 2023-08-11 RX ORDER — AMOXICILLIN AND CLAVULANATE POTASSIUM 875; 125 MG/1; MG/1
1 TABLET, FILM COATED ORAL EVERY 8 HOURS
Qty: 30 TABLET | Refills: 0 | Status: SHIPPED | OUTPATIENT
Start: 2023-08-11 | End: 2023-08-21

## 2023-08-11 NOTE — PROGRESS NOTES
Internal Medicine Follow Up    Chief Complaint  Cristy Louie is a 77 y.o. female who presents today for follow up of chronic medical conditions outlined below.    Chief Complaint   Patient presents with    Gout     Follow up        HPI  Ms. Louie comes in today for follow up. Gout resolved in L foot. Now with pain across metatarsals of R foot. No swelling or redness. Notes generalized arthralgias since stopping steroids. She has had abdominal pain, a few episodes of nonbloody diarrhea. Difficult for her to pinpoint when this started but it seems to be a week or more. No fever currently however after our visit last month she reports a single fever to 104. She was treated in June by ED for diverticulitis. She does not remember completing abx.    Gout  Associated symptoms include abdominal pain, arthralgias and fatigue. Pertinent negatives include no chest pain, fever or joint swelling.      Review of Systems  Review of Systems   Constitutional: Negative.  Positive for fatigue. Negative for fever.   Respiratory: Negative.     Cardiovascular:  Positive for palpitations. Negative for chest pain and leg swelling.   Gastrointestinal:  Positive for abdominal pain and diarrhea. Negative for anal bleeding, blood in stool and constipation.   Musculoskeletal:  Positive for arthralgias and gout. Negative for joint swelling.   Skin:  Negative for color change.      Current Medications  Current Outpatient Medications on File Prior to Visit   Medication Sig Dispense Refill    Accu-Chek FastClix Lancets misc 1 each Daily. 50 each 11    Accu-Chek Guide test strip 1 each by Other route Daily. 50 each 11    Blood Glucose Monitoring Suppl (Accu-Chek Guide Me) w/Device kit 1 Device Take As Directed. 1 kit 0    calcitriol (ROCALTROL) 0.25 MCG capsule Take 1 capsule by mouth Daily. 90 capsule 3    DULoxetine (CYMBALTA) 30 MG capsule Take 1 capsule by mouth Daily.      fluticasone (FLONASE) 50 MCG/ACT nasal spray 1 spray into the  "nostril(s) as directed by provider As Needed for Rhinitis or Allergies.      glimepiride (AMARYL) 2 MG tablet Take 1 tablet by mouth Every Morning Before Breakfast. 90 tablet 3    hydroxychloroquine (PLAQUENIL) 200 MG tablet Take 1 tablet by mouth 2 (Two) Times a Day.      levothyroxine (SYNTHROID, LEVOTHROID) 100 MCG tablet TAKE 1 TABLET BY MOUTH EVERY MORNING 90 tablet 3    lisinopril-hydrochlorothiazide (PRINZIDE,ZESTORETIC) 20-25 MG per tablet Take 1 tablet by mouth Daily. 90 tablet 3    Lutein 20 MG capsule Take 20 mg by mouth Daily.      metFORMIN ER (GLUCOPHAGE-XR) 500 MG 24 hr tablet Take 1 tablet by mouth 2 (Two) Times a Day With Meals. 180 tablet 3    nystatin (MYCOSTATIN) 340702 UNIT/GM powder Apply  topically to the appropriate area as directed 3 (Three) Times a Day. 60 g 11    ondansetron ODT (ZOFRAN-ODT) 8 MG disintegrating tablet Take 1 tablet by mouth Every 8 (Eight) Hours As Needed for Nausea or Vomiting. 90 tablet 3    simvastatin (ZOCOR) 40 MG tablet Take 1 tablet by mouth Daily.      [DISCONTINUED] omeprazole (priLOSEC) 20 MG capsule TAKE 1 CAPSULE TWICE DAILY (Patient taking differently: Take 1 capsule by mouth Daily.) 180 capsule 3     No current facility-administered medications on file prior to visit.       Allergies  Allergies   Allergen Reactions    Escitalopram Dizziness       Objective  Visit Vitals  /62   Pulse 82   Temp 97.8 øF (36.6 øC)   Ht 154.9 cm (61\")   Wt 71.7 kg (158 lb)   SpO2 100%   BMI 29.85 kg/mý        Physical Exam  Physical Exam  Vitals and nursing note reviewed.   Constitutional:       General: She is not in acute distress.     Appearance: Normal appearance. She is well-developed. She is not ill-appearing or toxic-appearing.   HENT:      Head: Normocephalic and atraumatic.   Eyes:      Conjunctiva/sclera: Conjunctivae normal.   Pulmonary:      Effort: Pulmonary effort is normal. No respiratory distress.   Abdominal:      General: There is no distension.      " Palpations: Abdomen is soft. There is no mass.      Tenderness: There is abdominal tenderness (generalized, no pain however in LLQ). There is no guarding or rebound.      Hernia: No hernia is present.   Musculoskeletal:         General: Tenderness (across metatarsal heads R foot) present. No swelling or deformity.      Right lower leg: No edema.      Left lower leg: No edema.   Skin:     General: Skin is warm and dry.      Findings: No erythema.   Neurological:      Mental Status: She is alert and oriented to person, place, and time. Mental status is at baseline.       Results  Results for orders placed or performed in visit on 07/21/23   Basic Metabolic Panel    Specimen: Blood   Result Value Ref Range    Glucose 120 (H) 65 - 99 mg/dL    BUN 22 8 - 23 mg/dL    Creatinine 1.03 (H) 0.57 - 1.00 mg/dL    Sodium 139 136 - 145 mmol/L    Potassium 5.0 3.5 - 5.2 mmol/L    Chloride 101 98 - 107 mmol/L    CO2 24.7 22.0 - 29.0 mmol/L    Calcium 8.6 8.6 - 10.5 mg/dL    BUN/Creatinine Ratio 21.4 7.0 - 25.0    Anion Gap 13.3 5.0 - 15.0 mmol/L    eGFR 55.4 (L) >60.0 mL/min/1.73        Assessment and Plan  Diagnoses and all orders for this visit:    Generalized abdominal pain  Diarrhea, unspecified type  Diverticulosis of large intestine without hemorrhage  - hx of extensive diverticulosis with findings of possible early diverticulitis on CT 6/2023  - prescribed augmentin but does not recall taking this  - now with about a week of abdominal pain and diarrhea intermittently. No fever. Appears well today.  - CBC, CMP, repeat CT abd/pelvis wo contrast  - augmentin x10d to pharmacy    Idiopathic chronic gout of left foot without tophus  - finished steroids and no active gout on exam today. R foot pain without erythema or swelling.  - will start allopurinol 100mg daily    Gastroesophageal reflux disease without esophagitis  - stable, continue omeprazole 20mg daily     Health Maintenance  - Pap smear: no longer indicated  - Mammogram:  6/2022 BIRADS 1, discuss next visit  - Colonoscopy: 6/2021  - HCV: negative  - Immunizations: Pneumococcal complete. Declines COVID19 and flu. Shingrix too expensive. Discussed Tdap.  - Depression screening: negative 7/2023    Return for Next scheduled follow up, Labs today.

## 2023-08-15 ENCOUNTER — TELEPHONE (OUTPATIENT)
Dept: INTERNAL MEDICINE | Facility: CLINIC | Age: 78
End: 2023-08-15
Payer: MEDICARE

## 2023-08-15 NOTE — TELEPHONE ENCOUNTER
She can take tylenol. The medications she needs to avoid are the NSAIDs (ibuprofen, naproxen, motrin, aleve).

## 2023-08-15 NOTE — TELEPHONE ENCOUNTER
Called and informed pt that she can take Tylenol and I listed the medications she is to avoid. Pt verbalized her understanding and confirmed her appt for tomorrow.

## 2023-08-15 NOTE — TELEPHONE ENCOUNTER
Patient called to schedule an appointment for a gout flare up. I have her scheduled tomorrow with Dr Daniel, but she wants to know if she can take Tylenol to help with the pain until then. She said Dr Daniel has told her in the past to stay away from Tylenol, but I advised I would have someone reach out to her.     Thanks

## 2023-08-16 ENCOUNTER — OFFICE VISIT (OUTPATIENT)
Dept: INTERNAL MEDICINE | Facility: CLINIC | Age: 78
End: 2023-08-16
Payer: MEDICARE

## 2023-08-16 VITALS
WEIGHT: 168 LBS | BODY MASS INDEX: 31.72 KG/M2 | DIASTOLIC BLOOD PRESSURE: 60 MMHG | SYSTOLIC BLOOD PRESSURE: 130 MMHG | TEMPERATURE: 98.6 F | OXYGEN SATURATION: 96 % | HEART RATE: 81 BPM | HEIGHT: 61 IN

## 2023-08-16 DIAGNOSIS — M10.9 ACUTE GOUT OF LEFT FOOT, UNSPECIFIED CAUSE: Primary | ICD-10-CM

## 2023-08-16 PROCEDURE — 3075F SYST BP GE 130 - 139MM HG: CPT | Performed by: INTERNAL MEDICINE

## 2023-08-16 PROCEDURE — 3078F DIAST BP <80 MM HG: CPT | Performed by: INTERNAL MEDICINE

## 2023-08-16 PROCEDURE — 99213 OFFICE O/P EST LOW 20 MIN: CPT | Performed by: INTERNAL MEDICINE

## 2023-08-16 RX ORDER — PREDNISONE 10 MG/1
TABLET ORAL
Qty: 30 TABLET | Refills: 0 | Status: SHIPPED | OUTPATIENT
Start: 2023-08-16 | End: 2023-08-28

## 2023-08-16 NOTE — PROGRESS NOTES
Internal Medicine Acute Visit    Chief Complaint   Patient presents with    Gout        HPI  Ms. Louie comes in today with increase in pain in L big toe, ankle, and heel. She has redness and warmth at base of big toe. She has started allopurinol for recently diagnosed gout.    Gout  Associated symptoms include arthralgias and joint swelling.      Review of Systems  Review of Systems   Musculoskeletal:  Positive for arthralgias, gout and joint swelling.      Medications  Current Outpatient Medications on File Prior to Visit   Medication Sig Dispense Refill    Accu-Chek FastClix Lancets misc 1 each Daily. 50 each 11    Accu-Chek Guide test strip 1 each by Other route Daily. 50 each 11    allopurinol (ZYLOPRIM) 100 MG tablet Take 1 tablet by mouth Daily. 90 tablet 0    amoxicillin-clavulanate (AUGMENTIN) 875-125 MG per tablet Take 1 tablet by mouth Every 8 (Eight) Hours for 10 days. 30 tablet 0    Blood Glucose Monitoring Suppl (Accu-Chek Guide Me) w/Device kit 1 Device Take As Directed. 1 kit 0    calcitriol (ROCALTROL) 0.25 MCG capsule Take 1 capsule by mouth Daily. 90 capsule 3    DULoxetine (CYMBALTA) 30 MG capsule Take 1 capsule by mouth Daily.      fluticasone (FLONASE) 50 MCG/ACT nasal spray 1 spray into the nostril(s) as directed by provider As Needed for Rhinitis or Allergies.      glimepiride (AMARYL) 2 MG tablet Take 1 tablet by mouth Every Morning Before Breakfast. 90 tablet 3    hydroxychloroquine (PLAQUENIL) 200 MG tablet Take 1 tablet by mouth 2 (Two) Times a Day.      levothyroxine (SYNTHROID, LEVOTHROID) 100 MCG tablet TAKE 1 TABLET BY MOUTH EVERY MORNING 90 tablet 3    lisinopril-hydrochlorothiazide (PRINZIDE,ZESTORETIC) 20-25 MG per tablet Take 1 tablet by mouth Daily. 90 tablet 3    Lutein 20 MG capsule Take 20 mg by mouth Daily.      metFORMIN ER (GLUCOPHAGE-XR) 500 MG 24 hr tablet Take 1 tablet by mouth 2 (Two) Times a Day With Meals. 180 tablet 3    nystatin (MYCOSTATIN) 451279 UNIT/GM powder  Apply  topically to the appropriate area as directed 3 (Three) Times a Day. 60 g 11    omeprazole (priLOSEC) 20 MG capsule Take 1 capsule by mouth Daily. 90 capsule 3    ondansetron ODT (ZOFRAN-ODT) 8 MG disintegrating tablet Take 1 tablet by mouth Every 8 (Eight) Hours As Needed for Nausea or Vomiting. 90 tablet 3    simvastatin (ZOCOR) 40 MG tablet Take 1 tablet by mouth Daily.       No current facility-administered medications on file prior to visit.        Allergies  Allergies   Allergen Reactions    Escitalopram Dizziness       PMH  Past Medical History:   Diagnosis Date    Abnormal liver function tests     Description: liver bx showed fatty liver 2008    Allergic rhinitis     Anemia     Anxiety disorder     Asthma     Description: dx 1998    Benign colonic polyp     Description: dx 2007    Cancer     Cataract     Diabetes mellitus     Description: dx 7/10    Disease of thyroid gland     Diverticulosis of intestine     Description: dx 2007    Fibromyalgia     Description: dx 2003    Gastroesophageal reflux disease     Description: dx 2003.  EGD normal 2007.    H/O cardiovascular stress test     12/06- normal dobutamine myoview. 12/16/13- acceptable negative Lexiscan Cardiolite test.    History of echocardiogram     2/06- normal except ? relaxation abnormality    History of esophagogastroduodenoscopy 06/21/2021    Reflux esophagitis with a single erosion, HH. 3/12- chronic gastritis / reflux esophagitis. 1/25/16- LA Class A refux esophagitis, small HH    History of varicella     Hyperlipidemia     Description: dx 1980's    Hyperparathyroidism     Description: dx 8/11    Hypertension     Osteoarthritis     Description: dx 1990's    Osteoporosis     Description: dx 2007    PONV (postoperative nausea and vomiting)     Rheumatoid arthritis     Description: dx 2003    Sensorineural hearing loss (SNHL) of both ears 10/14/2021    DX October 2021 by [facility] (mild to severe loss)    Vitamin D deficiency   "   Description: dx 7/10 (mild)       Objective  Visit Vitals  /60   Pulse 81   Temp 98.6 øF (37 øC)   Ht 154.9 cm (61\")   Wt 76.2 kg (168 lb)   SpO2 96%   BMI 31.74 kg/mý        Physical Exam  Physical Exam  Vitals and nursing note reviewed.   Constitutional:       General: She is not in acute distress.     Appearance: She is well-developed. She is not ill-appearing or toxic-appearing.   HENT:      Head: Normocephalic and atraumatic.   Eyes:      Conjunctiva/sclera: Conjunctivae normal.   Cardiovascular:      Rate and Rhythm: Normal rate and regular rhythm.      Heart sounds: Normal heart sounds. No murmur heard.  Pulmonary:      Effort: Pulmonary effort is normal. No respiratory distress.      Breath sounds: Normal breath sounds.   Musculoskeletal:         General: Swelling and tenderness (L big toe, Lateral L ankle, L heel) present. No deformity.      Right lower leg: No edema.      Left lower leg: No edema.   Skin:     General: Skin is warm and dry.      Findings: Erythema (base of L big toe) present.   Neurological:      Mental Status: She is alert and oriented to person, place, and time.      Gait: Gait abnormal (antalgic).       Results  Results for orders placed or performed in visit on 08/11/23   CBC (No Diff)    Specimen: Blood   Result Value Ref Range    WBC 12.91 (H) 3.40 - 10.80 10*3/mm3    RBC 4.14 3.77 - 5.28 10*6/mm3    Hemoglobin 13.0 12.0 - 15.9 g/dL    Hematocrit 38.2 34.0 - 46.6 %    MCV 92.3 79.0 - 97.0 fL    MCH 31.4 26.6 - 33.0 pg    MCHC 34.0 31.5 - 35.7 g/dL    RDW 12.1 (L) 12.3 - 15.4 %    RDW-SD 39.9 37.0 - 54.0 fl    MPV 12.0 6.0 - 12.0 fL    Platelets 312 140 - 450 10*3/mm3   Comprehensive Metabolic Panel    Specimen: Blood   Result Value Ref Range    Glucose 257 (H) 65 - 99 mg/dL    BUN 15 8 - 23 mg/dL    Creatinine 1.03 (H) 0.57 - 1.00 mg/dL    Sodium 136 136 - 145 mmol/L    Potassium 4.8 3.5 - 5.2 mmol/L    Chloride 97 (L) 98 - 107 mmol/L    CO2 25.5 22.0 - 29.0 mmol/L    Calcium " 9.7 8.6 - 10.5 mg/dL    Total Protein 7.0 6.0 - 8.5 g/dL    Albumin 4.0 3.5 - 5.2 g/dL    ALT (SGPT) 20 1 - 33 U/L    AST (SGOT) 17 1 - 32 U/L    Alkaline Phosphatase 72 39 - 117 U/L    Total Bilirubin 0.3 0.0 - 1.2 mg/dL    Globulin 3.0 gm/dL    A/G Ratio 1.3 g/dL    BUN/Creatinine Ratio 14.6 7.0 - 25.0    Anion Gap 13.5 5.0 - 15.0 mmol/L    eGFR 55.4 (L) >60.0 mL/min/1.73        Assessment and Plan  Diagnoses and all orders for this visit:    Acute gout of left foot, unspecified cause  - likely brought on by initiation of allopurinol  - will resume steroids. Avoiding NSAIDs, colchicine due to CKD.  - continue allopurinol 100mg daily    Health Maintenance  - Pap smear: no longer indicated  - Mammogram: 6/2022 BIRADS 1, discuss next visit  - Colonoscopy: 6/2021  - HCV: negative  - Immunizations: Pneumococcal complete. Declines COVID19 and flu. Shingrix too expensive. Discussed Tdap.  - Depression screening: negative 7/2023    Return for Next scheduled follow up.

## 2023-08-21 ENCOUNTER — TRANSCRIBE ORDERS (OUTPATIENT)
Dept: INTERNAL MEDICINE | Facility: CLINIC | Age: 78
End: 2023-08-21
Payer: MEDICARE

## 2023-08-21 DIAGNOSIS — Z12.31 VISIT FOR SCREENING MAMMOGRAM: Primary | ICD-10-CM

## 2023-09-07 ENCOUNTER — OFFICE VISIT (OUTPATIENT)
Dept: INTERNAL MEDICINE | Facility: CLINIC | Age: 78
End: 2023-09-07
Payer: MEDICARE

## 2023-09-07 VITALS
BODY MASS INDEX: 29.64 KG/M2 | SYSTOLIC BLOOD PRESSURE: 132 MMHG | OXYGEN SATURATION: 96 % | TEMPERATURE: 98.9 F | HEART RATE: 88 BPM | HEIGHT: 61 IN | DIASTOLIC BLOOD PRESSURE: 66 MMHG | WEIGHT: 157 LBS

## 2023-09-07 DIAGNOSIS — N82.4 COLOVAGINAL FISTULA: ICD-10-CM

## 2023-09-07 DIAGNOSIS — K57.32 SIGMOID DIVERTICULITIS: Primary | ICD-10-CM

## 2023-09-07 PROCEDURE — 3078F DIAST BP <80 MM HG: CPT | Performed by: INTERNAL MEDICINE

## 2023-09-07 PROCEDURE — 99214 OFFICE O/P EST MOD 30 MIN: CPT | Performed by: INTERNAL MEDICINE

## 2023-09-07 PROCEDURE — 3075F SYST BP GE 130 - 139MM HG: CPT | Performed by: INTERNAL MEDICINE

## 2023-09-07 RX ORDER — CIPROFLOXACIN 500 MG/1
500 TABLET, FILM COATED ORAL 2 TIMES DAILY
Qty: 14 TABLET | Refills: 0 | Status: SHIPPED | OUTPATIENT
Start: 2023-09-07 | End: 2023-09-14

## 2023-09-07 RX ORDER — AMOXICILLIN AND CLAVULANATE POTASSIUM 875; 125 MG/1; MG/1
1 TABLET, FILM COATED ORAL
COMMUNITY
Start: 2023-09-04 | End: 2023-09-07

## 2023-09-07 RX ORDER — METRONIDAZOLE 500 MG/1
500 TABLET ORAL 3 TIMES DAILY
Qty: 21 TABLET | Refills: 0 | Status: SHIPPED | OUTPATIENT
Start: 2023-09-07 | End: 2023-09-14

## 2023-09-07 NOTE — PROGRESS NOTES
Internal Medicine Follow Up    Chief Complaint  Cristy Louie is a 78 y.o. female who presents today for follow up of chronic medical conditions outlined below.    Chief Complaint   Patient presents with    GI Problem     Follow up        HPI  Ms. Louie was admitted 8/30-9/4/2023 with sigmoid diverticulitis. She had CT imaging with suggestion of colovaginal fistula. She was treated with IV zosyn and deescalated to augmentin at discharge. She had repeat CT on 9/2 which showed no change from CT on admission. Despite this she was felt to be improving and discharged with script for augmentin x7d. She feels no better. Has mucus like discharge. Denies diarrhea but has not been able to tolerate much oral intake. Not drinking enough water. Has a headache. No fever. She is holding plaquenil x7d.    GI Problem  The primary symptoms include fatigue and abdominal pain. Primary symptoms do not include fever or diarrhea.   The illness does not include constipation.      Review of Systems  Review of Systems   Constitutional:  Positive for appetite change and fatigue. Negative for fever.   Respiratory: Negative.     Cardiovascular:  Positive for leg swelling (feet).   Gastrointestinal:  Positive for abdominal pain. Negative for anal bleeding, blood in stool, constipation and diarrhea.      Current Medications  Current Outpatient Medications on File Prior to Visit   Medication Sig Dispense Refill    Accu-Chek FastClix Lancets misc 1 each Daily. 50 each 11    Accu-Chek Guide test strip 1 each by Other route Daily. 50 each 11    allopurinol (ZYLOPRIM) 100 MG tablet Take 1 tablet by mouth Daily. 90 tablet 0    Blood Glucose Monitoring Suppl (Accu-Chek Guide Me) w/Device kit 1 Device Take As Directed. 1 kit 0    calcitriol (ROCALTROL) 0.25 MCG capsule Take 1 capsule by mouth Daily. 90 capsule 3    DULoxetine (CYMBALTA) 30 MG capsule Take 1 capsule by mouth Daily.      fluticasone (FLONASE) 50 MCG/ACT nasal spray 1 spray into the  "nostril(s) as directed by provider As Needed for Rhinitis or Allergies.      glimepiride (AMARYL) 2 MG tablet Take 1 tablet by mouth Every Morning Before Breakfast. 90 tablet 3    hydroxychloroquine (PLAQUENIL) 200 MG tablet Take 1 tablet by mouth 2 (Two) Times a Day.      levothyroxine (SYNTHROID, LEVOTHROID) 100 MCG tablet TAKE 1 TABLET BY MOUTH EVERY MORNING 90 tablet 3    lisinopril-hydrochlorothiazide (PRINZIDE,ZESTORETIC) 20-25 MG per tablet Take 1 tablet by mouth Daily. 90 tablet 3    Lutein 20 MG capsule Take 20 mg by mouth Daily.      metFORMIN ER (GLUCOPHAGE-XR) 500 MG 24 hr tablet Take 1 tablet by mouth 2 (Two) Times a Day With Meals. 180 tablet 3    nystatin (MYCOSTATIN) 713233 UNIT/GM powder Apply  topically to the appropriate area as directed 3 (Three) Times a Day. 60 g 11    omeprazole (priLOSEC) 20 MG capsule Take 1 capsule by mouth Daily. 90 capsule 3    ondansetron ODT (ZOFRAN-ODT) 8 MG disintegrating tablet Take 1 tablet by mouth Every 8 (Eight) Hours As Needed for Nausea or Vomiting. 90 tablet 3    simvastatin (ZOCOR) 40 MG tablet Take 1 tablet by mouth Daily.      [DISCONTINUED] amoxicillin-clavulanate (AUGMENTIN) 875-125 MG per tablet Take 1 tablet by mouth.       No current facility-administered medications on file prior to visit.       Allergies  Allergies   Allergen Reactions    Escitalopram Dizziness       Objective  Visit Vitals  /66   Pulse 88   Temp 98.9 °F (37.2 °C)   Ht 154.9 cm (61\")   Wt 71.2 kg (157 lb)   SpO2 96%   BMI 29.66 kg/m²        Physical Exam  Physical Exam  Vitals and nursing note reviewed.   Constitutional:       General: She is not in acute distress.     Appearance: She is well-developed. She is ill-appearing. She is not toxic-appearing.   HENT:      Head: Normocephalic and atraumatic.   Eyes:      Conjunctiva/sclera: Conjunctivae normal.   Cardiovascular:      Rate and Rhythm: Normal rate and regular rhythm.      Heart sounds: Normal heart sounds.   Pulmonary:    "   Effort: Pulmonary effort is normal. No respiratory distress.      Breath sounds: Normal breath sounds.   Abdominal:      General: Bowel sounds are normal. There is no distension.      Palpations: There is no mass.      Tenderness: There is abdominal tenderness (R side and bilateral lower quadrants). There is guarding. There is no rebound.   Musculoskeletal:      Right lower leg: Edema (trace pedal edema) present.      Left lower leg: Edema (trace pedal edema) present.   Skin:     General: Skin is warm and dry.   Neurological:      Mental Status: She is alert and oriented to person, place, and time.       Results  Results for orders placed or performed in visit on 08/11/23   CBC (No Diff)    Specimen: Blood   Result Value Ref Range    WBC 12.91 (H) 3.40 - 10.80 10*3/mm3    RBC 4.14 3.77 - 5.28 10*6/mm3    Hemoglobin 13.0 12.0 - 15.9 g/dL    Hematocrit 38.2 34.0 - 46.6 %    MCV 92.3 79.0 - 97.0 fL    MCH 31.4 26.6 - 33.0 pg    MCHC 34.0 31.5 - 35.7 g/dL    RDW 12.1 (L) 12.3 - 15.4 %    RDW-SD 39.9 37.0 - 54.0 fl    MPV 12.0 6.0 - 12.0 fL    Platelets 312 140 - 450 10*3/mm3   Comprehensive Metabolic Panel    Specimen: Blood   Result Value Ref Range    Glucose 257 (H) 65 - 99 mg/dL    BUN 15 8 - 23 mg/dL    Creatinine 1.03 (H) 0.57 - 1.00 mg/dL    Sodium 136 136 - 145 mmol/L    Potassium 4.8 3.5 - 5.2 mmol/L    Chloride 97 (L) 98 - 107 mmol/L    CO2 25.5 22.0 - 29.0 mmol/L    Calcium 9.7 8.6 - 10.5 mg/dL    Total Protein 7.0 6.0 - 8.5 g/dL    Albumin 4.0 3.5 - 5.2 g/dL    ALT (SGPT) 20 1 - 33 U/L    AST (SGOT) 17 1 - 32 U/L    Alkaline Phosphatase 72 39 - 117 U/L    Total Bilirubin 0.3 0.0 - 1.2 mg/dL    Globulin 3.0 gm/dL    A/G Ratio 1.3 g/dL    BUN/Creatinine Ratio 14.6 7.0 - 25.0    Anion Gap 13.5 5.0 - 15.0 mmol/L    eGFR 55.4 (L) >60.0 mL/min/1.73        Assessment and Plan  Diagnoses and all orders for this visit:    Sigmoid diverticulitis  Colovaginal fistula  - failed to improve with outpatient augmentin so  hospitalized with CT showing sigmoid diverticulitis and likely colovaginal fistula  - she was treated with IV abx while admitted with no change in CT findings however felt to clinically improve so discharged on augmentin  - She continues to endorse pain, mucoid discharge which is not clear if from vagina or rectum. Exam with pain but nontoxic.  - switch augmentin to cipro and flagyl  - return on 9/15 for recheck  - she was referred to Dr. Barba by hospitalist and phone number provided to schedule appt. Call back if unable to schedule within next 2 weeks.    Health Maintenance  - Pap smear: no longer indicated  - Mammogram: 6/2022 BIRADS 1, discuss next visit  - Colonoscopy: 6/2021  - HCV: negative  - Immunizations: Pneumococcal complete. Declines COVID19 and flu. Shingrix too expensive. Discussed Tdap.  - Depression screening: negative 7/2023    Return for Next scheduled follow up.

## 2023-09-25 ENCOUNTER — TRANSCRIBE ORDERS (OUTPATIENT)
Dept: LAB | Facility: HOSPITAL | Age: 78
End: 2023-09-25
Payer: MEDICARE

## 2023-09-25 ENCOUNTER — LAB (OUTPATIENT)
Dept: LAB | Facility: HOSPITAL | Age: 78
End: 2023-09-25
Payer: MEDICARE

## 2023-09-25 DIAGNOSIS — D72.829 LEUKOCYTOSIS, UNSPECIFIED TYPE: ICD-10-CM

## 2023-09-25 DIAGNOSIS — E11.42 DIABETIC POLYNEUROPATHY ASSOCIATED WITH TYPE 2 DIABETES MELLITUS: ICD-10-CM

## 2023-09-25 DIAGNOSIS — R19.7 DIARRHEA, UNSPECIFIED TYPE: ICD-10-CM

## 2023-09-25 DIAGNOSIS — K57.32 DIVERTICULITIS OF LARGE INTESTINE, UNSPECIFIED BLEEDING STATUS, UNSPECIFIED COMPLICATION STATUS: Primary | ICD-10-CM

## 2023-09-25 DIAGNOSIS — I10 ESSENTIAL HYPERTENSION, BENIGN: ICD-10-CM

## 2023-09-25 DIAGNOSIS — K57.32 DIVERTICULITIS OF LARGE INTESTINE, UNSPECIFIED BLEEDING STATUS, UNSPECIFIED COMPLICATION STATUS: ICD-10-CM

## 2023-09-25 LAB
ALBUMIN SERPL-MCNC: 3.7 G/DL (ref 3.5–5.2)
ALBUMIN/GLOB SERPL: 0.9 G/DL
ALP SERPL-CCNC: 105 U/L (ref 39–117)
ALT SERPL W P-5'-P-CCNC: 14 U/L (ref 1–33)
ANION GAP SERPL CALCULATED.3IONS-SCNC: 13 MMOL/L (ref 5–15)
AST SERPL-CCNC: 26 U/L (ref 1–32)
BASOPHILS # BLD AUTO: 0.1 10*3/MM3 (ref 0–0.2)
BASOPHILS NFR BLD AUTO: 1.1 % (ref 0–1.5)
BILIRUB SERPL-MCNC: 0.4 MG/DL (ref 0–1.2)
BUN SERPL-MCNC: 12 MG/DL (ref 8–23)
BUN/CREAT SERPL: 13.5 (ref 7–25)
CALCIUM SPEC-SCNC: 8.8 MG/DL (ref 8.6–10.5)
CHLORIDE SERPL-SCNC: 99 MMOL/L (ref 98–107)
CO2 SERPL-SCNC: 26 MMOL/L (ref 22–29)
CREAT SERPL-MCNC: 0.89 MG/DL (ref 0.57–1)
CRP SERPL-MCNC: 2.02 MG/DL (ref 0–0.5)
DEPRECATED RDW RBC AUTO: 44.3 FL (ref 37–54)
EGFRCR SERPLBLD CKD-EPI 2021: 65.6 ML/MIN/1.73
EOSINOPHIL # BLD AUTO: 0 10*3/MM3 (ref 0–0.4)
EOSINOPHIL NFR BLD AUTO: 0 % (ref 0.3–6.2)
ERYTHROCYTE [DISTWIDTH] IN BLOOD BY AUTOMATED COUNT: 12.9 % (ref 12.3–15.4)
ERYTHROCYTE [SEDIMENTATION RATE] IN BLOOD: 117 MM/HR (ref 0–30)
GLOBULIN UR ELPH-MCNC: 4.2 GM/DL
GLUCOSE SERPL-MCNC: 110 MG/DL (ref 65–99)
HCT VFR BLD AUTO: 37.5 % (ref 34–46.6)
HGB BLD-MCNC: 12.1 G/DL (ref 12–15.9)
IMM GRANULOCYTES # BLD AUTO: 0.04 10*3/MM3 (ref 0–0.05)
IMM GRANULOCYTES NFR BLD AUTO: 0.4 % (ref 0–0.5)
LYMPHOCYTES # BLD AUTO: 3.25 10*3/MM3 (ref 0.7–3.1)
LYMPHOCYTES NFR BLD AUTO: 36.2 % (ref 19.6–45.3)
MCH RBC QN AUTO: 30.3 PG (ref 26.6–33)
MCHC RBC AUTO-ENTMCNC: 32.3 G/DL (ref 31.5–35.7)
MCV RBC AUTO: 93.8 FL (ref 79–97)
MONOCYTES # BLD AUTO: 0.89 10*3/MM3 (ref 0.1–0.9)
MONOCYTES NFR BLD AUTO: 9.9 % (ref 5–12)
NEUTROPHILS NFR BLD AUTO: 4.69 10*3/MM3 (ref 1.7–7)
NEUTROPHILS NFR BLD AUTO: 52.4 % (ref 42.7–76)
NRBC BLD AUTO-RTO: 0 /100 WBC (ref 0–0.2)
PLATELET # BLD AUTO: 540 10*3/MM3 (ref 140–450)
PMV BLD AUTO: 9.8 FL (ref 6–12)
POTASSIUM SERPL-SCNC: 4.6 MMOL/L (ref 3.5–5.2)
PROT SERPL-MCNC: 7.9 G/DL (ref 6–8.5)
RBC # BLD AUTO: 4 10*6/MM3 (ref 3.77–5.28)
SODIUM SERPL-SCNC: 138 MMOL/L (ref 136–145)
WBC NRBC COR # BLD: 8.97 10*3/MM3 (ref 3.4–10.8)

## 2023-09-25 PROCEDURE — 85025 COMPLETE CBC W/AUTO DIFF WBC: CPT

## 2023-09-25 PROCEDURE — 86140 C-REACTIVE PROTEIN: CPT

## 2023-09-25 PROCEDURE — 80053 COMPREHEN METABOLIC PANEL: CPT

## 2023-09-25 PROCEDURE — 36415 COLL VENOUS BLD VENIPUNCTURE: CPT

## 2023-09-25 PROCEDURE — 85652 RBC SED RATE AUTOMATED: CPT

## 2023-10-02 ENCOUNTER — LAB (OUTPATIENT)
Dept: LAB | Facility: HOSPITAL | Age: 78
End: 2023-10-02
Payer: MEDICARE

## 2023-10-02 ENCOUNTER — TRANSCRIBE ORDERS (OUTPATIENT)
Dept: LAB | Facility: HOSPITAL | Age: 78
End: 2023-10-02
Payer: MEDICARE

## 2023-10-02 DIAGNOSIS — R19.7 DIARRHEA OF PRESUMED INFECTIOUS ORIGIN: ICD-10-CM

## 2023-10-02 DIAGNOSIS — K57.32 DIVERTICULITIS OF LARGE INTESTINE, UNSPECIFIED BLEEDING STATUS, UNSPECIFIED COMPLICATION STATUS: ICD-10-CM

## 2023-10-02 DIAGNOSIS — I10 ESSENTIAL HYPERTENSION, BENIGN: ICD-10-CM

## 2023-10-02 DIAGNOSIS — D72.829 LEUKOCYTOSIS, UNSPECIFIED TYPE: ICD-10-CM

## 2023-10-02 DIAGNOSIS — E11.42 DIABETIC POLYNEUROPATHY ASSOCIATED WITH TYPE 2 DIABETES MELLITUS: ICD-10-CM

## 2023-10-02 DIAGNOSIS — K57.32 DIVERTICULITIS OF LARGE INTESTINE, UNSPECIFIED BLEEDING STATUS, UNSPECIFIED COMPLICATION STATUS: Primary | ICD-10-CM

## 2023-10-02 LAB
ALBUMIN SERPL-MCNC: 3.1 G/DL (ref 3.5–5.2)
ALBUMIN/GLOB SERPL: 0.8 G/DL
ALP SERPL-CCNC: 118 U/L (ref 39–117)
ALT SERPL W P-5'-P-CCNC: 21 U/L (ref 1–33)
ANION GAP SERPL CALCULATED.3IONS-SCNC: 16 MMOL/L (ref 5–15)
AST SERPL-CCNC: 31 U/L (ref 1–32)
BASOPHILS # BLD AUTO: 0.1 10*3/MM3 (ref 0–0.2)
BASOPHILS NFR BLD AUTO: 0.7 % (ref 0–1.5)
BILIRUB SERPL-MCNC: 0.4 MG/DL (ref 0–1.2)
BUN SERPL-MCNC: 18 MG/DL (ref 8–23)
BUN/CREAT SERPL: 20.2 (ref 7–25)
CALCIUM SPEC-SCNC: 7.6 MG/DL (ref 8.6–10.5)
CHLORIDE SERPL-SCNC: 101 MMOL/L (ref 98–107)
CO2 SERPL-SCNC: 23 MMOL/L (ref 22–29)
CREAT SERPL-MCNC: 0.89 MG/DL (ref 0.57–1)
CRP SERPL-MCNC: 0.48 MG/DL (ref 0–0.5)
DEPRECATED RDW RBC AUTO: 45.7 FL (ref 37–54)
EGFRCR SERPLBLD CKD-EPI 2021: 65.6 ML/MIN/1.73
EOSINOPHIL # BLD AUTO: 0 10*3/MM3 (ref 0–0.4)
EOSINOPHIL NFR BLD AUTO: 0 % (ref 0.3–6.2)
ERYTHROCYTE [DISTWIDTH] IN BLOOD BY AUTOMATED COUNT: 13.2 % (ref 12.3–15.4)
ERYTHROCYTE [SEDIMENTATION RATE] IN BLOOD: 80 MM/HR (ref 0–30)
GLOBULIN UR ELPH-MCNC: 4 GM/DL
GLUCOSE SERPL-MCNC: 157 MG/DL (ref 65–99)
HCT VFR BLD AUTO: 38.6 % (ref 34–46.6)
HGB BLD-MCNC: 12.5 G/DL (ref 12–15.9)
IMM GRANULOCYTES # BLD AUTO: 0.09 10*3/MM3 (ref 0–0.05)
IMM GRANULOCYTES NFR BLD AUTO: 0.6 % (ref 0–0.5)
LYMPHOCYTES # BLD AUTO: 2.8 10*3/MM3 (ref 0.7–3.1)
LYMPHOCYTES NFR BLD AUTO: 19.1 % (ref 19.6–45.3)
MCH RBC QN AUTO: 30.6 PG (ref 26.6–33)
MCHC RBC AUTO-ENTMCNC: 32.4 G/DL (ref 31.5–35.7)
MCV RBC AUTO: 94.6 FL (ref 79–97)
MONOCYTES # BLD AUTO: 0.97 10*3/MM3 (ref 0.1–0.9)
MONOCYTES NFR BLD AUTO: 6.6 % (ref 5–12)
NEUTROPHILS NFR BLD AUTO: 10.73 10*3/MM3 (ref 1.7–7)
NEUTROPHILS NFR BLD AUTO: 73 % (ref 42.7–76)
NRBC BLD AUTO-RTO: 0 /100 WBC (ref 0–0.2)
PLATELET # BLD AUTO: 247 10*3/MM3 (ref 140–450)
PMV BLD AUTO: 11 FL (ref 6–12)
POTASSIUM SERPL-SCNC: 4 MMOL/L (ref 3.5–5.2)
PROT SERPL-MCNC: 7.1 G/DL (ref 6–8.5)
RBC # BLD AUTO: 4.08 10*6/MM3 (ref 3.77–5.28)
SODIUM SERPL-SCNC: 140 MMOL/L (ref 136–145)
WBC NRBC COR # BLD: 14.69 10*3/MM3 (ref 3.4–10.8)

## 2023-10-02 PROCEDURE — 85025 COMPLETE CBC W/AUTO DIFF WBC: CPT

## 2023-10-02 PROCEDURE — 36415 COLL VENOUS BLD VENIPUNCTURE: CPT

## 2023-10-02 PROCEDURE — 86140 C-REACTIVE PROTEIN: CPT

## 2023-10-02 PROCEDURE — 85652 RBC SED RATE AUTOMATED: CPT

## 2023-10-02 PROCEDURE — 80053 COMPREHEN METABOLIC PANEL: CPT

## 2023-11-07 DIAGNOSIS — M1A.0720 IDIOPATHIC CHRONIC GOUT OF LEFT FOOT WITHOUT TOPHUS: ICD-10-CM

## 2023-11-07 RX ORDER — ALLOPURINOL 100 MG/1
100 TABLET ORAL DAILY
Qty: 90 TABLET | Refills: 0 | Status: SHIPPED | OUTPATIENT
Start: 2023-11-07

## 2023-11-23 ENCOUNTER — READMISSION MANAGEMENT (OUTPATIENT)
Dept: CALL CENTER | Facility: HOSPITAL | Age: 78
End: 2023-11-23
Payer: MEDICARE

## 2023-11-24 NOTE — OUTREACH NOTE
Prep Survey      Flowsheet Row Responses   [facility] facility patient discharged from? Non-[facility]   Is LACE score < 7 ? Non-BH Discharge   Eligibility [facility]   Date of Discharge 11/23/23   Discharge diagnosis ncounter for surgical aftercare following surgery on the digestive system   Does the patient have one of the following disease processes/diagnoses(primary or secondary)? Other   Prep survey completed? Yes            RAMOS WHITE - Registered Nurse

## 2023-11-27 ENCOUNTER — TRANSITIONAL CARE MANAGEMENT TELEPHONE ENCOUNTER (OUTPATIENT)
Dept: CALL CENTER | Facility: HOSPITAL | Age: 78
End: 2023-11-27
Payer: MEDICARE

## 2023-11-27 NOTE — OUTREACH NOTE
Call Center TCM Note      Flowsheet Row Responses   [facility], [place], operated by [facility] patient discharged from? Non-  [[facility]]   Does the patient have one of the following disease processes/diagnoses(primary or secondary)? Other   TCM attempt successful? Yes   Call start time [phone]   Call end time 0848   Discharge diagnosis Encounter for surgical aftercare following surgery on the digestive system   Does the patient have all medications ordered at discharge? N/A   Is the patient taking all medications as directed (includes completed medication regime)? Yes   Medication comments Pt states changes in meds and will take list with her to appt.   Comments Pt would rather call the office for appt. Declined appt offered at this time.   Does the patient have an appointment with their PCP within 7-14 days of discharge? No  [Has appt with surgeon office today]   Nursing Interventions Patient declined scheduling/rescheduling appointment at this time, Routed TCM call to PCP office   Has home health visited the patient within 72 hours of discharge? Yes   Psychosocial issues? No   Did the patient receive a copy of their discharge instructions? Yes   What is the patient's perception of their health status since discharge? Improving   Is the patient/caregiver able to teach back the hierarchy of who to call/visit for symptoms/problems? PCP, Specialist, Home health nurse, Urgent Care, ED, 911 Yes   TCM call completed? Yes   Wrap up additional comments Pt reports she is doing much better. HH will be coming out. Pt reports changeds to meds but not sure what at this time. Pt will take dc paperwork and meds to appt. Pt has an appt with surgeon today. Reports surgical site is looking good. Reviewed S/S of infection to monitor for. Pt declined appt offered and would rather call the office for FU appt.   Call end time 0848            Madeline Fried RN    11/27/2023, 08:49 EST

## 2023-12-07 ENCOUNTER — TELEPHONE (OUTPATIENT)
Dept: CASE MANAGEMENT | Facility: OTHER | Age: 78
End: 2023-12-07
Payer: MEDICARE

## 2023-12-11 ENCOUNTER — PATIENT OUTREACH (OUTPATIENT)
Dept: CASE MANAGEMENT | Facility: OTHER | Age: 78
End: 2023-12-11
Payer: MEDICARE

## 2023-12-11 DIAGNOSIS — E11.9 TYPE 2 DIABETES MELLITUS WITHOUT COMPLICATION, WITHOUT LONG-TERM CURRENT USE OF INSULIN: Primary | ICD-10-CM

## 2023-12-11 DIAGNOSIS — M15.9 PRIMARY OSTEOARTHRITIS INVOLVING MULTIPLE JOINTS: ICD-10-CM

## 2023-12-11 NOTE — OUTREACH NOTE
AMBULATORY CASE MANAGEMENT NOTE    Name and Relationship of Patient/Support Person: Cristy Louie E - Self    Patient Outreach    Spoke with patient as a CCM follow up call. Patient reports she is independent and lives alone. She stated she still drives but her gransddaughter hleps if needed. She reports continued abdominal pain on right and left sides above her hips. She reports this area has yellow bruises. She reports output of her ostomy but ate a banana and this stopped output for a little while. She reports not being able to eat anything. Patient reports she is current with [facility].     Patient agreeable to make an appointment with her GI provider regarding abdominal pain. She may benefit from a HH dietician referral, will message PCP. Patient agreeable for ACM to follow up during Thursday's PCP visit.     Adult Patient Profile  Questions/Answers      Flowsheet Row Most Recent Value   How to be Addressed Cristy   How Well Do You Speak English? very well   Source of Information patient   Patient Aware of Diagnosis yes   Admission in Past 90 Days hospital, skilled nursing facility   Hearing Difficulty or Deaf no   Wear Glasses or Blind yes   Concentrating, Remembering or Making Decisions Difficulty no   Difficulty Communicating no   Difficulty Eating/Swallowing no   Walking or Climbing Stairs Difficulty no   Dressing/Bathing Difficulty no   Doing Errands Independently Difficulty (such as shopping) no   Equipment Currently Used at Home colostomy/ostomy supplies   Change in Functional Status Since Onset of Current Illness/Injury no   Primary Source of Support/Comfort child(mary)   People in Home alone   Family Caregiver if Needed child(mary), adult   Primary Roles/Responsibilities disabled   Current Living Arrangements home   Resource/Environmental Concerns none        SDOH updated and reviewed with the patient during this program:  Financial Resource Strain: Low Risk  (12/11/2023)    Overall Financial Resource  Strain (CARDIA)     Difficulty of Paying Living Expenses: Not very hard      Food Insecurity: No Food Insecurity (12/11/2023)    Hunger Vital Sign     Worried About Running Out of Food in the Last Year: Never true     Ran Out of Food in the Last Year: Never true      Transportation Needs: No Transportation Needs (12/11/2023)    PRAPARE - Transportation     Lack of Transportation (Medical): No     Lack of Transportation (Non-Medical): No      Housing Stability: Unknown (12/11/2023)    Housing Stability Vital Sign     Unable to Pay for Housing in the Last Year: No     Number of Places Lived in the Last Year: Not on file     Unstable Housing in the Last Year: No      Continuing Care   [facility] [address] [address]    Phone: [phone]         Trini BARRON  Ambulatory Case Management    12/11/2023, 15:38 EST

## 2023-12-14 ENCOUNTER — OFFICE VISIT (OUTPATIENT)
Dept: INTERNAL MEDICINE | Facility: CLINIC | Age: 78
End: 2023-12-14
Payer: MEDICARE

## 2023-12-14 ENCOUNTER — PATIENT OUTREACH (OUTPATIENT)
Dept: CASE MANAGEMENT | Facility: OTHER | Age: 78
End: 2023-12-14
Payer: MEDICARE

## 2023-12-14 ENCOUNTER — LAB (OUTPATIENT)
Dept: LAB | Facility: HOSPITAL | Age: 78
End: 2023-12-14
Payer: MEDICARE

## 2023-12-14 VITALS
DIASTOLIC BLOOD PRESSURE: 64 MMHG | HEART RATE: 78 BPM | OXYGEN SATURATION: 97 % | BODY MASS INDEX: 27.56 KG/M2 | WEIGHT: 146 LBS | TEMPERATURE: 97.8 F | HEIGHT: 61 IN | SYSTOLIC BLOOD PRESSURE: 138 MMHG

## 2023-12-14 DIAGNOSIS — N18.31 STAGE 3A CHRONIC KIDNEY DISEASE: ICD-10-CM

## 2023-12-14 DIAGNOSIS — K57.32 SIGMOID DIVERTICULITIS: Primary | ICD-10-CM

## 2023-12-14 DIAGNOSIS — N82.4 COLOVAGINAL FISTULA: ICD-10-CM

## 2023-12-14 DIAGNOSIS — R63.4 WEIGHT LOSS: ICD-10-CM

## 2023-12-14 DIAGNOSIS — I10 ESSENTIAL HYPERTENSION: ICD-10-CM

## 2023-12-14 DIAGNOSIS — Z93.9 HISTORY OF CREATION OF OSTOMY: ICD-10-CM

## 2023-12-14 DIAGNOSIS — K62.5 RECTAL BLEEDING: ICD-10-CM

## 2023-12-14 DIAGNOSIS — Z90.49 S/P PARTIAL RESECTION OF COLON: ICD-10-CM

## 2023-12-14 PROCEDURE — 80048 BASIC METABOLIC PNL TOTAL CA: CPT

## 2023-12-14 RX ORDER — MIRTAZAPINE 15 MG/1
15 TABLET, FILM COATED ORAL DAILY
COMMUNITY

## 2023-12-14 RX ORDER — LOPERAMIDE HYDROCHLORIDE 2 MG/1
2 TABLET ORAL
COMMUNITY
Start: 2023-12-02

## 2023-12-14 RX ORDER — GLIPIZIDE 5 MG/1
2.5 TABLET ORAL DAILY
COMMUNITY
End: 2023-12-14

## 2023-12-14 RX ORDER — AMLODIPINE BESYLATE 5 MG/1
5 TABLET ORAL DAILY
Qty: 90 TABLET | Refills: 1 | Status: SHIPPED | OUTPATIENT
Start: 2023-12-14

## 2023-12-14 NOTE — PROGRESS NOTES
Transitional Care Follow Up Visit  Subjective     Cristy Louie is a 78 y.o. female who presents for a transitional care management visit.    Within 48 business hours after discharge our office contacted her via telephone to coordinate her care and needs.      I reviewed and discussed the details of that call along with the discharge summary, hospital problems, inpatient lab results, inpatient diagnostic studies, and consultation reports with Cristy.     Current outpatient and discharge medications have been reconciled for the patient.  Reviewed by: Sandra Daniel MD          11/23/2023     9:24 PM   Date of TCM Phone Call   [facility]   Date of Discharge 11/23/2023     Risk for Readmission (LACE) No data recorded    History of Present Illness   Course During Hospital Stay:  Ms. Louie was admitted to [facility] from 10/24-11/3 for elective LAR with diverting ostomy and bladder reconstruction with Dr. Barba. This was to address diverticulitis with colovesical fistula. She had postop ileus requiring TPN briefly. She also had rising WBC with normal lactate, no fever, and reassuring CT scan. LIDC followed and she was maintained on IV abx via PICC. She was discharged on daptomycin, micafungin, and zosyn. In DC summary it was noted that glimepiride, lisinopril-HCTZ, and bentyl were discontinued. She was started on lisinopril 20mg daily and megace. She was discharged to rehab. She had follow up with Dr. Barba on 11/27 with labs. Her renal function had declined with gfr 14. K was elevated. She was referred back to [facility] ED. She was admitted from 11/28-12/2 for GINGER and dehydration related to high output from ostomy. She was given IVF, lisinopril stopped. She was treated for UTI with cefdinir. Imodium PRN was added to slow ostomy output. CT was repeated and negative. She was then discharged home. Since being home she reports minimal need for imodium and notes that  she can slow ostomy output with diet. She reports weight loss down to 142lbs but now up to 146lbs. She is no longer on megace. She is on no BP medication at this time and unaware of any BP issues. She is confused regarding her diabetic medications as both glimepiride and glipizide are listed on her med list. She believes she is only taking glimepiride. She has had some residual abdominal soreness and notes yesterday some dark blood per rectum. She has follow up with Dr. Barba on 12/28 and reports speaking with someone in that office regarding this.     The following portions of the patient's history were reviewed and updated as appropriate: allergies, current medications, past surgical history, and problem list.    Review of Systems   Constitutional:  Negative for fever and unexpected weight change.   Respiratory: Negative.     Cardiovascular: Negative.    Gastrointestinal:  Positive for abdominal pain, anal bleeding and rectal pain (urge to defecate). Negative for blood in stool, constipation and diarrhea.   Skin:  Positive for rash (perineal from briefs).       Objective   Physical Exam  Vitals and nursing note reviewed.   Constitutional:       General: She is not in acute distress.     Appearance: She is well-developed. She is not ill-appearing or toxic-appearing.   HENT:      Head: Normocephalic and atraumatic.   Eyes:      Conjunctiva/sclera: Conjunctivae normal.   Cardiovascular:      Rate and Rhythm: Normal rate and regular rhythm.      Heart sounds: Normal heart sounds. No murmur heard.  Pulmonary:      Effort: Pulmonary effort is normal. No respiratory distress.      Breath sounds: Normal breath sounds.   Abdominal:      General: There is no distension.      Palpations: Abdomen is soft.      Tenderness: There is no abdominal tenderness. There is no guarding or rebound.      Comments: Ostomy in RLQ with liquid green stool in bag   Skin:     General: Skin is warm and dry.   Neurological:      Mental Status:  She is alert and oriented to person, place, and time. Mental status is at baseline.      Gait: Gait normal.         Assessment & Plan   Diagnoses and all orders for this visit:    Sigmoid diverticulitis  Colovaginal fistula  S/P partial resection of colon  History of creation of ostomy  - s/p LAR with diverting ostomy and bladder reconstruction  - has imodium to slow ostomy output  - will send to nutrition to help with this as well  - keep follow up with Dr. Barba    Stage 3a chronic kidney disease  - had GINGER when hospitalized with gfr 14. Secondary to high ostomy output and dehydration.  - baseline gfr 50-55 but has been in 60s at times  - did recover by discharge  - remains off ACEI  - has imodium to slow ostomy output  - BMP today    Essential hypertension  - lisinopril-HCTZ stopped while hospitalized due to GINGER  - BP elevated so will start amlodipine 5mg daily    Weight loss  - lost significant amount of weight due to flares of diverticulitis  - now regaining weight  - will refer to nutrition    Rectal bleeding  - recommended she contact Dr. Rivera office    Health Maintenance  - Pap smear: no longer indicated  - Mammogram: 6/2022 BIRADS 1, ordered but not scheduled  - Colonoscopy: 6/2021  - HCV: negative  - Immunizations: Pneumococcal complete. Declines COVID19 and flu. Shingrix too expensive. Discussed Tdap.  - Depression screening: negative 7/2023       Return in about 4 weeks (around 1/11/2024) for Follow up HTN, bowel resection, CKD3, Labs today.

## 2023-12-14 NOTE — OUTREACH NOTE
AMBULATORY CASE MANAGEMENT NOTE    Name and Relationship of Patient/Support Person: Cristy Louie E - Self    Patient Outreach    Spoke with patient during today's PCP visit. Discussed CCM and provided patient with my contact information. Patient is current with [facility] and feels she has adequate support. Encouraged her to call ACM if needed.     Provided patient with coupons for Ensure and a Glucerna protein shake. MD to add nutrition services for patient. Called verbal order for SN medication management to Lisa at [facility] for review of medications at home vs what patient is taking. Discussed nutrition with Lisa, she will add a nutrition assessment to patient's HH services. Faxed medication list to [facility] at [phone].    Patient understands to follow up with GI for current symptoms of abdominal pain and rectal bleeding. CCM closed. ACM available if patient has further questions/needs.        Trini BARRON  Ambulatory Case Management    12/14/2023, 11:29 EST

## 2023-12-15 LAB
ANION GAP SERPL CALCULATED.3IONS-SCNC: 14.2 MMOL/L (ref 5–15)
BUN SERPL-MCNC: 18 MG/DL (ref 8–23)
BUN/CREAT SERPL: 19.8 (ref 7–25)
CALCIUM SPEC-SCNC: 8.7 MG/DL (ref 8.6–10.5)
CHLORIDE SERPL-SCNC: 108 MMOL/L (ref 98–107)
CO2 SERPL-SCNC: 19.8 MMOL/L (ref 22–29)
CREAT SERPL-MCNC: 0.91 MG/DL (ref 0.57–1)
EGFRCR SERPLBLD CKD-EPI 2021: 63.9 ML/MIN/1.73
GLUCOSE SERPL-MCNC: 156 MG/DL (ref 65–99)
POTASSIUM SERPL-SCNC: 4.3 MMOL/L (ref 3.5–5.2)
SODIUM SERPL-SCNC: 142 MMOL/L (ref 136–145)

## 2023-12-18 ENCOUNTER — TELEPHONE (OUTPATIENT)
Dept: INTERNAL MEDICINE | Facility: CLINIC | Age: 78
End: 2023-12-18
Payer: MEDICARE

## 2023-12-18 NOTE — TELEPHONE ENCOUNTER
----- Message from Sandra Daniel MD sent at 12/17/2023  1:17 PM EST -----  Please mail patient a result letter.    Your kidney function looks good.

## 2024-01-05 ENCOUNTER — TELEPHONE (OUTPATIENT)
Dept: INTERNAL MEDICINE | Facility: CLINIC | Age: 79
End: 2024-01-05
Payer: MEDICARE

## 2024-01-05 NOTE — TELEPHONE ENCOUNTER
YOGESH WITH [facility] CALLED AND STATES SHE JUST LEFT THE PATIENT AND THE PATIENTS BLOOD SUGAR . YOGESH ALSO STATES PT DOES NOT HAVE A GLUCOMETER IN THE HOME. YOGESH ALSO STATES PATIENT IS ASYMPTOMATIC PATIENT IS FEEL OK. YOGESH'S CALL BACK NUMBER [phone]

## 2024-01-11 ENCOUNTER — OFFICE VISIT (OUTPATIENT)
Dept: INTERNAL MEDICINE | Facility: CLINIC | Age: 79
End: 2024-01-11
Payer: MEDICARE

## 2024-01-11 VITALS
HEIGHT: 61 IN | DIASTOLIC BLOOD PRESSURE: 82 MMHG | WEIGHT: 153 LBS | HEART RATE: 103 BPM | RESPIRATION RATE: 18 BRPM | OXYGEN SATURATION: 96 % | TEMPERATURE: 96.8 F | SYSTOLIC BLOOD PRESSURE: 140 MMHG | BODY MASS INDEX: 28.89 KG/M2

## 2024-01-11 DIAGNOSIS — K57.30 DIVERTICULOSIS OF LARGE INTESTINE WITHOUT HEMORRHAGE: ICD-10-CM

## 2024-01-11 DIAGNOSIS — I10 ESSENTIAL HYPERTENSION: Primary | ICD-10-CM

## 2024-01-11 DIAGNOSIS — J06.9 UPPER RESPIRATORY TRACT INFECTION, UNSPECIFIED TYPE: ICD-10-CM

## 2024-01-11 DIAGNOSIS — Z90.49 S/P PARTIAL RESECTION OF COLON: ICD-10-CM

## 2024-01-11 DIAGNOSIS — N18.31 STAGE 3A CHRONIC KIDNEY DISEASE: ICD-10-CM

## 2024-01-11 DIAGNOSIS — Z93.9 HISTORY OF CREATION OF OSTOMY: ICD-10-CM

## 2024-01-11 PROBLEM — K57.32 SIGMOID DIVERTICULITIS: Status: RESOLVED | Noted: 2023-09-07 | Resolved: 2024-01-11

## 2024-01-11 PROBLEM — N82.4 COLOVAGINAL FISTULA: Status: RESOLVED | Noted: 2023-09-07 | Resolved: 2024-01-11

## 2024-01-11 RX ORDER — AMOXICILLIN AND CLAVULANATE POTASSIUM 875; 125 MG/1; MG/1
1 TABLET, FILM COATED ORAL 2 TIMES DAILY
Qty: 14 TABLET | Refills: 0 | Status: SHIPPED | OUTPATIENT
Start: 2024-01-11 | End: 2024-01-18

## 2024-01-11 RX ORDER — LISINOPRIL 20 MG/1
20 TABLET ORAL DAILY
Qty: 90 TABLET | Refills: 1 | Status: SHIPPED | OUTPATIENT
Start: 2024-01-11

## 2024-01-11 RX ORDER — GUAIFENESIN 600 MG/1
600 TABLET, EXTENDED RELEASE ORAL 2 TIMES DAILY
Qty: 30 TABLET | Refills: 0 | Status: SHIPPED | OUTPATIENT
Start: 2024-01-11

## 2024-01-11 NOTE — PROGRESS NOTES
Internal Medicine Follow Up    Chief Complaint  Cristy Louie is a 78 y.o. female who presents today for follow up of chronic medical conditions outlined below.    Chief Complaint   Patient presents with    Hypertension    Follow-up    Sigmoid diverticulitis        HPI  Ms. Louie comes in today with her granddaughter for follow up. She has had nasal congestion and fever blisters x2 weeks. Taking zicam and trying to use flonase. No fever. Has not tested for COVID or flu. She notes ongoing pain and firmness around ostomy. Some skin irritation. She saw Dr. Barba recently and is scheduled next month for CT. She is taking imodium now and notes that with two per day her stools are too firm. She has stopped amlodipine due to swelling.         Review of Systems  Review of Systems   Constitutional:  Positive for fatigue. Negative for fever.   HENT:  Positive for congestion, ear pain (itching), mouth sores, postnasal drip and rhinorrhea. Negative for sinus pressure and sore throat.    Respiratory:  Negative for cough.    Cardiovascular: Negative.    Gastrointestinal:  Positive for abdominal distention (gas), abdominal pain, constipation and diarrhea.   Skin:  Positive for rash.   Psychiatric/Behavioral:  Positive for sleep disturbance.         Current Medications  Current Outpatient Medications on File Prior to Visit   Medication Sig Dispense Refill    allopurinol (ZYLOPRIM) 100 MG tablet TAKE 1 TABLET BY MOUTH EVERY DAY 90 tablet 0    calcitriol (ROCALTROL) 0.25 MCG capsule Take 1 capsule by mouth Daily. 90 capsule 3    DULoxetine (CYMBALTA) 30 MG capsule Take 1 capsule by mouth Daily.      fluticasone (FLONASE) 50 MCG/ACT nasal spray 1 spray into the nostril(s) as directed by provider As Needed for Rhinitis or Allergies.      glimepiride (AMARYL) 2 MG tablet Take 1 tablet by mouth Every Morning Before Breakfast. 90 tablet 3    levothyroxine (SYNTHROID, LEVOTHROID) 100 MCG tablet TAKE 1 TABLET BY MOUTH EVERY MORNING 90  "tablet 3    loperamide (IMODIUM A-D) 2 MG tablet Take 1 tablet by mouth.      metFORMIN ER (GLUCOPHAGE-XR) 500 MG 24 hr tablet Take 1 tablet by mouth 2 (Two) Times a Day With Meals. 180 tablet 3    nystatin (MYCOSTATIN) 984516 UNIT/GM powder Apply  topically to the appropriate area as directed 3 (Three) Times a Day. 60 g 11    simvastatin (ZOCOR) 40 MG tablet Take 1 tablet by mouth Daily.      Accu-Chek FastClix Lancets misc 1 each Daily. 50 each 11    Accu-Chek Guide test strip 1 each by Other route Daily. 50 each 11    Blood Glucose Monitoring Suppl (Accu-Chek Guide Me) w/Device kit 1 Device Take As Directed. 1 kit 0    mirtazapine (REMERON) 15 MG tablet Take 1 tablet by mouth Daily. (Patient not taking: Reported on 1/11/2024)      omeprazole (priLOSEC) 20 MG capsule Take 1 capsule by mouth Daily. (Patient not taking: Reported on 1/11/2024) 90 capsule 3    [DISCONTINUED] amLODIPine (NORVASC) 5 MG tablet Take 1 tablet by mouth Daily. (Patient not taking: Reported on 1/11/2024) 90 tablet 1     No current facility-administered medications on file prior to visit.       Allergies  Allergies   Allergen Reactions    Amlodipine Swelling    Escitalopram Dizziness       Objective  Visit Vitals  /82 (BP Location: Left arm, Patient Position: Sitting, Cuff Size: Adult)   Pulse 103   Temp 96.8 °F (36 °C) (Temporal)   Resp 18   Ht 154.9 cm (61\")   Wt 69.4 kg (153 lb)   SpO2 96%   BMI 28.91 kg/m²        Physical Exam  Physical Exam  Vitals and nursing note reviewed.   Constitutional:       General: She is not in acute distress.     Appearance: She is well-developed. She is ill-appearing. She is not toxic-appearing.   HENT:      Head: Normocephalic and atraumatic.      Right Ear: Tympanic membrane, ear canal and external ear normal.      Left Ear: Tympanic membrane, ear canal and external ear normal.      Nose: Congestion and rhinorrhea (clear) present.      Mouth/Throat:      Mouth: Mucous membranes are moist.      Pharynx: " Oropharynx is clear. Posterior oropharyngeal erythema (mild) present. No oropharyngeal exudate.   Eyes:      Conjunctiva/sclera: Conjunctivae normal.   Cardiovascular:      Rate and Rhythm: Normal rate and regular rhythm.      Heart sounds: Normal heart sounds.   Pulmonary:      Effort: Pulmonary effort is normal. No respiratory distress.      Breath sounds: Normal breath sounds.   Abdominal:      General: There is no distension.      Tenderness: There is abdominal tenderness (around RLQ ostomy).      Comments: Liquid stool in ostomy bag   Musculoskeletal:      Right lower leg: No edema.      Left lower leg: No edema.   Skin:     General: Skin is warm and dry.      Findings: Rash (erythema around ostomy appliance) present.   Neurological:      Mental Status: She is alert and oriented to person, place, and time. Mental status is at baseline.      Gait: Gait normal.         Results  Results for orders placed or performed in visit on 12/14/23   Basic Metabolic Panel    Specimen: Blood   Result Value Ref Range    Glucose 156 (H) 65 - 99 mg/dL    BUN 18 8 - 23 mg/dL    Creatinine 0.91 0.57 - 1.00 mg/dL    Sodium 142 136 - 145 mmol/L    Potassium 4.3 3.5 - 5.2 mmol/L    Chloride 108 (H) 98 - 107 mmol/L    CO2 19.8 (L) 22.0 - 29.0 mmol/L    Calcium 8.7 8.6 - 10.5 mg/dL    BUN/Creatinine Ratio 19.8 7.0 - 25.0    Anion Gap 14.2 5.0 - 15.0 mmol/L    eGFR 63.9 >60.0 mL/min/1.73        Assessment and Plan  Diagnoses and all orders for this visit:    Essential hypertension  - BP uncontrolled off meds  - stopped amlodipine due to edema  - will continue to avoid HCTZ due to risk of dehydration  - start lisinopril 20mg daily  - follow up in 4 weeks with JOCELYNE    Stage 3a chronic kidney disease  - on most recent labs gfr has returned to >60    Diverticulosis of large intestine without hemorrhage  S/P partial resection of colon  History of creation of ostomy  - s/p LAR with diverting ostomy and bladder reconstruction due to sigmoid  diverticulitis with colovaginal fistula  - has imodium to slow ostomy output, recommend reducing to 1 tab daily  - discussed prior nutrition referral which she has not scheduled  - keep follow up with Dr. Barba and appt for CT scan    Upper respiratory tract infection, unspecified type  - 2 weeks of sxs without improvement  - too late for COVID, flu testing to be of utility.  - may need abx at this point but will try to avoid use given potential to cause diarrhea. Trial of mucinex and flonase x2d and if not improving can start augmentin.     Health Maintenance  - Pap smear: no longer indicated  - Mammogram: 6/2022 BIRADS 1, ordered but not scheduled  - Colonoscopy: 6/2021  - HCV: negative  - Immunizations: Pneumococcal complete. Declines COVID19 and flu. Shingrix too expensive. Discussed Tdap.  - Depression screening: negative 7/2023    No follow-ups on file.

## 2024-01-16 ENCOUNTER — OFFICE VISIT (OUTPATIENT)
Dept: ENDOCRINOLOGY | Facility: CLINIC | Age: 79
End: 2024-01-16
Payer: MEDICARE

## 2024-01-16 VITALS
BODY MASS INDEX: 29.27 KG/M2 | SYSTOLIC BLOOD PRESSURE: 128 MMHG | DIASTOLIC BLOOD PRESSURE: 70 MMHG | HEART RATE: 93 BPM | HEIGHT: 61 IN | OXYGEN SATURATION: 98 % | WEIGHT: 155 LBS

## 2024-01-16 DIAGNOSIS — C73 THYROID CANCER: ICD-10-CM

## 2024-01-16 DIAGNOSIS — E89.0 POST-SURGICAL HYPOTHYROIDISM: ICD-10-CM

## 2024-01-16 DIAGNOSIS — E11.65 TYPE 2 DIABETES MELLITUS WITH HYPERGLYCEMIA, WITHOUT LONG-TERM CURRENT USE OF INSULIN: Primary | ICD-10-CM

## 2024-01-16 DIAGNOSIS — E89.2 POST-SURGICAL HYPOPARATHYROIDISM: ICD-10-CM

## 2024-01-16 LAB
DEPRECATED RDW RBC AUTO: 41.8 FL (ref 37–54)
ERYTHROCYTE [DISTWIDTH] IN BLOOD BY AUTOMATED COUNT: 12.8 % (ref 12.3–15.4)
EXPIRATION DATE: ABNORMAL
GLUCOSE BLDC GLUCOMTR-MCNC: 148 MG/DL (ref 70–130)
HBA1C MFR BLD: 8.7 % (ref 4.5–5.7)
HCT VFR BLD AUTO: 38.7 % (ref 34–46.6)
HGB BLD-MCNC: 12.4 G/DL (ref 12–15.9)
Lab: ABNORMAL
MCH RBC QN AUTO: 28.6 PG (ref 26.6–33)
MCHC RBC AUTO-ENTMCNC: 32 G/DL (ref 31.5–35.7)
MCV RBC AUTO: 89.4 FL (ref 79–97)
PLATELET # BLD AUTO: 386 10*3/MM3 (ref 140–450)
PMV BLD AUTO: 11.6 FL (ref 6–12)
RBC # BLD AUTO: 4.33 10*6/MM3 (ref 3.77–5.28)
WBC NRBC COR # BLD AUTO: 11.02 10*3/MM3 (ref 3.4–10.8)

## 2024-01-16 PROCEDURE — 84443 ASSAY THYROID STIM HORMONE: CPT | Performed by: INTERNAL MEDICINE

## 2024-01-16 PROCEDURE — 82947 ASSAY GLUCOSE BLOOD QUANT: CPT | Performed by: INTERNAL MEDICINE

## 2024-01-16 PROCEDURE — 99214 OFFICE O/P EST MOD 30 MIN: CPT | Performed by: INTERNAL MEDICINE

## 2024-01-16 PROCEDURE — 83036 HEMOGLOBIN GLYCOSYLATED A1C: CPT | Performed by: INTERNAL MEDICINE

## 2024-01-16 PROCEDURE — 3052F HG A1C>EQUAL 8.0%<EQUAL 9.0%: CPT | Performed by: INTERNAL MEDICINE

## 2024-01-16 PROCEDURE — 3078F DIAST BP <80 MM HG: CPT | Performed by: INTERNAL MEDICINE

## 2024-01-16 PROCEDURE — 3074F SYST BP LT 130 MM HG: CPT | Performed by: INTERNAL MEDICINE

## 2024-01-16 PROCEDURE — 85027 COMPLETE CBC AUTOMATED: CPT | Performed by: INTERNAL MEDICINE

## 2024-01-16 PROCEDURE — 36415 COLL VENOUS BLD VENIPUNCTURE: CPT | Performed by: INTERNAL MEDICINE

## 2024-01-16 PROCEDURE — 80053 COMPREHEN METABOLIC PANEL: CPT | Performed by: INTERNAL MEDICINE

## 2024-01-16 RX ORDER — BLOOD SUGAR DIAGNOSTIC
1 STRIP MISCELLANEOUS 2 TIMES DAILY
Qty: 60 EACH | Refills: 11 | Status: SHIPPED | OUTPATIENT
Start: 2024-01-16

## 2024-01-16 RX ORDER — CALCITRIOL 0.25 UG/1
0.25 CAPSULE, LIQUID FILLED ORAL DAILY
Qty: 90 CAPSULE | Refills: 3 | Status: SHIPPED | OUTPATIENT
Start: 2024-01-16

## 2024-01-16 RX ORDER — LANCETS
1 EACH MISCELLANEOUS 2 TIMES DAILY
Qty: 60 EACH | Refills: 11 | Status: SHIPPED | OUTPATIENT
Start: 2024-01-16

## 2024-01-16 RX ORDER — BLOOD-GLUCOSE METER
1 EACH MISCELLANEOUS TAKE AS DIRECTED
Qty: 1 KIT | Refills: 0 | Status: SHIPPED | OUTPATIENT
Start: 2024-01-16

## 2024-01-16 RX ORDER — GLIMEPIRIDE 4 MG/1
4 TABLET ORAL
Qty: 90 TABLET | Refills: 3 | Status: SHIPPED | OUTPATIENT
Start: 2024-01-16

## 2024-01-16 NOTE — PROGRESS NOTES
Chief Complaint   Patient presents with    Diabetes     Follow up       HPI:   Cristy Louie is a 78 y.o.female who returns to Endocrine Clinic for f/u evaluation of her Type 2 DM,  thyroid cancer, post-surgical hypothyroidism, and post-surgical hypoparathyroidism. Last visit 04/24/2023.  Her history is as follows:    Interim Events:   (08/30/2023) was admitted to [facility] for diverticulitis.  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 Ava Jacobs, Endocrine Surgery, at [facility] for surgical treatment of pt's primary hyperparathyroidism.  - (05/24/2018) had pre-operative localization imaging with Thyroid US at  by Dr. Jacobs and an US-Guided FNA of a right inferior thyroid lobe nodule was completed. 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  - pt underwent right superior parathyroidectomy and partial right thyroidectomy on 08/07/2018  - underwent completion thyroidectomy 11/27/2018    Surgical pathology:  (08/07/2018)  A. Thyroid, right, lobectomy:  - papillary thyroid carcinoma, oncocytic variant (pT1a, N0)  - Tumor Size: 1.0 x 0.8 x 0.7 cm, single focus  - no extrathyroidal extension  - no lymphatic or angioinvasion  - no invasion of the surgical resection margin or to the thyroid capsule  - one benign perithyroidal lymph node (0/1)  - benign adenomatoid nodules.    B. Parathyroid, right superior, excision:  - hypercellular parathyroid (0.45 gm)    (11/27/2018)  A. Thyroid, left lobe:   - multifocal papillary thyroid carcinoma, classic type, involving bilateral lobes (pT1b, Nx)  - Tumor Size: 1.2 x 0.8 x 0.6 cm, single focus  - no extrathyroidal extension  - no lymphatic or angioinvasion  - no invasion of the surgical resection margin or to the  thyroid capsule.  - Follicular adenoma of the superior pole (1.5 mm in the greatest dimension).    B. Parathyroid Left Inferior:   - hypercellular parathyroid gland (0.03 gm) with no tumor seen    C. Parathyroid Left Superior:   - hypercellular parathyroid gland (0.04 gm) with no tumor seen    Lab summary:   (01/2019, [facility]) TG <0.1, TG Ab < 1.0, TSH 0.55, free T4 1.6, Calcium 7.7  (04/2019, [facility]) TG 0.1, TG Ab <0 1.0, TSH 0.397, PTH 43.9, Ca 8.6  (10/2019) TSH 0.296 (0.270  - 4.200) - on levothyroxine 112 mcg  (10/2020) TSH 0.098, free T4 1.85. [facility]: TG <0.1, TG Ab < 1.0  (11/2020) TSH 4.330, free T4 1.24, increased to levothyroxine 125 mcg  (01/2021) TSH 0.086 - on levothyroxine 125 mcg  (04/2021) TSH 0.049,  Free T4 1.73 - on levothyroxine 125 mcg  (11/2021) TSH 0.138, free T4 1.58, [facility]: TG <0.1, TG Ab < 1.0 - on levothyroxine 112 mcg, dose changed to 100 mcg.  (10/2022) TSH 7.870, free T4 1.58, [facility]: TG by ROGELIO 0.2, TG Ab < 1.0 - on levothyroxine 88 mcg    2) post-surgical hypothyroidism:  Current Dose: [id number] mcg  - Pt taking in AM on an empty stomach as directed without interfering substances  - Is not on high dose biotin  - May have missed doses recently vs decreased absorption after recent diverting ostomy    Lab Results   Component Value Date    TSH 13.500 (H) 01/16/2024       3) post-surgical hypoparathyroidism:  - h/o  primary hyperparathyroidism: s/p right superior parathyroidectomy 08/2018, s/p left superior and inferior parathyroidectomy 11/2018  - On Calcitriol 0.25 mcg daily   Serum Calcium at lower end of normal range since 01/2021    (01/16/2024) CMP - Cr 1.13, eGFR 49.3  Ca 9.3    4) Type 2 diabetes without complication:  - had pre-diabetes for several years. Diagnosed with overt T2DM in 2013, A1C% 6.5    Current Rxs:  - Metformin  mg BID  - glimepiride 2 mg qAM: denies hypoglycemia    Review of Systems   Constitutional:  Positive for fatigue.        20 lb wt loss since last  "visit   HENT: Negative.     Eyes: Negative.    Respiratory: Negative.     Cardiovascular: Negative.    Gastrointestinal:  Positive for abdominal pain.   Endocrine: Negative.    Genitourinary: Negative.    Musculoskeletal:  Positive for arthralgias and myalgias. Negative for joint swelling.   Skin: Negative.    Allergic/Immunologic: Negative.    Neurological: Negative.    Hematological: Negative.    Psychiatric/Behavioral: Negative.       The following portions of the patient's history were reviewed and updated as appropriate: allergies, current medications, past family history, past medical history, past social history, past surgical history and problem list.      /70   Pulse 93   Ht 154.9 cm (61\")   Wt 70.3 kg (155 lb)   SpO2 98%   BMI 29.29 kg/m²   Physical Exam   Constitutional: She is oriented to person, place, and time. She appears well-developed. No distress.   HENT:   Head: Normocephalic.   Eyes: Pupils are equal, round, and reactive to light. Conjunctivae are normal.   Neck: No tracheal deviation present.   No palpable thyroid tissue      Cardiovascular: Normal rate, regular rhythm and normal heart sounds.   No murmur heard.  Pulmonary/Chest: Effort normal and breath sounds normal. No respiratory distress.   Abdominal: Soft.   Ostomy bag intact   Musculoskeletal:      Comments: Arthritic changes in hands   Lymphadenopathy:     She has no cervical adenopathy.   Neurological: She is alert and oriented to person, place, and time. No cranial nerve deficit.   Skin: Skin is warm and dry. She is not diaphoretic. No erythema.   Psychiatric: Her behavior is normal.   Vitals reviewed.    LABS/IMAGING: outside records reviewed and summarized in HPI  Office Visit on 01/16/2024   Component Date Value Ref Range Status    Hemoglobin A1C 01/16/2024 8.7 (A)  4.5 - 5.7 % Final    Lot Number 01/16/2024 [id number]   Final    Expiration Date 01/16/2024 7/30/25   Final    Glucose 01/16/2024 148 (A)  70 - 130 mg/dL " Final    Glucose 01/16/2024 92  65 - 99 mg/dL Final    BUN 01/16/2024 17  8 - 23 mg/dL Final    Creatinine 01/16/2024 1.13 (H)  0.57 - 1.00 mg/dL Final    Sodium 01/16/2024 143  136 - 145 mmol/L Final    Potassium 01/16/2024 4.2  3.5 - 5.2 mmol/L Final    Chloride 01/16/2024 102  98 - 107 mmol/L Final    CO2 01/16/2024 23.0  22.0 - 29.0 mmol/L Final    Calcium 01/16/2024 9.3  8.6 - 10.5 mg/dL Final    Total Protein 01/16/2024 8.1  6.0 - 8.5 g/dL Final    Albumin 01/16/2024 4.6  3.5 - 5.2 g/dL Final    ALT (SGPT) 01/16/2024 23  1 - 33 U/L Final    AST (SGOT) 01/16/2024 34 (H)  1 - 32 U/L Final    Alkaline Phosphatase 01/16/2024 156 (H)  39 - 117 U/L Final    Total Bilirubin 01/16/2024 0.3  0.0 - 1.2 mg/dL Final    Globulin 01/16/2024 3.5  gm/dL Final    A/G Ratio 01/16/2024 1.3  g/dL Final    BUN/Creatinine Ratio 01/16/2024 15.0  7.0 - 25.0 Final    Anion Gap 01/16/2024 18.0 (H)  5.0 - 15.0 mmol/L Final    eGFR 01/16/2024 49.3 (L)  >60.0 mL/min/1.73 Final    TSH 01/16/2024 13.500 (H)  0.270 - 4.200 uIU/mL Final    WBC 01/16/2024 11.02 (H)  3.40 - 10.80 10*3/mm3 Final    RBC 01/16/2024 4.33  3.77 - 5.28 10*6/mm3 Final    Hemoglobin 01/16/2024 12.4  12.0 - 15.9 g/dL Final    Hematocrit 01/16/2024 38.7  34.0 - 46.6 % Final    MCV 01/16/2024 89.4  79.0 - 97.0 fL Final    MCH 01/16/2024 28.6  26.6 - 33.0 pg Final    MCHC 01/16/2024 32.0  31.5 - 35.7 g/dL Final    RDW 01/16/2024 12.8  12.3 - 15.4 % Final    RDW-SD 01/16/2024 41.8  37.0 - 54.0 fl Final    MPV 01/16/2024 11.6  6.0 - 12.0 fL Final    Platelets 01/16/2024 386  140 - 450 10*3/mm3 Final         ASSESSMENT/PLAN:    1) Type 2 diabetes w/o complications: uncontrolled, A1C% 8.70 today  - continue Metformin  mg to BID with food.   - increasing glimepiride to 4 mg qAM. Reviewed potential for hypoglycemia on this medication. Advised to not skip meals    2) post-surgical hypothyroidism: uncontrolled  - clinically euthyroid on exam  - TSH elevated to 13.500  - May  have missed doses recently vs decreased absorption after recent diverting ostomy  - Will continue levothyroxine 100 mcg qAM for now.   - Will check TSH again on 02/16/2024, the same day as her f/u with her PCP. If again elevated, will increase dose to 112 or 125 mcg  - Reviewed proper thyroid hormone administration, and factors to avoid that decrease medication potency and medication absorption.     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 Louie current American Thyroid Association 2015 guidelines for differentiated thyroid carcinoma.   - discussed that the need for additional treatment (in particular, radioiodine therapy) after surgery, per these guidelines, is based upon an initial risk stratification system which estimates the risk of persistent/recurrent disease. Per this stratification system, she is at low risk for recurrence.  I did not recommend REESE remnant ablation.     RTC 04/17/2024    Electronically Signed: Maureen Aiken MD

## 2024-01-17 LAB
ALBUMIN SERPL-MCNC: 4.6 G/DL (ref 3.5–5.2)
ALBUMIN/GLOB SERPL: 1.3 G/DL
ALP SERPL-CCNC: 156 U/L (ref 39–117)
ALT SERPL W P-5'-P-CCNC: 23 U/L (ref 1–33)
ANION GAP SERPL CALCULATED.3IONS-SCNC: 18 MMOL/L (ref 5–15)
AST SERPL-CCNC: 34 U/L (ref 1–32)
BILIRUB SERPL-MCNC: 0.3 MG/DL (ref 0–1.2)
BUN SERPL-MCNC: 17 MG/DL (ref 8–23)
BUN/CREAT SERPL: 15 (ref 7–25)
CALCIUM SPEC-SCNC: 9.3 MG/DL (ref 8.6–10.5)
CHLORIDE SERPL-SCNC: 102 MMOL/L (ref 98–107)
CO2 SERPL-SCNC: 23 MMOL/L (ref 22–29)
CREAT SERPL-MCNC: 1.13 MG/DL (ref 0.57–1)
EGFRCR SERPLBLD CKD-EPI 2021: 49.3 ML/MIN/1.73
GLOBULIN UR ELPH-MCNC: 3.5 GM/DL
GLUCOSE SERPL-MCNC: 92 MG/DL (ref 65–99)
POTASSIUM SERPL-SCNC: 4.2 MMOL/L (ref 3.5–5.2)
PROT SERPL-MCNC: 8.1 G/DL (ref 6–8.5)
SODIUM SERPL-SCNC: 143 MMOL/L (ref 136–145)
TSH SERPL DL<=0.05 MIU/L-ACNC: 13.5 UIU/ML (ref 0.27–4.2)

## 2024-01-25 ENCOUNTER — TELEPHONE (OUTPATIENT)
Dept: INTERNAL MEDICINE | Facility: CLINIC | Age: 79
End: 2024-01-25
Payer: MEDICARE

## 2024-01-25 DIAGNOSIS — F32.A DEPRESSION, UNSPECIFIED DEPRESSION TYPE: ICD-10-CM

## 2024-01-25 DIAGNOSIS — M79.7 FIBROMYALGIA: ICD-10-CM

## 2024-01-25 DIAGNOSIS — F41.1 GENERALIZED ANXIETY DISORDER: Primary | ICD-10-CM

## 2024-01-25 RX ORDER — DULOXETIN HYDROCHLORIDE 30 MG/1
30 CAPSULE, DELAYED RELEASE ORAL DAILY
Qty: 30 CAPSULE | Refills: 0 | Status: SHIPPED | OUTPATIENT
Start: 2024-01-25

## 2024-01-25 NOTE — TELEPHONE ENCOUNTER
Patient is scheduled to see pcp on Monday. She used to take Duloxetine prescribed by Dr Brenner and she doesn't have anymore and she no longer sees him to request a refill. She wanted to know if she could have some called in to last until Monday. She has been unable to sleep and has felt really caged in and depressed.

## 2024-01-25 NOTE — TELEPHONE ENCOUNTER
Caller: Cristy Louie    Relationship: Self    Best call back number: [phone]     What medication are you requesting: SOMETHING TO TREAT ANXIETY AND OR DEPRESSION    What are your current symptoms: ANXIETY AND POSSIBLE DEPRESSION     How long have you been experiencing symptoms: SINCE OCTOBER    Have you had these symptoms before:    [x] Yes  [] No    Have you been treated for these symptoms before:   [x] Yes  [] No    If a prescription is needed, what is your preferred pharmacy and phone number: [facility]/PHARMACY #[id number] - [address] [phone] [facility] [phone]      Additional notes:  PATIENT HAS CALLED REQUESTING IF PCP CAN CALL HER SOMETHING IN TO SETTLE HER DOWN. PATIENT STATES SHE FEELS LIKE SHOULD WANTS TO CLIMB WALLS. PATIENT STATES SHE MAY HAVE ANXIETY OR DEPRESSION.  PATIENT STATES SHE CAN NOT SHAKE THE FEELING. PATIENT IS REQUESTING A CALL BACK ASAP TO LET HER KNOW IF SOMETHING WILL BE CALLED IN.

## 2024-01-29 ENCOUNTER — OFFICE VISIT (OUTPATIENT)
Dept: INTERNAL MEDICINE | Facility: CLINIC | Age: 79
End: 2024-01-29
Payer: MEDICARE

## 2024-01-29 VITALS
HEIGHT: 61 IN | BODY MASS INDEX: 27.56 KG/M2 | DIASTOLIC BLOOD PRESSURE: 60 MMHG | HEART RATE: 96 BPM | OXYGEN SATURATION: 98 % | SYSTOLIC BLOOD PRESSURE: 100 MMHG | WEIGHT: 146 LBS | TEMPERATURE: 97.9 F

## 2024-01-29 DIAGNOSIS — F41.1 GENERALIZED ANXIETY DISORDER: Primary | ICD-10-CM

## 2024-01-29 DIAGNOSIS — I10 ESSENTIAL HYPERTENSION: ICD-10-CM

## 2024-01-29 PROBLEM — N17.9 ACUTE KIDNEY FAILURE, UNSPECIFIED: Status: ACTIVE | Noted: 2023-12-07

## 2024-01-29 PROBLEM — N39.0 UTI (URINARY TRACT INFECTION): Status: ACTIVE | Noted: 2023-12-02

## 2024-01-29 PROCEDURE — 99214 OFFICE O/P EST MOD 30 MIN: CPT | Performed by: INTERNAL MEDICINE

## 2024-01-29 PROCEDURE — 3078F DIAST BP <80 MM HG: CPT | Performed by: INTERNAL MEDICINE

## 2024-01-29 PROCEDURE — 3074F SYST BP LT 130 MM HG: CPT | Performed by: INTERNAL MEDICINE

## 2024-01-29 RX ORDER — HYDROXYZINE HYDROCHLORIDE 25 MG/1
25 TABLET, FILM COATED ORAL 3 TIMES DAILY PRN
Qty: 30 TABLET | Refills: 0 | Status: SHIPPED | OUTPATIENT
Start: 2024-01-29

## 2024-01-29 RX ORDER — LISINOPRIL 10 MG/1
10 TABLET ORAL DAILY
Qty: 90 TABLET | Refills: 1 | Status: SHIPPED | OUTPATIENT
Start: 2024-01-29

## 2024-01-29 NOTE — PROGRESS NOTES
Internal Medicine Follow Up    Chief Complaint  Cristy Louie is a 78 y.o. female who presents today for follow up of chronic medical conditions outlined below.    Chief Complaint   Patient presents with    Anxiety        HPI  Ms. Louie comes in today for anxiety. Has not been seeing rheumatology so ran out of cymbalta about a month ago. Has been more anxious and having panic attacks. Describes feeling on edge. She was able to resume cymbalta over the weekend.     Anxiety  Symptoms include nervous/anxious behavior.            Review of Systems  Review of Systems   Psychiatric/Behavioral:  The patient is nervous/anxious.         Current Medications  Current Outpatient Medications on File Prior to Visit   Medication Sig Dispense Refill    Accu-Chek FastClix Lancets misc Use 1 each 2 (Two) Times a Day. 60 each 11    Accu-Chek Guide test strip 1 each by Other route 2 (Two) Times a Day. 60 each 11    allopurinol (ZYLOPRIM) 100 MG tablet TAKE 1 TABLET BY MOUTH EVERY DAY 90 tablet 0    Blood Glucose Monitoring Suppl (Accu-Chek Guide Me) w/Device kit Use 1 Device Take As Directed. 1 kit 0    calcitriol (ROCALTROL) 0.25 MCG capsule Take 1 capsule by mouth Daily. 90 capsule 3    DULoxetine (CYMBALTA) 30 MG capsule Take 1 capsule by mouth Daily. 30 capsule 0    fluticasone (FLONASE) 50 MCG/ACT nasal spray 1 spray into the nostril(s) as directed by provider As Needed for Rhinitis or Allergies.      glimepiride (AMARYL) 4 MG tablet Take 1 tablet by mouth Every Morning Before Breakfast. 90 tablet 3    guaiFENesin (Mucinex) 600 MG 12 hr tablet Take 1 tablet by mouth 2 (Two) Times a Day. 30 tablet 0    levothyroxine (SYNTHROID, LEVOTHROID) 100 MCG tablet TAKE 1 TABLET BY MOUTH EVERY MORNING 90 tablet 3    loperamide (IMODIUM A-D) 2 MG tablet Take 1 tablet by mouth.      metFORMIN ER (GLUCOPHAGE-XR) 500 MG 24 hr tablet Take 1 tablet by mouth 2 (Two) Times a Day With Meals. 180 tablet 3    mirtazapine (REMERON) 15 MG tablet Take 1  "tablet by mouth Daily.      nystatin (MYCOSTATIN) 006879 UNIT/GM powder Apply  topically to the appropriate area as directed 3 (Three) Times a Day. 60 g 11    omeprazole (priLOSEC) 20 MG capsule Take 1 capsule by mouth Daily. 90 capsule 3    simvastatin (ZOCOR) 40 MG tablet Take 1 tablet by mouth Daily.      [DISCONTINUED] lisinopril (PRINIVIL,ZESTRIL) 20 MG tablet Take 1 tablet by mouth Daily. 90 tablet 1     No current facility-administered medications on file prior to visit.       Allergies  Allergies   Allergen Reactions    Amlodipine Swelling    Escitalopram Dizziness       Objective  Visit Vitals  /60   Pulse 96   Temp 97.9 °F (36.6 °C) (Infrared)   Ht 154.9 cm (60.98\")   Wt 66.2 kg (146 lb)   SpO2 98%   BMI 27.60 kg/m²        Physical Exam  Physical Exam  Vitals and nursing note reviewed.   Constitutional:       General: She is not in acute distress.     Appearance: She is well-developed. She is not ill-appearing or toxic-appearing.   HENT:      Head: Normocephalic and atraumatic.   Eyes:      Conjunctiva/sclera: Conjunctivae normal.   Pulmonary:      Effort: Pulmonary effort is normal. No respiratory distress.   Neurological:      Mental Status: She is alert and oriented to person, place, and time. Mental status is at baseline.   Psychiatric:         Mood and Affect: Mood normal.         Behavior: Behavior normal.         Results  Results for orders placed or performed in visit on 01/16/24   Comprehensive Metabolic Panel    Specimen: Arm, Left; Blood   Result Value Ref Range    Glucose 92 65 - 99 mg/dL    BUN 17 8 - 23 mg/dL    Creatinine 1.13 (H) 0.57 - 1.00 mg/dL    Sodium 143 136 - 145 mmol/L    Potassium 4.2 3.5 - 5.2 mmol/L    Chloride 102 98 - 107 mmol/L    CO2 23.0 22.0 - 29.0 mmol/L    Calcium 9.3 8.6 - 10.5 mg/dL    Total Protein 8.1 6.0 - 8.5 g/dL    Albumin 4.6 3.5 - 5.2 g/dL    ALT (SGPT) 23 1 - 33 U/L    AST (SGOT) 34 (H) 1 - 32 U/L    Alkaline Phosphatase 156 (H) 39 - 117 U/L    Total " Bilirubin 0.3 0.0 - 1.2 mg/dL    Globulin 3.5 gm/dL    A/G Ratio 1.3 g/dL    BUN/Creatinine Ratio 15.0 7.0 - 25.0    Anion Gap 18.0 (H) 5.0 - 15.0 mmol/L    eGFR 49.3 (L) >60.0 mL/min/1.73   TSH    Specimen: Arm, Left; Blood   Result Value Ref Range    TSH 13.500 (H) 0.270 - 4.200 uIU/mL   CBC (No Diff)    Specimen: Blood   Result Value Ref Range    WBC 11.02 (H) 3.40 - 10.80 10*3/mm3    RBC 4.33 3.77 - 5.28 10*6/mm3    Hemoglobin 12.4 12.0 - 15.9 g/dL    Hematocrit 38.7 34.0 - 46.6 %    MCV 89.4 79.0 - 97.0 fL    MCH 28.6 26.6 - 33.0 pg    MCHC 32.0 31.5 - 35.7 g/dL    RDW 12.8 12.3 - 15.4 %    RDW-SD 41.8 37.0 - 54.0 fl    MPV 11.6 6.0 - 12.0 fL    Platelets 386 140 - 450 10*3/mm3   POC Glycosylated Hemoglobin (Hb A1C)    Specimen: Blood   Result Value Ref Range    Hemoglobin A1C 8.7 (A) 4.5 - 5.7 %    Lot Number [id number]     Expiration Date 7/30/25    POC Glucose, Blood    Specimen: Blood   Result Value Ref Range    Glucose 148 (A) 70 - 130 mg/dL        Assessment and Plan  Diagnoses and all orders for this visit:    Generalized anxiety disorder  - worse since running out of cymbalta, resumed cymbalta 30mg daily over the weekend  - will add hydroxyzine 25mg TID PRN for panic attacks  - follow up in 3 weeks    Essential hypertension  - BP borderline low and slight decrease in renal function noted on labs done by Dr. Aiken  - will reduce lisinopril from 20mg daily to 10mg daily     Health Maintenance  - Pap smear: no longer indicated  - Mammogram: 6/2022 BIRADS 1, ordered but not scheduled  - Colonoscopy: 6/2021  - HCV: negative  - Immunizations: Pneumococcal complete. Declines COVID19 and flu. Shingrix too expensive. Discussed Tdap.  - Depression screening: negative 7/2023    Return in about 3 weeks (around 2/19/2024) for Follow up anxiety, HTN.

## 2024-02-15 ENCOUNTER — OUTSIDE FACILITY SERVICE (OUTPATIENT)
Dept: INTERNAL MEDICINE | Facility: CLINIC | Age: 79
End: 2024-02-15
Payer: MEDICARE

## 2024-02-15 PROCEDURE — G0179 MD RECERTIFICATION HHA PT: HCPCS | Performed by: INTERNAL MEDICINE

## 2024-02-16 ENCOUNTER — LAB (OUTPATIENT)
Dept: LAB | Facility: HOSPITAL | Age: 79
End: 2024-02-16
Payer: MEDICARE

## 2024-02-16 ENCOUNTER — OFFICE VISIT (OUTPATIENT)
Dept: INTERNAL MEDICINE | Facility: CLINIC | Age: 79
End: 2024-02-16
Payer: MEDICARE

## 2024-02-16 VITALS
HEART RATE: 94 BPM | SYSTOLIC BLOOD PRESSURE: 128 MMHG | TEMPERATURE: 97.6 F | OXYGEN SATURATION: 96 % | DIASTOLIC BLOOD PRESSURE: 72 MMHG | WEIGHT: 147 LBS | BODY MASS INDEX: 27.75 KG/M2 | HEIGHT: 61 IN

## 2024-02-16 DIAGNOSIS — N17.9 AKI (ACUTE KIDNEY INJURY): ICD-10-CM

## 2024-02-16 DIAGNOSIS — M1A.0720 IDIOPATHIC CHRONIC GOUT OF LEFT FOOT WITHOUT TOPHUS: ICD-10-CM

## 2024-02-16 DIAGNOSIS — E89.0 POST-SURGICAL HYPOTHYROIDISM: ICD-10-CM

## 2024-02-16 DIAGNOSIS — I10 ESSENTIAL HYPERTENSION: ICD-10-CM

## 2024-02-16 DIAGNOSIS — N18.31 STAGE 3A CHRONIC KIDNEY DISEASE: ICD-10-CM

## 2024-02-16 DIAGNOSIS — F41.1 GENERALIZED ANXIETY DISORDER: Primary | ICD-10-CM

## 2024-02-16 LAB
ANION GAP SERPL CALCULATED.3IONS-SCNC: 15.9 MMOL/L (ref 5–15)
BUN SERPL-MCNC: 21 MG/DL (ref 8–23)
BUN/CREAT SERPL: 16.7 (ref 7–25)
CALCIUM SPEC-SCNC: 9.3 MG/DL (ref 8.6–10.5)
CHLORIDE SERPL-SCNC: 104 MMOL/L (ref 98–107)
CO2 SERPL-SCNC: 18.1 MMOL/L (ref 22–29)
CREAT SERPL-MCNC: 1.26 MG/DL (ref 0.57–1)
EGFRCR SERPLBLD CKD-EPI 2021: 43.2 ML/MIN/1.73
GLUCOSE SERPL-MCNC: 74 MG/DL (ref 65–99)
POTASSIUM SERPL-SCNC: 4 MMOL/L (ref 3.5–5.2)
SODIUM SERPL-SCNC: 138 MMOL/L (ref 136–145)
TSH SERPL DL<=0.05 MIU/L-ACNC: 1.54 UIU/ML (ref 0.27–4.2)

## 2024-02-16 PROCEDURE — 84443 ASSAY THYROID STIM HORMONE: CPT

## 2024-02-16 PROCEDURE — 80048 BASIC METABOLIC PNL TOTAL CA: CPT

## 2024-02-16 RX ORDER — ALLOPURINOL 100 MG/1
100 TABLET ORAL DAILY
Qty: 90 TABLET | Refills: 1 | Status: SHIPPED | OUTPATIENT
Start: 2024-02-16

## 2024-02-19 DIAGNOSIS — N17.9 AKI (ACUTE KIDNEY INJURY): Primary | ICD-10-CM

## 2024-02-20 DIAGNOSIS — F32.A DEPRESSION, UNSPECIFIED DEPRESSION TYPE: ICD-10-CM

## 2024-02-20 DIAGNOSIS — F41.1 GENERALIZED ANXIETY DISORDER: ICD-10-CM

## 2024-02-20 DIAGNOSIS — M79.7 FIBROMYALGIA: ICD-10-CM

## 2024-02-20 RX ORDER — HYDROXYZINE HYDROCHLORIDE 25 MG/1
25 TABLET, FILM COATED ORAL 3 TIMES DAILY PRN
Qty: 30 TABLET | Refills: 1 | Status: SHIPPED | OUTPATIENT
Start: 2024-02-20

## 2024-02-20 RX ORDER — DULOXETIN HYDROCHLORIDE 30 MG/1
30 CAPSULE, DELAYED RELEASE ORAL DAILY
Qty: 30 CAPSULE | Refills: 2 | Status: SHIPPED | OUTPATIENT
Start: 2024-02-20

## 2024-02-23 ENCOUNTER — HOSPITAL ENCOUNTER (OUTPATIENT)
Dept: CT IMAGING | Facility: HOSPITAL | Age: 79
Discharge: HOME | End: 2024-02-23
Payer: MEDICARE

## 2024-02-23 ENCOUNTER — TELEPHONE (OUTPATIENT)
Dept: INTERNAL MEDICINE | Facility: CLINIC | Age: 79
End: 2024-02-23
Payer: MEDICARE

## 2024-02-23 DIAGNOSIS — R10.84 GENERALIZED ABDOMINAL PAIN: ICD-10-CM

## 2024-02-23 DIAGNOSIS — K57.30 DIVERTICULOSIS OF LARGE INTESTINE WITHOUT HEMORRHAGE: ICD-10-CM

## 2024-02-23 DIAGNOSIS — R19.7 DIARRHEA, UNSPECIFIED TYPE: ICD-10-CM

## 2024-02-23 NOTE — TELEPHONE ENCOUNTER
No that order was from August. She would only need this if another provider has ordered since then. My order can be cancelled.

## 2024-02-23 NOTE — TELEPHONE ENCOUNTER
[facility] CALLED WONDERING IF RACHELLE NEEDS A CT ABDOMEN DONE TODAY SINCE SHE'S ALREADY HAD SURGERY. PLEASE CALL [phone] TO CLARIFY

## 2024-03-14 ENCOUNTER — TELEPHONE (OUTPATIENT)
Dept: INTERNAL MEDICINE | Facility: CLINIC | Age: 79
End: 2024-03-14

## 2024-03-14 NOTE — TELEPHONE ENCOUNTER
Caller: LibanCristy    Relationship: Self    Best call back number: [phone]     Caller requesting test results: PATIENT     What test was performed: BLOOD WORK     When was the test performed: 3/4/24 AND WAS FAXED TO PCP ON 3/8/24    Where was the test performed: DR. VIVAR     Additional notes: PATIENT STATES DR. VIVAR'S OFFICE FAXED THE RESULTS TO PCP ON 3/8/24 BUT IF THE FAX WAS NOT RECEIVED THEY CAN RE-FAX.

## 2024-03-26 DIAGNOSIS — M79.7 FIBROMYALGIA: ICD-10-CM

## 2024-03-26 DIAGNOSIS — F41.1 GENERALIZED ANXIETY DISORDER: ICD-10-CM

## 2024-03-26 DIAGNOSIS — E78.2 MIXED HYPERLIPIDEMIA: Primary | ICD-10-CM

## 2024-03-26 DIAGNOSIS — F32.A DEPRESSION, UNSPECIFIED DEPRESSION TYPE: ICD-10-CM

## 2024-03-26 RX ORDER — SIMVASTATIN 40 MG
40 TABLET ORAL DAILY
Qty: 90 TABLET | Refills: 3 | Status: SHIPPED | OUTPATIENT
Start: 2024-03-26

## 2024-03-26 RX ORDER — DULOXETIN HYDROCHLORIDE 30 MG/1
30 CAPSULE, DELAYED RELEASE ORAL DAILY
Qty: 90 CAPSULE | Refills: 1 | Status: SHIPPED | OUTPATIENT
Start: 2024-03-26

## 2024-03-26 NOTE — TELEPHONE ENCOUNTER
PATIENT HAS CALLED REQUESTING A CALL BACK TO LET HER KNOW IF HER PRESCRIPTION FOR DULOXETINE SHOULD HAD BEEN FOR 90 DAY PRESCRIPTION. PATIENT STATES SHE HAS REQUESTED 90 DAYS DUE TO HER LIVING FAR AWAY FROM PHARMACY. PATIENT STATES SHE THOUGHT PRESCRIPTION WAS SUPPOSED TO BE FOR 90 DAYS.    CALL BACK NUMBER [phone]

## 2024-03-26 NOTE — TELEPHONE ENCOUNTER
Simvastatin filled by historical provider. Okay to send for 90 days? Patient has also requested Duloxetine be filled for 90 days.

## 2024-03-26 NOTE — TELEPHONE ENCOUNTER
Caller: Cecilia Louiejosé ANTONIO    Relationship: Self    Best call back number: [phone]     Requested Prescriptions:   Requested Prescriptions     Pending Prescriptions Disp Refills    simvastatin (ZOCOR) 40 MG tablet 90 tablet      Sig: Take 1 tablet by mouth Daily.        Pharmacy where request should be sent: [facility]/PHARMACY #[id number] [address] [phone] [facility] [phone] [facility]     Last office visit with prescribing clinician: 2/16/2024   Last telemedicine visit with prescribing clinician: Visit date not found   Next office visit with prescribing clinician: 5/20/2024     Additional details provided by patient: PATIENT HAS CALLED REQUESTING A NEW 90 DAY PRESCRIPTION ON ABOVE MEDICATION.    Does the patient have less than a 3 day supply:  [x] Yes  [] No    Would you like a call back once the refill request has been completed: [] Yes [x] No    If the office needs to give you a call back, can they leave a voicemail: [] Yes [x] No    Tessa Plascencia   03/26/24 14:58 EDT

## 2024-04-04 ENCOUNTER — READMISSION MANAGEMENT (OUTPATIENT)
Dept: CALL CENTER | Facility: HOSPITAL | Age: 79
End: 2024-04-04
Payer: MEDICARE

## 2024-04-04 NOTE — OUTREACH NOTE
Prep Survey      Flowsheet Row Responses   [facility] facility patient discharged from? Non-BH   Is LACE score < 7 ? Non-BH Discharge   Eligibility Not Eligible   What are the reasons patient is not eligible? Other  [no recent external discharge noted]   Does the patient have one of the following disease processes/diagnoses(primary or secondary)? Other   Prep survey completed? Yes            Essence Padilla Registered Nurse

## 2024-04-11 DIAGNOSIS — J06.9 UPPER RESPIRATORY TRACT INFECTION, UNSPECIFIED TYPE: ICD-10-CM

## 2024-04-11 DIAGNOSIS — I10 ESSENTIAL HYPERTENSION: ICD-10-CM

## 2024-04-11 RX ORDER — LISINOPRIL AND HYDROCHLOROTHIAZIDE 25; 20 MG/1; MG/1
1 TABLET ORAL DAILY
Qty: 90 TABLET | Refills: 3 | OUTPATIENT
Start: 2024-04-11

## 2024-04-11 RX ORDER — GUAIFENESIN 600 MG/1
600 TABLET, EXTENDED RELEASE ORAL 2 TIMES DAILY
Qty: 30 TABLET | Refills: 0 | Status: SHIPPED | OUTPATIENT
Start: 2024-04-11

## 2024-04-15 DIAGNOSIS — E89.0 POST-SURGICAL HYPOTHYROIDISM: ICD-10-CM

## 2024-04-15 RX ORDER — LEVOTHYROXINE SODIUM 0.1 MG/1
100 TABLET ORAL EVERY MORNING
Qty: 90 TABLET | Refills: 3 | Status: SHIPPED | OUTPATIENT
Start: 2024-04-15

## 2024-04-15 NOTE — TELEPHONE ENCOUNTER
Rx Refill Note  Requested Prescriptions     Pending Prescriptions Disp Refills    levothyroxine (SYNTHROID, LEVOTHROID) 100 MCG tablet [Pharmacy Med Name: LEVOTHYROXINE 100 MCG TABLET] 90 tablet 3     Sig: TAKE 1 TABLET BY MOUTH EVERY DAY IN THE MORNING      Last office visit with prescribing clinician: 1/16/2024   Last telemedicine visit with prescribing clinician: Visit date not found   Next office visit with prescribing clinician: 4/17/2024                         Would you like a call back once the refill request has been completed: [] Yes [] No    If the office needs to give you a call back, can they leave a voicemail: [] Yes [] No    Amanda Shaw MA  04/15/24, 08:23 EDT

## 2024-04-17 ENCOUNTER — OFFICE VISIT (OUTPATIENT)
Dept: ENDOCRINOLOGY | Facility: CLINIC | Age: 79
End: 2024-04-17
Payer: MEDICARE

## 2024-04-17 VITALS
OXYGEN SATURATION: 98 % | SYSTOLIC BLOOD PRESSURE: 124 MMHG | HEIGHT: 61 IN | DIASTOLIC BLOOD PRESSURE: 74 MMHG | WEIGHT: 150 LBS | BODY MASS INDEX: 28.32 KG/M2 | HEART RATE: 90 BPM

## 2024-04-17 DIAGNOSIS — E89.0 POST-SURGICAL HYPOTHYROIDISM: ICD-10-CM

## 2024-04-17 DIAGNOSIS — E11.9 TYPE 2 DIABETES MELLITUS WITHOUT COMPLICATION, WITHOUT LONG-TERM CURRENT USE OF INSULIN: Primary | ICD-10-CM

## 2024-04-17 DIAGNOSIS — C73 THYROID CANCER: ICD-10-CM

## 2024-04-17 DIAGNOSIS — E89.2 POST-SURGICAL HYPOPARATHYROIDISM: ICD-10-CM

## 2024-04-17 LAB
EXPIRATION DATE: ABNORMAL
EXPIRATION DATE: ABNORMAL
GLUCOSE BLDC GLUCOMTR-MCNC: 205 MG/DL (ref 70–130)
HBA1C MFR BLD: 6.7 % (ref 4.5–5.7)
Lab: ABNORMAL
Lab: ABNORMAL

## 2024-04-17 PROCEDURE — 82947 ASSAY GLUCOSE BLOOD QUANT: CPT | Performed by: INTERNAL MEDICINE

## 2024-04-17 PROCEDURE — 3078F DIAST BP <80 MM HG: CPT | Performed by: INTERNAL MEDICINE

## 2024-04-17 PROCEDURE — 83036 HEMOGLOBIN GLYCOSYLATED A1C: CPT | Performed by: INTERNAL MEDICINE

## 2024-04-17 PROCEDURE — 99214 OFFICE O/P EST MOD 30 MIN: CPT | Performed by: INTERNAL MEDICINE

## 2024-04-17 PROCEDURE — 3074F SYST BP LT 130 MM HG: CPT | Performed by: INTERNAL MEDICINE

## 2024-04-17 PROCEDURE — 3051F HG A1C>EQUAL 7.0%<8.0%: CPT | Performed by: INTERNAL MEDICINE

## 2024-04-17 RX ORDER — BLOOD SUGAR DIAGNOSTIC
1 STRIP MISCELLANEOUS DAILY
Qty: 50 EACH | Refills: 5 | Status: SHIPPED | OUTPATIENT
Start: 2024-04-17

## 2024-04-17 RX ORDER — LANCETS
1 EACH MISCELLANEOUS DAILY
Qty: 102 EACH | Refills: 2 | Status: SHIPPED | OUTPATIENT
Start: 2024-04-17

## 2024-04-17 RX ORDER — BLOOD-GLUCOSE METER
1 EACH MISCELLANEOUS TAKE AS DIRECTED
Qty: 1 KIT | Refills: 0 | Status: SHIPPED | OUTPATIENT
Start: 2024-04-17

## 2024-04-17 NOTE — PROGRESS NOTES
Chief Complaint   Patient presents with    Diabetes    Hypothyroidism     Follow-up       HPI:   Cristy Louie is a 78 y.o.female who returns to [facility]ocrine Clinic for f/u evaluation of her Type 2 DM,  thyroid cancer, post-surgical hypothyroidism, and post-surgical hypoparathyroidism. Last visit 01/16/2024.  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 Ava Jacobs, Endocrine Surgery, at [facility] for surgical treatment of pt's primary hyperparathyroidism.  - (05/24/2018) had pre-operative localization imaging with Thyroid US at  by Dr. Jacobs and an US-Guided FNA of a right inferior thyroid lobe nodule was completed. 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  - pt underwent right superior parathyroidectomy and partial right thyroidectomy on 08/07/2018  - underwent completion thyroidectomy 11/27/2018    Surgical pathology:  (08/07/2018)  A. Thyroid, right, lobectomy:  - papillary thyroid carcinoma, oncocytic variant (pT1a, N0)  - Tumor Size: 1.0 x 0.8 x 0.7 cm, single focus  - no extrathyroidal extension  - no lymphatic or angioinvasion  - no invasion of the surgical resection margin or to the thyroid capsule  - one benign perithyroidal lymph node (0/1)  - benign adenomatoid nodules.    B. Parathyroid, right superior, excision:  - hypercellular parathyroid (0.45 gm)    (11/27/2018)  A. Thyroid, left lobe:   - multifocal papillary thyroid carcinoma, classic type, involving bilateral lobes (pT1b, Nx)  - Tumor Size: 1.2 x 0.8 x 0.6 cm, single focus  - no extrathyroidal extension  - no lymphatic or angioinvasion  - no invasion of the surgical resection margin or to the thyroid capsule.  - Follicular adenoma of the superior pole (1.5 mm in the  greatest dimension).    B. Parathyroid Left Inferior:   - hypercellular parathyroid gland (0.03 gm) with no tumor seen    C. Parathyroid Left Superior:   - hypercellular parathyroid gland (0.04 gm) with no tumor seen    Lab summary:   (01/2019, [facility]) TG <0.1, TG Ab < 1.0, TSH 0.55, free T4 1.6, Calcium 7.7  (04/2019, [facility]) TG 0.1, TG Ab <0 1.0, TSH 0.397, PTH 43.9, Ca 8.6  (10/2019) TSH 0.296 (0.270  - 4.200) - on levothyroxine 112 mcg  (10/2020) TSH 0.098, free T4 1.85. [facility]: TG <0.1, TG Ab < 1.0  (11/2020) TSH 4.330, free T4 1.24, increased to levothyroxine 125 mcg  (01/2021) TSH 0.086 - on levothyroxine 125 mcg  (04/2021) TSH 0.049,  Free T4 1.73 - on levothyroxine 125 mcg  (11/2021) TSH 0.138, free T4 1.58, [facility]: TG <0.1, TG Ab < 1.0 - on levothyroxine 112 mcg, dose changed to 100 mcg.  (10/2022) TSH 7.870, free T4 1.58, [facility]: TG by ROGELIO 0.2, TG Ab < 1.0 - on levothyroxine 88 mcg    2) post-surgical hypothyroidism:  Current Dose: [id number] mcg  - Pt taking in AM on an empty stomach as directed without interfering substances  - Is not on high dose biotin    Lab Results   Component Value Date    TSH 1.540 02/16/2024       3) post-surgical hypoparathyroidism:  - h/o  primary hyperparathyroidism: s/p right superior parathyroidectomy 08/2018, s/p left superior and inferior parathyroidectomy 11/2018  - On Calcitriol 0.25 mcg daily   Serum Calcium at lower end of normal range since 01/2021    (02/16/2024) BMP - Cr 1.26, eGFR 43.2  Ca 9.3    4) Type 2 diabetes without complication:  - had pre-diabetes for several years. Diagnosed with overt T2DM in 2013, A1C% 6.5    Current Rxs:  - Metformin  mg BID  - glimepiride 4 mg qAM: denies hypoglycemia    Other History:  (08/30/2023) was admitted to [facility] for diverticulitis.  Later developed a Colovaginal fistula  (10/24/2023) s/p LAR diverting ostomy and bladder reconstruction  (11/28/2023) Was admitted again to [facility] for dehydration    Review of Systems  "  Constitutional:  Positive for fatigue.        Wt stable   HENT: Negative.     Eyes: Negative.    Respiratory: Negative.     Cardiovascular: Negative.    Gastrointestinal: Negative.    Endocrine: Negative.    Genitourinary: Negative.    Musculoskeletal:  Positive for arthralgias and myalgias. Negative for joint swelling.   Skin: Negative.    Allergic/Immunologic: Negative.    Neurological: Negative.    Hematological: Negative.    Psychiatric/Behavioral: Negative.         The following portions of the patient's history were reviewed and updated as appropriate: allergies, current medications, past family history, past medical history, past social history, past surgical history and problem list.      /74 (BP Location: Right arm, Patient Position: Sitting, Cuff Size: Adult)   Pulse 90   Ht 154.9 cm (61\")   Wt 68 kg (150 lb)   SpO2 98%   BMI 28.34 kg/m²   Physical Exam   Constitutional: She is oriented to person, place, and time. She appears well-developed. No distress.   HENT:   Head: Normocephalic.   Eyes: Pupils are equal, round, and reactive to light. Conjunctivae are normal.   Neck: No tracheal deviation present.   No palpable thyroid tissue      Cardiovascular: Normal rate, regular rhythm and normal heart sounds.   No murmur heard.  Pulmonary/Chest: Effort normal and breath sounds normal. No respiratory distress.   Abdominal: Soft.   Ostomy bag intact   Musculoskeletal:      Comments: Arthritic changes in hands   Lymphadenopathy:     She has no cervical adenopathy.   Neurological: She is alert and oriented to person, place, and time. No cranial nerve deficit.   Skin: Skin is warm and dry. She is not diaphoretic. No erythema.   Psychiatric: Her behavior is normal.   Vitals reviewed.    LABS/IMAGING: outside records reviewed and summarized in HPI  Office Visit on 04/17/2024   Component Date Value Ref Range Status    Glucose 04/17/2024 205 (A)  70 - 130 mg/dL Final    Lot Number 04/17/2024 [id number]   " Final    Expiration Date 04/17/2024 10-20-24   Final    Hemoglobin A1C 04/17/2024 6.7 (A)  4.5 - 5.7 % Final    Lot Number 04/17/2024 [id number]   Final    Expiration Date 04/17/2024 2026-01-24   Final     Lab Results   Component Value Date    TSH 1.540 02/16/2024       ASSESSMENT/PLAN:    1) Type 2 diabetes w/o complications: controlled, A1C% 6.70 today  - continue Metformin  mg to BID with food.   - continue glimepiride to 4 mg qAM. Reviewed potential for hypoglycemia on this medication. Advised to not skip meals    Advised to hold all DM medications the day before surgery on 04/25/2024    2) post-surgical hypothyroidism: controlled  - clinically euthyroid on exam  - TSH was 1.540 on 02/16/2024  - Will continue levothyroxine 100 mcg qAM   - Reviewed proper thyroid hormone administration, and factors to avoid that decrease medication potency and medication absorption.     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   - discussed that the need for additional treatment (in particular, radioiodine therapy) after surgery, per these guidelines, is based upon an initial risk stratification system which estimates the risk of persistent/recurrent disease. Per this stratification system, she is at low risk for recurrence.  I did not recommend REESE remnant ablation.     RTC 09/17/2024    Electronically Signed: Maureen Aiken  MD

## 2024-04-19 ENCOUNTER — PRE-ADMISSION TESTING (OUTPATIENT)
Dept: PREADMISSION TESTING | Facility: HOSPITAL | Age: 79
End: 2024-04-19
Payer: MEDICARE

## 2024-04-19 VITALS — WEIGHT: 150.46 LBS | BODY MASS INDEX: 28.41 KG/M2 | HEIGHT: 61 IN

## 2024-04-19 DIAGNOSIS — Z01.89 LABORATORY TEST: Primary | ICD-10-CM

## 2024-04-19 LAB
ABO GROUP BLD: NORMAL
ALBUMIN SERPL-MCNC: 4.5 G/DL (ref 3.5–5.2)
ALBUMIN/GLOB SERPL: 1.3 G/DL
ALP SERPL-CCNC: 121 U/L (ref 39–117)
ALT SERPL W P-5'-P-CCNC: 23 U/L (ref 1–33)
ANION GAP SERPL CALCULATED.3IONS-SCNC: 14 MMOL/L (ref 5–15)
AST SERPL-CCNC: 23 U/L (ref 1–32)
BILIRUB SERPL-MCNC: 0.2 MG/DL (ref 0–1.2)
BUN SERPL-MCNC: 28 MG/DL (ref 8–23)
BUN/CREAT SERPL: 27.5 (ref 7–25)
CALCIUM SPEC-SCNC: 9.3 MG/DL (ref 8.6–10.5)
CHLORIDE SERPL-SCNC: 108 MMOL/L (ref 98–107)
CO2 SERPL-SCNC: 16 MMOL/L (ref 22–29)
CREAT SERPL-MCNC: 1.02 MG/DL (ref 0.57–1)
DEPRECATED RDW RBC AUTO: 45.8 FL (ref 37–54)
EGFRCR SERPLBLD CKD-EPI 2021: 55.7 ML/MIN/1.73
ERYTHROCYTE [DISTWIDTH] IN BLOOD BY AUTOMATED COUNT: 13.5 % (ref 12.3–15.4)
GLOBULIN UR ELPH-MCNC: 3.5 GM/DL
GLUCOSE SERPL-MCNC: 123 MG/DL (ref 65–99)
HBA1C MFR BLD: 7.3 % (ref 4.8–5.6)
HCT VFR BLD AUTO: 36.7 % (ref 34–46.6)
HGB BLD-MCNC: 12.1 G/DL (ref 12–15.9)
MCH RBC QN AUTO: 30.3 PG (ref 26.6–33)
MCHC RBC AUTO-ENTMCNC: 33 G/DL (ref 31.5–35.7)
MCV RBC AUTO: 91.8 FL (ref 79–97)
PLATELET # BLD AUTO: 354 10*3/MM3 (ref 140–450)
PMV BLD AUTO: 10.6 FL (ref 6–12)
POTASSIUM SERPL-SCNC: 4.3 MMOL/L (ref 3.5–5.2)
PROT SERPL-MCNC: 8 G/DL (ref 6–8.5)
QT INTERVAL: 348 MS
QTC INTERVAL: 455 MS
RBC # BLD AUTO: 4 10*6/MM3 (ref 3.77–5.28)
RH BLD: POSITIVE
SODIUM SERPL-SCNC: 138 MMOL/L (ref 136–145)
WBC NRBC COR # BLD AUTO: 12.04 10*3/MM3 (ref 3.4–10.8)

## 2024-04-19 PROCEDURE — 83036 HEMOGLOBIN GLYCOSYLATED A1C: CPT

## 2024-04-19 PROCEDURE — 85027 COMPLETE CBC AUTOMATED: CPT

## 2024-04-19 PROCEDURE — 36415 COLL VENOUS BLD VENIPUNCTURE: CPT

## 2024-04-19 PROCEDURE — 93010 ELECTROCARDIOGRAM REPORT: CPT | Performed by: INTERNAL MEDICINE

## 2024-04-19 PROCEDURE — 86901 BLOOD TYPING SEROLOGIC RH(D): CPT

## 2024-04-19 PROCEDURE — 93005 ELECTROCARDIOGRAM TRACING: CPT

## 2024-04-19 PROCEDURE — 80053 COMPREHEN METABOLIC PANEL: CPT

## 2024-04-19 PROCEDURE — 86900 BLOOD TYPING SEROLOGIC ABO: CPT

## 2024-04-19 RX ORDER — SCOLOPAMINE TRANSDERMAL SYSTEM 1 MG/1
1 PATCH, EXTENDED RELEASE TRANSDERMAL ONCE
Status: CANCELLED | OUTPATIENT
Start: 2024-04-19 | End: 2024-04-19

## 2024-04-19 RX ORDER — SPIRONOLACTONE 25 MG
20 TABLET ORAL DAILY
COMMUNITY

## 2024-04-19 RX ORDER — MELATONIN 10 MG
10 CAPSULE ORAL NIGHTLY
COMMUNITY

## 2024-04-19 NOTE — PAT
"Patient did not review general PAT education video as instructed in their preoperative information received from their surgeon.  One-on-one Pre Admission Testing general education provided during PAT visit.  Copies of PAT general education handouts (Incentive Spirometry, Meds to Beds Program, Patient Belongings, Pre-op skin preparation instructions, Blood Glucose testing, Visitor policy, Surgery FAQ, Code H) distributed to patient. Encouraged patient/family to read PAT general education handouts thoroughly and notify PAT staff with any questions or concerns. Patient instructed to bring PAT pass and completed skin prep sheet (if applicable) on the day of procedure. Patient verbalized understanding of all information and priority content.     Patient to apply Chlorhexadine wipes  to surgical area (as instructed) the night before procedure and the AM of procedure. Wipes provided.    Patient instructed to drink 20 ounces of Gatorade or Gatorlyte (if diabetic) and it needs to be completed 1 hour (for Main OR patients) or 2 hours (scheduled  section & [facility] patients) before given arrival time for procedure (NO RED Gatorade and NO Gatorade Zero).    Patient verbalized understanding.    Ten (8 ounce) Impact Advanced Recovery Nutritional Drinks distributed to patient from Pre Admission Testing Department.  Verbal and written instructions given that patient must consume two (8 ounce) Impact drinks a day for five days before surgery.  Flavoring tip sheet provided as well the brochure \"Go in Stronger. Get Home Sooner\" that explains benefits of nutritional drinks to enhance recovery. Patient/family verbalized understanding.     Per Anesthesia Request, patient instructed not to take their ACE/ARB medications on the AM of surgery.    Too early to draw type and screen in PAT.  Please obtain blood bank specimen in pre-op on the day of surgery.    Notified Yanet RIVERA (GI Nurse Navigator) of upcoming surgery.     "

## 2024-04-25 ENCOUNTER — HOSPITAL ENCOUNTER (INPATIENT)
Facility: HOSPITAL | Age: 79
LOS: 4 days | Discharge: HOME | DRG: 331 | End: 2024-04-29
Attending: COLON & RECTAL SURGERY | Admitting: COLON & RECTAL SURGERY
Payer: MEDICARE

## 2024-04-25 ENCOUNTER — ANESTHESIA EVENT (OUTPATIENT)
Dept: PERIOP | Facility: HOSPITAL | Age: 79
End: 2024-04-25
Payer: MEDICARE

## 2024-04-25 ENCOUNTER — ANESTHESIA EVENT CONVERTED (OUTPATIENT)
Dept: ANESTHESIOLOGY | Facility: HOSPITAL | Age: 79
DRG: 331 | End: 2024-04-25
Payer: MEDICARE

## 2024-04-25 ENCOUNTER — ANESTHESIA (OUTPATIENT)
Dept: PERIOP | Facility: HOSPITAL | Age: 79
End: 2024-04-25
Payer: MEDICARE

## 2024-04-25 DIAGNOSIS — Z98.890 HISTORY OF ILEOSTOMY: ICD-10-CM

## 2024-04-25 DIAGNOSIS — Z98.890 STATUS POST REVERSAL OF ILEOSTOMY: Primary | ICD-10-CM

## 2024-04-25 PROBLEM — N32.1 COLOVESICAL FISTULA: Status: ACTIVE | Noted: 2024-04-25

## 2024-04-25 LAB
ABO GROUP BLD: NORMAL
BLD GP AB SCN SERPL QL: NEGATIVE
GLUCOSE BLDC GLUCOMTR-MCNC: 169 MG/DL (ref 70–130)
GLUCOSE BLDC GLUCOMTR-MCNC: 233 MG/DL (ref 70–130)
RH BLD: POSITIVE
T&S EXPIRATION DATE: NORMAL

## 2024-04-25 PROCEDURE — 86901 BLOOD TYPING SEROLOGIC RH(D): CPT | Performed by: COLON & RECTAL SURGERY

## 2024-04-25 PROCEDURE — 25010000002 PROPOFOL 10 MG/ML EMULSION

## 2024-04-25 PROCEDURE — 25010000002 ONDANSETRON PER 1 MG: Performed by: ANESTHESIOLOGY

## 2024-04-25 PROCEDURE — 0DBB0ZZ EXCISION OF ILEUM, OPEN APPROACH: ICD-10-PCS | Performed by: COLON & RECTAL SURGERY

## 2024-04-25 PROCEDURE — P9041 ALBUMIN (HUMAN),5%, 50ML: HCPCS

## 2024-04-25 PROCEDURE — 25010000002 HEPARIN (PORCINE) PER 1000 UNITS: Performed by: COLON & RECTAL SURGERY

## 2024-04-25 PROCEDURE — 88304 TISSUE EXAM BY PATHOLOGIST: CPT | Performed by: COLON & RECTAL SURGERY

## 2024-04-25 PROCEDURE — 25010000002 HYDROMORPHONE 1 MG/ML SOLUTION: Performed by: COLON & RECTAL SURGERY

## 2024-04-25 PROCEDURE — 25010000002 SODIUM CHLORIDE 0.9 % WITH KCL 20 MEQ 20-0.9 MEQ/L-% SOLUTION: Performed by: COLON & RECTAL SURGERY

## 2024-04-25 PROCEDURE — 25010000002 SUGAMMADEX 200 MG/2ML SOLUTION: Performed by: ANESTHESIOLOGY

## 2024-04-25 PROCEDURE — 25010000002 DEXAMETHASONE SODIUM PHOSPHATE 10 MG/ML SOLUTION

## 2024-04-25 PROCEDURE — 86900 BLOOD TYPING SEROLOGIC ABO: CPT | Performed by: COLON & RECTAL SURGERY

## 2024-04-25 PROCEDURE — 25010000002 ESMOLOL 100 MG/10ML SOLUTION

## 2024-04-25 PROCEDURE — 25010000002 ERTAPENEM PER 500 MG: Performed by: COLON & RECTAL SURGERY

## 2024-04-25 PROCEDURE — 25810000003 LACTATED RINGERS PER 1000 ML: Performed by: ANESTHESIOLOGY

## 2024-04-25 PROCEDURE — 25010000002 BUPIVACAINE (PF) 0.25 % SOLUTION

## 2024-04-25 PROCEDURE — 82948 REAGENT STRIP/BLOOD GLUCOSE: CPT

## 2024-04-25 PROCEDURE — 25010000002 FENTANYL CITRATE (PF) 100 MCG/2ML SOLUTION

## 2024-04-25 PROCEDURE — 25010000002 FENTANYL CITRATE (PF) 50 MCG/ML SOLUTION

## 2024-04-25 PROCEDURE — 86850 RBC ANTIBODY SCREEN: CPT | Performed by: COLON & RECTAL SURGERY

## 2024-04-25 PROCEDURE — 63710000001 INSULIN LISPRO (HUMAN) PER 5 UNITS: Performed by: INTERNAL MEDICINE

## 2024-04-25 PROCEDURE — 25010000002 ALBUMIN HUMAN 5% PER 50 ML

## 2024-04-25 DEVICE — PROXIMATE RELOADABLE LINEAR CUTTER WITH SAFETY LOCK-OUT, 75MM
Type: IMPLANTABLE DEVICE | Site: ABDOMEN | Status: FUNCTIONAL
Brand: PROXIMATE

## 2024-04-25 DEVICE — PROXIMATE RELOADABLE LINEAR STAPLER, 60MM
Type: IMPLANTABLE DEVICE | Site: ABDOMEN | Status: FUNCTIONAL
Brand: PROXIMATE

## 2024-04-25 RX ORDER — FENTANYL CITRATE 50 UG/ML
INJECTION, SOLUTION INTRAMUSCULAR; INTRAVENOUS AS NEEDED
Status: DISCONTINUED | OUTPATIENT
Start: 2024-04-25 | End: 2024-04-25 | Stop reason: SURG

## 2024-04-25 RX ORDER — ERTAPENEM 1 G/1
1 INJECTION, POWDER, LYOPHILIZED, FOR SOLUTION INTRAMUSCULAR; INTRAVENOUS ONCE
Status: DISCONTINUED | OUTPATIENT
Start: 2024-04-25 | End: 2024-04-25

## 2024-04-25 RX ORDER — ACETAMINOPHEN 500 MG
1000 TABLET ORAL ONCE
Status: COMPLETED | OUTPATIENT
Start: 2024-04-25 | End: 2024-04-25

## 2024-04-25 RX ORDER — NICOTINE POLACRILEX 4 MG
15 LOZENGE BUCCAL
Status: DISCONTINUED | OUTPATIENT
Start: 2024-04-25 | End: 2024-04-29 | Stop reason: HOSPADM

## 2024-04-25 RX ORDER — LEVOTHYROXINE SODIUM 0.1 MG/1
100 TABLET ORAL
Status: DISCONTINUED | OUTPATIENT
Start: 2024-04-26 | End: 2024-04-29 | Stop reason: HOSPADM

## 2024-04-25 RX ORDER — HYDROMORPHONE HYDROCHLORIDE 1 MG/ML
0.5 INJECTION, SOLUTION INTRAMUSCULAR; INTRAVENOUS; SUBCUTANEOUS
Status: DISCONTINUED | OUTPATIENT
Start: 2024-04-25 | End: 2024-04-25 | Stop reason: HOSPADM

## 2024-04-25 RX ORDER — CHOLECALCIFEROL (VITAMIN D3) 125 MCG
10 CAPSULE ORAL NIGHTLY
Status: DISCONTINUED | OUTPATIENT
Start: 2024-04-25 | End: 2024-04-29 | Stop reason: HOSPADM

## 2024-04-25 RX ORDER — ALBUMIN, HUMAN INJ 5% 5 %
SOLUTION INTRAVENOUS CONTINUOUS PRN
Status: DISCONTINUED | OUTPATIENT
Start: 2024-04-25 | End: 2024-04-25 | Stop reason: SURG

## 2024-04-25 RX ORDER — ROCURONIUM BROMIDE 10 MG/ML
INJECTION, SOLUTION INTRAVENOUS AS NEEDED
Status: DISCONTINUED | OUTPATIENT
Start: 2024-04-25 | End: 2024-04-25 | Stop reason: SURG

## 2024-04-25 RX ORDER — SODIUM CHLORIDE 9 MG/ML
40 INJECTION, SOLUTION INTRAVENOUS AS NEEDED
Status: DISCONTINUED | OUTPATIENT
Start: 2024-04-25 | End: 2024-04-25 | Stop reason: HOSPADM

## 2024-04-25 RX ORDER — SODIUM CHLORIDE 0.9 % (FLUSH) 0.9 %
10 SYRINGE (ML) INJECTION EVERY 12 HOURS SCHEDULED
Status: CANCELLED | OUTPATIENT
Start: 2024-04-25

## 2024-04-25 RX ORDER — SODIUM CHLORIDE AND POTASSIUM CHLORIDE 150; 900 MG/100ML; MG/100ML
100 INJECTION, SOLUTION INTRAVENOUS CONTINUOUS
Status: DISCONTINUED | OUTPATIENT
Start: 2024-04-25 | End: 2024-04-25

## 2024-04-25 RX ORDER — LIDOCAINE HYDROCHLORIDE 10 MG/ML
INJECTION, SOLUTION EPIDURAL; INFILTRATION; INTRACAUDAL; PERINEURAL AS NEEDED
Status: DISCONTINUED | OUTPATIENT
Start: 2024-04-25 | End: 2024-04-25 | Stop reason: SURG

## 2024-04-25 RX ORDER — IBUPROFEN 400 MG/1
400 TABLET ORAL EVERY 6 HOURS PRN
Status: DISCONTINUED | OUTPATIENT
Start: 2024-04-25 | End: 2024-04-29 | Stop reason: HOSPADM

## 2024-04-25 RX ORDER — IPRATROPIUM BROMIDE AND ALBUTEROL SULFATE 2.5; .5 MG/3ML; MG/3ML
3 SOLUTION RESPIRATORY (INHALATION) ONCE AS NEEDED
Status: DISCONTINUED | OUTPATIENT
Start: 2024-04-25 | End: 2024-04-25 | Stop reason: HOSPADM

## 2024-04-25 RX ORDER — DEXTROSE MONOHYDRATE 25 G/50ML
25 INJECTION, SOLUTION INTRAVENOUS
Status: DISCONTINUED | OUTPATIENT
Start: 2024-04-25 | End: 2024-04-29 | Stop reason: HOSPADM

## 2024-04-25 RX ORDER — MIDAZOLAM HYDROCHLORIDE 1 MG/ML
0.5 INJECTION INTRAMUSCULAR; INTRAVENOUS
Status: DISCONTINUED | OUTPATIENT
Start: 2024-04-25 | End: 2024-04-25 | Stop reason: HOSPADM

## 2024-04-25 RX ORDER — SODIUM CHLORIDE, SODIUM LACTATE, POTASSIUM CHLORIDE, CALCIUM CHLORIDE 600; 310; 30; 20 MG/100ML; MG/100ML; MG/100ML; MG/100ML
9 INJECTION, SOLUTION INTRAVENOUS CONTINUOUS
Status: DISCONTINUED | OUTPATIENT
Start: 2024-04-25 | End: 2024-04-26

## 2024-04-25 RX ORDER — EPHEDRINE SULFATE 50 MG/ML
INJECTION INTRAVENOUS AS NEEDED
Status: DISCONTINUED | OUTPATIENT
Start: 2024-04-25 | End: 2024-04-25 | Stop reason: SURG

## 2024-04-25 RX ORDER — DROPERIDOL 2.5 MG/ML
0.62 INJECTION, SOLUTION INTRAMUSCULAR; INTRAVENOUS
Status: DISCONTINUED | OUTPATIENT
Start: 2024-04-25 | End: 2024-04-25 | Stop reason: HOSPADM

## 2024-04-25 RX ORDER — ATORVASTATIN CALCIUM 20 MG/1
20 TABLET, FILM COATED ORAL NIGHTLY
Status: DISCONTINUED | OUTPATIENT
Start: 2024-04-25 | End: 2024-04-29 | Stop reason: HOSPADM

## 2024-04-25 RX ORDER — DEXAMETHASONE SODIUM PHOSPHATE 10 MG/ML
INJECTION, SOLUTION INTRAMUSCULAR; INTRAVENOUS
Status: COMPLETED | OUTPATIENT
Start: 2024-04-25 | End: 2024-04-25

## 2024-04-25 RX ORDER — ALVIMOPAN 12 MG/1
12 CAPSULE ORAL 2 TIMES DAILY
Qty: 14 CAPSULE | Refills: 0 | Status: DISCONTINUED | OUTPATIENT
Start: 2024-04-25 | End: 2024-04-26

## 2024-04-25 RX ORDER — ACETAMINOPHEN 325 MG/1
650 TABLET ORAL EVERY 8 HOURS
Status: DISCONTINUED | OUTPATIENT
Start: 2024-04-25 | End: 2024-04-29 | Stop reason: HOSPADM

## 2024-04-25 RX ORDER — BUPIVACAINE HYDROCHLORIDE 2.5 MG/ML
INJECTION, SOLUTION EPIDURAL; INFILTRATION; INTRACAUDAL
Status: COMPLETED | OUTPATIENT
Start: 2024-04-25 | End: 2024-04-25

## 2024-04-25 RX ORDER — SODIUM CHLORIDE 9 MG/ML
40 INJECTION, SOLUTION INTRAVENOUS AS NEEDED
Status: CANCELLED | OUTPATIENT
Start: 2024-04-25

## 2024-04-25 RX ORDER — FENTANYL CITRATE 50 UG/ML
INJECTION, SOLUTION INTRAMUSCULAR; INTRAVENOUS
Status: COMPLETED
Start: 2024-04-25 | End: 2024-04-25

## 2024-04-25 RX ORDER — LABETALOL HYDROCHLORIDE 5 MG/ML
5 INJECTION, SOLUTION INTRAVENOUS
Status: DISCONTINUED | OUTPATIENT
Start: 2024-04-25 | End: 2024-04-25 | Stop reason: HOSPADM

## 2024-04-25 RX ORDER — PREGABALIN 75 MG/1
75 CAPSULE ORAL ONCE
Status: COMPLETED | OUTPATIENT
Start: 2024-04-25 | End: 2024-04-25

## 2024-04-25 RX ORDER — FAMOTIDINE 20 MG/1
20 TABLET, FILM COATED ORAL ONCE
Status: COMPLETED | OUTPATIENT
Start: 2024-04-25 | End: 2024-04-25

## 2024-04-25 RX ORDER — HYDROXYZINE HYDROCHLORIDE 25 MG/1
25 TABLET, FILM COATED ORAL 3 TIMES DAILY PRN
Status: DISCONTINUED | OUTPATIENT
Start: 2024-04-25 | End: 2024-04-29 | Stop reason: HOSPADM

## 2024-04-25 RX ORDER — HEPARIN SODIUM 5000 [USP'U]/ML
5000 INJECTION, SOLUTION INTRAVENOUS; SUBCUTANEOUS ONCE
Status: COMPLETED | OUTPATIENT
Start: 2024-04-25 | End: 2024-04-25

## 2024-04-25 RX ORDER — LIDOCAINE HYDROCHLORIDE 10 MG/ML
0.5 INJECTION, SOLUTION EPIDURAL; INFILTRATION; INTRACAUDAL; PERINEURAL ONCE AS NEEDED
Status: COMPLETED | OUTPATIENT
Start: 2024-04-25 | End: 2024-04-25

## 2024-04-25 RX ORDER — ALLOPURINOL 100 MG/1
100 TABLET ORAL DAILY
Status: DISCONTINUED | OUTPATIENT
Start: 2024-04-25 | End: 2024-04-29 | Stop reason: HOSPADM

## 2024-04-25 RX ORDER — DEXAMETHASONE SODIUM PHOSPHATE 10 MG/ML
INJECTION, SOLUTION INTRAMUSCULAR; INTRAVENOUS AS NEEDED
Status: DISCONTINUED | OUTPATIENT
Start: 2024-04-25 | End: 2024-04-25 | Stop reason: SURG

## 2024-04-25 RX ORDER — HEPARIN SODIUM 5000 [USP'U]/ML
5000 INJECTION, SOLUTION INTRAVENOUS; SUBCUTANEOUS EVERY 8 HOURS SCHEDULED
Status: DISCONTINUED | OUTPATIENT
Start: 2024-04-26 | End: 2024-04-29 | Stop reason: HOSPADM

## 2024-04-25 RX ORDER — HYDRALAZINE HYDROCHLORIDE 20 MG/ML
5 INJECTION INTRAMUSCULAR; INTRAVENOUS
Status: DISCONTINUED | OUTPATIENT
Start: 2024-04-25 | End: 2024-04-25 | Stop reason: HOSPADM

## 2024-04-25 RX ORDER — FAMOTIDINE 10 MG/ML
20 INJECTION, SOLUTION INTRAVENOUS ONCE
Status: CANCELLED | OUTPATIENT
Start: 2024-04-25 | End: 2024-04-25

## 2024-04-25 RX ORDER — SODIUM CHLORIDE 0.9 % (FLUSH) 0.9 %
3 SYRINGE (ML) INJECTION EVERY 12 HOURS SCHEDULED
Status: DISCONTINUED | OUTPATIENT
Start: 2024-04-25 | End: 2024-04-25 | Stop reason: HOSPADM

## 2024-04-25 RX ORDER — CALCITRIOL 0.25 UG/1
0.25 CAPSULE, LIQUID FILLED ORAL DAILY
Status: DISCONTINUED | OUTPATIENT
Start: 2024-04-25 | End: 2024-04-29 | Stop reason: HOSPADM

## 2024-04-25 RX ORDER — PANTOPRAZOLE SODIUM 40 MG/1
40 TABLET, DELAYED RELEASE ORAL
Status: DISCONTINUED | OUTPATIENT
Start: 2024-04-26 | End: 2024-04-29 | Stop reason: HOSPADM

## 2024-04-25 RX ORDER — INSULIN LISPRO 100 [IU]/ML
2-7 INJECTION, SOLUTION INTRAVENOUS; SUBCUTANEOUS
Status: DISCONTINUED | OUTPATIENT
Start: 2024-04-25 | End: 2024-04-29 | Stop reason: HOSPADM

## 2024-04-25 RX ORDER — IBUPROFEN 600 MG/1
1 TABLET ORAL
Status: DISCONTINUED | OUTPATIENT
Start: 2024-04-25 | End: 2024-04-29 | Stop reason: HOSPADM

## 2024-04-25 RX ORDER — ONDANSETRON 4 MG/1
4 TABLET, ORALLY DISINTEGRATING ORAL EVERY 6 HOURS PRN
Status: DISCONTINUED | OUTPATIENT
Start: 2024-04-25 | End: 2024-04-29 | Stop reason: HOSPADM

## 2024-04-25 RX ORDER — LISINOPRIL 10 MG/1
10 TABLET ORAL DAILY
Status: DISCONTINUED | OUTPATIENT
Start: 2024-04-26 | End: 2024-04-29 | Stop reason: HOSPADM

## 2024-04-25 RX ORDER — GABAPENTIN 100 MG/1
100 CAPSULE ORAL 2 TIMES DAILY
Status: COMPLETED | OUTPATIENT
Start: 2024-04-25 | End: 2024-04-28

## 2024-04-25 RX ORDER — DIAZEPAM 5 MG/1
5 TABLET ORAL EVERY 6 HOURS PRN
Status: DISCONTINUED | OUTPATIENT
Start: 2024-04-25 | End: 2024-04-29 | Stop reason: HOSPADM

## 2024-04-25 RX ORDER — DULOXETIN HYDROCHLORIDE 30 MG/1
30 CAPSULE, DELAYED RELEASE ORAL DAILY
Status: DISCONTINUED | OUTPATIENT
Start: 2024-04-26 | End: 2024-04-29 | Stop reason: HOSPADM

## 2024-04-25 RX ORDER — ONDANSETRON 2 MG/ML
4 INJECTION INTRAMUSCULAR; INTRAVENOUS ONCE AS NEEDED
Status: DISCONTINUED | OUTPATIENT
Start: 2024-04-25 | End: 2024-04-25 | Stop reason: HOSPADM

## 2024-04-25 RX ORDER — ESMOLOL HYDROCHLORIDE 10 MG/ML
INJECTION INTRAVENOUS AS NEEDED
Status: DISCONTINUED | OUTPATIENT
Start: 2024-04-25 | End: 2024-04-25 | Stop reason: SURG

## 2024-04-25 RX ORDER — ONDANSETRON 2 MG/ML
INJECTION INTRAMUSCULAR; INTRAVENOUS AS NEEDED
Status: DISCONTINUED | OUTPATIENT
Start: 2024-04-25 | End: 2024-04-25 | Stop reason: SURG

## 2024-04-25 RX ORDER — SODIUM CHLORIDE 0.9 % (FLUSH) 0.9 %
3-10 SYRINGE (ML) INJECTION AS NEEDED
Status: DISCONTINUED | OUTPATIENT
Start: 2024-04-25 | End: 2024-04-25 | Stop reason: HOSPADM

## 2024-04-25 RX ORDER — FENTANYL CITRATE 50 UG/ML
50 INJECTION, SOLUTION INTRAMUSCULAR; INTRAVENOUS
Status: DISCONTINUED | OUTPATIENT
Start: 2024-04-25 | End: 2024-04-25 | Stop reason: HOSPADM

## 2024-04-25 RX ORDER — HYDROCODONE BITARTRATE AND ACETAMINOPHEN 7.5; 325 MG/1; MG/1
1 TABLET ORAL EVERY 4 HOURS PRN
Status: DISCONTINUED | OUTPATIENT
Start: 2024-04-25 | End: 2024-04-29 | Stop reason: HOSPADM

## 2024-04-25 RX ORDER — SODIUM CHLORIDE 0.9 % (FLUSH) 0.9 %
10 SYRINGE (ML) INJECTION AS NEEDED
Status: CANCELLED | OUTPATIENT
Start: 2024-04-25

## 2024-04-25 RX ORDER — ONDANSETRON 2 MG/ML
4 INJECTION INTRAMUSCULAR; INTRAVENOUS EVERY 6 HOURS PRN
Status: DISCONTINUED | OUTPATIENT
Start: 2024-04-25 | End: 2024-04-29 | Stop reason: HOSPADM

## 2024-04-25 RX ORDER — PROPOFOL 10 MG/ML
VIAL (ML) INTRAVENOUS AS NEEDED
Status: DISCONTINUED | OUTPATIENT
Start: 2024-04-25 | End: 2024-04-25 | Stop reason: SURG

## 2024-04-25 RX ORDER — NALOXONE HCL 0.4 MG/ML
0.4 VIAL (ML) INJECTION
Status: DISCONTINUED | OUTPATIENT
Start: 2024-04-25 | End: 2024-04-29 | Stop reason: HOSPADM

## 2024-04-25 RX ORDER — PHENYLEPHRINE HCL IN 0.9% NACL 1 MG/10 ML
SYRINGE (ML) INTRAVENOUS AS NEEDED
Status: DISCONTINUED | OUTPATIENT
Start: 2024-04-25 | End: 2024-04-25 | Stop reason: SURG

## 2024-04-25 RX ADMIN — EPHEDRINE SULFATE 10 MG: 50 INJECTION INTRAVENOUS at 15:38

## 2024-04-25 RX ADMIN — FENTANYL CITRATE 50 MCG: 50 INJECTION, SOLUTION INTRAMUSCULAR; INTRAVENOUS at 17:02

## 2024-04-25 RX ADMIN — ACETAMINOPHEN 650 MG: 325 TABLET ORAL at 21:30

## 2024-04-25 RX ADMIN — Medication 100 MCG: at 15:30

## 2024-04-25 RX ADMIN — SODIUM BICARBONATE 75 MEQ: 84 INJECTION, SOLUTION INTRAVENOUS at 21:32

## 2024-04-25 RX ADMIN — EPHEDRINE SULFATE 10 MG: 50 INJECTION INTRAVENOUS at 15:06

## 2024-04-25 RX ADMIN — POTASSIUM CHLORIDE AND SODIUM CHLORIDE 100 ML/HR: 900; 150 INJECTION, SOLUTION INTRAVENOUS at 17:50

## 2024-04-25 RX ADMIN — DEXAMETHASONE SODIUM PHOSPHATE 6 MG: 10 INJECTION, SOLUTION INTRAMUSCULAR; INTRAVENOUS at 15:06

## 2024-04-25 RX ADMIN — SUGAMMADEX 200 MG: 100 INJECTION, SOLUTION INTRAVENOUS at 16:19

## 2024-04-25 RX ADMIN — Medication 10 MG: at 21:32

## 2024-04-25 RX ADMIN — Medication 100 MCG: at 15:06

## 2024-04-25 RX ADMIN — EPHEDRINE SULFATE 5 MG: 50 INJECTION INTRAVENOUS at 15:32

## 2024-04-25 RX ADMIN — ALBUMIN (HUMAN): 12.5 INJECTION, SOLUTION INTRAVENOUS at 15:39

## 2024-04-25 RX ADMIN — Medication 200 MCG: at 15:07

## 2024-04-25 RX ADMIN — FENTANYL CITRATE 100 MCG: 50 INJECTION, SOLUTION INTRAMUSCULAR; INTRAVENOUS at 14:58

## 2024-04-25 RX ADMIN — HYDROMORPHONE HYDROCHLORIDE 1 MG: 1 INJECTION, SOLUTION INTRAMUSCULAR; INTRAVENOUS; SUBCUTANEOUS at 17:50

## 2024-04-25 RX ADMIN — ROCURONIUM BROMIDE 50 MG: 10 INJECTION INTRAVENOUS at 14:58

## 2024-04-25 RX ADMIN — HEPARIN SODIUM 5000 UNITS: 5000 INJECTION INTRAVENOUS; SUBCUTANEOUS at 13:45

## 2024-04-25 RX ADMIN — INSULIN LISPRO 3 UNITS: 100 INJECTION, SOLUTION INTRAVENOUS; SUBCUTANEOUS at 21:33

## 2024-04-25 RX ADMIN — Medication 100 MCG: at 15:32

## 2024-04-25 RX ADMIN — PROPOFOL 25 MCG/KG/MIN: 10 INJECTION, EMULSION INTRAVENOUS at 15:02

## 2024-04-25 RX ADMIN — BUPIVACAINE HYDROCHLORIDE 60 ML: 2.5 INJECTION, SOLUTION EPIDURAL; INFILTRATION; INTRACAUDAL; PERINEURAL at 15:04

## 2024-04-25 RX ADMIN — LIDOCAINE HYDROCHLORIDE 50 MG: 10 INJECTION, SOLUTION EPIDURAL; INFILTRATION; INTRACAUDAL; PERINEURAL at 14:58

## 2024-04-25 RX ADMIN — LIDOCAINE HYDROCHLORIDE 0.2 ML: 10 INJECTION, SOLUTION EPIDURAL; INFILTRATION; INTRACAUDAL; PERINEURAL at 13:15

## 2024-04-25 RX ADMIN — INSULIN LISPRO 3 UNITS: 100 INJECTION, SOLUTION INTRAVENOUS; SUBCUTANEOUS at 21:43

## 2024-04-25 RX ADMIN — Medication 100 MCG: at 15:38

## 2024-04-25 RX ADMIN — FAMOTIDINE 20 MG: 20 TABLET, FILM COATED ORAL at 13:45

## 2024-04-25 RX ADMIN — ERTAPENEM SODIUM 1000 MG: 1 INJECTION, POWDER, LYOPHILIZED, FOR SOLUTION INTRAMUSCULAR; INTRAVENOUS at 15:04

## 2024-04-25 RX ADMIN — FENTANYL CITRATE 25 MCG: 50 INJECTION, SOLUTION INTRAMUSCULAR; INTRAVENOUS at 16:24

## 2024-04-25 RX ADMIN — CALCITRIOL CAPSULES 0.25 MCG 0.25 MCG: 0.25 CAPSULE ORAL at 21:29

## 2024-04-25 RX ADMIN — ONDANSETRON 4 MG: 2 INJECTION INTRAMUSCULAR; INTRAVENOUS at 16:20

## 2024-04-25 RX ADMIN — ACETAMINOPHEN 1000 MG: 500 TABLET ORAL at 13:45

## 2024-04-25 RX ADMIN — PROPOFOL 150 MG: 10 INJECTION, EMULSION INTRAVENOUS at 14:58

## 2024-04-25 RX ADMIN — ALVIMOPAN 12 MG: 12 CAPSULE ORAL at 21:31

## 2024-04-25 RX ADMIN — SODIUM CHLORIDE, POTASSIUM CHLORIDE, SODIUM LACTATE AND CALCIUM CHLORIDE 9 ML/HR: 600; 310; 30; 20 INJECTION, SOLUTION INTRAVENOUS at 13:15

## 2024-04-25 RX ADMIN — ATORVASTATIN CALCIUM 20 MG: 20 TABLET, FILM COATED ORAL at 21:31

## 2024-04-25 RX ADMIN — GABAPENTIN 100 MG: 100 CAPSULE ORAL at 21:32

## 2024-04-25 RX ADMIN — ESMOLOL HYDROCHLORIDE 20 MG: 10 INJECTION, SOLUTION INTRAVENOUS at 15:04

## 2024-04-25 RX ADMIN — DEXAMETHASONE SODIUM PHOSPHATE 4 MG: 10 INJECTION, SOLUTION INTRAMUSCULAR; INTRAVENOUS at 15:04

## 2024-04-25 RX ADMIN — ALLOPURINOL 100 MG: 100 TABLET ORAL at 21:27

## 2024-04-25 RX ADMIN — PREGABALIN 75 MG: 75 CAPSULE ORAL at 13:45

## 2024-04-25 NOTE — ANESTHESIA PROCEDURE NOTES
Airway  Urgency: elective    Date/Time: 4/25/2024 3:01 PM  Airway not difficult    General Information and Staff    Patient location during procedure: OR  CRNA/CAA: Ton Mccurdy CRNA    Indications and Patient Condition  Indications for airway management: airway protection    Preoxygenated: yes  MILS not maintained throughout  Mask difficulty assessment: 2 - vent by mask + OA or adjuvant +/- NMBA    Final Airway Details  Final airway type: endotracheal airway      Successful airway: ETT  Cuffed: yes   Successful intubation technique: video laryngoscopy  Endotracheal tube insertion site: oral  Blade: Joshi  Blade size: 3  ETT size (mm): 7.0  Cormack-Lehane Classification: grade I - full view of glottis  Placement verified by: chest auscultation and capnometry   Cuff volume (mL): 8  Measured from: lips  ETT/EBT  to lips (cm): 21  Number of attempts at approach: 1  Assessment: lips, teeth, and gum same as pre-op and atraumatic intubation    Additional Comments  Negative epigastric sounds, Breath sound equal bilaterally with symmetric chest rise and fall

## 2024-04-25 NOTE — ANESTHESIA PROCEDURE NOTES
"Peripheral Block      Patient reassessed immediately prior to procedure    Patient location during procedure: OR  Start time: 4/25/2024 3:00 PM  Stop time: 4/25/2024 3:04 PM  Reason for block: at surgeon's request and post-op pain management  Performed by  Anesthesiologist: Tana Howell DO  Preanesthetic Checklist  Completed: patient identified, IV checked, site marked, risks and benefits discussed, surgical consent, monitors and equipment checked, pre-op evaluation and timeout performed  Prep:  Pt Position: supine  Sterile barriers:cap, gloves, mask and washed/disinfected hands  Prep: ChloraPrep  Patient monitoring: blood pressure monitoring, continuous pulse oximetry and EKG  Procedure    Sedation: yes  Performed under: general  Guidance:ultrasound guided  Images:still images obtained, printed/placed on chart    Laterality:Bilateral  Block Type:TAP  Injection Technique:single-shot  Needle Type:short-bevel and echogenic  Needle Gauge:20 G  Resistance on Injection: none    Medications Used: bupivacaine PF (MARCAINE) 0.25 % injection - Injection   60 mL - 4/25/2024 3:04:00 PM  dexamethasone sodium phosphate injection - Injection   4 mg - 4/25/2024 3:04:00 PM      Medications  Comment:Block Injection:  LA dose divided between Right and Left block        Post Assessment  Injection Assessment: negative aspiration for heme, incremental injection and no paresthesia on injection  Patient Tolerance:comfortable throughout block  Complications:no  Additional Notes    Subcostal TAPs    A high-frequency linear transducer, with sterile cover, was placed sub-xiphoid to identify Linea Alba, right and left Rectus Abdominus Muscles (SHERLY). The transducer was moved either right or left subcostally to identify the SHERLY and the Transverse Abdominus Muscle (HODGES). The insertion site was prepped in sterile fashion and then localized with 2-5 ml of 1% Lidocaine. Using ultrasound-guidance, a 20-gauge B-Lazcano 4\" Ultraplex 360 " non-stimulating echogenic needle was advanced in plane, from medial to lateral, until the tip of the needle was in the fascial plane between the SHERLY and HODGES. 1-3ml of preservative free normal saline was used to hydro-dissect the fascial planes. After the fascial plane was verified, the local anesthetic (LA) was injected. The procedure was repeated on the opposite side for bilateral coverage. Aspiration every 5 ml to prevent intravascular injection. Injection was completed with negative aspiration of blood and negative intravascular injection. Injection pressures were normal with minimal resistance. The subcostal approach to the TAP nerve block ideally anesthetizes the intercostal nerves T6-T9.

## 2024-04-25 NOTE — ANESTHESIA POSTPROCEDURE EVALUATION
Patient: Cristy Louie    Procedure Summary       Date: 04/25/24 Room / Location:  [facility] / [facility]    Anesthesia Start: 1454 Anesthesia Stop: 1633    Procedure: ILEOSTOMY TAKEDOWN Diagnosis:     Surgeons: Lopez Barba MD Provider: Tana Howell DO    Anesthesia Type: general with block ASA Status: 3            Anesthesia Type: general with block    Vitals  Vitals Value Taken Time   /70 04/25/24 1636   Temp 98.6 °F (37 °C) 04/25/24 1636   Pulse 86 04/25/24 1636   Resp 20 04/25/24 1636   SpO2 93 % 04/25/24 1636           Post Anesthesia Care and Evaluation    Patient location during evaluation: PACU  Patient participation: complete - patient participated  Level of consciousness: awake and alert  Pain score: 0  Pain management: adequate    Airway patency: patent  Anesthetic complications: No anesthetic complications  PONV Status: none  Cardiovascular status: hemodynamically stable and acceptable  Respiratory status: nonlabored ventilation, acceptable and nasal cannula  Hydration status: acceptable

## 2024-04-25 NOTE — OP NOTE
[facility] ([facility]    ILEOSTOMY TAKEDOWN  Regional lysis of dense adhesions.  Right abdomen.      Procedure Note    Cristy oLuie  4/25/2024    Pre-op Diagnosis: History of perforated diverticulitis with colovesical fistula prompting resection, reconstruction, protecting loop ileostomy.    Post-op Diagnosis:   Dense regional adhesions around the loop ileostomy.    Anesthesia: General    Staff:   Circulator: Lorenzo Ogden RN; Meghann Latham RN  Scrub Person: Cinda Reed  Assistant: Argenis Sanders RNFA    Assistant: Argenis Sanders RNFA was responsible for performing the following activities: Retraction, Suction, Irrigation, and Placing Dressing and their skilled assistance was necessary for the success of this case.         Estimated Blood Loss: minimal    Specimens: Ileostomy        Drains: Telfa wick    Findings: As above    Complications: None evident    The procedure was reviewed in the preoperative area with the patient and her daughter and grandchildren-FaceTime.    The goals of surgery and postoperative care were reviewed.    We advanced to the operating room with antibiotic and DVT prophylaxis.  Position was supine.  Pursestring was placed to the loop ileostomy.  Block was performed by anesthesia, prepped and draped and timeout protocol was all utilized.    An ellipse incision was made around the loop ileostomy and working through the subcutaneous tissue the fascia was exposed around the loop ileostomy.  The proximal and distal limbs of the loop ileostomy and mesentery were dissected away from the surrounding tissue-rectus, posterior rectus sheath.    There were dense adhesions around the loop ileostomy involving several distant limbs of small bowel.  Gradual and careful lysis of adhesions was performed using small Hardy retractor and [employer] retractors.    Ultimately regional lysis of adhesions was achieved.    The soft tissues were  dissected away from the loop ileostomy and common lumen was established with [id number].    There was good hemostasis at the common lumen.    The anastomosis was then complete aligning the full-thickness bowel wall with Henny Fruit[place] clamps followed by application of a TA 60 green load.  The excess tissue was excised.  The confluence of the anastomosis was reinforced with interrupted 3-0 Vicryl.    Palpation demonstrated widely patent anastomotic lumen.  This was returned to the peritoneal cavity.  The right lower quadrant fascia and loops of small bowel were carefully inspected and appeared satisfactory.  Irrigation aspiration with 300 cc of saline and pool sucker was performed.    The posterior rectus sheath was reapproximated with figure-of-eight #1 PDS.    The anterior rectus sheath was similarly reapproximated.    The final counts were announced as correct.    The skin was reapproximated skin clips and the central area was stented open with a Telfa wick.      Lopez Barba MD     Date: 4/25/2024  Time: 16:26 EDT

## 2024-04-25 NOTE — H&P
Admission      Patient Name: Cristy Louie  MRN: [id number]  : 1945  DOS: 2024    Attending: Lopez Barba MD    Primary Care Provider: Sandra Daniel MD      Patient Care Team:  Sandra Daniel MD as PCP - General (Internal Medicine)  Sunil Jones MD as Consulting Physician (Dermatology)  Maureen Aiken MD as Consulting Physician (Endocrinology)  Rajat Kim MD as Consulting Physician (Gastroenterology)  Robby Valadez MD as Consulting Physician (Colon and Rectal Surgery)  Sly Mariano MD as Consulting Physician (Urology)  Maureen Aiken MD as Consulting Physician (Endocrinology)    Chief complaint:  Unwanted ileostomy.    Subjective   Patient is a pleasant 78 y.o. female presented for scheduled surgery by Dr. Barba.      Patient has history of perforated diverticulitis and colovesical fistula requiring resection and reconstruction and protecting loop ileostomy.  She has since followed up with Dr. Barba and presented today for ileostomy reversal.    She underwent ileostomy takedown with regional lysis of dense adhesions in the right abdomen.  Surgery was done under general anesthesia, was tolerated well.    I saw her in PACU where she is somewhat drowsy but still complaining of expected postoperative pain.  No nausea or vomiting and no shortness of breath.    Reviewed patient's past medical history and home medications.  She lives alone by granddaughter will be able to help her following discharge.  After her previous surgery she tells me she went to rehab but she is hopeful of going home with help from family at the end of his hospital stay.    Allergies   Allergen Reactions    Amlodipine Swelling    Escitalopram Dizziness       Meds:  Medications Prior to Admission   Medication Sig Dispense Refill Last Dose    levothyroxine (SYNTHROID, LEVOTHROID) 100 MCG tablet TAKE 1 TABLET BY MOUTH EVERY DAY IN THE MORNING 90 tablet 3 2024    Accu-Chek FastClix  Lancets misc Use 1 each Daily. 102 each 2     Accu-Chek Guide test strip 1 each by Other route Daily. 50 each 5     allopurinol (ZYLOPRIM) 100 MG tablet Take 1 tablet by mouth Daily. 90 tablet 1 4/22/2024    Blood Glucose Monitoring Suppl (Accu-Chek Guide Me) w/Device kit Use 1 Device Take As Directed. 1 kit 0     calcitriol (ROCALTROL) 0.25 MCG capsule Take 1 capsule by mouth Daily. 90 capsule 3 4/22/2024    DULoxetine (CYMBALTA) 30 MG capsule Take 1 capsule by mouth Daily. 90 capsule 1 4/22/2024    fluticasone (FLONASE) 50 MCG/ACT nasal spray 1 spray into the nostril(s) as directed by provider As Needed for Rhinitis or Allergies.   4/22/2024    glimepiride (AMARYL) 4 MG tablet Take 1 tablet by mouth Every Morning Before Breakfast. 90 tablet 3 4/22/2024    hydrOXYzine (ATARAX) 25 MG tablet Take 1 tablet by mouth 3 (Three) Times a Day As Needed for Anxiety. 30 tablet 1 More than a month    lisinopril (PRINIVIL,ZESTRIL) 10 MG tablet Take 1 tablet by mouth Daily. 90 tablet 1 4/22/2024    Lutein 20 MG capsule Take 20 mg by mouth Daily.   4/22/2024    Melatonin 10 MG capsule Take 1 capsule by mouth Every Night.   4/22/2024    metFORMIN ER (GLUCOPHAGE-XR) 500 MG 24 hr tablet Take 1 tablet by mouth 2 (Two) Times a Day With Meals. 180 tablet 3 4/22/2024    nystatin (MYCOSTATIN) 915134 UNIT/GM powder Apply  topically to the appropriate area as directed 3 (Three) Times a Day. 60 g 11 4/23/2024    omeprazole (priLOSEC) 20 MG capsule Take 1 capsule by mouth Daily. 90 capsule 3 4/22/2024    simvastatin (ZOCOR) 40 MG tablet Take 1 tablet by mouth Daily. 90 tablet 3 4/22/2024        Past Medical History:   Diagnosis Date    Abnormal liver function tests     Description: liver bx showed fatty liver 2008    Allergic rhinitis     Anemia     Anxiety disorder     Asthma     Description: dx 1998    Benign colonic polyp     Description: dx 2007    Cancer     Thyroid    Cataract     Chronic kidney disease (CKD), stage III (moderate)      Colovaginal fistula 09/07/2023    Diabetes mellitus     Description: dx 7/10    Disease of thyroid gland     Diverticulosis of intestine     Description: dx 2007    Fibromyalgia     Description: dx 2003    Gastroesophageal reflux disease     Description: dx 2003.  EGD normal 2007.    H/O cardiovascular stress test     12/06- normal dobutamine myoview. 12/16/13- acceptable negative Lexiscan Cardiolite test.    History of echocardiogram     2/06- normal except ? relaxation abnormality    Hyperlipidemia     Description: dx 1980's    Hyperparathyroidism     Description: dx 8/11    Hypertension     Osteoarthritis     Description: dx 1990's    Osteoporosis     Description: dx 2007    PONV (postoperative nausea and vomiting)     Rheumatoid arthritis     Description: dx 2003    Sensorineural hearing loss (SNHL) of both ears 10/14/2021    DX October 2021 by [facility] (mild to severe loss)    Sigmoid diverticulitis 09/07/2023    Vitamin D deficiency     Description: dx 7/10 (mild)     Past Surgical History:   Procedure Laterality Date    BLADDER SURGERY      urethral bulking injections    BREAST BIOPSY Right     benign    CHOLECYSTECTOMY      1978    COLON SURGERY  10/24/2023    [facility] (Colectomy)diverticulitis    COLONOSCOPY      CYSTOSCOPY W/ BULKING AGENT INJECTION N/A 04/08/2022    Procedure: CYSTOSCOPY WITH BULKING AGENT INJECTION (BULKAMID);  Surgeon: Sly Mariano MD;  Location: [facility];  Service: Urology;  Laterality: N/A;    ENDOSCOPY      PARATHYROIDECTOMY Right 08/07/2018    [place], path c/w hypercellular parathyroid- Dr. Ava Jacobs ([facility])    THYROID SURGERY  08/2018    x 2 11/2018    THYROIDECTOMY Right 08/07/2018    Papillary Thyroid Carcinoma- Dr. Ava Jacobs ([facility])    THYROIDECTOMY Left 11/27/2018    Papillary Thyroid Carcinoma- Dr. Ava Jacobs ([facility])    TOTAL ABDOMINAL HYSTERECTOMY WITH SALPINGO OOPHORECTOMY Bilateral     1987, menorrhagia    WISDOM  "TOOTH EXTRACTION       Family History   Problem Relation Age of Onset    No Known Problems Mother     Coronary artery disease Father          MI age 59    Hyperlipidemia Father     Heart attack Father 59    Hypertension Father     Diabetes Brother     Breast cancer Paternal Aunt         ?    Aneurysm Neg Hx         AAA     Social History     Tobacco Use    Smoking status: Never     Passive exposure: Past    Smokeless tobacco: Never   Vaping Use    Vaping status: Never Used   Substance Use Topics    Alcohol use: No    Drug use: No       Review of Systems  Pertinent items are noted in HPI    Vital Signs  /71 (BP Location: Right arm, Patient Position: Lying)   Pulse 93   Temp 98 °F (36.7 °C) (Oral)   Resp 18   Ht 154.9 cm (61\")   Wt 68 kg (150 lb)   SpO2 99%   BMI 28.34 kg/m²     Physical Exam:    General Appearance:  Drowsy, cooperative, in no acute distress   Head:    Normocephalic, without obvious abnormality, atraumatic   Eyes:            Lids and lashes normal, conjunctivae and sclerae normal, no   icterus, no pallor, corneas clear   Ears:    Ears appear intact with no abnormalities noted   Throat:   No oral lesions, no thrush, oral mucosa moist   Neck:   No adenopathy, supple, trachea midline, no thyromegaly         Lungs:     Clear to auscultation,respirations regular, even and       unlabored. No wheezes or rales.    Heart:    Regular rhythm and normal rate, normal S1 and S2, no murmur    Abdomen:      Soft, expected tenderness.  Dressing over old stoma site clean dry and intact   Genitalia:    Deferred   Extremities:   Moves all extremities well, no edema, no cyanosis, no              redness   Pulses:   Pulses palpable and equal bilaterally   Skin:   No bleeding, bruising or rash   Neurologic:   Cranial nerves 2 - 12 grossly intact, no gross motor deficit.     Results from last 7 days   Lab Units 24  0928   WBC 10*3/mm3 12.04*   HEMOGLOBIN g/dL 12.1   HEMATOCRIT % 36.7 "   PLATELETS 10*3/mm3 354     Results from last 7 days   Lab Units 04/19/24  0928   SODIUM mmol/L 138   POTASSIUM mmol/L 4.3   CHLORIDE mmol/L 108*   CO2 mmol/L 16.0*   BUN mg/dL 28*   CREATININE mg/dL 1.02*   CALCIUM mg/dL 9.3   BILIRUBIN mg/dL 0.2   ALK PHOS U/L 121*   ALT (SGPT) U/L 23   AST (SGOT) U/L 23   GLUCOSE mg/dL 123*     Lab Results   Component Value Date    HGBA1C 7.30 (H) 04/19/2024       Assessment and Plan:       Status post reversal of ileostomy    Anxiety disorder    Asthma    Essential hypertension    Rheumatoid arthritis    Type 2 diabetes mellitus without complication, without long-term current use of insulin    Stage 3a chronic kidney disease    History of thyroid cancer    S/P partial resection of colon    History of colovesical fistula  Hyperlipidemia    Plan  1. Ambulation, several times daily.  2. Pain control-prns   3. IS-encourage  4. DVT proph- Mechanical, subcutaneous heparin.  5. Bowel regimen, alvimopan  6. Resume home medications as appropriate  7. Monitor post-op labs  8. DC planning   9. Diet, Clears, advance diet as tolerated.  IVF initially, monitor volume status. Will give bicarb with IVF initially.    -Anxiety: Resume home regimen.    -Hypothyroidism: Resume replacement.    -DM type 2  Hgb A1C 7.3  Hold OHA as appropriate  FSBG AC/HS, ( q 6 when NPO), along with correction humalog.  Long acting insulin if needed.      - Hypertension:  Resume home medications as appropriate, formulary substitution when indicated.  Holding parameters.  Prn medications for elevated blood pressure.    -Dyslipidemia:  Resume home regimen statin ( formulary substitution when appropriate).     -GERD:  Resume PPI.  Formulary substitution when indicated.    Dragon disclaimer:  Part of this encounter note is an electronic transcription/translation of spoken language to printed text. The electronic translation of spoken language may permit erroneous, or at times, nonsensical words or phrases to be  inadvertently transcribed; Although I have reviewed the note for such errors, some may still exist.    Daryl Malik MD  04/25/24  19:27 EDT

## 2024-04-25 NOTE — INTERVAL H&P NOTE
[facility] Pre-op    Full history and physical note from office is attached.    VS: /74  HR 94  RR 16  T 99.1 sat 97%RA    LAB Results:  Lab Results   Component Value Date    WBC 12.04 (H) 04/19/2024    HGB 12.1 04/19/2024    HCT 36.7 04/19/2024    MCV 91.8 04/19/2024     04/19/2024    NEUTROABS 10.73 (H) 10/02/2023    GLUCOSE 123 (H) 04/19/2024    BUN 28 (H) 04/19/2024    CREATININE 1.02 (H) 04/19/2024    EGFRIFNONA 51 (L) 11/29/2021     04/19/2024    K 4.3 04/19/2024     (H) 04/19/2024    CO2 16.0 (L) 04/19/2024    MG 1.9 11/03/2023    PHOS 3.9 11/29/2021    CALCIUM 9.3 04/19/2024    ALBUMIN 4.5 04/19/2024    AST 23 04/19/2024    ALT 23 04/19/2024    BILITOT 0.2 04/19/2024    PTT 29 10/09/2015    INR 0.94 10/09/2015       Cancer Staging (if applicable)  Cancer Patient: __ yes __no __unknown__N/A; If yes, clinical stage T:__ N:__M:__, stage group or __N/A      Impression: Fistula of large intestine due to diverticulitis; s/p LAR 10/24/23      Plan: ILEOSTOMY TAKEDOWN       JULIANO Aceves   4/25/2024   13:17 EDT

## 2024-04-25 NOTE — ANESTHESIA PREPROCEDURE EVALUATION
Anesthesia Evaluation     Patient summary reviewed and Nursing notes reviewed   history of anesthetic complications:  PONV  NPO Solid Status: > 8 hours  NPO Liquid Status: > 2 hours           Airway   Mallampati: II  TM distance: >3 FB  Neck ROM: full  Possible difficult intubation  Dental    (+) poor dentition    Pulmonary - normal exam   (+) asthma,  (-) sleep apnea, not a smoker  Cardiovascular - normal exam  Exercise tolerance: good (4-7 METS)    ECG reviewed    (+) hypertension, hyperlipidemia  (-) valvular problems/murmurs, dysrhythmias, angina, cardiac stents, CABG    ROS comment: 12/16/13- acceptable negative Lexiscan Cardiolite test.    Neuro/Psych  (+) psychiatric history  (-) seizures, CVA  GI/Hepatic/Renal/Endo    (+) GERD well controlled, renal disease-, diabetes mellitus, thyroid problem hypothyroidism    Musculoskeletal     Abdominal    Substance History - negative use     OB/GYN          Other   arthritis, autoimmune disease rheumatoid arthritis,   history of cancer (thyroid ca)    ROS/Med Hx Other: Hgb 12.1 k 4.3 cr 1.02   [id number] osh  LAR  No gerd/n/v/glp1      Phys Exam Other: Multiple broken/missing teeth                  Anesthesia Plan    ASA 3     general with block     (Risks and benefits of general anesthesia discussed with patient (including MI, CVA, death, recall, aspiration, oropharyngeal/dental damage), questions answered, agreeable to proceed.    Benefits and risks of TAP nerve block/catheter discussed with patient ((including failed block, damage to nerve/nearby structures, intravascular injection)); questions answered, agreeable to proceed.    )  intravenous induction     Anesthetic plan, risks, benefits, and alternatives have been provided, discussed and informed consent has been obtained with: patient.    Use of blood products discussed with patient  Consented to blood products.    Plan discussed with CRNA.        CODE STATUS:

## 2024-04-26 LAB
ANION GAP SERPL CALCULATED.3IONS-SCNC: 13 MMOL/L (ref 5–15)
BASOPHILS # BLD AUTO: 0.01 10*3/MM3 (ref 0–0.2)
BASOPHILS NFR BLD AUTO: 0.1 % (ref 0–1.5)
BUN SERPL-MCNC: 32 MG/DL (ref 8–23)
BUN/CREAT SERPL: 35.6 (ref 7–25)
CALCIUM SPEC-SCNC: 7.8 MG/DL (ref 8.6–10.5)
CHLORIDE SERPL-SCNC: 101 MMOL/L (ref 98–107)
CO2 SERPL-SCNC: 20 MMOL/L (ref 22–29)
CREAT SERPL-MCNC: 0.9 MG/DL (ref 0.57–1)
DEPRECATED RDW RBC AUTO: 43.7 FL (ref 37–54)
EGFRCR SERPLBLD CKD-EPI 2021: 64.8 ML/MIN/1.73
EOSINOPHIL # BLD AUTO: 0 10*3/MM3 (ref 0–0.4)
EOSINOPHIL NFR BLD AUTO: 0 % (ref 0.3–6.2)
ERYTHROCYTE [DISTWIDTH] IN BLOOD BY AUTOMATED COUNT: 13.2 % (ref 12.3–15.4)
GLUCOSE BLDC GLUCOMTR-MCNC: 139 MG/DL (ref 70–130)
GLUCOSE BLDC GLUCOMTR-MCNC: 150 MG/DL (ref 70–130)
GLUCOSE BLDC GLUCOMTR-MCNC: 200 MG/DL (ref 70–130)
GLUCOSE BLDC GLUCOMTR-MCNC: 227 MG/DL (ref 70–130)
GLUCOSE SERPL-MCNC: 287 MG/DL (ref 65–99)
HCT VFR BLD AUTO: 28.7 % (ref 34–46.6)
HGB BLD-MCNC: 9.5 G/DL (ref 12–15.9)
IMM GRANULOCYTES # BLD AUTO: 0.1 10*3/MM3 (ref 0–0.05)
IMM GRANULOCYTES NFR BLD AUTO: 0.8 % (ref 0–0.5)
LYMPHOCYTES # BLD AUTO: 0.95 10*3/MM3 (ref 0.7–3.1)
LYMPHOCYTES NFR BLD AUTO: 7.5 % (ref 19.6–45.3)
MCH RBC QN AUTO: 29.8 PG (ref 26.6–33)
MCHC RBC AUTO-ENTMCNC: 33.1 G/DL (ref 31.5–35.7)
MCV RBC AUTO: 90 FL (ref 79–97)
MONOCYTES # BLD AUTO: 0.79 10*3/MM3 (ref 0.1–0.9)
MONOCYTES NFR BLD AUTO: 6.3 % (ref 5–12)
NEUTROPHILS NFR BLD AUTO: 10.79 10*3/MM3 (ref 1.7–7)
NEUTROPHILS NFR BLD AUTO: 85.3 % (ref 42.7–76)
NRBC BLD AUTO-RTO: 0 /100 WBC (ref 0–0.2)
PLATELET # BLD AUTO: 283 10*3/MM3 (ref 140–450)
PMV BLD AUTO: 11.3 FL (ref 6–12)
POTASSIUM SERPL-SCNC: 4.7 MMOL/L (ref 3.5–5.2)
RBC # BLD AUTO: 3.19 10*6/MM3 (ref 3.77–5.28)
SODIUM SERPL-SCNC: 134 MMOL/L (ref 136–145)
WBC NRBC COR # BLD AUTO: 12.64 10*3/MM3 (ref 3.4–10.8)

## 2024-04-26 PROCEDURE — 63710000001 INSULIN GLARGINE PER 5 UNITS: Performed by: INTERNAL MEDICINE

## 2024-04-26 PROCEDURE — 97161 PT EVAL LOW COMPLEX 20 MIN: CPT

## 2024-04-26 PROCEDURE — 85025 COMPLETE CBC W/AUTO DIFF WBC: CPT | Performed by: COLON & RECTAL SURGERY

## 2024-04-26 PROCEDURE — 25010000002 HYDROMORPHONE 1 MG/ML SOLUTION: Performed by: COLON & RECTAL SURGERY

## 2024-04-26 PROCEDURE — 63710000001 INSULIN LISPRO (HUMAN) PER 5 UNITS: Performed by: INTERNAL MEDICINE

## 2024-04-26 PROCEDURE — 25010000002 HEPARIN (PORCINE) PER 1000 UNITS: Performed by: COLON & RECTAL SURGERY

## 2024-04-26 PROCEDURE — 80048 BASIC METABOLIC PNL TOTAL CA: CPT | Performed by: COLON & RECTAL SURGERY

## 2024-04-26 PROCEDURE — 82948 REAGENT STRIP/BLOOD GLUCOSE: CPT

## 2024-04-26 PROCEDURE — 25010000002 ONDANSETRON PER 1 MG: Performed by: COLON & RECTAL SURGERY

## 2024-04-26 RX ADMIN — HYDROXYZINE HYDROCHLORIDE 25 MG: 25 TABLET, FILM COATED ORAL at 19:57

## 2024-04-26 RX ADMIN — HEPARIN SODIUM 5000 UNITS: 5000 INJECTION INTRAVENOUS; SUBCUTANEOUS at 06:37

## 2024-04-26 RX ADMIN — ALVIMOPAN 12 MG: 12 CAPSULE ORAL at 08:26

## 2024-04-26 RX ADMIN — SODIUM BICARBONATE 75 MEQ: 84 INJECTION, SOLUTION INTRAVENOUS at 08:26

## 2024-04-26 RX ADMIN — ALLOPURINOL 100 MG: 100 TABLET ORAL at 08:26

## 2024-04-26 RX ADMIN — DULOXETINE HYDROCHLORIDE 30 MG: 30 CAPSULE, DELAYED RELEASE ORAL at 08:26

## 2024-04-26 RX ADMIN — HEPARIN SODIUM 5000 UNITS: 5000 INJECTION INTRAVENOUS; SUBCUTANEOUS at 13:08

## 2024-04-26 RX ADMIN — ACETAMINOPHEN 650 MG: 325 TABLET ORAL at 13:08

## 2024-04-26 RX ADMIN — GABAPENTIN 100 MG: 100 CAPSULE ORAL at 20:01

## 2024-04-26 RX ADMIN — ACETAMINOPHEN 650 MG: 325 TABLET ORAL at 19:51

## 2024-04-26 RX ADMIN — Medication 10 MG: at 19:51

## 2024-04-26 RX ADMIN — INSULIN GLARGINE 10 UNITS: 100 INJECTION, SOLUTION SUBCUTANEOUS at 14:23

## 2024-04-26 RX ADMIN — HYDROMORPHONE HYDROCHLORIDE 1 MG: 1 INJECTION, SOLUTION INTRAMUSCULAR; INTRAVENOUS; SUBCUTANEOUS at 19:42

## 2024-04-26 RX ADMIN — LISINOPRIL 10 MG: 10 TABLET ORAL at 08:26

## 2024-04-26 RX ADMIN — CALCITRIOL CAPSULES 0.25 MCG 0.25 MCG: 0.25 CAPSULE ORAL at 08:26

## 2024-04-26 RX ADMIN — HYDROCODONE BITARTRATE AND ACETAMINOPHEN 1 TABLET: 7.5; 325 TABLET ORAL at 02:38

## 2024-04-26 RX ADMIN — ATORVASTATIN CALCIUM 20 MG: 20 TABLET, FILM COATED ORAL at 19:51

## 2024-04-26 RX ADMIN — LEVOTHYROXINE SODIUM 100 MCG: 0.1 TABLET ORAL at 06:22

## 2024-04-26 RX ADMIN — INSULIN LISPRO 3 UNITS: 100 INJECTION, SOLUTION INTRAVENOUS; SUBCUTANEOUS at 19:57

## 2024-04-26 RX ADMIN — ACETAMINOPHEN 650 MG: 325 TABLET ORAL at 06:22

## 2024-04-26 RX ADMIN — GABAPENTIN 100 MG: 100 CAPSULE ORAL at 08:26

## 2024-04-26 RX ADMIN — INSULIN LISPRO 3 UNITS: 100 INJECTION, SOLUTION INTRAVENOUS; SUBCUTANEOUS at 08:25

## 2024-04-26 RX ADMIN — ONDANSETRON 4 MG: 2 INJECTION INTRAMUSCULAR; INTRAVENOUS at 11:49

## 2024-04-26 RX ADMIN — HEPARIN SODIUM 5000 UNITS: 5000 INJECTION INTRAVENOUS; SUBCUTANEOUS at 19:51

## 2024-04-26 RX ADMIN — PANTOPRAZOLE SODIUM 40 MG: 40 TABLET, DELAYED RELEASE ORAL at 06:22

## 2024-04-26 RX ADMIN — INSULIN LISPRO 2 UNITS: 100 INJECTION, SOLUTION INTRAVENOUS; SUBCUTANEOUS at 11:49

## 2024-04-26 RX ADMIN — SODIUM BICARBONATE 75 MEQ: 84 INJECTION, SOLUTION INTRAVENOUS at 17:29

## 2024-04-26 RX ADMIN — HYDROMORPHONE HYDROCHLORIDE 1 MG: 1 INJECTION, SOLUTION INTRAMUSCULAR; INTRAVENOUS; SUBCUTANEOUS at 11:49

## 2024-04-26 NOTE — PLAN OF CARE
Goal Outcome Evaluation:  Plan of Care Reviewed With: patient        Progress: no change  Outcome Evaluation: PT eval completed. Pt ambulated 60 feet CGA-Min A x1 with one LOB with decreased gait speed, decreased stride length, and decreased margaret. Mobility limited d/t ab incisional pain. Pt demonstrated pain limiting function, generalized weakness, and balance deficits warranting IPPT POC. d/c rec home with assist, HHPT, and trial FWx next PT session.      Anticipated Discharge Disposition (PT): home with assist, home with home health

## 2024-04-26 NOTE — CASE MANAGEMENT/SOCIAL WORK
Discharge Planning Assessment  [facility]     Patient Name: Cristy Louie  MRN: [id number]  Today's Date: 4/26/2024    Admit Date: 4/25/2024    Plan: home   Discharge Needs Assessment       Row Name 04/26/24 0937       Living Environment    People in Home alone    Current Living Arrangements home    Potentially Unsafe Housing Conditions none    In the past 12 months has the electric, gas, oil, or water company threatened to shut off services in your home? No    Primary Care Provided by self    Family Caregiver if Needed child(mary), adult    Quality of Family Relationships supportive    Able to Return to Prior Arrangements yes       Resource/Environmental Concerns    Resource/Environmental Concerns none    Environment Concerns none    Transportation Concerns none       Transportation Needs    In the past 12 months, has lack of transportation kept you from medical appointments or from getting medications? no    In the past 12 months, has lack of transportation kept you from meetings, work, or from getting things needed for daily living? No       Food Insecurity    Within the past 12 months, you worried that your food would run out before you got the money to buy more. Never true    Within the past 12 months, the food you bought just didn't last and you didn't have money to get more. Never true       Transition Planning    Patient/Family Anticipates Transition to home    Patient/Family Anticipated Services at Transition none    Transportation Anticipated family or friend will provide       Discharge Needs Assessment    Readmission Within the Last 30 Days no previous admission in last 30 days    Equipment Currently Used at Home bp cuff;glucometer    Concerns to be Addressed discharge planning    Anticipated Changes Related to Illness none    Equipment Needed After Discharge none                   Discharge Plan       Row Name 04/26/24 0938       Plan    Plan home    Patient/Family in Agreement with Plan yes    Plan  Comments Met with Ms. Louie at the bedside to initiate discharge plan. She lives alone in a [facility]. She is independent with activities of daily living and still drives. She has a bp cuff and glucometer but no other DME. She had home health up until early this month, but she is not interested in resuming. She is not current with outpatient services at this time. Her primary care provider is Dr. Sandra Daniel. She denies problems with getting medical care or obtaining medications. Her discharge plan is home, and family or friend will transport. No discharge needs were identified today.  will continue to follow plan of care and assist with discharge planning needs as indicated.    Final Discharge Disposition Code 01 - home or self-care                  Continued Care and Services - Admitted Since 4/25/2024    No active coordination exists for this encounter.          Demographic Summary       Row Name 04/26/24 0935       General Information    Admission Type inpatient    Arrived From home    Referral Source admission list    Reason for Consult discharge planning    Preferred Language English       Contact Information    Permission Granted to Share Info With     Contact Information Obtained for     Contact Information Comments Sunday Louie Son   [phone]                   Functional Status       Row Name 04/26/24 0936       Functional Status    Usual Activity Tolerance good    Current Activity Tolerance other (see comments)  maddie       Physical Activity    On average, how many days per week do you engage in moderate to strenuous exercise (like a brisk walk)? 0 days    On average, how many minutes do you engage in exercise at this level? 0 min    Number of minutes of exercise per week 0       Assessment of Health Literacy    How often do you have someone help you read hospital materials? Never    How often do you have problems learning about your medical condition  because of difficulty understanding written information? Never    How often do you have a problem understanding what is told to you about your medical condition? Never    How confident are you filling out medical forms by yourself? Extremely    Health Literacy Good       Functional Status, IADL    Medications independent    Meal Preparation independent    Housekeeping independent    Laundry independent    Shopping independent       Mental Status    General Appearance WDL WDL       Mental Status Summary    Recent Changes in Mental Status/Cognitive Functioning no changes       Employment/    Employment Status retired                   Psychosocial    No documentation.                  Abuse/Neglect    No documentation.                  Legal    No documentation.                  Substance Abuse    No documentation.                  Patient Forms    No documentation.                     Autumn Beltran RN

## 2024-04-26 NOTE — PROGRESS NOTES
"IM progress note      Cristy Louie  [id number]  1945     LOS: 1 day     Attending: Lopez Barba MD    Primary Care Provider: Sandra Daniel MD      Chief Complaint/Reason for visit:  No chief complaint on file.      Subjective   Feels okay.  Still has some postoperative pain but this is improved from yesterday.  Tolerating clears.  Had liquid BMs.  Afraid to walk in the hallway because of that, worried she may not have enough time to the bathroom.     Objective        Visit Vitals  /58 (BP Location: Right arm, Patient Position: Sitting)   Pulse 85   Temp 98 °F (36.7 °C) (Oral)   Resp 18   Ht 154.9 cm (61\")   Wt 68 kg (150 lb)   SpO2 94%   BMI 28.34 kg/m²     Temp (24hrs), Av.3 °F (36.8 °C), Min:97 °F (36.1 °C), Max:98.6 °F (37 °C)      Intake/Output:    Intake/Output Summary (Last 24 hours) at 2024 1401  Last data filed at 2024 0826  Gross per 24 hour   Intake 2330 ml   Output 5 ml   Net 2325 ml        Physical Therapy:    Date of Service: 24 0930  Creation Time: 24 1107     Signed         Goal Outcome Evaluation:  Plan of Care Reviewed With: patient  Progress: no change  Outcome Evaluation: PT eval completed. Pt ambulated 60 feet CGA-Min A x1 with one LOB with decreased gait speed, decreased stride length, and decreased margaret. Mobility limited d/t ab incisional pain. Pt demonstrated pain limiting function, generalized weakness, and balance deficits warranting IPPT POC. d/c rec home with assist, HHPT, and trial FWx next PT session.        Anticipated Discharge Disposition (PT): home with assist, home with home health                    Physical Exam:     General Appearance:    Alert, cooperative, in no acute distress   Head:    Normocephalic, without obvious abnormality, atraumatic    Lungs:     Normal effort, symmetric chest rise,  clear to      auscultation bilaterally              Heart:    Regular rhythm and normal rate, normal S1 and S2    Abdomen:    Soft and " benign.  Intact dressing.  Expected tenderness   Extremities: No clubbing, cyanosis or edema.  No deformities.    Pulses:   Pulses palpable and equal bilaterally   Skin:   No bleeding, bruising or rash          Results Review:     I reviewed the patient's new clinical results.   Results from last 7 days   Lab Units 04/26/24  0456   WBC 10*3/mm3 12.64*   HEMOGLOBIN g/dL 9.5*   HEMATOCRIT % 28.7*   PLATELETS 10*3/mm3 283     Results from last 7 days   Lab Units 04/26/24  0456   SODIUM mmol/L 134*   POTASSIUM mmol/L 4.7   CHLORIDE mmol/L 101   CO2 mmol/L 20.0*   BUN mg/dL 32*   CREATININE mg/dL 0.90   CALCIUM mg/dL 7.8*   GLUCOSE mg/dL 287*      Latest Reference Range & Units 04/26/24 04:56 04/26/24 07:33 04/26/24 11:13   Glucose 70 - 130 mg/dL 287 (H) 227 (H) 150 (H)   (H): Data is abnormally high  I reviewed the patient's new imaging including images and reports.    All medications reviewed.   acetaminophen, 650 mg, Oral, Q8H  allopurinol, 100 mg, Oral, Daily  atorvastatin, 20 mg, Oral, Nightly  calcitriol, 0.25 mcg, Oral, Daily  DULoxetine, 30 mg, Oral, Daily  gabapentin, 100 mg, Oral, BID  heparin (porcine), 5,000 Units, Subcutaneous, Q8H  insulin lispro, 2-7 Units, Subcutaneous, 4x Daily AC & at Bedtime  levothyroxine, 100 mcg, Oral, Q AM  lisinopril, 10 mg, Oral, Daily  melatonin, 10 mg, Oral, Nightly  pantoprazole, 40 mg, Oral, Q AM      dextrose, 25 g, Q15 Min PRN  dextrose, 15 g, Q15 Min PRN  diazePAM, 5 mg, Q6H PRN  glucagon (human recombinant), 1 mg, Q15 Min PRN  HYDROcodone-acetaminophen, 1 tablet, Q4H PRN  HYDROmorphone, 1 mg, Q4H PRN   And  naloxone, 0.4 mg, Q5 Min PRN  hydrOXYzine, 25 mg, TID PRN  ibuprofen, 400 mg, Q6H PRN  ondansetron ODT, 4 mg, Q6H PRN   Or  ondansetron, 4 mg, Q6H PRN        Assessment & Plan       Status post reversal of ileostomy    Anxiety disorder    Asthma    Gastroesophageal reflux disease    Essential hypertension    Rheumatoid arthritis    Type 2 diabetes mellitus without  complication, without long-term current use of insulin    Stage 3a chronic kidney disease    History of thyroid cancer    S/P partial resection of colon    Colovesical fistula         Plan   1. Ambulation, several times daily.  2. Pain control-prns       3. IS-encourage  4. DVT proph-Mechanical, subcutaneous heparin.  5. Bowel regimen, alvimopan  6. Resume home medications as appropriate  7. Monitor post-op labs  8. DC planning   9. Diet, Clears, advance diet as tolerated.  IVF initially, monitor volume status. Will give bicarb with IVF initially.     -Anxiety: Resume home regimen.     -Hypothyroidism: Resumed replacement.     -DM type 2  Hgb A1C 7.3  Hold OHA as appropriate  FSBG AC/HS, ( q 6 when NPO), along with correction humalog.  Long acting insulin added.         - Hypertension:BP ok.   Resumed home medications as appropriate, formulary substitution when indicated.  Holding parameters.  Prn medications for elevated blood pressure.     -Dyslipidemia:  Resume home regimen statin ( formulary substitution when appropriate).      -GERD:  Resume PPI.  Formulary substitution when indicated.    Daryl Malik MD  04/26/24  14:01 EDT

## 2024-04-26 NOTE — PROGRESS NOTES
"[facility] ([facility])    Feels well    Length of stay: 1 days    Subjective:  Stable Post - Op course.    Vital Signs:  Blood pressure 124/60, pulse 85, temperature 96.8 °F (36 °C), temperature source Oral, resp. rate 18, height 154.9 cm (61\"), weight 68 kg (150 lb), SpO2 94%, not currently breastfeeding.    Labs past 24 hours:  Lab Results (last 24 hours)       Procedure Component Value Units Date/Time    POC Glucose Once [[id number]]  (Abnormal) Collected: 04/26/24 1635    Specimen: Blood Updated: 04/26/24 1659     Glucose 139 mg/dL     POC Glucose Once [[id number]]  (Abnormal) Collected: 04/26/24 1113    Specimen: Blood Updated: 04/26/24 1126     Glucose 150 mg/dL     POC Glucose Once [[id number]]  (Abnormal) Collected: 04/26/24 0733    Specimen: Blood Updated: 04/26/24 0740     Glucose 227 mg/dL     Tissue Pathology Exam [[id number]] Collected: 04/25/24 1632    Specimen: Tissue from Abdomen, Lower Updated: 04/26/24 0650    Basic Metabolic Panel [[id number]]  (Abnormal) Collected: 04/26/24 0456    Specimen: Blood Updated: 04/26/24 0637     Glucose 287 mg/dL      BUN 32 mg/dL      Creatinine 0.90 mg/dL      Sodium 134 mmol/L      Potassium 4.7 mmol/L      Chloride 101 mmol/L      CO2 20.0 mmol/L      Calcium 7.8 mg/dL      BUN/Creatinine Ratio 35.6     Anion Gap 13.0 mmol/L      eGFR 64.8 mL/min/1.73     Narrative:      GFR Normal >60  Chronic Kidney Disease <60  Kidney Failure <15    The GFR formula is only valid for adults with stable renal function between ages 18 and 70.    CBC & Differential [[id number]]  (Abnormal) Collected: 04/26/24 0456    Specimen: Blood Updated: 04/26/24 0611    Narrative:      The following orders were created for panel order CBC & Differential.  Procedure                               Abnormality         Status                     ---------                               -----------         ------                     CBC Auto Differential[[id number]]        " Abnormal            Final result                 Please view results for these tests on the individual orders.    CBC Auto Differential [[id number]]  (Abnormal) Collected: 04/26/24 0456    Specimen: Blood Updated: 04/26/24 0611     WBC 12.64 10*3/mm3      RBC 3.19 10*6/mm3      Hemoglobin 9.5 g/dL      Hematocrit 28.7 %      MCV 90.0 fL      MCH 29.8 pg      MCHC 33.1 g/dL      RDW 13.2 %      RDW-SD 43.7 fl      MPV 11.3 fL      Platelets 283 10*3/mm3      Neutrophil % 85.3 %      Lymphocyte % 7.5 %      Monocyte % 6.3 %      Eosinophil % 0.0 %      Basophil % 0.1 %      Immature Grans % 0.8 %      Neutrophils, Absolute 10.79 10*3/mm3      Lymphocytes, Absolute 0.95 10*3/mm3      Monocytes, Absolute 0.79 10*3/mm3      Eosinophils, Absolute 0.00 10*3/mm3      Basophils, Absolute 0.01 10*3/mm3      Immature Grans, Absolute 0.10 10*3/mm3      nRBC 0.0 /100 WBC     POC Glucose Once [[id number]]  (Abnormal) Collected: 04/25/24 2058    Specimen: Blood Updated: 04/25/24 2101     Glucose 233 mg/dL             I/O last shift:  I/O this shift:  In: 2235 [P.O.:240; I.V.:1995]  Out: -      PHYSICAL EXAM-  Comfortable  cv- rsr  Abd- soft, mild distention, dressing ok    Pathology:  Order Name Source Comment Collection Info Order Time   BASIC METABOLIC PANEL   Collected By: Lyubov Purcell 4/25/2024 10:02 PM     Release to patient   Routine Release        TYPE AND SCREEN   Collected By: Ngozi Lopez, RN 4/25/2024 12:47 PM     Release to patient   Routine Release        TISSUE PATHOLOGY EXAM Abdomen, Lower  Collected By: Lopez Barba MD 4/25/2024  4:33 PM     Release to patient   Routine Release        .    Assessment and Plan:  Diet and activity as tolerated  A little volume contracted / continue fluids    Lopez Barba MD  04/26/24  18:31 EDT

## 2024-04-26 NOTE — THERAPY EVALUATION
Patient Name: Cristy Louie  : 1945    MRN: [id number]                              Today's Date: 2024       Admit Date: 2024    Visit Dx:     ICD-10-CM ICD-9-CM   1. History of ileostomy  Z98.890 V45.89     Patient Active Problem List   Diagnosis    Allergic rhinitis    Anxiety disorder    Asthma    Diverticulosis of intestine    Gastroesophageal reflux disease    Fibromyalgia    Mixed hyperlipidemia    Essential hypertension    Osteoarthritis    Osteopenia of multiple sites    Rheumatoid arthritis    Vitamin D deficiency    Weight loss    Thyroid cancer    Post-surgical hypothyroidism    Post-surgical hypoparathyroidism    Urinary incontinence, mixed    Renal cyst, right    Sensorineural hearing loss (SNHL) of both ears    Type 2 diabetes mellitus without complication, without long-term current use of insulin    Stage 3a chronic kidney disease    Tinnitus of both ears    History of thyroid cancer    S/P partial resection of colon    History of creation of ostomy    Acute kidney failure, unspecified    UTI (urinary tract infection)    Idiopathic chronic gout of left foot without tophus    Colovesical fistula    Status post reversal of ileostomy     Past Medical History:   Diagnosis Date    Abnormal liver function tests     Description: liver bx showed fatty liver     Allergic rhinitis     Anemia     Anxiety disorder     Asthma     Description: dx     Benign colonic polyp     Description: dx     Cancer     Thyroid    Cataract     Chronic kidney disease (CKD), stage III (moderate)     Colovaginal fistula 2023    Diabetes mellitus     Description: dx 7/10    Disease of thyroid gland     Diverticulosis of intestine     Description: dx     Fibromyalgia     Description: dx     Gastroesophageal reflux disease     Description: dx .  EGD normal .    H/O cardiovascular stress test     - normal dobutamine myoview. 13- acceptable negative Lexiscan Cardiolite test.     History of echocardiogram     2/06- normal except ? relaxation abnormality    Hyperlipidemia     Description: dx 1980's    Hyperparathyroidism     Description: dx 8/11    Hypertension     Osteoarthritis     Description: dx 1990's    Osteoporosis     Description: dx 2007    PONV (postoperative nausea and vomiting)     Rheumatoid arthritis     Description: dx 2003    Sensorineural hearing loss (SNHL) of both ears 10/14/2021    DX October 2021 by [facility] (mild to severe loss)    Sigmoid diverticulitis 09/07/2023    Vitamin D deficiency     Description: dx 7/10 (mild)     Past Surgical History:   Procedure Laterality Date    BLADDER SURGERY      urethral bulking injections    BREAST BIOPSY Right     benign    CHOLECYSTECTOMY      1978    COLON SURGERY  10/24/2023    [facility] (Colectomy)diverticulitis    COLONOSCOPY      CYSTOSCOPY W/ BULKING AGENT INJECTION N/A 04/08/2022    Procedure: CYSTOSCOPY WITH BULKING AGENT INJECTION (BULKAMID);  Surgeon: Sly Mariano MD;  Location:  [facility];  Service: Urology;  Laterality: N/A;    ENDOSCOPY      ILEOSTOMY CLOSURE N/A 4/25/2024    Procedure: ILEOSTOMY TAKEDOWN;  Surgeon: Lopez Barba MD;  Location:  [facility];  Service: General;  Laterality: N/A;    PARATHYROIDECTOMY Right 08/07/2018    [place], path c/w hypercellular parathyroid- Dr. Ava Jacobs ([facility])    THYROID SURGERY  08/2018    x 2 11/2018    THYROIDECTOMY Right 08/07/2018    Papillary Thyroid Carcinoma- Dr. Ava Jacobs ([facility])    THYROIDECTOMY Left 11/27/2018    Papillary Thyroid Carcinoma- Dr. Ava Jacobs ([facility])    TOTAL ABDOMINAL HYSTERECTOMY WITH SALPINGO OOPHORECTOMY Bilateral     1987, menorrhagia    WISDOM TOOTH EXTRACTION        General Information       Row Name 04/26/24 1018          Physical Therapy Time and Intention    Document Type evaluation  -     Mode of Treatment physical therapy  -       Row Name 04/26/24 1018          General Information     Patient Profile Reviewed yes  -     Prior Level of Function independent:;all household mobility;community mobility;gait;transfer;bed mobility  -     Existing Precautions/Restrictions fall;other (see comments)  ab incision s/p ileostomy takedown  -     Barriers to Rehab medically complex  -       Row Name 04/26/24 1018          Living Environment    People in Home alone  -       Row Name 04/26/24 1018          Home Main Entrance    Number of Stairs, Main Entrance one  -       Row Name 04/26/24 1018          Stairs Within Home, Primary    Stairs, Within Home, Primary basement where laundry is  -     Number of Stairs, Within Home, Primary twelve  -HM     Stair Railings, Within Home, Primary railings on both sides of stairs  -       Row Name 04/26/24 1018          Cognition    Orientation Status (Cognition) oriented x 4  -       Row Name 04/26/24 1018          Safety Issues, Functional Mobility    Safety Issues Affecting Function (Mobility) awareness of need for assistance;safety precaution awareness  -     Impairments Affecting Function (Mobility) balance;endurance/activity tolerance;pain;strength  -               User Key  (r) = Recorded By, (t) = Taken By, (c) = Cosigned By      Initials Name Provider Type     Maggie Flores, PT Physical Therapist                   Mobility       Row Name 04/26/24 1059          Bed Mobility    Comment, (Bed Mobility) sitting EOB upon arrival  -       Row Name 04/26/24 1059          Sit-Stand Transfer    Sit-Stand [place] (Transfers) standby assist  -     Comment, (Sit-Stand Transfer) no AD, no symptom onset  -       Row Name 04/26/24 1059          Gait/Stairs (Locomotion)    [place] Level (Gait) minimum assist (75% patient effort);contact guard;1 person assist  -     Distance in Feet (Gait) 60  -HM     Deviations/Abnormal Patterns (Gait) bilateral deviations;margaret decreased;gait speed decreased;stride length decreased  -     Comment,  (Gait/Stairs) Pt ambulated with no AD in room 60 feet with one LOB with Min A to correct, otherwise CGA to ambulate. Utilized IV pole for support towards end of ambulation. Mobility limited d/t pain. Discussed with pt may trial FWx next PT session for improved stability d/t 1 LOB.  -               User Key  (r) = Recorded By, (t) = Taken By, (c) = Cosigned By      Initials Name Provider Type     Maggie Flores PT Physical Therapist                   Obj/Interventions       Row Name 04/26/24 1101          Range of Motion Comprehensive    General Range of Motion bilateral lower extremity ROM WFL  -       Row Name 04/26/24 1101          Strength Comprehensive (MMT)    Comment, General Manual Muscle Testing (MMT) Assessment formal MMT deferred d/t pain, BLE grossly 4/5  -       Row Name 04/26/24 1101          Balance    Balance Assessment sitting static balance;sit to stand dynamic balance;sitting dynamic balance;standing dynamic balance;standing static balance  -     Static Sitting Balance standby assist  -     Dynamic Sitting Balance standby assist  -     Position, Sitting Balance unsupported;sitting edge of bed  -     Sit to Stand Dynamic Balance standby assist  -     Static Standing Balance contact guard  -     Dynamic Standing Balance contact guard;minimal assist  -     Position/Device Used, Standing Balance unsupported  -     Balance Interventions standing;dynamic;occupation based/functional task  -     Comment, Balance one LOB Min A to correct, otherwise CGA to steady  -       Row Name 04/26/24 1101          Sensory Assessment (Somatosensory)    Sensory Assessment (Somatosensory) LE sensation intact  -               User Key  (r) = Recorded By, (t) = Taken By, (c) = Cosigned By      Initials Name Provider Type     Maggie Flores, PENNY Physical Therapist                   Goals/Plan       Row Name 04/26/24 1106          Bed Mobility Goal 1 (PT)    Activity/Assistive Device (Bed  Mobility Goal 1, PT) bed mobility activities, all  -HM     [place] Level/Cues Needed (Bed Mobility Goal 1, PT) independent  -HM     Time Frame (Bed Mobility Goal 1, PT) long term goal (LTG);10 days  -HM     Progress/Outcomes (Bed Mobility Goal 1, PT) new goal  -       Row Name 04/26/24 1106          Transfer Goal 1 (PT)    Activity/Assistive Device (Transfer Goal 1, PT) sit-to-stand/stand-to-sit;bed-to-chair/chair-to-bed  -HM     [place] Level/Cues Needed (Transfer Goal 1, PT) independent  -HM     Time Frame (Transfer Goal 1, PT) long term goal (LTG);10 days  -HM     Progress/Outcome (Transfer Goal 1, PT) new goal  -       Row Name 04/26/24 1106          Gait Training Goal 1 (PT)    Activity/Assistive Device (Gait Training Goal 1, PT) gait (walking locomotion);assistive device use  -HM     [place] Level (Gait Training Goal 1, PT) standby assist  -HM     Distance (Gait Training Goal 1, PT) 450  -HM     Time Frame (Gait Training Goal 1, PT) long term goal (LTG);10 days  -HM     Progress/Outcome (Gait Training Goal 1, PT) new goal  -       Row Name 04/26/24 1106          Stairs Goal 1 (PT)    Activity/Assistive Device (Stairs Goal 1, PT) ascending stairs;descending stairs  -HM     [place] Level/Cues Needed (Stairs Goal 1, PT) contact guard required  -HM     Number of Stairs (Stairs Goal 1, PT) 12  -HM     Time Frame (Stairs Goal 1, PT) long term goal (LTG);10 days  -HM     Progress/Outcome (Stairs Goal 1, PT) new [facility]  -       Row Name 04/26/24 1106          Therapy Assessment/Plan (PT)    Planned Therapy Interventions (PT) balance training;bed mobility training;gait training;home exercise program;neuromuscular re-education;postural re-education;patient/family education;stair training;ROM (range of motion);transfer training;strengthening  -HM               User Key  (r) = Recorded By, (t) = Taken By, (c) = Cosigned By      Initials Name Provider Type    Maggie Begum, PT Physical  Therapist                   Clinical Impression       Row Name 04/26/24 1102          Pain    Pretreatment Pain Rating 5/10  -     Posttreatment Pain Rating 6/10  -     Pain Location incisional  -     Pain Location - abdomen  -     Pre/Posttreatment Pain Comment tolerated  -     Pain Intervention(s) Ambulation/increased activity;Repositioned  -       Row Name 04/26/24 1102          Plan of Care Review    Plan of Care Reviewed With patient  -     Progress no change  -     Outcome Evaluation PT eval completed. Pt ambulated 60 feet CGA-Min A x1 with one LOB with decreased gait speed, decreased stride length, and decreased margaret. Mobility limited d/t ab incisional pain. Pt demonstrated pain limiting function, generalized weakness, and balance deficits warranting IPPT POC. d/c rec home with assist, HHPT, and trial FWx next PT session.  -       Row Name 04/26/24 1102          Therapy Assessment/Plan (PT)    Rehab Potential (PT) good, to achieve stated therapy goals  -     Criteria for Skilled Interventions Met (PT) yes;meets criteria;skilled treatment is necessary  -     Therapy Frequency (PT) daily  -       Row Name 04/26/24 1102          Vital Signs    Pre Systolic BP Rehab 130  -     Pre Treatment Diastolic BP 61  -HM     O2 Delivery Pre Treatment room air  -HM     O2 Delivery Intra Treatment room air  -     Post SpO2 (%) 95  -HM     O2 Delivery Post Treatment room air  -     Pre Patient Position Supine  -     Intra Patient Position Standing  -     Post Patient Position Sitting  -[facility] Name 04/26/24 1102          Positioning and Restraints    Pre-Treatment Position in bed  -     Post Treatment Position chair  -     In Chair notified nsg;call light within reach;encouraged to call for assist;sitting;reclined;waffle cushion;legs elevated  RN deferred alarm  -               User Key  (r) = Recorded By, (t) = Taken By, (c) = Cosigned By      Initials Name Provider Type      Maggie Flores, PT Physical Therapist                   Outcome Measures       Row Name 04/26/24 0826 04/26/24 0651       How much help from another person do you currently need...    Turning from your back to your side while in flat bed without using bedrails? 3  -DS 4  -CJ    Moving from lying on back to sitting on the side of a flat bed without bedrails? 3  -DS 3  -CJ    Moving to and from a bed to a chair (including a wheelchair)? 3  -DS 3  -CJ    Standing up from a chair using your arms (e.g., wheelchair, bedside chair)? 3  -DS 3  -CJ    Climbing 3-5 steps with a railing? 2  -DS 3  -CJ    To walk in hospital room? 3  -DS 3  -CJ    AM-PAC 6 Clicks Score (PT) 17  -DS 19  -CJ    Highest Level of Mobility Goal 5 --> Static standing  -DS 6 --> Walk 10 steps or more  -              User Key  (r) = Recorded By, (t) = Taken By, (c) = Cosigned By      Initials Name Provider Type    DS Trini Koch RN Registered Nurse    Carl Neff RN Registered Nurse                                 Physical Therapy Education       Title: PT OT SLP Therapies (In Progress)       Topic: Physical Therapy (In Progress)       Point: Mobility training (Done)       Learning Progress Summary             Patient Acceptance, E,TB, VU,NR by  at 4/26/2024 1107                         Point: Home exercise program (Not Started)       Learner Progress:  Not documented in this visit.              Point: Body mechanics (Done)       Learning Progress Summary             Patient Acceptance, E,TB, VU,NR by  at 4/26/2024 1107                         Point: Precautions (Done)       Learning Progress Summary             Patient Acceptance, E,TB, VU,NR by  at 4/26/2024 1107                                         User Key       Initials Effective Dates Name Provider Type Discipline     09/22/22 -  Maggie Flores, PT Physical Therapist PT                  PT Recommendation and Plan  Planned Therapy Interventions (PT): balance  training, bed mobility training, gait training, home exercise program, neuromuscular re-education, postural re-education, patient/family education, stair training, ROM (range of motion), transfer training, strengthening  Plan of Care Reviewed With: patient  Progress: no change  Outcome Evaluation: PT eval completed. Pt ambulated 60 feet CGA-Min A x1 with one LOB with decreased gait speed, decreased stride length, and decreased margaret. Mobility limited d/t ab incisional pain. Pt demonstrated pain limiting function, generalized weakness, and balance deficits warranting IPPT POC. d/c rec home with assist, HHPT, and trial FWx next PT session.     Time Calculation:   PT Evaluation Complexity  History, PT Evaluation Complexity: 3 or more personal factors and/or comorbidities  Examination of Body Systems (PT Eval Complexity): total of 3 or more elements  Clinical Presentation (PT Evaluation Complexity): stable  Clinical Decision Making (PT Evaluation Complexity): low complexity  Overall Complexity (PT Evaluation Complexity): low complexity     PT Charges       Row Name 04/26/24 1108             Time Calculation    Start Time 0930  -HM      PT Received On 04/26/24  -HM      PT Goal Re-Cert Due Date 05/06/24  -HM         Untimed Charges    PT Eval/Re-eval Minutes 46  -HM         Total Minutes    Untimed Charges Total Minutes 46  -HM       Total Minutes 46  -HM                User Key  (r) = Recorded By, (t) = Taken By, (c) = Cosigned By      Initials Name Provider Type     Maggie Flores, PENNY Physical Therapist                  Therapy Charges for Today       Code Description Service Date Service Provider Modifiers Qty    [id number]  PT EVAL LOW COMPLEXITY 4 4/26/2024 Maggie Flores, PT GP 1            PT G-Codes  AM-PAC 6 Clicks Score (PT): 17  PT Discharge Summary  Anticipated Discharge Disposition (PT): home with assist, home with home health    Maggie Flores PT  4/26/2024

## 2024-04-27 LAB
ANION GAP SERPL CALCULATED.3IONS-SCNC: 10 MMOL/L (ref 5–15)
BASOPHILS # BLD AUTO: 0.03 10*3/MM3 (ref 0–0.2)
BASOPHILS NFR BLD AUTO: 0.2 % (ref 0–1.5)
BUN SERPL-MCNC: 20 MG/DL (ref 8–23)
BUN/CREAT SERPL: 25 (ref 7–25)
CALCIUM SPEC-SCNC: 7.1 MG/DL (ref 8.6–10.5)
CHLORIDE SERPL-SCNC: 100 MMOL/L (ref 98–107)
CO2 SERPL-SCNC: 26 MMOL/L (ref 22–29)
CREAT SERPL-MCNC: 0.8 MG/DL (ref 0.57–1)
DEPRECATED RDW RBC AUTO: 45.8 FL (ref 37–54)
EGFRCR SERPLBLD CKD-EPI 2021: 74.6 ML/MIN/1.73
EOSINOPHIL # BLD AUTO: 0.2 10*3/MM3 (ref 0–0.4)
EOSINOPHIL NFR BLD AUTO: 1.6 % (ref 0.3–6.2)
ERYTHROCYTE [DISTWIDTH] IN BLOOD BY AUTOMATED COUNT: 13.7 % (ref 12.3–15.4)
GLUCOSE BLDC GLUCOMTR-MCNC: 149 MG/DL (ref 70–130)
GLUCOSE BLDC GLUCOMTR-MCNC: 152 MG/DL (ref 70–130)
GLUCOSE BLDC GLUCOMTR-MCNC: 217 MG/DL (ref 70–130)
GLUCOSE BLDC GLUCOMTR-MCNC: 273 MG/DL (ref 70–130)
GLUCOSE SERPL-MCNC: 133 MG/DL (ref 65–99)
HCT VFR BLD AUTO: 26.5 % (ref 34–46.6)
HGB BLD-MCNC: 8.8 G/DL (ref 12–15.9)
IMM GRANULOCYTES # BLD AUTO: 0.07 10*3/MM3 (ref 0–0.05)
IMM GRANULOCYTES NFR BLD AUTO: 0.6 % (ref 0–0.5)
LYMPHOCYTES # BLD AUTO: 2.69 10*3/MM3 (ref 0.7–3.1)
LYMPHOCYTES NFR BLD AUTO: 21.9 % (ref 19.6–45.3)
MCH RBC QN AUTO: 30.1 PG (ref 26.6–33)
MCHC RBC AUTO-ENTMCNC: 33.2 G/DL (ref 31.5–35.7)
MCV RBC AUTO: 90.8 FL (ref 79–97)
MONOCYTES # BLD AUTO: 1.02 10*3/MM3 (ref 0.1–0.9)
MONOCYTES NFR BLD AUTO: 8.3 % (ref 5–12)
NEUTROPHILS NFR BLD AUTO: 67.4 % (ref 42.7–76)
NEUTROPHILS NFR BLD AUTO: 8.29 10*3/MM3 (ref 1.7–7)
NRBC BLD AUTO-RTO: 0 /100 WBC (ref 0–0.2)
PLATELET # BLD AUTO: 255 10*3/MM3 (ref 140–450)
PMV BLD AUTO: 11.3 FL (ref 6–12)
POTASSIUM SERPL-SCNC: 3.7 MMOL/L (ref 3.5–5.2)
RBC # BLD AUTO: 2.92 10*6/MM3 (ref 3.77–5.28)
SODIUM SERPL-SCNC: 136 MMOL/L (ref 136–145)
WBC NRBC COR # BLD AUTO: 12.3 10*3/MM3 (ref 3.4–10.8)

## 2024-04-27 PROCEDURE — 25010000002 HEPARIN (PORCINE) PER 1000 UNITS: Performed by: COLON & RECTAL SURGERY

## 2024-04-27 PROCEDURE — 25010000002 HYDROMORPHONE 1 MG/ML SOLUTION: Performed by: COLON & RECTAL SURGERY

## 2024-04-27 PROCEDURE — 85025 COMPLETE CBC W/AUTO DIFF WBC: CPT | Performed by: COLON & RECTAL SURGERY

## 2024-04-27 PROCEDURE — 82948 REAGENT STRIP/BLOOD GLUCOSE: CPT

## 2024-04-27 PROCEDURE — 80048 BASIC METABOLIC PNL TOTAL CA: CPT | Performed by: COLON & RECTAL SURGERY

## 2024-04-27 PROCEDURE — 63710000001 INSULIN LISPRO (HUMAN) PER 5 UNITS: Performed by: INTERNAL MEDICINE

## 2024-04-27 PROCEDURE — 63710000001 INSULIN GLARGINE PER 5 UNITS: Performed by: INTERNAL MEDICINE

## 2024-04-27 RX ADMIN — SODIUM BICARBONATE 75 MEQ: 84 INJECTION, SOLUTION INTRAVENOUS at 14:02

## 2024-04-27 RX ADMIN — ACETAMINOPHEN 650 MG: 325 TABLET ORAL at 20:27

## 2024-04-27 RX ADMIN — PANTOPRAZOLE SODIUM 40 MG: 40 TABLET, DELAYED RELEASE ORAL at 05:41

## 2024-04-27 RX ADMIN — INSULIN LISPRO 3 UNITS: 100 INJECTION, SOLUTION INTRAVENOUS; SUBCUTANEOUS at 12:08

## 2024-04-27 RX ADMIN — GABAPENTIN 100 MG: 100 CAPSULE ORAL at 08:14

## 2024-04-27 RX ADMIN — INSULIN GLARGINE 10 UNITS: 100 INJECTION, SOLUTION SUBCUTANEOUS at 08:13

## 2024-04-27 RX ADMIN — HEPARIN SODIUM 5000 UNITS: 5000 INJECTION INTRAVENOUS; SUBCUTANEOUS at 05:41

## 2024-04-27 RX ADMIN — HYDROMORPHONE HYDROCHLORIDE 1 MG: 1 INJECTION, SOLUTION INTRAMUSCULAR; INTRAVENOUS; SUBCUTANEOUS at 22:45

## 2024-04-27 RX ADMIN — GABAPENTIN 100 MG: 100 CAPSULE ORAL at 20:27

## 2024-04-27 RX ADMIN — HEPARIN SODIUM 5000 UNITS: 5000 INJECTION INTRAVENOUS; SUBCUTANEOUS at 20:28

## 2024-04-27 RX ADMIN — CALCITRIOL CAPSULES 0.25 MCG 0.25 MCG: 0.25 CAPSULE ORAL at 08:14

## 2024-04-27 RX ADMIN — ACETAMINOPHEN 650 MG: 325 TABLET ORAL at 05:40

## 2024-04-27 RX ADMIN — IBUPROFEN 400 MG: 400 TABLET, FILM COATED ORAL at 02:41

## 2024-04-27 RX ADMIN — ACETAMINOPHEN 650 MG: 325 TABLET ORAL at 14:06

## 2024-04-27 RX ADMIN — DULOXETINE HYDROCHLORIDE 30 MG: 30 CAPSULE, DELAYED RELEASE ORAL at 08:14

## 2024-04-27 RX ADMIN — ATORVASTATIN CALCIUM 20 MG: 20 TABLET, FILM COATED ORAL at 20:27

## 2024-04-27 RX ADMIN — INSULIN LISPRO 4 UNITS: 100 INJECTION, SOLUTION INTRAVENOUS; SUBCUTANEOUS at 20:34

## 2024-04-27 RX ADMIN — LEVOTHYROXINE SODIUM 100 MCG: 0.1 TABLET ORAL at 05:41

## 2024-04-27 RX ADMIN — Medication 10 MG: at 20:27

## 2024-04-27 RX ADMIN — HEPARIN SODIUM 5000 UNITS: 5000 INJECTION INTRAVENOUS; SUBCUTANEOUS at 14:06

## 2024-04-27 RX ADMIN — DIAZEPAM 5 MG: 5 TABLET ORAL at 20:28

## 2024-04-27 RX ADMIN — ALLOPURINOL 100 MG: 100 TABLET ORAL at 08:14

## 2024-04-27 RX ADMIN — HYDROCODONE BITARTRATE AND ACETAMINOPHEN 1 TABLET: 7.5; 325 TABLET ORAL at 21:56

## 2024-04-27 RX ADMIN — SODIUM BICARBONATE 75 MEQ: 84 INJECTION, SOLUTION INTRAVENOUS at 03:18

## 2024-04-27 RX ADMIN — INSULIN LISPRO 2 UNITS: 100 INJECTION, SOLUTION INTRAVENOUS; SUBCUTANEOUS at 08:14

## 2024-04-27 NOTE — PROGRESS NOTES
"IM progress note      Cristy Louie  [id number]  1945     LOS: 2 days     Attending: Lopez Barba MD    Primary Care Provider: Sandra Daniel MD      Chief Complaint/Reason for visit:  No chief complaint on file.      Subjective   Doing well. Good pain control. Ambulating. Tolerating diet. Still liquid stool. Denies f/c/n/v/sob/cp.     Objective        Visit Vitals  /60 (BP Location: Right arm, Patient Position: Sitting)   Pulse 79   Temp 98.3 °F (36.8 °C) (Oral)   Resp 16   Ht 154.9 cm (61\")   Wt 68 kg (150 lb)   SpO2 96%   BMI 28.34 kg/m²     Temp (24hrs), Av.8 °F (36.6 °C), Min:97 °F (36.1 °C), Max:98.6 °F (37 °C)     Physical Therapy: PT eval completed. Pt ambulated 60 feet CGA-Min A x1 with one LOB with decreased gait speed, decreased stride length, and decreased margaret. Mobility limited d/t ab incisional pain. Pt demonstrated pain limiting function, generalized weakness, and balance deficits warranting IPPT POC. d/c rec home with assist, HHPT, and trial FWx next PT session.     Physical Exam:     General Appearance:    Alert, cooperative, in no acute distress   Head:    Normocephalic, without obvious abnormality, atraumatic    Lungs:     Normal effort, symmetric chest rise,  clear to      auscultation bilaterally              Heart:    Regular rhythm and normal rate, normal S1 and S2    Abdomen:    Soft and benign.  Intact dressing.  Expected tenderness   Extremities: No clubbing, cyanosis or edema.  No deformities.    Pulses:   Pulses palpable and equal bilaterally   Skin:   No bleeding, bruising or rash          Results Review:     I reviewed the patient's new clinical results.   Results from last 7 days   Lab Units 24  0336 24  0456   WBC 10*3/mm3 12.30* 12.64*   HEMOGLOBIN g/dL 8.8* 9.5*   HEMATOCRIT % 26.5* 28.7*   PLATELETS 10*3/mm3 255 283     Results from last 7 days   Lab Units 24  0336 24  0456   SODIUM mmol/L 136 134*   POTASSIUM mmol/L 3.7 4.7 "   CHLORIDE mmol/L 100 101   CO2 mmol/L 26.0 20.0*   BUN mg/dL 20 32*   CREATININE mg/dL 0.80 0.90   CALCIUM mg/dL 7.1* 7.8*   GLUCOSE mg/dL 133* 287*      Latest Reference Range & Units 04/27/24 07:14 04/27/24 11:08 04/27/24 16:22   Glucose 70 - 130 mg/dL 152 (H) 217 (H) 149 (H)   (H): Data is abnormally high    I reviewed the patient's new imaging including images and reports.    All medications reviewed.   acetaminophen, 650 mg, Oral, Q8H  allopurinol, 100 mg, Oral, Daily  atorvastatin, 20 mg, Oral, Nightly  calcitriol, 0.25 mcg, Oral, Daily  DULoxetine, 30 mg, Oral, Daily  gabapentin, 100 mg, Oral, BID  heparin (porcine), 5,000 Units, Subcutaneous, Q8H  insulin glargine, 10 Units, Subcutaneous, Daily  insulin lispro, 2-7 Units, Subcutaneous, 4x Daily AC & at Bedtime  levothyroxine, 100 mcg, Oral, Q AM  lisinopril, 10 mg, Oral, Daily  melatonin, 10 mg, Oral, Nightly  pantoprazole, 40 mg, Oral, Q AM      dextrose, 25 g, Q15 Min PRN  dextrose, 15 g, Q15 Min PRN  diazePAM, 5 mg, Q6H PRN  glucagon (human recombinant), 1 mg, Q15 Min PRN  HYDROcodone-acetaminophen, 1 tablet, Q4H PRN  HYDROmorphone, 1 mg, Q4H PRN   And  naloxone, 0.4 mg, Q5 Min PRN  hydrOXYzine, 25 mg, TID PRN  ibuprofen, 400 mg, Q6H PRN  ondansetron ODT, 4 mg, Q6H PRN   Or  ondansetron, 4 mg, Q6H PRN        Assessment & Plan       Status post reversal of ileostomy    Anxiety disorder    Asthma    Gastroesophageal reflux disease    Essential hypertension    Rheumatoid arthritis    Type 2 diabetes mellitus without complication, without long-term current use of insulin    Stage 3a chronic kidney disease    History of thyroid cancer    S/P partial resection of colon    Colovesical fistula         Plan  1. Ambulation, several times daily.  2. Pain control-prns       3. IS-encourage  4. DVT proph-Mechanical, subcutaneous heparin.  5. Bowel regimen, alvimopan  6. Resume home medications as appropriate  7. Monitor post-op labs  8. DC planning   9. Diet, Clears,  advance diet as tolerated.  IVF initially, monitor volume status. Will give bicarb with IVF initially.     -Anxiety: Resume home regimen.     -Hypothyroidism: Resumed replacement.     -DM type 2  Hgb A1C 7.3  Hold OHA as appropriate  FSBG AC/HS, ( q 6 when NPO), along with correction humalog.  Long acting insulin added.      - Hypertension:BP ok.   Resumed home medications as appropriate, formulary substitution when indicated.  Holding parameters.  Prn medications for elevated blood pressure.     -Dyslipidemia:  Resume home regimen statin ( formulary substitution when appropriate).      -GERD:  Resume PPI.  Formulary substitution when indicated.      Otilia Espinoza, APRN  04/27/24  17:30 EDT

## 2024-04-27 NOTE — PROGRESS NOTES
Feels well  Walking some    Labs / data etc reviewed  Wbc lower today    Abdomen - soft  Packing removed    New dressing  Diet as tolerated  A little bloody mucous like stool per patient    Diet and activity as tolerated.  Maybe home Sunday / Monday

## 2024-04-27 NOTE — PLAN OF CARE
Goal Outcome Evaluation:  Plan of Care Reviewed With: patient, daughter, family        Progress: no change       Problem: Adult Inpatient Plan of Care  Goal: Plan of Care Review  Outcome: Ongoing, Progressing  Flowsheets (Taken 4/26/2024 2152)  Progress: no change  Plan of Care Reviewed With:   patient   daughter   family  Goal: Patient-Specific Goal (Individualized)  Outcome: Ongoing, Progressing  Goal: Absence of Hospital-Acquired Illness or Injury  Outcome: Ongoing, Progressing  Intervention: Identify and Manage Fall Risk  Recent Flowsheet Documentation  Taken 4/26/2024 2000 by Xin Holliday RN  Safety Promotion/Fall Prevention: safety round/check completed  Intervention: Prevent Skin Injury  Recent Flowsheet Documentation  Taken 4/26/2024 2000 by Xin Holliday RN  Body Position: weight shifting  Skin Protection:   adhesive use limited   transparent dressing maintained   skin-to-skin areas padded   skin-to-device areas padded   tubing/devices free from skin contact  Taken 4/26/2024 1948 by Xin Holliday RN  Body Position:   turned   sitting up in bed  Taken 4/26/2024 1943 by Xin Holliday RN  Body Position: sitting up in bed  Intervention: Prevent and Manage VTE (Venous Thromboembolism) Risk  Recent Flowsheet Documentation  Taken 4/26/2024 2000 by Xin Holliday RN  Activity Management: activity encouraged  VTE Prevention/Management: (heparin subq)   bilateral   sequential compression devices off  Taken 4/26/2024 1948 by Xin Holliday RN  Activity Management: activity encouraged  Taken 4/26/2024 1943 by Xin Holliday RN  Activity Management:   ambulated to bathroom   back to bed  Intervention: Prevent Infection  Recent Flowsheet Documentation  Taken 4/26/2024 2000 by Xin Holliday RN  Infection Prevention: rest/sleep promoted  Goal: Optimal Comfort and Wellbeing  Outcome: Ongoing, Progressing  Intervention: Provide Person-Centered Care  Recent Flowsheet Documentation  Taken  4/26/2024 2000 by Xin Holliday RN  Trust Relationship/Rapport:   care explained   choices provided  Goal: Readiness for Transition of Care  Outcome: Ongoing, Progressing     Problem: Fall Injury Risk  Goal: Absence of Fall and Fall-Related Injury  Outcome: Ongoing, Progressing  Intervention: Promote Injury-Free Environment  Recent Flowsheet Documentation  Taken 4/26/2024 2000 by Xin Holliday RN  Safety Promotion/Fall Prevention: safety round/check completed     Problem: Skin Injury Risk Increased  Goal: Skin Health and Integrity  Outcome: Ongoing, Progressing  Intervention: Optimize Skin Protection  Recent Flowsheet Documentation  Taken 4/26/2024 2000 by Xin Holliday RN  Pressure Reduction Techniques: weight shift assistance provided  Head of Bed (HOB) Positioning: HOB at 30 degrees  Pressure Reduction Devices:   positioning supports utilized   pressure-redistributing mattress utilized  Skin Protection:   adhesive use limited   transparent dressing maintained   skin-to-skin areas padded   skin-to-device areas padded   tubing/devices free from skin contact

## 2024-04-28 LAB
ANION GAP SERPL CALCULATED.3IONS-SCNC: 7 MMOL/L (ref 5–15)
BASOPHILS # BLD AUTO: 0.04 10*3/MM3 (ref 0–0.2)
BASOPHILS NFR BLD AUTO: 0.4 % (ref 0–1.5)
BUN SERPL-MCNC: 15 MG/DL (ref 8–23)
BUN/CREAT SERPL: 20.3 (ref 7–25)
CALCIUM SPEC-SCNC: 6.9 MG/DL (ref 8.6–10.5)
CHLORIDE SERPL-SCNC: 106 MMOL/L (ref 98–107)
CO2 SERPL-SCNC: 31 MMOL/L (ref 22–29)
CREAT SERPL-MCNC: 0.74 MG/DL (ref 0.57–1)
DEPRECATED RDW RBC AUTO: 45.9 FL (ref 37–54)
EGFRCR SERPLBLD CKD-EPI 2021: 81.9 ML/MIN/1.73
EOSINOPHIL # BLD AUTO: 0.48 10*3/MM3 (ref 0–0.4)
EOSINOPHIL NFR BLD AUTO: 4.8 % (ref 0.3–6.2)
ERYTHROCYTE [DISTWIDTH] IN BLOOD BY AUTOMATED COUNT: 13.7 % (ref 12.3–15.4)
GLUCOSE BLDC GLUCOMTR-MCNC: 111 MG/DL (ref 70–130)
GLUCOSE BLDC GLUCOMTR-MCNC: 125 MG/DL (ref 70–130)
GLUCOSE BLDC GLUCOMTR-MCNC: 205 MG/DL (ref 70–130)
GLUCOSE BLDC GLUCOMTR-MCNC: 208 MG/DL (ref 70–130)
GLUCOSE SERPL-MCNC: 114 MG/DL (ref 65–99)
HCT VFR BLD AUTO: 25.3 % (ref 34–46.6)
HGB BLD-MCNC: 8.3 G/DL (ref 12–15.9)
IMM GRANULOCYTES # BLD AUTO: 0.05 10*3/MM3 (ref 0–0.05)
IMM GRANULOCYTES NFR BLD AUTO: 0.5 % (ref 0–0.5)
LYMPHOCYTES # BLD AUTO: 3.01 10*3/MM3 (ref 0.7–3.1)
LYMPHOCYTES NFR BLD AUTO: 29.9 % (ref 19.6–45.3)
MCH RBC QN AUTO: 30 PG (ref 26.6–33)
MCHC RBC AUTO-ENTMCNC: 32.8 G/DL (ref 31.5–35.7)
MCV RBC AUTO: 91.3 FL (ref 79–97)
MONOCYTES # BLD AUTO: 0.88 10*3/MM3 (ref 0.1–0.9)
MONOCYTES NFR BLD AUTO: 8.7 % (ref 5–12)
NEUTROPHILS NFR BLD AUTO: 5.62 10*3/MM3 (ref 1.7–7)
NEUTROPHILS NFR BLD AUTO: 55.7 % (ref 42.7–76)
NRBC BLD AUTO-RTO: 0 /100 WBC (ref 0–0.2)
PLATELET # BLD AUTO: 258 10*3/MM3 (ref 140–450)
PMV BLD AUTO: 11.4 FL (ref 6–12)
POTASSIUM SERPL-SCNC: 3.4 MMOL/L (ref 3.5–5.2)
RBC # BLD AUTO: 2.77 10*6/MM3 (ref 3.77–5.28)
SODIUM SERPL-SCNC: 144 MMOL/L (ref 136–145)
WBC NRBC COR # BLD AUTO: 10.08 10*3/MM3 (ref 3.4–10.8)

## 2024-04-28 PROCEDURE — 82948 REAGENT STRIP/BLOOD GLUCOSE: CPT

## 2024-04-28 PROCEDURE — 63710000001 INSULIN LISPRO (HUMAN) PER 5 UNITS: Performed by: INTERNAL MEDICINE

## 2024-04-28 PROCEDURE — 85025 COMPLETE CBC W/AUTO DIFF WBC: CPT | Performed by: COLON & RECTAL SURGERY

## 2024-04-28 PROCEDURE — 25010000002 HEPARIN (PORCINE) PER 1000 UNITS: Performed by: COLON & RECTAL SURGERY

## 2024-04-28 PROCEDURE — 63710000001 INSULIN GLARGINE PER 5 UNITS: Performed by: INTERNAL MEDICINE

## 2024-04-28 PROCEDURE — 80048 BASIC METABOLIC PNL TOTAL CA: CPT | Performed by: COLON & RECTAL SURGERY

## 2024-04-28 RX ADMIN — ACETAMINOPHEN 650 MG: 325 TABLET ORAL at 15:05

## 2024-04-28 RX ADMIN — ALLOPURINOL 100 MG: 100 TABLET ORAL at 10:08

## 2024-04-28 RX ADMIN — HYDROCODONE BITARTRATE AND ACETAMINOPHEN 1 TABLET: 7.5; 325 TABLET ORAL at 10:07

## 2024-04-28 RX ADMIN — ATORVASTATIN CALCIUM 20 MG: 20 TABLET, FILM COATED ORAL at 20:12

## 2024-04-28 RX ADMIN — Medication 10 MG: at 20:12

## 2024-04-28 RX ADMIN — PANTOPRAZOLE SODIUM 40 MG: 40 TABLET, DELAYED RELEASE ORAL at 05:11

## 2024-04-28 RX ADMIN — HEPARIN SODIUM 5000 UNITS: 5000 INJECTION INTRAVENOUS; SUBCUTANEOUS at 20:12

## 2024-04-28 RX ADMIN — LISINOPRIL 10 MG: 10 TABLET ORAL at 10:07

## 2024-04-28 RX ADMIN — GABAPENTIN 100 MG: 100 CAPSULE ORAL at 10:08

## 2024-04-28 RX ADMIN — SODIUM BICARBONATE 75 MEQ: 84 INJECTION, SOLUTION INTRAVENOUS at 15:04

## 2024-04-28 RX ADMIN — HEPARIN SODIUM 5000 UNITS: 5000 INJECTION INTRAVENOUS; SUBCUTANEOUS at 05:11

## 2024-04-28 RX ADMIN — HEPARIN SODIUM 5000 UNITS: 5000 INJECTION INTRAVENOUS; SUBCUTANEOUS at 15:04

## 2024-04-28 RX ADMIN — ACETAMINOPHEN 650 MG: 325 TABLET ORAL at 20:12

## 2024-04-28 RX ADMIN — HYDROCODONE BITARTRATE AND ACETAMINOPHEN 1 TABLET: 7.5; 325 TABLET ORAL at 22:19

## 2024-04-28 RX ADMIN — LEVOTHYROXINE SODIUM 100 MCG: 0.1 TABLET ORAL at 05:11

## 2024-04-28 RX ADMIN — INSULIN LISPRO 3 UNITS: 100 INJECTION, SOLUTION INTRAVENOUS; SUBCUTANEOUS at 13:06

## 2024-04-28 RX ADMIN — CALCITRIOL CAPSULES 0.25 MCG 0.25 MCG: 0.25 CAPSULE ORAL at 10:08

## 2024-04-28 RX ADMIN — INSULIN LISPRO 3 UNITS: 100 INJECTION, SOLUTION INTRAVENOUS; SUBCUTANEOUS at 20:12

## 2024-04-28 RX ADMIN — DIAZEPAM 5 MG: 5 TABLET ORAL at 20:11

## 2024-04-28 RX ADMIN — HYDROCODONE BITARTRATE AND ACETAMINOPHEN 1 TABLET: 7.5; 325 TABLET ORAL at 15:05

## 2024-04-28 RX ADMIN — ACETAMINOPHEN 650 MG: 325 TABLET ORAL at 05:11

## 2024-04-28 RX ADMIN — INSULIN GLARGINE 10 UNITS: 100 INJECTION, SOLUTION SUBCUTANEOUS at 10:08

## 2024-04-28 RX ADMIN — DULOXETINE HYDROCHLORIDE 30 MG: 30 CAPSULE, DELAYED RELEASE ORAL at 10:07

## 2024-04-28 RX ADMIN — SODIUM BICARBONATE 75 MEQ: 84 INJECTION, SOLUTION INTRAVENOUS at 01:02

## 2024-04-28 RX ADMIN — DIAZEPAM 5 MG: 5 TABLET ORAL at 10:07

## 2024-04-28 NOTE — PLAN OF CARE
Goal Outcome Evaluation:  Plan of Care Reviewed With: patient       VSS. RA. Alert and oriented. Slept ok. New IV placed. No c/o nausea. Had to give some PRNs for pain. Dressing changed. Good urine output, still have bowel movements.           Progress: improving         Problem: Adult Inpatient Plan of Care  Goal: Plan of Care Review  Outcome: Ongoing, Progressing  Flowsheets (Taken 4/27/2024 2329)  Progress: improving  Plan of Care Reviewed With: patient  Goal: Patient-Specific Goal (Individualized)  Outcome: Ongoing, Progressing  Goal: Absence of Hospital-Acquired Illness or Injury  Outcome: Ongoing, Progressing  Intervention: Identify and Manage Fall Risk  Recent Flowsheet Documentation  Taken 4/27/2024 2000 by Xin Holliday RN  Safety Promotion/Fall Prevention:   safety round/check completed   room organization consistent  Intervention: Prevent Skin Injury  Recent Flowsheet Documentation  Taken 4/27/2024 2039 by Xin Holliday RN  Body Position:   turned   sitting up in bed  Taken 4/27/2024 2026 by Xin Holliday RN  Body Position:   turned   sitting up in bed  Taken 4/27/2024 2000 by Xin Holliday RN  Body Position: weight shifting  Skin Protection: adhesive use limited  Intervention: Prevent and Manage VTE (Venous Thromboembolism) Risk  Recent Flowsheet Documentation  Taken 4/27/2024 2039 by Xin Holliday RN  Activity Management: ambulated to bathroom  Taken 4/27/2024 2026 by Xin Holliday, RN  Activity Management: activity encouraged  Taken 4/27/2024 2000 by Xin Holliday RN  Activity Management: activity encouraged  VTE Prevention/Management: (hep subq)   bilateral   sequential compression devices off  Intervention: Prevent Infection  Recent Flowsheet Documentation  Taken 4/27/2024 2000 by Xin Holliday RN  Infection Prevention: rest/sleep promoted  Goal: Optimal Comfort and Wellbeing  Outcome: Ongoing, Progressing  Intervention: Provide Person-Centered Care  Recent Flowsheet  Documentation  Taken 4/27/2024 2000 by Xin Holliday RN  Trust Relationship/Rapport:   care explained   choices provided  Goal: Readiness for Transition of Care  Outcome: Ongoing, Progressing     Problem: Fall Injury Risk  Goal: Absence of Fall and Fall-Related Injury  Outcome: Ongoing, Progressing  Intervention: Promote Injury-Free Environment  Recent Flowsheet Documentation  Taken 4/27/2024 2000 by Xin Holliday, RN  Safety Promotion/Fall Prevention:   safety round/check completed   room organization consistent     Problem: Infection Progression (Sepsis/Septic Shock)  Goal: Absence of Infection Signs and Symptoms  Outcome: Ongoing, Progressing  Intervention: Initiate Sepsis Management  Recent Flowsheet Documentation  Taken 4/27/2024 2000 by Xin Holliday, RN  Infection Prevention: rest/sleep promoted  Intervention: Promote Recovery  Recent Flowsheet Documentation  Taken 4/27/2024 2039 by Xin Holliday, RN  Activity Management: ambulated to bathroom  Taken 4/27/2024 2026 by Xin Holliday, RN  Activity Management: activity encouraged  Taken 4/27/2024 2000 by Xin Holliday, RN  Activity Management: activity encouraged

## 2024-04-28 NOTE — PROGRESS NOTES
"IM progress note      Cristy Louie  [id number]  1945     LOS: 3 days     Attending: Lopez Barba MD    Primary Care Provider: Sandra Daniel MD      Chief Complaint/Reason for visit:  No chief complaint on file.      Subjective   Doing well. Good pain control. Ambulating. Tolerating diet. No new complaints. Denies f/c/n/v/sob/cp.     Objective        Visit Vitals  /66 (BP Location: Left arm, Patient Position: Sitting)   Pulse 86   Temp 98.2 °F (36.8 °C) (Temporal)   Resp 18   Ht 154.9 cm (61\")   Wt 68 kg (150 lb)   SpO2 97%   BMI 28.34 kg/m²     Temp (24hrs), Av.1 °F (36.7 °C), Min:97 °F (36.1 °C), Max:98.6 °F (37 °C)     Physical Therapy:  (24) PT eval completed. Pt ambulated 60 feet CGA-Min A x1 with one LOB with decreased gait speed, decreased stride length, and decreased margaret. Mobility limited d/t ab incisional pain. Pt demonstrated pain limiting function, generalized weakness, and balance deficits warranting IPPT POC. d/c rec home with assist, HHPT, and trial FWx next PT session.     Physical Exam:     General Appearance:    Alert, cooperative, in no acute distress   Head:    Normocephalic, without obvious abnormality, atraumatic    Lungs:     Normal effort, symmetric chest rise,  clear to      auscultation bilaterally              Heart:    Regular rhythm and normal rate, normal S1 and S2    Abdomen:    Soft and benign.  Intact dressing.  Expected tenderness   Extremities: No clubbing, cyanosis or edema.  No deformities.    Pulses:   Pulses palpable and equal bilaterally   Skin:   No bleeding, bruising or rash          Results Review:     I reviewed the patient's new clinical results.   Results from last 7 days   Lab Units 24  03424  0336 24  0456   WBC 10*3/mm3 10.08 12.30* 12.64*   HEMOGLOBIN g/dL 8.3* 8.8* 9.5*   HEMATOCRIT % 25.3* 26.5* 28.7*   PLATELETS 10*3/mm3 258 255 283     Results from last 7 days   Lab Units 24  03424  0336 " 04/26/24  0456   SODIUM mmol/L 144 136 134*   POTASSIUM mmol/L 3.4* 3.7 4.7   CHLORIDE mmol/L 106 100 101   CO2 mmol/L 31.0* 26.0 20.0*   BUN mg/dL 15 20 32*   CREATININE mg/dL 0.74 0.80 0.90   CALCIUM mg/dL 6.9* 7.1* 7.8*   GLUCOSE mg/dL 114* 133* 287*      Latest Reference Range & Units 04/28/24 03:41 04/28/24 07:36 04/28/24 11:16   Glucose 70 - 130 mg/dL 114 (H) 125 205 (H)   (H): Data is abnormally high    I reviewed the patient's new imaging including images and reports.    All medications reviewed.   acetaminophen, 650 mg, Oral, Q8H  allopurinol, 100 mg, Oral, Daily  atorvastatin, 20 mg, Oral, Nightly  calcitriol, 0.25 mcg, Oral, Daily  DULoxetine, 30 mg, Oral, Daily  heparin (porcine), 5,000 Units, Subcutaneous, Q8H  insulin glargine, 10 Units, Subcutaneous, Daily  insulin lispro, 2-7 Units, Subcutaneous, 4x Daily AC & at Bedtime  levothyroxine, 100 mcg, Oral, Q AM  lisinopril, 10 mg, Oral, Daily  melatonin, 10 mg, Oral, Nightly  pantoprazole, 40 mg, Oral, Q AM      dextrose, 25 g, Q15 Min PRN  dextrose, 15 g, Q15 Min PRN  diazePAM, 5 mg, Q6H PRN  glucagon (human recombinant), 1 mg, Q15 Min PRN  HYDROcodone-acetaminophen, 1 tablet, Q4H PRN  HYDROmorphone, 1 mg, Q4H PRN   And  naloxone, 0.4 mg, Q5 Min PRN  hydrOXYzine, 25 mg, TID PRN  ibuprofen, 400 mg, Q6H PRN  ondansetron ODT, 4 mg, Q6H PRN   Or  ondansetron, 4 mg, Q6H PRN        Assessment & Plan       Status post reversal of ileostomy    Anxiety disorder    Asthma    Gastroesophageal reflux disease    Essential hypertension    Rheumatoid arthritis    Type 2 diabetes mellitus without complication, without long-term current use of insulin    Stage 3a chronic kidney disease    History of thyroid cancer    S/P partial resection of colon    Colovesical fistula         Plan  1. Ambulation, several times daily.  2. Pain control-prns       3. IS-encourage  4. DVT proph-Mechanical, subcutaneous heparin.  5. Bowel regimen, alvimopan  6. Monitor post-op labs  7. DC  planning for home, likely tomorrow    -Anxiety: Resume home regimen.     -Hypothyroidism: Resumed replacement.     -DM type 2  Hgb A1C 7.3  Hold OHA as appropriate  FSBG AC/HS, ( q 6 when NPO), along with correction humalog.  Long acting insulin added.      - Hypertension:BP ok.   Resumed home medications as appropriate, formulary substitution when indicated.  Holding parameters.  Prn medications for elevated blood pressure.     -Dyslipidemia:  Resume home regimen statin ( formulary substitution when appropriate).      -GERD:  Resume PPI.  Formulary substitution when indicated.      Otilia Espinoza, JULIANO  04/28/24  14:32 EDT

## 2024-04-28 NOTE — PROGRESS NOTES
"[facility] ([facility])        Length of stay: 3 days    Subjective:  Stable Post - Op course.    Vital Signs:  Blood pressure 125/66, pulse 86, temperature 98.2 °F (36.8 °C), temperature source Temporal, resp. rate 18, height 154.9 cm (61\"), weight 68 kg (150 lb), SpO2 97%, not currently breastfeeding.    Labs past 24 hours:  Lab Results (last 24 hours)       Procedure Component Value Units Date/Time    POC Glucose Once [[id number]]  (Abnormal) Collected: 04/28/24 1116    Specimen: Blood Updated: 04/28/24 1132     Glucose 205 mg/dL     POC Glucose Once [[id number]]  (Normal) Collected: 04/28/24 0736    Specimen: Blood Updated: 04/28/24 0738     Glucose 125 mg/dL     Basic Metabolic Panel [[id number]]  (Abnormal) Collected: 04/28/24 0341    Specimen: Blood Updated: 04/28/24 0546     Glucose 114 mg/dL      BUN 15 mg/dL      Creatinine 0.74 mg/dL      Sodium 144 mmol/L      Potassium 3.4 mmol/L      Chloride 106 mmol/L      CO2 31.0 mmol/L      Calcium 6.9 mg/dL      BUN/Creatinine Ratio 20.3     Anion Gap 7.0 mmol/L      eGFR 81.9 mL/min/1.73     Narrative:      GFR Normal >60  Chronic Kidney Disease <60  Kidney Failure <15    The GFR formula is only valid for adults with stable renal function between ages 18 and 70.    CBC & Differential [[id number]]  (Abnormal) Collected: 04/28/24 0341    Specimen: Blood Updated: 04/28/24 0504    Narrative:      The following orders were created for panel order CBC & Differential.  Procedure                               Abnormality         Status                     ---------                               -----------         ------                     CBC Auto Differential[[id number]]        Abnormal            Final result                 Please view results for these tests on the individual orders.    CBC Auto Differential [[id number]]  (Abnormal) Collected: 04/28/24 0341    Specimen: Blood Updated: 04/28/24 0504     WBC 10.08 10*3/mm3      RBC 2.77 " 10*6/mm3      Hemoglobin 8.3 g/dL      Hematocrit 25.3 %      MCV 91.3 fL      MCH 30.0 pg      MCHC 32.8 g/dL      RDW 13.7 %      RDW-SD 45.9 fl      MPV 11.4 fL      Platelets 258 10*3/mm3      Neutrophil % 55.7 %      Lymphocyte % 29.9 %      Monocyte % 8.7 %      Eosinophil % 4.8 %      Basophil % 0.4 %      Immature Grans % 0.5 %      Neutrophils, Absolute 5.62 10*3/mm3      Lymphocytes, Absolute 3.01 10*3/mm3      Monocytes, Absolute 0.88 10*3/mm3      Eosinophils, Absolute 0.48 10*3/mm3      Basophils, Absolute 0.04 10*3/mm3      Immature Grans, Absolute 0.05 10*3/mm3      nRBC 0.0 /100 WBC     POC Glucose Once [[id number]]  (Abnormal) Collected: 04/27/24 1950    Specimen: Blood Updated: 04/27/24 1958     Glucose 273 mg/dL     POC Glucose Once [[id number]]  (Abnormal) Collected: 04/27/24 1622    Specimen: Blood Updated: 04/27/24 1623     Glucose 149 mg/dL             I/O last shift:  I/O this shift:  In: 360 [P.O.:360]  Out: -      PHYSICAL EXAM-  Comfortable  Passing stool, some black blood like material consistent with drop in hematocrit which appears pretty stable over the last 24 hours  Abdomen is soft  Incision looks good  Not distended or particularly tender    Pathology:  Order Name Source Comment Collection Info Order Time   BASIC METABOLIC PANEL   Collected By: Lyubov Purcell 4/25/2024 10:02 PM     Release to patient   Routine Release        TYPE AND SCREEN   Collected By: Ngozi Lopez RN 4/25/2024 12:47 PM     Release to patient   Routine Release        TISSUE PATHOLOGY EXAM Abdomen, Lower  Collected By: Lopez Barba MD 4/25/2024  4:33 PM     Release to patient   Routine Release        .    Assessment and Plan:  Diet and activity as tolerated  She will call her family today, they should be able to get her tomorrow  Cleared for discharge tomorrow from my standpoint, discharge instructions reviewed.    Lopez Barba MD  04/28/24  13:28 EDT

## 2024-04-28 NOTE — PLAN OF CARE
Problem: Adult Inpatient Plan of Care  Goal: Plan of Care Review  Outcome: Ongoing, Progressing  Flowsheets (Taken 4/28/2024 4018)  Progress: improving  Plan of Care Reviewed With: patient  Outcome Evaluation: Patient sat in chair. Pain has been controlled. V/S stable.   Goal Outcome Evaluation:  Plan of Care Reviewed With: patient        Progress: improving  Outcome Evaluation: Patient sat in chair. Pain has been controlled. V/S stable.

## 2024-04-29 ENCOUNTER — READMISSION MANAGEMENT (OUTPATIENT)
Dept: CALL CENTER | Facility: HOSPITAL | Age: 79
End: 2024-04-29
Payer: MEDICARE

## 2024-04-29 VITALS
RESPIRATION RATE: 20 BRPM | WEIGHT: 150 LBS | BODY MASS INDEX: 28.32 KG/M2 | TEMPERATURE: 97.1 F | HEIGHT: 61 IN | DIASTOLIC BLOOD PRESSURE: 58 MMHG | SYSTOLIC BLOOD PRESSURE: 138 MMHG | OXYGEN SATURATION: 97 % | HEART RATE: 88 BPM

## 2024-04-29 LAB
CYTO UR: NORMAL
GLUCOSE BLDC GLUCOMTR-MCNC: 119 MG/DL (ref 70–130)
HCT VFR BLD AUTO: 28.3 % (ref 34–46.6)
HGB BLD-MCNC: 9 G/DL (ref 12–15.9)
LAB AP CASE REPORT: NORMAL
LAB AP CLINICAL INFORMATION: NORMAL
PATH REPORT.FINAL DX SPEC: NORMAL
PATH REPORT.GROSS SPEC: NORMAL

## 2024-04-29 PROCEDURE — 85014 HEMATOCRIT: CPT | Performed by: INTERNAL MEDICINE

## 2024-04-29 PROCEDURE — 63710000001 INSULIN GLARGINE PER 5 UNITS: Performed by: INTERNAL MEDICINE

## 2024-04-29 PROCEDURE — 25010000002 HEPARIN (PORCINE) PER 1000 UNITS: Performed by: COLON & RECTAL SURGERY

## 2024-04-29 PROCEDURE — 85018 HEMOGLOBIN: CPT | Performed by: INTERNAL MEDICINE

## 2024-04-29 PROCEDURE — 82948 REAGENT STRIP/BLOOD GLUCOSE: CPT

## 2024-04-29 RX ORDER — HYDROCODONE BITARTRATE AND ACETAMINOPHEN 7.5; 325 MG/1; MG/1
1 TABLET ORAL EVERY 4 HOURS PRN
Qty: 20 TABLET | Refills: 0 | Status: SHIPPED | OUTPATIENT
Start: 2024-04-29 | End: 2024-05-05

## 2024-04-29 RX ORDER — FUROSEMIDE 20 MG/1
20 TABLET ORAL ONCE
Status: COMPLETED | OUTPATIENT
Start: 2024-04-29 | End: 2024-04-29

## 2024-04-29 RX ORDER — POTASSIUM CHLORIDE 750 MG/1
40 CAPSULE, EXTENDED RELEASE ORAL ONCE
Status: COMPLETED | OUTPATIENT
Start: 2024-04-29 | End: 2024-04-29

## 2024-04-29 RX ADMIN — CALCITRIOL CAPSULES 0.25 MCG 0.25 MCG: 0.25 CAPSULE ORAL at 08:20

## 2024-04-29 RX ADMIN — PANTOPRAZOLE SODIUM 40 MG: 40 TABLET, DELAYED RELEASE ORAL at 05:47

## 2024-04-29 RX ADMIN — ACETAMINOPHEN 650 MG: 325 TABLET ORAL at 05:47

## 2024-04-29 RX ADMIN — HEPARIN SODIUM 5000 UNITS: 5000 INJECTION INTRAVENOUS; SUBCUTANEOUS at 05:47

## 2024-04-29 RX ADMIN — POTASSIUM CHLORIDE 40 MEQ: 750 CAPSULE, EXTENDED RELEASE ORAL at 05:47

## 2024-04-29 RX ADMIN — ALLOPURINOL 100 MG: 100 TABLET ORAL at 08:20

## 2024-04-29 RX ADMIN — LEVOTHYROXINE SODIUM 100 MCG: 0.1 TABLET ORAL at 05:47

## 2024-04-29 RX ADMIN — DULOXETINE HYDROCHLORIDE 30 MG: 30 CAPSULE, DELAYED RELEASE ORAL at 08:20

## 2024-04-29 RX ADMIN — LISINOPRIL 10 MG: 10 TABLET ORAL at 08:20

## 2024-04-29 RX ADMIN — HYDROXYZINE HYDROCHLORIDE 25 MG: 25 TABLET, FILM COATED ORAL at 00:45

## 2024-04-29 RX ADMIN — IBUPROFEN 400 MG: 400 TABLET, FILM COATED ORAL at 00:45

## 2024-04-29 RX ADMIN — INSULIN GLARGINE 10 UNITS: 100 INJECTION, SOLUTION SUBCUTANEOUS at 08:20

## 2024-04-29 RX ADMIN — FUROSEMIDE 20 MG: 20 TABLET ORAL at 05:47

## 2024-04-29 NOTE — PLAN OF CARE
Goal Outcome Evaluation:  Plan of Care Reviewed With: patient       VSS. RA. Alert and oriented. Pain better controlled, no c/o nausea. Good urine output, many small loose bowel movements. Educated pt on dressing changes. Did dressing change at around 0500.         Progress: improving     Problem: Adult Inpatient Plan of Care  Goal: Plan of Care Review  Outcome: Ongoing, Progressing  Flowsheets (Taken 4/28/2024 2142)  Progress: improving  Plan of Care Reviewed With: patient  Goal: Patient-Specific Goal (Individualized)  Outcome: Ongoing, Progressing  Goal: Absence of Hospital-Acquired Illness or Injury  Outcome: Ongoing, Progressing  Intervention: Identify and Manage Fall Risk  Recent Flowsheet Documentation  Taken 4/28/2024 2000 by Xin Holliday RN  Safety Promotion/Fall Prevention: safety round/check completed  Intervention: Prevent Skin Injury  Recent Flowsheet Documentation  Taken 4/28/2024 2000 by Xin Holliday RN  Body Position: weight shifting  Skin Protection:   adhesive use limited   skin-to-skin areas padded   skin-to-device areas padded   transparent dressing maintained  Intervention: Prevent and Manage VTE (Venous Thromboembolism) Risk  Recent Flowsheet Documentation  Taken 4/28/2024 2000 by Xin Holliday RN  Activity Management: activity encouraged  VTE Prevention/Management: (hep subq)   bilateral   sequential compression devices off  Goal: Optimal Comfort and Wellbeing  Outcome: Ongoing, Progressing  Intervention: Provide Person-Centered Care  Recent Flowsheet Documentation  Taken 4/28/2024 2000 by Xin Holliday RN  Trust Relationship/Rapport: care explained  Goal: Readiness for Transition of Care  Outcome: Ongoing, Progressing     Problem: Fall Injury Risk  Goal: Absence of Fall and Fall-Related Injury  Outcome: Ongoing, Progressing  Intervention: Promote Injury-Free Environment  Recent Flowsheet Documentation  Taken 4/28/2024 2000 by Xin Holliday RN  Safety Promotion/Fall  Prevention: safety round/check completed     Problem: Skin Injury Risk Increased  Goal: Skin Health and Integrity  Outcome: Ongoing, Progressing  Intervention: Optimize Skin Protection  Recent Flowsheet Documentation  Taken 4/28/2024 2000 by Xin Holliday RN  Pressure Reduction Techniques: weight shift assistance provided  Pressure Reduction Devices: positioning supports utilized  Skin Protection:   adhesive use limited   skin-to-skin areas padded   skin-to-device areas padded   transparent dressing maintained

## 2024-04-29 NOTE — OUTREACH NOTE
Prep Survey      Flowsheet Row Responses   [facility] patient discharged from? [place]   Is LACE score < 7 ? No   Eligibility [facility]   Date of Admission 04/25/24   Date of Discharge 04/29/24   Discharge Disposition Home or Self Care   Discharge diagnosis Status post reversal of ileostomy   Does the patient have one of the following disease processes/diagnoses(primary or secondary)? General Surgery   Does the patient have Home health ordered? No   Is there a DME ordered? No   Medication alerts for this patient [employer]   Prep survey completed? Yes            Kathryn JEREZ - Registered Nurse

## 2024-04-29 NOTE — DISCHARGE SUMMARY
Patient Name: Cristy Louie  MRN: [id number]  : 1945  DOS: 2024    Attending: Lopez Barba MD    Primary Care Provider: Sandra Daniel MD    Date of Admission:.2024 11:57 AM    Date of Discharge:  2024    Discharge Diagnosis:   Status post reversal of ileostomy    Anxiety disorder    Asthma    Gastroesophageal reflux disease    Essential hypertension    Rheumatoid arthritis    Type 2 diabetes mellitus without complication, without long-term current use of insulin    Stage 3a chronic kidney disease    History of thyroid cancer    S/P partial resection of colon    Colovesical fistula      Hospital Course  At admit    Patient is a pleasant 78 y.o. female presented for scheduled surgery by Dr. Barba.       Patient has history of perforated diverticulitis and colovesical fistula requiring resection and reconstruction and protecting loop ileostomy.  She has since followed up with Dr. Barba and presented today for ileostomy reversal.     She underwent ileostomy takedown with regional lysis of dense adhesions in the right abdomen.  Surgery was done under general anesthesia, was tolerated well.     I saw her in PACU where she is somewhat drowsy but still complaining of expected postoperative pain.  No nausea or vomiting and no shortness of breath.     Reviewed patient's past medical history and home medications.  She lives alone by granddaughter will be able to help her following discharge.  After her previous surgery she tells me she went to rehab but she is hopeful of going home with help from family at the end of his hospital stay.    After admit    She was provided pain medication as needed for pain control.  Patient received DVT prophylaxis with subcutaneous heparin as well as mechanicals      Patient was started on clear liquid diet, this was advanced when she showed evidence of bowel function return.      Tolerated diet without difficulty.    During her stay patient used an IS for  "atelectasis prophylaxis.    Home medications were resumed as appropriate, and labs were monitored and remained fairly stable.    With the progress she has made, pt is ready for DC home today.      Discussed with patient regarding plan and she shows understanding and agreement.       Procedures Performed  Procedure(s):  ILEOSTOMY TAKEDOWN       Pertinent Test Results:    I reviewed the patient's new clinical results.   Results from last 7 days   Lab Units 24  0456   WBC 10*3/mm3 10.08 12.30* 12.64*   HEMOGLOBIN g/dL 8.3* 8.8* 9.5*   HEMATOCRIT % 25.3* 26.5* 28.7*   PLATELETS 10*3/mm3 258 255 283     Results from last 7 days   Lab Units 24  0456   SODIUM mmol/L 144 136 134*   POTASSIUM mmol/L 3.4* 3.7 4.7   CHLORIDE mmol/L 106 100 101   CO2 mmol/L 31.0* 26.0 20.0*   BUN mg/dL 15 20 32*   CREATININE mg/dL 0.74 0.80 0.90   CALCIUM mg/dL 6.9* 7.1* 7.8*   GLUCOSE mg/dL 114* 133* 287*     I reviewed the patient's new imaging including images and reports.         Discharge Assessment:    Vital Signs  Visit Vitals  /58 (BP Location: Left arm, Patient Position: Lying)   Pulse 88   Temp 97.1 °F (36.2 °C) (Axillary)   Resp 20   Ht 154.9 cm (61\")   Wt 68 kg (150 lb)   SpO2 97%   BMI 28.34 kg/m²     Temp (24hrs), Av.1 °F (36.2 °C), Min:96.2 °F (35.7 °C), Max:97.7 °F (36.5 °C)      General Appearance:    Alert, cooperative, in no acute distress   Lungs:     Clear to auscultation,respirations regular, even and                   unlabored    Heart:    Regular rhythm and normal rate, normal S1 and S2    Abdomen:   Soft and benign with intact dressing over old stoma site   Extremities:   Moves all extremities well, no edema, no cyanosis, no              redness   Pulses:   Pulses palpable and equal bilaterally   Skin:   No bleeding, bruising or rash            Discharge Disposition: Home          Discharge Medications        New Medications        " Instructions Start Date   HYDROcodone-acetaminophen 7.5-325 MG per tablet  Commonly known as: NORCO   1 tablet, Oral, Every 4 Hours PRN             Continue These Medications        Instructions Start Date   Accu-Chek FastClix Lancets misc   1 each, Does not apply, Daily      Accu-Chek Guide Me w/Device kit   1 Device, Does not apply, Take As Directed      Accu-Chek Guide test strip  Generic drug: glucose blood   1 each, Other, Daily      allopurinol 100 MG tablet  Commonly known as: ZYLOPRIM   100 mg, Oral, Daily      calcitriol 0.25 MCG capsule  Commonly known as: ROCALTROL   0.25 mcg, Oral, Daily      DULoxetine 30 MG capsule  Commonly known as: CYMBALTA   30 mg, Oral, Daily      fluticasone 50 MCG/ACT nasal spray  Commonly known as: FLONASE   1 spray, Nasal, As Needed      glimepiride 4 MG tablet  Commonly known as: AMARYL   4 mg, Oral, Every Morning Before Breakfast      hydrOXYzine 25 MG tablet  Commonly known as: ATARAX   25 mg, Oral, 3 Times Daily PRN      levothyroxine 100 MCG tablet  Commonly known as: SYNTHROID, LEVOTHROID   100 mcg, Oral, Every Morning      lisinopril 10 MG tablet  Commonly known as: PRINIVIL,ZESTRIL   10 mg, Oral, Daily      Lutein 20 MG capsule   20 mg, Oral, Daily      Melatonin 10 MG capsule   10 mg, Oral, Nightly      metFORMIN  MG 24 hr tablet  Commonly known as: GLUCOPHAGE-XR   500 mg, Oral, 2 Times Daily With Meals      nystatin 862546 UNIT/GM powder  Commonly known as: MYCOSTATIN   Topical, 3 Times Daily      omeprazole 20 MG capsule  Commonly known as: priLOSEC   20 mg, Oral, Daily      simvastatin 40 MG tablet  Commonly known as: ZOCOR   40 mg, Oral, Daily               Discharge Diet: Soft diet, low fiber    Activity at Discharge: Ambulate.  Restrictions per colorectal surgery     Future Appointments   Date Time Provider Department Center   5/20/2024  3:00 PM Sandra Daniel MD [facility]   9/17/2024 10:00 AM Maureen Aiken MD [facility]      Lopez Barba  MD per his orders    Discharge took over 30 min    Dragon disclaimer:  Part of this encounter note is an electronic transcription/translation of spoken language to printed text. The electronic translation of spoken language may permit erroneous, or at times, nonsensical words or phrases to be inadvertently transcribed; Although I have reviewed the note for such errors, some may still exist.       Daryl Malik MD  04/29/24  12:26 EDT

## 2024-04-29 NOTE — CASE MANAGEMENT/SOCIAL WORK
Continued Stay Note  [facility]     Patient Name: Cristy Louie  MRN: [id number]  Today's Date: 4/29/2024    Admit Date: 4/25/2024    Plan: Home   Discharge Plan       Row Name 04/29/24 7840       Plan    Plan Home    Patient/Family in Agreement with Plan yes    Plan Comments Cassy met with Ms. Louie at the bedside to discuss today's discharge.  I have offered to submit referrals for PT/OT recommended home health however she declines these services.  She denies having any discharge needs and states that her granddaughter will provide transportation and assistance as needed.    Final Discharge Disposition Code 01 - home or self-care                   Discharge Codes    No documentation.                 Expected Discharge Date and Time       Expected Discharge Date Expected Discharge Time    Apr 29, 2024               Violette Jeff RN

## 2024-04-29 NOTE — PLAN OF CARE
Problem: Adult Inpatient Plan of Care  Goal: Absence of Hospital-Acquired Illness or Injury  Intervention: Identify and Manage Fall Risk  Recent Flowsheet Documentation  Taken 4/29/2024 1000 by Nazanin Ray RN  Safety Promotion/Fall Prevention:   activity supervised   assistive device/personal items within reach   lighting adjusted   nonskid shoes/slippers when out of bed   room organization consistent   safety round/check completed   toileting scheduled   clutter free environment maintained   fall prevention program maintained  Taken 4/29/2024 0800 by Nazanin Ray RN  Safety Promotion/Fall Prevention:   activity supervised   assistive device/personal items within reach   lighting adjusted   nonskid shoes/slippers when out of bed   room organization consistent   safety round/check completed   toileting scheduled   clutter free environment maintained   fall prevention program maintained  Intervention: Prevent Skin Injury  Recent Flowsheet Documentation  Taken 4/29/2024 1000 by Nazanin Ray RN  Skin Protection:   adhesive use limited   tubing/devices free from skin contact   transparent dressing maintained   skin-to-skin areas padded   skin-to-device areas padded  Taken 4/29/2024 0800 by Nazanin Ray RN  Body Position: weight shifting  Skin Protection: adhesive use limited  Intervention: Prevent and Manage VTE (Venous Thromboembolism) Risk  Recent Flowsheet Documentation  Taken 4/29/2024 0800 by Nazanin Ray RN  Activity Management: activity encouraged  Goal: Optimal Comfort and Wellbeing  Intervention: Provide Person-Centered Care  Recent Flowsheet Documentation  Taken 4/29/2024 0800 by Nazanin Ray RN  Trust Relationship/Rapport:   care explained   questions answered     Problem: Fall Injury Risk  Goal: Absence of Fall and Fall-Related Injury  Intervention: Promote Injury-Free Environment  Recent Flowsheet Documentation  Taken 4/29/2024 1000 by Nazanin Ray RN  Safety Promotion/Fall  Prevention:   activity supervised   assistive device/personal items within reach   lighting adjusted   nonskid shoes/slippers when out of bed   room organization consistent   safety round/check completed   toileting scheduled   clutter free environment maintained   fall prevention program maintained  Taken 4/29/2024 0800 by Nazanin Ray RN  Safety Promotion/Fall Prevention:   activity supervised   assistive device/personal items within reach   lighting adjusted   nonskid shoes/slippers when out of bed   room organization consistent   safety round/check completed   toileting scheduled   clutter free environment maintained   fall prevention program maintained     Problem: Skin Injury Risk Increased  Goal: Skin Health and Integrity  Intervention: Optimize Skin Protection  Recent Flowsheet Documentation  Taken 4/29/2024 1000 by Nazanin Ray RN  Pressure Reduction Techniques:   weight shift assistance provided   frequent weight shift encouraged  Head of Bed (HOB) Positioning: HOB elevated  Pressure Reduction Devices:   pressure-redistributing mattress utilized   positioning supports utilized  Skin Protection:   adhesive use limited   tubing/devices free from skin contact   transparent dressing maintained   skin-to-skin areas padded   skin-to-device areas padded  Taken 4/29/2024 0800 by Nazanin Ray RN  Pressure Reduction Techniques: weight shift assistance provided  Head of Bed (HOB) Positioning: HOB elevated  Pressure Reduction Devices: positioning supports utilized  Skin Protection: adhesive use limited     Problem: Adjustment to Illness (Sepsis/Septic Shock)  Goal: Optimal Coping  Intervention: Optimize Psychosocial Adjustment to Illness  Recent Flowsheet Documentation  Taken 4/29/2024 0800 by Nazanin Ray RN  Family/Support System Care: support provided     Problem: Infection Progression (Sepsis/Septic Shock)  Goal: Absence of Infection Signs and Symptoms  Intervention: Promote Recovery  Recent  Flowsheet Documentation  Taken 4/29/2024 0800 by Nazanin Ray, RN  Activity Management: activity encouraged   Goal Outcome Evaluation:  Plan of Care Reviewed With: patient        Progress: improving  Outcome Evaluation: Patient sat in chair. Pain has been controlled. V/S stable.

## 2024-04-30 ENCOUNTER — TRANSITIONAL CARE MANAGEMENT TELEPHONE ENCOUNTER (OUTPATIENT)
Dept: CALL CENTER | Facility: HOSPITAL | Age: 79
End: 2024-04-30
Payer: MEDICARE

## 2024-04-30 NOTE — OUTREACH NOTE
Call Center TCM Note      Flowsheet Row Responses   [facility] patient discharged from? [facility]   Does the patient have one of the following disease processes/diagnoses(primary or secondary)? General Surgery   TCM attempt successful? Yes   Call start time 1439   Call end time 1441   Discharge diagnosis Status post reversal of ileostomy   Person spoke with today (if not patient) and relationship patient   Meds reviewed with patient/caregiver? Yes   Is the patient having any side effects they believe may be caused by any medication additions or changes? No   Does the patient have all medications related to this admission filled (includes all antibiotics, pain medications, etc.) Yes   Is the patient taking all medications as directed (includes completed medication regime)? Yes   Comments Patient has a hospital followup scheduled on 5/6 2024 with PCP office.   Does the patient have an appointment with their PCP within 7-14 days of discharge? Yes   Psychosocial issues? No   Did the patient receive a copy of their discharge instructions? Yes   Nursing interventions Reviewed instructions with patient   What is the patient's perception of their health status since discharge? Improving   Nursing interventions Nurse provided patient education   Is the patient /caregiver able to teach back basic post-op care? Keep incision areas clean,dry and protected   Is the patient/caregiver able to teach back signs and symptoms of incisional infection? Increased redness, swelling or pain at the incisonal site, Incisional warmth, Fever, Pus or odor from incision, Increased drainage or bleeding   Is the patient/caregiver able to teach back steps to recovery at home? Set small, achievable goals for return to baseline health, Rest and rebuild strength, gradually increase activity   If the patient is a current smoker, are they able to teach back resources for cessation? Not a smoker   Is the patient/caregiver able to teach back the  hierarchy of who to call/visit for symptoms/problems? PCP, Specialist, Home health nurse, Urgent Care, ED, 911 Yes   TCM call completed? Yes   Wrap up additional comments Doing well. Having some soreness   Call end time 1441   Would this patient benefit from a Referral to [facility] Social Work? No   Is the patient interested in additional calls from an ambulatory ? No            Michel Storey RN    4/30/2024, 14:42 EDT

## 2024-05-01 LAB — GLUCOSE BLDC GLUCOMTR-MCNC: 296 MG/DL (ref 70–130)

## 2024-05-06 ENCOUNTER — LAB (OUTPATIENT)
Dept: LAB | Facility: HOSPITAL | Age: 79
End: 2024-05-06
Payer: MEDICARE

## 2024-05-06 ENCOUNTER — OFFICE VISIT (OUTPATIENT)
Dept: INTERNAL MEDICINE | Facility: CLINIC | Age: 79
End: 2024-05-06
Payer: MEDICARE

## 2024-05-06 VITALS
OXYGEN SATURATION: 96 % | RESPIRATION RATE: 20 BRPM | BODY MASS INDEX: 29.72 KG/M2 | DIASTOLIC BLOOD PRESSURE: 70 MMHG | WEIGHT: 157.4 LBS | HEIGHT: 61 IN | HEART RATE: 82 BPM | SYSTOLIC BLOOD PRESSURE: 140 MMHG | TEMPERATURE: 97.7 F

## 2024-05-06 DIAGNOSIS — I10 ESSENTIAL HYPERTENSION: ICD-10-CM

## 2024-05-06 DIAGNOSIS — Z98.890 STATUS POST REVERSAL OF ILEOSTOMY: ICD-10-CM

## 2024-05-06 DIAGNOSIS — Z09 HOSPITAL DISCHARGE FOLLOW-UP: ICD-10-CM

## 2024-05-06 DIAGNOSIS — Z98.890 HISTORY OF ILEOSTOMY: ICD-10-CM

## 2024-05-06 DIAGNOSIS — Z76.89 ENCOUNTER FOR SUPPORT AND COORDINATION OF TRANSITION OF CARE: Primary | ICD-10-CM

## 2024-05-06 LAB
DEPRECATED RDW RBC AUTO: 47.9 FL (ref 37–54)
ERYTHROCYTE [DISTWIDTH] IN BLOOD BY AUTOMATED COUNT: 13.8 % (ref 12.3–15.4)
HCT VFR BLD AUTO: 30.5 % (ref 34–46.6)
HGB BLD-MCNC: 9.8 G/DL (ref 12–15.9)
MCH RBC QN AUTO: 30.4 PG (ref 26.6–33)
MCHC RBC AUTO-ENTMCNC: 32.1 G/DL (ref 31.5–35.7)
MCV RBC AUTO: 94.7 FL (ref 79–97)
PLATELET # BLD AUTO: 452 10*3/MM3 (ref 140–450)
PMV BLD AUTO: 11.3 FL (ref 6–12)
RBC # BLD AUTO: 3.22 10*6/MM3 (ref 3.77–5.28)
WBC NRBC COR # BLD AUTO: 11.21 10*3/MM3 (ref 3.4–10.8)

## 2024-05-06 PROCEDURE — 99496 TRANSJ CARE MGMT HIGH F2F 7D: CPT | Performed by: NURSE PRACTITIONER

## 2024-05-06 PROCEDURE — 1160F RVW MEDS BY RX/DR IN RCRD: CPT | Performed by: NURSE PRACTITIONER

## 2024-05-06 PROCEDURE — 85027 COMPLETE CBC AUTOMATED: CPT

## 2024-05-06 PROCEDURE — 1111F DSCHRG MED/CURRENT MED MERGE: CPT | Performed by: NURSE PRACTITIONER

## 2024-05-06 PROCEDURE — 3078F DIAST BP <80 MM HG: CPT | Performed by: NURSE PRACTITIONER

## 2024-05-06 PROCEDURE — 1159F MED LIST DOCD IN RCRD: CPT | Performed by: NURSE PRACTITIONER

## 2024-05-06 PROCEDURE — 80053 COMPREHEN METABOLIC PANEL: CPT

## 2024-05-06 PROCEDURE — 3077F SYST BP >= 140 MM HG: CPT | Performed by: NURSE PRACTITIONER

## 2024-05-07 ENCOUNTER — TELEPHONE (OUTPATIENT)
Dept: INTERNAL MEDICINE | Facility: CLINIC | Age: 79
End: 2024-05-07
Payer: MEDICARE

## 2024-05-07 LAB
ALBUMIN SERPL-MCNC: 3.9 G/DL (ref 3.5–5.2)
ALBUMIN/GLOB SERPL: 1.3 G/DL
ALP SERPL-CCNC: 185 U/L (ref 39–117)
ALT SERPL W P-5'-P-CCNC: 31 U/L (ref 1–33)
ANION GAP SERPL CALCULATED.3IONS-SCNC: 19.3 MMOL/L (ref 5–15)
AST SERPL-CCNC: 24 U/L (ref 1–32)
BILIRUB SERPL-MCNC: <0.2 MG/DL (ref 0–1.2)
BUN SERPL-MCNC: 10 MG/DL (ref 8–23)
BUN/CREAT SERPL: 11.4 (ref 7–25)
CALCIUM SPEC-SCNC: 6.8 MG/DL (ref 8.6–10.5)
CHLORIDE SERPL-SCNC: 103 MMOL/L (ref 98–107)
CO2 SERPL-SCNC: 21.7 MMOL/L (ref 22–29)
CREAT SERPL-MCNC: 0.88 MG/DL (ref 0.57–1)
EGFRCR SERPLBLD CKD-EPI 2021: 66.5 ML/MIN/1.73
GLOBULIN UR ELPH-MCNC: 3 GM/DL
GLUCOSE SERPL-MCNC: 55 MG/DL (ref 65–99)
POTASSIUM SERPL-SCNC: 3.5 MMOL/L (ref 3.5–5.2)
PROT SERPL-MCNC: 6.9 G/DL (ref 6–8.5)
SODIUM SERPL-SCNC: 144 MMOL/L (ref 136–145)

## 2024-05-08 ENCOUNTER — TELEPHONE (OUTPATIENT)
Dept: INTERNAL MEDICINE | Facility: CLINIC | Age: 79
End: 2024-05-08
Payer: MEDICARE

## 2024-05-09 ENCOUNTER — READMISSION MANAGEMENT (OUTPATIENT)
Dept: CALL CENTER | Facility: HOSPITAL | Age: 79
End: 2024-05-09
Payer: MEDICARE

## 2024-05-16 ENCOUNTER — READMISSION MANAGEMENT (OUTPATIENT)
Dept: CALL CENTER | Facility: HOSPITAL | Age: 79
End: 2024-05-16
Payer: MEDICARE

## 2024-05-16 NOTE — OUTREACH NOTE
General Surgery Week 2 Survey      Flowsheet Row Responses   [facility] patient discharged from? [facility]   Does the patient have one of the following disease processes/diagnoses(primary or secondary)? General Surgery   Week 2 attempt successful? Yes   Call start time 1731   Call end time 1738   Discharge diagnosis Status post reversal of ileostomy   Person spoke with today (if not patient) and relationship patient   Meds reviewed with patient/caregiver? Yes   Is the patient having any side effects they believe may be caused by any medication additions or changes? No   Does the patient have all medications related to this admission filled (includes all antibiotics, pain medications, etc.) Yes   Is the patient taking all medications as directed (includes completed medication regime)? Yes   Does the patient have a follow up appointment scheduled with their surgeon? Yes   Has the patient kept scheduled appointments due by today? Yes   Psychosocial issues? No   What is the patient's perception of their health status since discharge? Same   Nursing interventions Nurse provided patient education   Is the patient /caregiver able to teach back basic post-op care? Take showers only when approved by MD-sponge bathe until then, No tub bath, swimming, or hot tub until instructed by MD, Keep incision areas clean,dry and protected, Lifting as instructed by MD in discharge instructions   Is the patient/caregiver able to teach back signs and symptoms of incisional infection? Increased redness, swelling or pain at the incisonal site, Increased drainage or bleeding, Incisional warmth, Pus or odor from incision, Fever   Is the patient/caregiver able to teach back steps to recovery at home? Eat a well-balance diet   If the patient is a current smoker, are they able to teach back resources for cessation? Not a smoker   Is the patient/caregiver able to teach back the hierarchy of who to call/visit for symptoms/problems? PCP,  Specialist, Home health nurse, Urgent Care, ED, 911 Yes   Week 2 call completed? Yes   Is the patient interested in additional calls from an ambulatory ? No   Would this patient benefit from a Referral to [facility] Social Work? No   Wrap up additional comments Pt reports her incision site looks pink and has a little discharge. Pt had Post-op appt. Pt was advised to call surgeons office to report drainage from incisional site.   Call end time [id number]            Sahara MYERS - Registered Nurse

## 2024-05-20 ENCOUNTER — OFFICE VISIT (OUTPATIENT)
Dept: INTERNAL MEDICINE | Facility: CLINIC | Age: 79
End: 2024-05-20
Payer: MEDICARE

## 2024-05-20 ENCOUNTER — LAB (OUTPATIENT)
Dept: LAB | Facility: HOSPITAL | Age: 79
End: 2024-05-20
Payer: MEDICARE

## 2024-05-20 VITALS
SYSTOLIC BLOOD PRESSURE: 134 MMHG | WEIGHT: 150 LBS | RESPIRATION RATE: 16 BRPM | TEMPERATURE: 97 F | BODY MASS INDEX: 28.32 KG/M2 | OXYGEN SATURATION: 98 % | HEIGHT: 61 IN | DIASTOLIC BLOOD PRESSURE: 68 MMHG | HEART RATE: 98 BPM

## 2024-05-20 DIAGNOSIS — E78.2 MIXED HYPERLIPIDEMIA: ICD-10-CM

## 2024-05-20 DIAGNOSIS — F41.1 GENERALIZED ANXIETY DISORDER: ICD-10-CM

## 2024-05-20 DIAGNOSIS — Z86.19 HISTORY OF CLOSTRIDIOIDES DIFFICILE INFECTION: ICD-10-CM

## 2024-05-20 DIAGNOSIS — K21.9 GASTROESOPHAGEAL REFLUX DISEASE WITHOUT ESOPHAGITIS: ICD-10-CM

## 2024-05-20 DIAGNOSIS — Z85.850 HISTORY OF THYROID CANCER: ICD-10-CM

## 2024-05-20 DIAGNOSIS — E11.9 TYPE 2 DIABETES MELLITUS WITHOUT COMPLICATION, WITHOUT LONG-TERM CURRENT USE OF INSULIN: ICD-10-CM

## 2024-05-20 DIAGNOSIS — M05.9 RHEUMATOID ARTHRITIS WITH POSITIVE RHEUMATOID FACTOR, INVOLVING UNSPECIFIED SITE: ICD-10-CM

## 2024-05-20 DIAGNOSIS — I10 ESSENTIAL HYPERTENSION: ICD-10-CM

## 2024-05-20 DIAGNOSIS — E89.2 POST-SURGICAL HYPOPARATHYROIDISM: ICD-10-CM

## 2024-05-20 DIAGNOSIS — M1A.0720 IDIOPATHIC CHRONIC GOUT OF LEFT FOOT WITHOUT TOPHUS: ICD-10-CM

## 2024-05-20 DIAGNOSIS — M85.89 OSTEOPENIA OF MULTIPLE SITES: ICD-10-CM

## 2024-05-20 DIAGNOSIS — N17.9 AKI (ACUTE KIDNEY INJURY): ICD-10-CM

## 2024-05-20 DIAGNOSIS — E89.0 POST-SURGICAL HYPOTHYROIDISM: ICD-10-CM

## 2024-05-20 DIAGNOSIS — Z98.890 STATUS POST REVERSAL OF ILEOSTOMY: ICD-10-CM

## 2024-05-20 DIAGNOSIS — N18.31 STAGE 3A CHRONIC KIDNEY DISEASE: ICD-10-CM

## 2024-05-20 DIAGNOSIS — Z12.31 ENCOUNTER FOR SCREENING MAMMOGRAM FOR MALIGNANT NEOPLASM OF BREAST: ICD-10-CM

## 2024-05-20 DIAGNOSIS — K57.90 DIVERTICULOSIS OF INTESTINE WITHOUT BLEEDING, UNSPECIFIED INTESTINAL TRACT LOCATION: ICD-10-CM

## 2024-05-20 DIAGNOSIS — Z00.00 MEDICARE ANNUAL WELLNESS VISIT, SUBSEQUENT: Primary | ICD-10-CM

## 2024-05-20 DIAGNOSIS — Z90.49 S/P PARTIAL RESECTION OF COLON: ICD-10-CM

## 2024-05-20 PROBLEM — Z93.9 HISTORY OF CREATION OF OSTOMY: Status: RESOLVED | Noted: 2023-12-14 | Resolved: 2024-05-20

## 2024-05-20 PROBLEM — N39.0 UTI (URINARY TRACT INFECTION): Status: RESOLVED | Noted: 2023-12-02 | Resolved: 2024-05-20

## 2024-05-20 PROBLEM — N32.1 COLOVESICAL FISTULA: Status: RESOLVED | Noted: 2024-04-25 | Resolved: 2024-05-20

## 2024-05-20 PROCEDURE — 85027 COMPLETE CBC AUTOMATED: CPT

## 2024-05-20 PROCEDURE — 3078F DIAST BP <80 MM HG: CPT | Performed by: INTERNAL MEDICINE

## 2024-05-20 PROCEDURE — 3075F SYST BP GE 130 - 139MM HG: CPT | Performed by: INTERNAL MEDICINE

## 2024-05-20 PROCEDURE — 1160F RVW MEDS BY RX/DR IN RCRD: CPT | Performed by: INTERNAL MEDICINE

## 2024-05-20 PROCEDURE — 1159F MED LIST DOCD IN RCRD: CPT | Performed by: INTERNAL MEDICINE

## 2024-05-20 PROCEDURE — G0439 PPPS, SUBSEQ VISIT: HCPCS | Performed by: INTERNAL MEDICINE

## 2024-05-20 PROCEDURE — 80061 LIPID PANEL: CPT

## 2024-05-20 PROCEDURE — 84550 ASSAY OF BLOOD/URIC ACID: CPT

## 2024-05-20 PROCEDURE — 1170F FXNL STATUS ASSESSED: CPT | Performed by: INTERNAL MEDICINE

## 2024-05-20 PROCEDURE — 80048 BASIC METABOLIC PNL TOTAL CA: CPT

## 2024-05-20 PROCEDURE — 82306 VITAMIN D 25 HYDROXY: CPT

## 2024-05-20 RX ORDER — LISINOPRIL AND HYDROCHLOROTHIAZIDE 25; 20 MG/1; MG/1
1 TABLET ORAL DAILY
Qty: 90 TABLET | Refills: 1 | Status: SHIPPED | OUTPATIENT
Start: 2024-05-20

## 2024-05-20 RX ORDER — LISINOPRIL AND HYDROCHLOROTHIAZIDE 25; 20 MG/1; MG/1
1 TABLET ORAL DAILY
COMMUNITY
End: 2024-05-20 | Stop reason: SDUPTHER

## 2024-05-20 NOTE — PROGRESS NOTES
The ABCs of the Annual Wellness Visit  Subsequent Medicare Wellness Visit    Chief Complaint   Patient presents with    Medicare Wellness-subsequent       Subjective   History of Present Illness:  Cristy Louie is a 78 y.o. female who presents for a Subsequent Medicare Wellness Visit.    HEALTH RISK ASSESSMENT    Recent Hospitalizations:  Recently treated at the following:  [facility]    Current Medical Providers:  Patient Care Team:  Sandra Daniel MD as PCP - General (Internal Medicine)  Sunil Jones MD as Consulting Physician (Dermatology)  Maureen Aiken MD as Consulting Physician (Endocrinology)  Rajat Kim MD as Consulting Physician (Gastroenterology)  Robby Valadez MD as Consulting Physician (Colon and Rectal Surgery)  Sly Mariano MD as Consulting Physician (Urology)  Maureen Aiken MD as Consulting Physician (Endocrinology)    Smoking Status:  Social History     Tobacco Use   Smoking Status Never    Passive exposure: Past   Smokeless Tobacco Never       Alcohol Consumption:  Social History     Substance and Sexual Activity   Alcohol Use No       Depression Screen:       2024     2:37 PM   PHQ-2/PHQ-9 Depression Screening   Little Interest or Pleasure in Doing Things 0-->not at all   Feeling Down, Depressed or Hopeless 0-->not at all   PHQ-9: Brief Depression Severity Measure Score 0       Fall Risk Screen:  CAROLE Fall Risk Assessment was completed, and patient is at LOW risk for falls.Assessment completed on:2024    Health Habits and Functional and Cognitive Screenin/20/2024     2:36 PM   Functional & Cognitive Status   Do you have difficulty preparing food and eating? No   Do you have difficulty bathing yourself, getting dressed or grooming yourself? No   Do you have difficulty using the toilet? No   Do you have difficulty moving around from place to place? No   Do you have trouble with steps or getting out of a bed or a chair? No    Current Diet Unhealthy Diet   Dental Exam Up to date   Eye Exam Not up to date   Exercise (times per week) 7 times per week   Current Exercises Include Walking   Do you need help using the phone?  No   Are you deaf or do you have serious difficulty hearing?  Yes   Do you need help to go to places out of walking distance? No   Do you need help shopping? No   Do you need help preparing meals?  No   Do you need help with housework?  No   Do you need help with laundry? No   Do you need help taking your medications? No   Do you need help managing money? No   Do you ever drive or ride in a car without wearing a seat belt? No   Have you felt unusual stress, anger or loneliness in the last month? No   Who do you live with? Alone   If you need help, do you have trouble finding someone available to you? No   Have you been bothered in the last four weeks by sexual problems? No   Do you have difficulty concentrating, remembering or making decisions? No         Does the patient have evidence of cognitive impairment? No    Asprin use counseling:Does not need ASA (and currently is not on it)    Age-appropriate Screening Schedule:  Refer to the list below for future screening recommendations based on patient's age, sex and/or medical conditions. Orders for these recommended tests are listed in the plan section. The patient has been provided with a written plan.    Health Maintenance   Topic Date Due    RSV Vaccine - Adults (1 - 1-dose 60+ series) Never done    DIABETIC EYE EXAM  10/01/2020    URINE MICROALBUMIN  10/20/2021    MAMMOGRAM  06/21/2023    ANNUAL WELLNESS VISIT  08/19/2023    LIPID PANEL  02/24/2024    DXA SCAN  06/21/2024    TDAP/TD VACCINES (2 - Td or Tdap) 06/02/2024 (Originally 11/28/2017)    INFLUENZA VACCINE  08/01/2024    HEMOGLOBIN A1C  10/19/2024    BMI FOLLOWUP  12/14/2024    COLORECTAL CANCER SCREENING  03/08/2029    Pneumococcal Vaccine 65+  Completed    COVID-19 Vaccine  Discontinued    ZOSTER VACCINE   Discontinued          The following portions of the patient's history were reviewed and updated as appropriate: allergies, current medications, past family history, past medical history, past social history, past surgical history, and problem list.    Outpatient Medications Prior to Visit   Medication Sig Dispense Refill    Accu-Chek FastClix Lancets misc Use 1 each Daily. 102 each 2    Accu-Chek Guide test strip 1 each by Other route Daily. 50 each 5    allopurinol (ZYLOPRIM) 100 MG tablet Take 1 tablet by mouth Daily. 90 tablet 1    Blood Glucose Monitoring Suppl (Accu-Chek Guide Me) w/Device kit Use 1 Device Take As Directed. 1 kit 0    calcitriol (ROCALTROL) 0.25 MCG capsule Take 1 capsule by mouth Daily. 90 capsule 3    DULoxetine (CYMBALTA) 30 MG capsule Take 1 capsule by mouth Daily. 90 capsule 1    fluticasone (FLONASE) 50 MCG/ACT nasal spray 1 spray into the nostril(s) as directed by provider As Needed for Rhinitis or Allergies.      glimepiride (AMARYL) 4 MG tablet Take 1 tablet by mouth Every Morning Before Breakfast. 90 tablet 3    hydrOXYzine (ATARAX) 25 MG tablet Take 1 tablet by mouth 3 (Three) Times a Day As Needed for Anxiety. 30 tablet 1    levothyroxine (SYNTHROID, LEVOTHROID) 100 MCG tablet TAKE 1 TABLET BY MOUTH EVERY DAY IN THE MORNING 90 tablet 3    lisinopril-hydrochlorothiazide (PRINZIDE,ZESTORETIC) 20-25 MG per tablet Take 1 tablet by mouth Daily.      Lutein 20 MG capsule Take 20 mg by mouth Daily.      Melatonin 10 MG capsule Take 1 capsule by mouth Every Night.      metFORMIN ER (GLUCOPHAGE-XR) 500 MG 24 hr tablet Take 1 tablet by mouth 2 (Two) Times a Day With Meals. 180 tablet 3    nystatin (MYCOSTATIN) 387561 UNIT/GM powder Apply  topically to the appropriate area as directed 3 (Three) Times a Day. 60 g 11    omeprazole (priLOSEC) 20 MG capsule Take 1 capsule by mouth Daily. 90 capsule 3    simvastatin (ZOCOR) 40 MG tablet Take 1 tablet by mouth Daily. 90 tablet 3    lisinopril  (PRINIVIL,ZESTRIL) 10 MG tablet Take 1 tablet by mouth Daily. (Patient not taking: Reported on 5/20/2024) 90 tablet 1     No facility-administered medications prior to visit.       Patient Active Problem List   Diagnosis    Allergic rhinitis    Anxiety disorder    Asthma    Diverticulosis of intestine    Gastroesophageal reflux disease    Fibromyalgia    Mixed hyperlipidemia    Essential hypertension    Osteoarthritis    Osteopenia of multiple sites    Rheumatoid arthritis    Vitamin D deficiency    Thyroid cancer    Post-surgical hypothyroidism    Post-surgical hypoparathyroidism    Urinary incontinence, mixed    Renal cyst, right    Sensorineural hearing loss (SNHL) of both ears    Type 2 diabetes mellitus without complication, without long-term current use of insulin    Stage 3a chronic kidney disease    Tinnitus of both ears    History of thyroid cancer    S/P partial resection of colon    Idiopathic chronic gout of left foot without tophus    Status post reversal of ileostomy       Advanced Care Planning:  ACP discussion was held with the patient during this visit. Patient does not have an advance directive, information provided.    Review of Systems   Constitutional:  Positive for fatigue.   HENT:  Positive for hearing loss.    Eyes: Negative.    Respiratory: Negative.     Cardiovascular: Negative.    Gastrointestinal:  Positive for abdominal pain, constipation and diarrhea. Negative for anal bleeding and blood in stool.   Genitourinary:  Negative for decreased urine volume and difficulty urinating.   Musculoskeletal:  Positive for arthralgias and back pain. Negative for gait problem.   Skin:  Positive for wound (recent ostomy reversal, staples removed and healing well).   Neurological: Negative.    Psychiatric/Behavioral: Negative.         Compared to one year ago, the patient feels her physical health is better.  Compared to one year ago, the patient feels her mental health is the same.    Reviewed chart for  "potential of high risk medication in the elderly: yes  Reviewed chart for potential of harmful drug interactions in the elderly:yes    Objective         Vitals:    05/20/24 1431   BP: 134/68   BP Location: Left arm   Patient Position: Sitting   Cuff Size: Adult   Pulse: 98   Resp: 16   Temp: 97 °F (36.1 °C)   TempSrc: Temporal   SpO2: 98%   Weight: 68 kg (150 lb)   Height: 154.9 cm (61\")   PainSc: 0-No pain       Body mass index is 28.34 kg/m².  Discussed the patient's BMI with her. The BMI is above average; BMI management plan is completed.    Physical Exam  Vitals and nursing note reviewed.   Constitutional:       General: She is not in acute distress.     Appearance: She is well-developed. She is not ill-appearing, toxic-appearing or diaphoretic.   HENT:      Head: Normocephalic and atraumatic.      Right Ear: Tympanic membrane, ear canal and external ear normal.      Left Ear: Tympanic membrane, ear canal and external ear normal.      Nose: Nose normal.   Eyes:      General: No scleral icterus.     Conjunctiva/sclera: Conjunctivae normal.   Cardiovascular:      Rate and Rhythm: Normal rate and regular rhythm.      Heart sounds: Normal heart sounds.   Pulmonary:      Effort: Pulmonary effort is normal. No respiratory distress.      Breath sounds: Normal breath sounds.   Abdominal:      General: There is no distension.      Palpations: Abdomen is soft. There is no mass.      Tenderness: There is abdominal tenderness (R side of abdomen).      Comments: Clean bandage R side of abdomen   Musculoskeletal:         General: No deformity.      Cervical back: Neck supple.      Right lower leg: No edema.      Left lower leg: No edema.   Lymphadenopathy:      Cervical: No cervical adenopathy.   Skin:     General: Skin is warm and dry.      Findings: No rash.   Neurological:      Mental Status: She is alert and oriented to person, place, and time. Mental status is at baseline.      Gait: Gait normal.   Psychiatric:         " Mood and Affect: Mood normal.         Behavior: Behavior normal.         Lab Results   Component Value Date    HGBA1C 7.30 (H) 04/19/2024        Assessment & Plan   Medicare Risks and Personalized Health Plan  CMS Preventative Services Quick Reference  Advance Directive Discussion  Breast Cancer/Mammogram Screening  Hearing Problem  Immunizations Discussed/Encouraged (specific immunizations; Tdap and RSV (Respiratory Syncytial Virus) )  Osteoporosis Risk    The above risks/problems have been discussed with the patient.  Pertinent information has been shared with the patient in the After Visit Summary.  Follow up plans and orders are seen below in the Assessment/Plan Section.    Diagnoses and all orders for this visit:    Medicare annual wellness visit, subsequent    Generalized anxiety disorder  - stable, continue cymbalta 30mg daily and hydroxyzine 25mg TID PRN     Stage 3a chronic kidney disease  - latest gfr 66.5 however historically has had CKD3a gfr 50-55 with frequent GINGER due to dehydration from high ostomy output. No s/p ostomy reversal.  - BMP today    Essential hypertension  - had noted elevated BP when at follow up with Dr. Barba so switched from lisinopril 10mg daily back to lisinopril-HCTZ 20-25mg daily  - BP improving  - BMP today    Idiopathic chronic gout of left foot without tophus  - stable without recent flare on allopurinol 100mg daily  - uric acid level ordered    Diverticulosis of intestine without bleeding, unspecified intestinal tract location  S/P partial resection of colon  Status post reversal of ileostomy  - s/p LAR with diverting ostomy and bladder reconstruction due to sigmoid diverticulitis with colovaginal fistula and more recently s/p ostomy reversal  - bowel habits not yet regular with some loose stools and constipation.  - following with Dr. Barba    History of thyroid cancer, Post-surgical hypothyroidism  - s/p thyroidectomy  - Following with Dr. Aiken and on levothyroxine 100mcg  for subsequent hypothyroidism    Post-surgical hypoparathyroidism  - s/p parathyroidectomy for primary hyperparathyroidism. Now with postsurgical hypoparathyroidism on calcitriol 0.25mcg daily. Managed by Dr. Aiken.  - Calcium low on postop labs, repeat today    Type 2 diabetes mellitus without complication, without long-term current use of insulin  - Following with Dr. Aiken, last A1c 7.3 on glimepiride 4mg daily and metformin XR 500mg BID     Mixed hyperlipidemia  - continue simvastatin 40mg daily and update lipid panel    Gastroesophageal reflux disease without esophagitis  - stable, continue omeprazole 20mg daily    Osteopenia of multiple sites  - DEXA 6/2022 with osteopenia, FRAX 15% and 3.4%. BMD improved at all sites but this may be secondary to her arthritis.   - on calcitriol, check vitamin D level  - DEXA ordered    Rheumatoid arthritis with positive rheumatoid factor, involving unspecified site  - previously followed at [facility] and on plaquenil with recommendation to try humira however now declines to see rheumatology and off treatment.    History of Clostridioides difficile infection  - postop from ostomy reversal had diarrhea and cdiff positive. S/p treatment by Dr. Barba and diarrhea resolved.    Encounter for screening mammogram for malignant neoplasm of breast  -     Mammo Screening Digital Tomosynthesis Bilateral With CAD; Future    Health Maintenance  - Pap smear: no longer indicated  - Mammogram: 6/2022 BIRADS 1, wishes to continue screening so mammogram ordered  - Colonoscopy: 6/2021  - HCV: negative  - Immunizations: Pneumococcal complete. Declines COVID19. Shingrix too expensive. Discussed Tdap and RSV.  - Depression screening: negative 5/2024    Follow Up:  Return in about 6 months (around 11/20/2024) for Follow up, 1 year for wellness 45 minutes, Labs today.     An After Visit Summary and PPPS were given to the patient.

## 2024-05-21 LAB
25(OH)D3 SERPL-MCNC: 47 NG/ML (ref 30–100)
ANION GAP SERPL CALCULATED.3IONS-SCNC: 17.2 MMOL/L (ref 5–15)
BUN SERPL-MCNC: 15 MG/DL (ref 8–23)
BUN/CREAT SERPL: 13.8 (ref 7–25)
CALCIUM SPEC-SCNC: 8.8 MG/DL (ref 8.6–10.5)
CHLORIDE SERPL-SCNC: 98 MMOL/L (ref 98–107)
CHOLEST SERPL-MCNC: 208 MG/DL (ref 0–200)
CO2 SERPL-SCNC: 25.8 MMOL/L (ref 22–29)
CREAT SERPL-MCNC: 1.09 MG/DL (ref 0.57–1)
DEPRECATED RDW RBC AUTO: 42.5 FL (ref 37–54)
EGFRCR SERPLBLD CKD-EPI 2021: 51.5 ML/MIN/1.73
ERYTHROCYTE [DISTWIDTH] IN BLOOD BY AUTOMATED COUNT: 13 % (ref 12.3–15.4)
GLUCOSE SERPL-MCNC: 159 MG/DL (ref 65–99)
HCT VFR BLD AUTO: 37.2 % (ref 34–46.6)
HDLC SERPL-MCNC: 52 MG/DL (ref 40–60)
HGB BLD-MCNC: 12.1 G/DL (ref 12–15.9)
LDLC SERPL CALC-MCNC: 111 MG/DL (ref 0–100)
LDLC/HDLC SERPL: 2 {RATIO}
MCH RBC QN AUTO: 29.9 PG (ref 26.6–33)
MCHC RBC AUTO-ENTMCNC: 32.5 G/DL (ref 31.5–35.7)
MCV RBC AUTO: 91.9 FL (ref 79–97)
PLATELET # BLD AUTO: 488 10*3/MM3 (ref 140–450)
PMV BLD AUTO: 11.4 FL (ref 6–12)
POTASSIUM SERPL-SCNC: 3.4 MMOL/L (ref 3.5–5.2)
RBC # BLD AUTO: 4.05 10*6/MM3 (ref 3.77–5.28)
SODIUM SERPL-SCNC: 141 MMOL/L (ref 136–145)
TRIGL SERPL-MCNC: 259 MG/DL (ref 0–150)
URATE SERPL-MCNC: 7 MG/DL (ref 2.4–5.7)
VLDLC SERPL-MCNC: 45 MG/DL (ref 5–40)
WBC NRBC COR # BLD AUTO: 10.95 10*3/MM3 (ref 3.4–10.8)

## 2024-05-22 ENCOUNTER — TELEPHONE (OUTPATIENT)
Dept: INTERNAL MEDICINE | Facility: CLINIC | Age: 79
End: 2024-05-22
Payer: MEDICARE

## 2024-05-22 DIAGNOSIS — M1A.0720 IDIOPATHIC CHRONIC GOUT OF LEFT FOOT WITHOUT TOPHUS: ICD-10-CM

## 2024-05-22 DIAGNOSIS — N18.31 STAGE 3A CHRONIC KIDNEY DISEASE: Primary | ICD-10-CM

## 2024-05-22 RX ORDER — ALLOPURINOL 100 MG/1
150 TABLET ORAL DAILY
Qty: 135 TABLET | Refills: 1 | Status: SHIPPED | OUTPATIENT
Start: 2024-05-22

## 2024-05-22 NOTE — TELEPHONE ENCOUNTER
HUB CAN RELAY    Please call patient. Her uric acid level is slightly high so I recommend increasing allopurinol to 1.5 tablets daily and sent this new dose to pharmacy. Her kidney function is slightly diminished and this may be due to adjustment made to BP meds. She needs to increase water intake and recheck labs in 2-4 weeks. Her vitamin D level is adequate. Blood counts are improving. Cholesterol is slightly elevated. We will continue simvastatin for now and monitor.

## 2024-05-23 ENCOUNTER — TELEPHONE (OUTPATIENT)
Dept: INTERNAL MEDICINE | Facility: CLINIC | Age: 79
End: 2024-05-23
Payer: MEDICARE

## 2024-06-03 ENCOUNTER — TELEPHONE (OUTPATIENT)
Dept: ENDOCRINOLOGY | Facility: CLINIC | Age: 79
End: 2024-06-03
Payer: MEDICARE

## 2024-06-03 ENCOUNTER — TELEPHONE (OUTPATIENT)
Dept: INTERNAL MEDICINE | Facility: CLINIC | Age: 79
End: 2024-06-03
Payer: MEDICARE

## 2024-06-03 DIAGNOSIS — E11.9 TYPE 2 DIABETES MELLITUS WITHOUT COMPLICATION, WITHOUT LONG-TERM CURRENT USE OF INSULIN: Primary | ICD-10-CM

## 2024-06-03 RX ORDER — LANCETS
1 EACH MISCELLANEOUS 2 TIMES DAILY
Qty: 60 EACH | Refills: 5 | Status: SHIPPED | OUTPATIENT
Start: 2024-06-03

## 2024-06-03 NOTE — TELEPHONE ENCOUNTER
HUB TRANSFERRED CALL THROUGH.     FYI:    Pt reports intermittent dizzy spells onset 1 week. Reports feeling like her head is spinning to the left. Admits worsening sxs when laying down or getting up. Reports 2 spells this morning.   Reports getting off of toilet this morning and felt her head spin and lost balance and fell. Did not hit head but scraped RT elbow. Denies any pain.     Reports calling pharmacy and was told it may be vertigo.    Reports checking BP today.  BP: 138/88 HR 79     Scheduled on Friday 6/7  Advised to go to ER/Call 911 with any worsening progress.     Call back # [phone]

## 2024-06-03 NOTE — TELEPHONE ENCOUNTER
Rx for accu-chek soft click lancets sent to her pharmacy.   Electronically Signed: Maureen Aiken MD

## 2024-06-03 NOTE — TELEPHONE ENCOUNTER
Caller: Cristy Louie    Relationship: Self    Best call back number: [phone]     What was the call regarding: PATIENT FELL YESTERDAY, INJURED ELBOW, FEELS DIZZY.     WT    Is it okay if the provider responds through MyChart: NO

## 2024-06-03 NOTE — TELEPHONE ENCOUNTER
PATIENT STATES THAT PHARMACY IS REQUESTING AN RX FOR ACCU-CHEK SOFTCLIX LANCETS. THE RX FOR FASTCLIX IS NOT COVERED BY HER INSURANCE PLAN.     [facility]

## 2024-06-07 ENCOUNTER — LAB (OUTPATIENT)
Dept: LAB | Facility: HOSPITAL | Age: 79
End: 2024-06-07
Payer: MEDICARE

## 2024-06-07 ENCOUNTER — OFFICE VISIT (OUTPATIENT)
Dept: INTERNAL MEDICINE | Facility: CLINIC | Age: 79
End: 2024-06-07
Payer: MEDICARE

## 2024-06-07 VITALS
WEIGHT: 153 LBS | HEART RATE: 97 BPM | OXYGEN SATURATION: 97 % | TEMPERATURE: 96.8 F | DIASTOLIC BLOOD PRESSURE: 90 MMHG | HEIGHT: 61 IN | SYSTOLIC BLOOD PRESSURE: 150 MMHG | BODY MASS INDEX: 28.89 KG/M2

## 2024-06-07 DIAGNOSIS — E11.9 TYPE 2 DIABETES MELLITUS WITHOUT COMPLICATION, WITHOUT LONG-TERM CURRENT USE OF INSULIN: ICD-10-CM

## 2024-06-07 DIAGNOSIS — N18.31 STAGE 3A CHRONIC KIDNEY DISEASE: ICD-10-CM

## 2024-06-07 DIAGNOSIS — S51.011A SKIN TEAR OF RIGHT ELBOW WITHOUT COMPLICATION, INITIAL ENCOUNTER: ICD-10-CM

## 2024-06-07 DIAGNOSIS — R42 VERTIGO: Primary | ICD-10-CM

## 2024-06-07 DIAGNOSIS — I10 ESSENTIAL HYPERTENSION: ICD-10-CM

## 2024-06-07 LAB
ANION GAP SERPL CALCULATED.3IONS-SCNC: 15 MMOL/L (ref 5–15)
BUN SERPL-MCNC: 16 MG/DL (ref 8–23)
BUN/CREAT SERPL: 14.8 (ref 7–25)
CALCIUM SPEC-SCNC: 8.6 MG/DL (ref 8.6–10.5)
CHLORIDE SERPL-SCNC: 100 MMOL/L (ref 98–107)
CO2 SERPL-SCNC: 25 MMOL/L (ref 22–29)
CREAT SERPL-MCNC: 1.08 MG/DL (ref 0.57–1)
EGFRCR SERPLBLD CKD-EPI 2021: 52 ML/MIN/1.73
GLUCOSE SERPL-MCNC: 139 MG/DL (ref 65–99)
POTASSIUM SERPL-SCNC: 3.5 MMOL/L (ref 3.5–5.2)
SODIUM SERPL-SCNC: 140 MMOL/L (ref 136–145)

## 2024-06-07 PROCEDURE — 1160F RVW MEDS BY RX/DR IN RCRD: CPT | Performed by: INTERNAL MEDICINE

## 2024-06-07 PROCEDURE — 3078F DIAST BP <80 MM HG: CPT | Performed by: INTERNAL MEDICINE

## 2024-06-07 PROCEDURE — 3077F SYST BP >= 140 MM HG: CPT | Performed by: INTERNAL MEDICINE

## 2024-06-07 PROCEDURE — 1125F AMNT PAIN NOTED PAIN PRSNT: CPT | Performed by: INTERNAL MEDICINE

## 2024-06-07 PROCEDURE — 1159F MED LIST DOCD IN RCRD: CPT | Performed by: INTERNAL MEDICINE

## 2024-06-07 PROCEDURE — 80048 BASIC METABOLIC PNL TOTAL CA: CPT

## 2024-06-07 PROCEDURE — 99214 OFFICE O/P EST MOD 30 MIN: CPT | Performed by: INTERNAL MEDICINE

## 2024-06-07 PROCEDURE — G2211 COMPLEX E/M VISIT ADD ON: HCPCS | Performed by: INTERNAL MEDICINE

## 2024-06-07 RX ORDER — LISINOPRIL 20 MG/1
20 TABLET ORAL DAILY
COMMUNITY

## 2024-06-07 NOTE — PROGRESS NOTES
Internal Medicine Acute Visit    Chief Complaint   Patient presents with    Dizziness     Sometimes when getting up and sometimes when laying down.        HPI  Ms. Louie comes in for dizziness. Has had a few episodes of lightheadedness and other episodes described as the room spinning and losing control of her body. She fell once in the bathroom causing a skin tear to R elbow. She notes sxs worse upon standing or getting up in the morning. She is still having diarrhea off and on. On HCTZ with no edema noted. Not drinking enough water. Feels ears are itchy and full of fluid.    In addition to above she requests diabetic foot exam for shoes. Brings form today. Sees Dr. Aiken for DM but appt not for several more months.    Dizziness  Associated symptoms include fatigue.        Review of Systems  Review of Systems   Constitutional:  Positive for fatigue.   HENT:  Positive for ear discharge (fluid in ears).    Respiratory: Negative.     Cardiovascular: Negative.    Gastrointestinal:  Positive for diarrhea. Negative for anal bleeding and blood in stool.   Genitourinary:  Negative for decreased urine volume.   Neurological:  Positive for dizziness.        Medications  Current Outpatient Medications on File Prior to Visit   Medication Sig Dispense Refill    Accu-Chek Guide test strip 1 each by Other route Daily. 50 each 5    Accu-Chek Softclix Lancets lancets 1 each by Other route 2 (Two) Times a Day. 60 each 5    allopurinol (ZYLOPRIM) 100 MG tablet Take 1.5 tablets by mouth Daily. 135 tablet 1    Blood Glucose Monitoring Suppl (Accu-Chek Guide Me) w/Device kit Use 1 Device Take As Directed. 1 kit 0    calcitriol (ROCALTROL) 0.25 MCG capsule Take 1 capsule by mouth Daily. 90 capsule 3    DULoxetine (CYMBALTA) 30 MG capsule Take 1 capsule by mouth Daily. 90 capsule 1    fluticasone (FLONASE) 50 MCG/ACT nasal spray 1 spray into the nostril(s) as directed by provider As Needed for Rhinitis or Allergies.      glimepiride  (AMARYL) 4 MG tablet Take 1 tablet by mouth Every Morning Before Breakfast. 90 tablet 3    hydrOXYzine (ATARAX) 25 MG tablet Take 1 tablet by mouth 3 (Three) Times a Day As Needed for Anxiety. 30 tablet 1    levothyroxine (SYNTHROID, LEVOTHROID) 100 MCG tablet TAKE 1 TABLET BY MOUTH EVERY DAY IN THE MORNING 90 tablet 3    Lutein 20 MG capsule Take 20 mg by mouth Daily.      metFORMIN ER (GLUCOPHAGE-XR) 500 MG 24 hr tablet Take 1 tablet by mouth 2 (Two) Times a Day With Meals. 180 tablet 3    nystatin (MYCOSTATIN) 735988 UNIT/GM powder Apply  topically to the appropriate area as directed 3 (Three) Times a Day. 60 g 11    omeprazole (priLOSEC) 20 MG capsule Take 1 capsule by mouth Daily. 90 capsule 3    simvastatin (ZOCOR) 40 MG tablet Take 1 tablet by mouth Daily. 90 tablet 3    [DISCONTINUED] lisinopril-hydrochlorothiazide (PRINZIDE,ZESTORETIC) 20-25 MG per tablet Take 1 tablet by mouth Daily. 90 tablet 1    lisinopril (PRINIVIL,ZESTRIL) 20 MG tablet Take 1 tablet by mouth Daily.       No current facility-administered medications on file prior to visit.        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  EGD normal 2007.    H/O cardiovascular stress test     12/06- normal dobutamine myoview. 12/16/13- acceptable negative  "Lexiscan Cardiolite test.    History of echocardiogram     2/06- normal except ? relaxation abnormality    Hyperlipidemia     Description: dx 1980's    Hyperparathyroidism     Description: dx 8/11    Hypertension     Osteoarthritis     Description: dx 1990's    Osteoporosis     Description: dx 2007    PONV (postoperative nausea and vomiting)     Rheumatoid arthritis     Description: dx 2003    Sensorineural hearing loss (SNHL) of both ears 10/14/2021    DX October 2021 by [facility] (mild to severe loss)    Sigmoid diverticulitis 09/07/2023    Vitamin D deficiency     Description: dx 7/10 (mild)       Objective  Visit Vitals  /90 (BP Location: Right arm, Patient Position: Standing)   Pulse 97   Temp 96.8 °F (36 °C) (Infrared)   Ht 154.9 cm (60.98\")   Wt 69.4 kg (153 lb)   SpO2 97%   BMI 28.92 kg/m²        Physical Exam  Physical Exam  Vitals and nursing note reviewed.   Constitutional:       General: She is not in acute distress.     Appearance: She is well-developed. She is not ill-appearing or toxic-appearing.   HENT:      Head: Normocephalic and atraumatic.      Right Ear: Tympanic membrane, ear canal and external ear normal. There is no impacted cerumen.      Left Ear: Tympanic membrane, ear canal and external ear normal. There is no impacted cerumen.   Eyes:      Conjunctiva/sclera: Conjunctivae normal.   Cardiovascular:      Rate and Rhythm: Normal rate and regular rhythm.      Pulses:           Dorsalis pedis pulses are 2+ on the right side and 2+ on the left side.        Posterior tibial pulses are 2+ on the right side and 2+ on the left side.      Heart sounds: Normal heart sounds. No murmur heard.  Pulmonary:      Effort: Pulmonary effort is normal. No respiratory distress.      Breath sounds: Normal breath sounds.   Musculoskeletal:      Right lower leg: No edema.      Left lower leg: No edema.      Right foot: Bunion present.      Left foot: Bunion present.   Feet:      Right foot:    "   Protective Sensation: 10 sites tested.  10 sites sensed.      Skin integrity: Skin integrity normal.      Left foot:      Protective Sensation: 10 sites tested.  10 sites sensed.      Skin integrity: Skin integrity normal.   Skin:     General: Skin is warm and dry.   Neurological:      General: No focal deficit present.      Mental Status: She is alert and oriented to person, place, and time. Mental status is at baseline.      Gait: Gait normal.         Results  Results for orders placed or performed in visit on 05/20/24   Basic Metabolic Panel    Specimen: Blood   Result Value Ref Range    Glucose 159 (H) 65 - 99 mg/dL    BUN 15 8 - 23 mg/dL    Creatinine 1.09 (H) 0.57 - 1.00 mg/dL    Sodium 141 136 - 145 mmol/L    Potassium 3.4 (L) 3.5 - 5.2 mmol/L    Chloride 98 98 - 107 mmol/L    CO2 25.8 22.0 - 29.0 mmol/L    Calcium 8.8 8.6 - 10.5 mg/dL    BUN/Creatinine Ratio 13.8 7.0 - 25.0    Anion Gap 17.2 (H) 5.0 - 15.0 mmol/L    eGFR 51.5 (L) >60.0 mL/min/1.73   CBC (No Diff)    Specimen: Blood   Result Value Ref Range    WBC 10.95 (H) 3.40 - 10.80 10*3/mm3    RBC 4.05 3.77 - 5.28 10*6/mm3    Hemoglobin 12.1 12.0 - 15.9 g/dL    Hematocrit 37.2 34.0 - 46.6 %    MCV 91.9 79.0 - 97.0 fL    MCH 29.9 26.6 - 33.0 pg    MCHC 32.5 31.5 - 35.7 g/dL    RDW 13.0 12.3 - 15.4 %    RDW-SD 42.5 37.0 - 54.0 fl    MPV 11.4 6.0 - 12.0 fL    Platelets 488 (H) 140 - 450 10*3/mm3   Lipid Panel    Specimen: Blood   Result Value Ref Range    Total Cholesterol 208 (H) 0 - 200 mg/dL    Triglycerides 259 (H) 0 - 150 mg/dL    HDL Cholesterol 52 40 - 60 mg/dL    LDL Cholesterol  111 (H) 0 - 100 mg/dL    VLDL Cholesterol 45 (H) 5 - 40 mg/dL    LDL/HDL Ratio 2.00    Vitamin D,25-Hydroxy    Specimen: Blood   Result Value Ref Range    25 Hydroxy, Vitamin D 47.0 30.0 - 100.0 ng/ml   Uric acid    Specimen: Blood   Result Value Ref Range    Uric Acid 7.0 (H) 2.4 - 5.7 mg/dL        Assessment and Plan  Diagnoses and all orders for this  visit:    Vertigo  - mix of vertigo and dizziness  - likely dehydration in setting of poor hydration, diarrhea, diuretic use  - stop HCTZ  - increase hydration    Essential hypertension  - stop lisinopril-HCTZ 20-25mg daily  - recheck BMP today  - start lisinopril 20mg daily    Skin tear of right elbow without complication, initial encounter  - expected to heal without intervention    Type 2 diabetes mellitus without complication, without long-term current use of insulin  - Following with Dr. Aiken, last A1c 7.3 on glimepiride 4mg daily and metformin XR 500mg BID   - her next appt is not for several months so I did her foot exam today for diabetic shoes and faxed paperwork    Health Maintenance  - Pap smear: no longer indicated  - Mammogram: 6/2022 BIRADS 1, scheduled 6/2024  - Colonoscopy: 6/2021  - HCV: negative  - Immunizations: Pneumococcal complete. Declines COVID19. Shingrix too expensive. Discussed Tdap and RSV.  - Depression screening: negative 5/2024    Return in about 2 weeks (around 6/21/2024) for Follow up HTN, dizziness, Labs today.

## 2024-06-17 ENCOUNTER — TELEPHONE (OUTPATIENT)
Dept: INTERNAL MEDICINE | Facility: CLINIC | Age: 79
End: 2024-06-17
Payer: MEDICARE

## 2024-06-17 NOTE — TELEPHONE ENCOUNTER
Left message for pt that she should contact us or call 911 if any sxs of CP, arm or shoulder pain prior to appt. Office number given for any additional issues.

## 2024-06-17 NOTE — TELEPHONE ENCOUNTER
Pt states that her BP was 154/92 standing up today.  She has taken her medication.  Pt does not have any CP or arm/shoulder pain.    She does mention that she has neck pain with a knot in her neck.      Pt states she was just calling to see when her appt was for fu.    Best number to call back if needed is [phone].

## 2024-06-19 ENCOUNTER — OFFICE VISIT (OUTPATIENT)
Dept: INTERNAL MEDICINE | Facility: CLINIC | Age: 79
End: 2024-06-19
Payer: MEDICARE

## 2024-06-19 VITALS
WEIGHT: 157.2 LBS | BODY MASS INDEX: 29.68 KG/M2 | TEMPERATURE: 97.3 F | DIASTOLIC BLOOD PRESSURE: 78 MMHG | HEIGHT: 61 IN | OXYGEN SATURATION: 98 % | HEART RATE: 90 BPM | SYSTOLIC BLOOD PRESSURE: 162 MMHG

## 2024-06-19 DIAGNOSIS — I10 ESSENTIAL HYPERTENSION: Primary | ICD-10-CM

## 2024-06-19 DIAGNOSIS — R42 VERTIGO: ICD-10-CM

## 2024-06-19 DIAGNOSIS — J30.89 SEASONAL ALLERGIC RHINITIS DUE TO OTHER ALLERGIC TRIGGER: ICD-10-CM

## 2024-06-19 DIAGNOSIS — L29.9 EAR ITCHING: ICD-10-CM

## 2024-06-19 PROCEDURE — 3077F SYST BP >= 140 MM HG: CPT | Performed by: INTERNAL MEDICINE

## 2024-06-19 PROCEDURE — G2211 COMPLEX E/M VISIT ADD ON: HCPCS | Performed by: INTERNAL MEDICINE

## 2024-06-19 PROCEDURE — 1126F AMNT PAIN NOTED NONE PRSNT: CPT | Performed by: INTERNAL MEDICINE

## 2024-06-19 PROCEDURE — 3078F DIAST BP <80 MM HG: CPT | Performed by: INTERNAL MEDICINE

## 2024-06-19 PROCEDURE — 99214 OFFICE O/P EST MOD 30 MIN: CPT | Performed by: INTERNAL MEDICINE

## 2024-06-19 RX ORDER — LISINOPRIL 20 MG/1
20 TABLET ORAL 2 TIMES DAILY
Qty: 180 TABLET | Refills: 1 | Status: SHIPPED | OUTPATIENT
Start: 2024-06-19

## 2024-06-19 RX ORDER — CETIRIZINE HYDROCHLORIDE 10 MG/1
10 TABLET ORAL DAILY
Qty: 90 TABLET | Refills: 0 | Status: SHIPPED | OUTPATIENT
Start: 2024-06-19

## 2024-06-19 NOTE — PROGRESS NOTES
Internal Medicine Follow Up    Chief Complaint  Cristy Louie is a 78 y.o. female who presents today for follow up of chronic medical conditions outlined below.    Chief Complaint   Patient presents with    Dizziness     Has only had one episode since last visit    Hypertension        HPI  Ms. Louie comes in today for follow up. BP running high on lisinopril 20mg daily. Had headache one evening. No chest pain. Did note that she has only had one episode of dizziness which was very mild since stopping HCTZ. Complains of itchy ears and clear rhinorrhea.    Dizziness    Hypertension         Review of Systems  Review of Systems   Constitutional: Negative.    HENT:  Positive for rhinorrhea.         Ear itching   Respiratory: Negative.     Cardiovascular: Negative.    Neurological:  Positive for dizziness and headache (once).        Current Medications  Current Outpatient Medications on File Prior to Visit   Medication Sig Dispense Refill    Accu-Chek Guide test strip 1 each by Other route Daily. 50 each 5    Accu-Chek Softclix Lancets lancets 1 each by Other route 2 (Two) Times a Day. 60 each 5    allopurinol (ZYLOPRIM) 100 MG tablet Take 1.5 tablets by mouth Daily. 135 tablet 1    Blood Glucose Monitoring Suppl (Accu-Chek Guide Me) w/Device kit Use 1 Device Take As Directed. 1 kit 0    calcitriol (ROCALTROL) 0.25 MCG capsule Take 1 capsule by mouth Daily. 90 capsule 3    DULoxetine (CYMBALTA) 30 MG capsule Take 1 capsule by mouth Daily. 90 capsule 1    fluticasone (FLONASE) 50 MCG/ACT nasal spray 1 spray into the nostril(s) as directed by provider As Needed for Rhinitis or Allergies.      glimepiride (AMARYL) 4 MG tablet Take 1 tablet by mouth Every Morning Before Breakfast. 90 tablet 3    hydrOXYzine (ATARAX) 25 MG tablet Take 1 tablet by mouth 3 (Three) Times a Day As Needed for Anxiety. 30 tablet 1    levothyroxine (SYNTHROID, LEVOTHROID) 100 MCG tablet TAKE 1 TABLET BY MOUTH EVERY DAY IN THE MORNING 90 tablet 3     "Lutein 20 MG capsule Take 20 mg by mouth Daily.      metFORMIN ER (GLUCOPHAGE-XR) 500 MG 24 hr tablet Take 1 tablet by mouth 2 (Two) Times a Day With Meals. 180 tablet 3    nystatin (MYCOSTATIN) 553295 UNIT/GM powder Apply  topically to the appropriate area as directed 3 (Three) Times a Day. 60 g 11    omeprazole (priLOSEC) 20 MG capsule Take 1 capsule by mouth Daily. 90 capsule 3    simvastatin (ZOCOR) 40 MG tablet Take 1 tablet by mouth Daily. 90 tablet 3    [DISCONTINUED] lisinopril (PRINIVIL,ZESTRIL) 20 MG tablet Take 1 tablet by mouth Daily.       No current facility-administered medications on file prior to visit.       Allergies  Allergies   Allergen Reactions    Amlodipine Swelling    Escitalopram Dizziness       Objective  Visit Vitals  /78   Pulse 90   Temp 97.3 °F (36.3 °C)   Ht 154.9 cm (60.98\")   Wt 71.3 kg (157 lb 3.2 oz)   SpO2 98% Comment: ra   BMI 29.72 kg/m²        Physical Exam  Physical Exam  Vitals and nursing note reviewed.   Constitutional:       General: She is not in acute distress.     Appearance: She is well-developed. She is not ill-appearing or toxic-appearing.   HENT:      Head: Normocephalic and atraumatic.      Right Ear: Tympanic membrane, ear canal and external ear normal.      Left Ear: Tympanic membrane, ear canal and external ear normal.   Eyes:      Conjunctiva/sclera: Conjunctivae normal.   Pulmonary:      Effort: Pulmonary effort is normal. No respiratory distress.   Skin:     General: Skin is warm and dry.   Neurological:      Mental Status: She is alert and oriented to person, place, and time. Mental status is at baseline.      Gait: Gait normal.         Results  Results for orders placed or performed in visit on 06/07/24   Basic Metabolic Panel    Specimen: Blood   Result Value Ref Range    Glucose 139 (H) 65 - 99 mg/dL    BUN 16 8 - 23 mg/dL    Creatinine 1.08 (H) 0.57 - 1.00 mg/dL    Sodium 140 136 - 145 mmol/L    Potassium 3.5 3.5 - 5.2 mmol/L    Chloride 100 98 - " 107 mmol/L    CO2 25.0 22.0 - 29.0 mmol/L    Calcium 8.6 8.6 - 10.5 mg/dL    BUN/Creatinine Ratio 14.8 7.0 - 25.0    Anion Gap 15.0 5.0 - 15.0 mmol/L    eGFR 52.0 (L) >60.0 mL/min/1.73        Assessment and Plan  Diagnoses and all orders for this visit:    Essential hypertension  - BP elevated since stopping HCTZ  - increase lisinopril to 20mg BID  - recheck BMP in 1-2 weeks    Ear itching  - nothing on exam, likely related to allergies  - start zyrtec 10mg daily    Vertigo  - improving off HCTZ    Seasonal allergic rhinitis due to other allergic trigger  - has had clear rhinorrhea  - will add zyrtec, continue flonase     Health Maintenance  - Pap smear: no longer indicated  - Mammogram: 6/2022 BIRADS 1, scheduled 8/2024  - Colonoscopy: 6/2021  - HCV: negative  - Immunizations: Pneumococcal complete. Declines COVID19. Shingrix too expensive. Discussed Tdap and RSV.  - Depression screening: negative 5/2024    Return for Next scheduled follow up.

## 2024-07-02 ENCOUNTER — LAB (OUTPATIENT)
Dept: LAB | Facility: HOSPITAL | Age: 79
End: 2024-07-02
Payer: MEDICARE

## 2024-07-02 DIAGNOSIS — I10 ESSENTIAL HYPERTENSION: ICD-10-CM

## 2024-07-02 LAB
ANION GAP SERPL CALCULATED.3IONS-SCNC: 13 MMOL/L (ref 5–15)
BUN SERPL-MCNC: 14 MG/DL (ref 8–23)
BUN/CREAT SERPL: 13.2 (ref 7–25)
CALCIUM SPEC-SCNC: 8.8 MG/DL (ref 8.6–10.5)
CHLORIDE SERPL-SCNC: 104 MMOL/L (ref 98–107)
CO2 SERPL-SCNC: 25 MMOL/L (ref 22–29)
CREAT SERPL-MCNC: 1.06 MG/DL (ref 0.57–1)
EGFRCR SERPLBLD CKD-EPI 2021: 53.2 ML/MIN/1.73
GLUCOSE SERPL-MCNC: 207 MG/DL (ref 65–99)
POTASSIUM SERPL-SCNC: 3.4 MMOL/L (ref 3.5–5.2)
SODIUM SERPL-SCNC: 142 MMOL/L (ref 136–145)

## 2024-07-02 PROCEDURE — 80048 BASIC METABOLIC PNL TOTAL CA: CPT

## 2024-07-08 ENCOUNTER — TELEPHONE (OUTPATIENT)
Dept: INTERNAL MEDICINE | Facility: CLINIC | Age: 79
End: 2024-07-08
Payer: MEDICARE

## 2024-07-19 DIAGNOSIS — E11.65 TYPE 2 DIABETES MELLITUS WITH HYPERGLYCEMIA, WITHOUT LONG-TERM CURRENT USE OF INSULIN: ICD-10-CM

## 2024-07-19 RX ORDER — METFORMIN HYDROCHLORIDE 500 MG/1
500 TABLET, EXTENDED RELEASE ORAL 2 TIMES DAILY WITH MEALS
Qty: 180 TABLET | Refills: 3 | Status: SHIPPED | OUTPATIENT
Start: 2024-07-19

## 2024-07-19 RX ORDER — METFORMIN HYDROCHLORIDE 500 MG/1
500 TABLET, EXTENDED RELEASE ORAL 2 TIMES DAILY WITH MEALS
Qty: 180 TABLET | Refills: 3 | Status: CANCELLED | OUTPATIENT
Start: 2024-07-19

## 2024-07-19 NOTE — TELEPHONE ENCOUNTER
PT CALLED REQUESTIG METFORMIN RX TO BE SENT IN TO [facility] [place].     PT IS OUT [facility]

## 2024-07-19 NOTE — TELEPHONE ENCOUNTER
Rx Refill Note    Requested Prescriptions     Pending Prescriptions Disp Refills    metFORMIN ER (GLUCOPHAGE-XR) 500 MG 24 hr tablet [Pharmacy Med Name: METFORMIN HCL  MG TABLET] 180 tablet 3     Sig: TAKE 1 TABLET BY MOUTH TWICE A DAY WITH MEALS        Last office visit with prescribing clinician: 4/17/2024         Next office visit with prescribing clinician: 7/19/2024

## 2024-07-29 ENCOUNTER — TELEPHONE (OUTPATIENT)
Dept: INTERNAL MEDICINE | Facility: CLINIC | Age: 79
End: 2024-07-29
Payer: MEDICARE

## 2024-07-29 NOTE — TELEPHONE ENCOUNTER
Caller: Cristy Louie     Relationship:     Best call back number: [phone]    What is your medical concern?     NOSE BLEEDING WHEN SHE BLOWS IT  HEADACHE    How long has this issue been going on?     3-4 WEEKS    [facility]/pharmacy #[id number] - [address] [employer] [facility] [phone] [facility] [phone] FX     PATIENT SAID SHE WAS TAKING   cetirizine (zyrTEC) 10 MG tablet  AND IT STOPS HER HEAD UP AND SHE CAN'T BREATH.  SHE STOPPED TAKING IT

## 2024-07-29 NOTE — TELEPHONE ENCOUNTER
Caller: Cristy Louie    Relationship: Self    Best call back number: [phone]    What test was performed: LABS    When was the test performed:     7/2/24    Additional notes:     PLEASE CALL WITH RESULTS

## 2024-07-30 ENCOUNTER — OFFICE VISIT (OUTPATIENT)
Dept: INTERNAL MEDICINE | Facility: CLINIC | Age: 79
End: 2024-07-30
Payer: MEDICARE

## 2024-07-30 VITALS
BODY MASS INDEX: 29.79 KG/M2 | WEIGHT: 157.8 LBS | HEIGHT: 61 IN | DIASTOLIC BLOOD PRESSURE: 74 MMHG | SYSTOLIC BLOOD PRESSURE: 138 MMHG | HEART RATE: 92 BPM | OXYGEN SATURATION: 96 %

## 2024-07-30 DIAGNOSIS — B37.31 VAGINAL YEAST INFECTION: ICD-10-CM

## 2024-07-30 DIAGNOSIS — J30.89 SEASONAL ALLERGIC RHINITIS DUE TO OTHER ALLERGIC TRIGGER: Primary | ICD-10-CM

## 2024-07-30 DIAGNOSIS — J01.10 ACUTE NON-RECURRENT FRONTAL SINUSITIS: ICD-10-CM

## 2024-07-30 PROCEDURE — 3078F DIAST BP <80 MM HG: CPT | Performed by: NURSE PRACTITIONER

## 2024-07-30 PROCEDURE — 99213 OFFICE O/P EST LOW 20 MIN: CPT | Performed by: NURSE PRACTITIONER

## 2024-07-30 PROCEDURE — 1159F MED LIST DOCD IN RCRD: CPT | Performed by: NURSE PRACTITIONER

## 2024-07-30 PROCEDURE — 3075F SYST BP GE 130 - 139MM HG: CPT | Performed by: NURSE PRACTITIONER

## 2024-07-30 PROCEDURE — 1160F RVW MEDS BY RX/DR IN RCRD: CPT | Performed by: NURSE PRACTITIONER

## 2024-07-30 PROCEDURE — 1126F AMNT PAIN NOTED NONE PRSNT: CPT | Performed by: NURSE PRACTITIONER

## 2024-07-30 RX ORDER — LORATADINE 10 MG/1
10 TABLET ORAL DAILY
Qty: 90 TABLET | Refills: 0 | Status: SHIPPED | OUTPATIENT
Start: 2024-07-30

## 2024-07-30 RX ORDER — AMOXICILLIN 875 MG/1
875 TABLET, COATED ORAL 2 TIMES DAILY
Qty: 10 TABLET | Refills: 0 | Status: SHIPPED | OUTPATIENT
Start: 2024-07-30

## 2024-07-30 RX ORDER — FLUCONAZOLE 150 MG/1
150 TABLET ORAL ONCE
Qty: 1 TABLET | Refills: 1 | Status: SHIPPED | OUTPATIENT
Start: 2024-07-30 | End: 2024-07-30

## 2024-07-30 RX ORDER — MULTIVIT WITH MINERALS/LUTEIN
250 TABLET ORAL DAILY
COMMUNITY

## 2024-07-30 NOTE — Clinical Note
Patient wanted to discuss coming off metformin and taking a supplment of berberine. She was also interested in considering mounjaro. I told her I would reaach out to you to discuss with her. Thank you!

## 2024-07-30 NOTE — PROGRESS NOTES
Office Note     Name: Cristy Louie    : 1945     MRN: [id number]     Chief Complaint  Allergies    Subjective     History of Present Illness:  Cristy Louie is a 78 y.o. female who presents today for concerns relatedto her allergies    Patient regularly follows with Dr. Daniel.  She was last seen in  for some concerns about itchy ears and clear nasal discharge.  She was started on Zyrtec and Flonase.  She called yesterday for concerns stating she had some bleeding when she is blowing her nose and a headache.    Patient states she has continued to her Flonase and does 2 spray in each nostril.  She states she does not have an outward and just ends it straight back.  She was previously prescribed cetirizine but is not currently taking this.  She has noticed some significant pressure in her face especially on the left side.    She will occasionally get some yeast infections with use of antibiotics      Past Medical History:   Diagnosis Date    Abnormal liver function tests     Description: liver bx showed fatty liver     Allergic rhinitis     Anemia     Anxiety disorder     Asthma     Description: dx     Benign colonic polyp     Description: dx     Cancer     Thyroid    Cataract     Chronic kidney disease (CKD), stage III (moderate)     Colovaginal fistula 2023    Colovesical fistula 2024    Diabetes mellitus     Description: dx 7/10    Disease of thyroid gland     Diverticulosis of intestine     Description: dx     Fibromyalgia     Description: dx     Gastroesophageal reflux disease     Description: dx .  EGD normal .    H/O cardiovascular stress test     - normal dobutamine myoview. 13- acceptable negative Lexiscan Cardiolite test.    History of echocardiogram     - normal except ? relaxation abnormality    Hyperlipidemia     Description: dx     Hyperparathyroidism     Description: dx     Hypertension     Osteoarthritis      Description: dx 1990's    Osteoporosis     Description: dx 2007    PONV (postoperative nausea and vomiting)     Rheumatoid arthritis     Description: dx 2003    Sensorineural hearing loss (SNHL) of both ears 10/14/2021    DX October 2021 by [facility] (mild to severe loss)    Sigmoid diverticulitis 09/07/2023    Vitamin D deficiency     Description: dx 7/10 (mild)       Past Surgical History:   Procedure Laterality Date    BLADDER SURGERY      urethral bulking injections    BREAST BIOPSY Right     benign    CHOLECYSTECTOMY      1978    COLON SURGERY  10/24/2023    [facility] (Colectomy)diverticulitis    COLONOSCOPY      CYSTOSCOPY W/ BULKING AGENT INJECTION N/A 04/08/2022    Procedure: CYSTOSCOPY WITH BULKING AGENT INJECTION (BULKAMID);  Surgeon: Sly Mariano MD;  Location:  [facility];  Service: Urology;  Laterality: N/A;    ENDOSCOPY      ILEOSTOMY CLOSURE N/A 4/25/2024    Procedure: ILEOSTOMY TAKEDOWN;  Surgeon: Lopez Barba MD;  Location:  [facility];  Service: General;  Laterality: N/A;    PARATHYROIDECTOMY Right 08/07/2018    Superior, path c/w hypercellular parathyroid- Dr. Ava Jacobs ([facility])    THYROID SURGERY  08/2018    x 2 11/2018    THYROIDECTOMY Right 08/07/2018    Papillary Thyroid Carcinoma- Dr. Ava Jacobs ([facility])    THYROIDECTOMY Left 11/27/2018    Papillary Thyroid Carcinoma- Dr. Ava Jacobs ([facility])    TOTAL ABDOMINAL HYSTERECTOMY WITH SALPINGO OOPHORECTOMY Bilateral     1987, menorrhagia    WISDOM TOOTH EXTRACTION         Social History     Socioeconomic History    Marital status:     Number of children: 4   Tobacco Use    Smoking status: Never     Passive exposure: Past    Smokeless tobacco: Never   Vaping Use    Vaping status: Never Used   Substance and Sexual Activity    Alcohol use: No    Drug use: No    Sexual activity: Defer         Current Outpatient Medications:     Accu-Chek Guide test strip, 1 each by Other route Daily., Disp: 50 each,  Rfl: 5    Accu-Chek Softclix Lancets lancets, 1 each by Other route 2 (Two) Times a Day., Disp: 60 each, Rfl: 5    allopurinol (ZYLOPRIM) 100 MG tablet, Take 1.5 tablets by mouth Daily., Disp: 135 tablet, Rfl: 1    Blood Glucose Monitoring Suppl (Accu-Chek Guide Me) w/Device kit, Use 1 Device Take As Directed., Disp: 1 kit, Rfl: 0    calcitriol (ROCALTROL) 0.25 MCG capsule, Take 1 capsule by mouth Daily., Disp: 90 capsule, Rfl: 3    DULoxetine (CYMBALTA) 30 MG capsule, Take 1 capsule by mouth Daily., Disp: 90 capsule, Rfl: 1    fluticasone (FLONASE) 50 MCG/ACT nasal spray, 1 spray into the nostril(s) as directed by provider As Needed for Rhinitis or Allergies., Disp: , Rfl:     glimepiride (AMARYL) 4 MG tablet, Take 1 tablet by mouth Every Morning Before Breakfast., Disp: 90 tablet, Rfl: 3    hydrOXYzine (ATARAX) 25 MG tablet, Take 1 tablet by mouth 3 (Three) Times a Day As Needed for Anxiety., Disp: 30 tablet, Rfl: 1    levothyroxine (SYNTHROID, LEVOTHROID) 100 MCG tablet, TAKE 1 TABLET BY MOUTH EVERY DAY IN THE MORNING, Disp: 90 tablet, Rfl: 3    lisinopril (PRINIVIL,ZESTRIL) 20 MG tablet, Take 1 tablet by mouth 2 (Two) Times a Day., Disp: 180 tablet, Rfl: 1    Lutein 20 MG capsule, Take 20 mg by mouth Daily., Disp: , Rfl:     metFORMIN ER (GLUCOPHAGE-XR) 500 MG 24 hr tablet, TAKE 1 TABLET BY MOUTH TWICE A DAY WITH MEALS, Disp: 180 tablet, Rfl: 3    nystatin (MYCOSTATIN) 296041 UNIT/GM powder, Apply  topically to the appropriate area as directed 3 (Three) Times a Day., Disp: 60 g, Rfl: 11    omeprazole (priLOSEC) 20 MG capsule, Take 1 capsule by mouth Daily., Disp: 90 capsule, Rfl: 3    simvastatin (ZOCOR) 40 MG tablet, Take 1 tablet by mouth Daily., Disp: 90 tablet, Rfl: 3    vitamin C (ASCORBIC ACID) 250 MG tablet, Take 1 tablet by mouth Daily., Disp: , Rfl:     amoxicillin (AMOXIL) 875 MG tablet, Take 1 tablet by mouth 2 (Two) Times a Day., Disp: 10 tablet, Rfl: 0    fluconazole (Diflucan) 150 MG tablet, Take 1  "tablet by mouth 1 (One) Time for 1 dose., Disp: 1 tablet, Rfl: 1    loratadine (Claritin) 10 MG tablet, Take 1 tablet by mouth Daily., Disp: 90 tablet, Rfl: 0    Objective     Vital Signs  /74   Pulse 92   Ht 154.9 cm (60.98\")   Wt 71.6 kg (157 lb 12.8 oz)   SpO2 96%   BMI 29.84 kg/m²   Estimated body mass index is 29.84 kg/m² as calculated from the following:    Height as of this encounter: 154.9 cm (60.98\").    Weight as of this encounter: 71.6 kg (157 lb 12.8 oz).             Physical Exam  Vitals and nursing note reviewed.   Constitutional:       Appearance: Normal appearance.   HENT:      Head: Normocephalic and atraumatic.      Nose:      Left Sinus: Maxillary sinus tenderness and frontal sinus tenderness present.   Eyes:      Extraocular Movements: Extraocular movements intact.      Pupils: Pupils are equal, round, and reactive to light.      Comments: Glasses in place   Cardiovascular:      Rate and Rhythm: Normal rate and regular rhythm.   Pulmonary:      Effort: Pulmonary effort is normal.   Musculoskeletal:         General: Normal range of motion.   Skin:     General: Skin is warm and dry.   Neurological:      Mental Status: She is alert and oriented to person, place, and time.      Comments: Mental status fully intact as patient was able to provide a detailed description of the events   Psychiatric:         Mood and Affect: Mood normal.         Behavior: Behavior normal.                   Assessment and Plan     Diagnoses and all orders for this visit:    1. Seasonal allergic rhinitis due to other allergic trigger (Primary)  -     loratadine (Claritin) 10 MG tablet; Take 1 tablet by mouth Daily.  Dispense: 90 tablet; Refill: 0    2. Acute non-recurrent frontal sinusitis  -     amoxicillin (AMOXIL) 875 MG tablet; Take 1 tablet by mouth 2 (Two) Times a Day.  Dispense: 10 tablet; Refill: 0    3. Vaginal yeast infection  -     fluconazole (Diflucan) 150 MG tablet; Take 1 tablet by mouth 1 (One) " Time for 1 dose.  Dispense: 1 tablet; Refill: 1    Plan  Discussed with patient she should continue to take Flonase.  We did discuss appropriate administration.  Patient voiced understanding  Based on her symptoms patient will be prescribed amoxicillin twice a day for 5 days to be sent to the pharmacy.  Please complete full course of medication  Medication for yeast infection will be sent to the pharmacy  Go to ER if any condition worsens or severe  Patient will plan to follow-up with me in October for general health follow-up      Follow Up  Return for Next scheduled follow up.    JULIANO Clark    Part of this note may be an electronic transcription/translation of spoken language to printed text using the Dragon Dictation System.

## 2024-07-31 ENCOUNTER — TELEPHONE (OUTPATIENT)
Dept: INTERNAL MEDICINE | Facility: CLINIC | Age: 79
End: 2024-07-31

## 2024-07-31 DIAGNOSIS — B37.2 CANDIDAL INTERTRIGO: ICD-10-CM

## 2024-07-31 RX ORDER — NYSTATIN 100000 [USP'U]/G
POWDER TOPICAL SEE ADMIN INSTRUCTIONS
Qty: 60 G | Refills: 11 | Status: SHIPPED | OUTPATIENT
Start: 2024-07-31

## 2024-07-31 NOTE — TELEPHONE ENCOUNTER
PROTOCOL PASSED RX APPROVED   NYSTOP 100,000 UNIT   DISP-60 G  REFILL- 11  LAST FILL -7/23/2023  LOV:7/30/2024  NOV10/15/2024

## 2024-07-31 NOTE — TELEPHONE ENCOUNTER
Caller: Cristy Louie    Relationship: Self    Best call back number: [phone]     What is the best time to reach you: ANYTIME    Who are you requesting to speak with (clinical staff, provider,  specific staff member): CLINICAL    Do you know the name of the person who called: CRISTY    What was the call regarding: PATIENT WOULD LIKE TO KNOW WHAT WOULD BE BEST FOR HER BAD CIRCULATION. COMPRESSION SOCKS OR THE BOOTS THAT PLUG INTO THE WALL WITH AIR COMPRESSION AND HEAT ON HER LEGS.    Is it okay if the provider responds through MyChart: NO

## 2024-08-13 LAB
NCCN CRITERIA FLAG: NORMAL
TYRER CUZICK SCORE: 1.8

## 2024-08-28 DIAGNOSIS — K21.9 GASTROESOPHAGEAL REFLUX DISEASE WITHOUT ESOPHAGITIS: ICD-10-CM

## 2024-09-17 ENCOUNTER — OFFICE VISIT (OUTPATIENT)
Dept: ENDOCRINOLOGY | Facility: CLINIC | Age: 79
End: 2024-09-17
Payer: MEDICARE

## 2024-09-17 VITALS
HEIGHT: 61 IN | DIASTOLIC BLOOD PRESSURE: 80 MMHG | OXYGEN SATURATION: 97 % | WEIGHT: 161 LBS | BODY MASS INDEX: 30.4 KG/M2 | HEART RATE: 87 BPM | SYSTOLIC BLOOD PRESSURE: 132 MMHG

## 2024-09-17 DIAGNOSIS — C73 THYROID CANCER: ICD-10-CM

## 2024-09-17 DIAGNOSIS — E11.9 TYPE 2 DIABETES MELLITUS WITHOUT COMPLICATION, WITHOUT LONG-TERM CURRENT USE OF INSULIN: Primary | ICD-10-CM

## 2024-09-17 DIAGNOSIS — R19.7 DIARRHEA, UNSPECIFIED TYPE: ICD-10-CM

## 2024-09-17 DIAGNOSIS — E89.0 POST-SURGICAL HYPOTHYROIDISM: ICD-10-CM

## 2024-09-17 DIAGNOSIS — E89.2 POST-SURGICAL HYPOPARATHYROIDISM: ICD-10-CM

## 2024-09-17 LAB
ALBUMIN SERPL-MCNC: 4.5 G/DL (ref 3.5–5.2)
ALBUMIN/GLOB SERPL: 1.5 G/DL
ALP SERPL-CCNC: 114 U/L (ref 39–117)
ALT SERPL W P-5'-P-CCNC: 28 U/L (ref 1–33)
ANION GAP SERPL CALCULATED.3IONS-SCNC: 13.7 MMOL/L (ref 5–15)
AST SERPL-CCNC: 28 U/L (ref 1–32)
BILIRUB SERPL-MCNC: 0.2 MG/DL (ref 0–1.2)
BUN SERPL-MCNC: 20 MG/DL (ref 8–23)
BUN/CREAT SERPL: 22 (ref 7–25)
CALCIUM SPEC-SCNC: 9.7 MG/DL (ref 8.6–10.5)
CHLORIDE SERPL-SCNC: 102 MMOL/L (ref 98–107)
CO2 SERPL-SCNC: 23.3 MMOL/L (ref 22–29)
CREAT SERPL-MCNC: 0.91 MG/DL (ref 0.57–1)
EGFRCR SERPLBLD CKD-EPI 2021: 63.5 ML/MIN/1.73
EXPIRATION DATE: ABNORMAL
EXPIRATION DATE: ABNORMAL
GLOBULIN UR ELPH-MCNC: 3.1 GM/DL
GLUCOSE BLDC GLUCOMTR-MCNC: 182 MG/DL (ref 70–130)
GLUCOSE SERPL-MCNC: 106 MG/DL (ref 65–99)
HBA1C MFR BLD: 7.8 % (ref 4.5–5.7)
Lab: ABNORMAL
Lab: ABNORMAL
POTASSIUM SERPL-SCNC: 4.4 MMOL/L (ref 3.5–5.2)
PROT SERPL-MCNC: 7.6 G/DL (ref 6–8.5)
SODIUM SERPL-SCNC: 139 MMOL/L (ref 136–145)
T4 FREE SERPL-MCNC: 1.51 NG/DL (ref 0.92–1.68)
TSH SERPL DL<=0.05 MIU/L-ACNC: 2.25 UIU/ML (ref 0.27–4.2)
VIT B12 BLD-MCNC: 891 PG/ML (ref 211–946)

## 2024-09-17 PROCEDURE — 3079F DIAST BP 80-89 MM HG: CPT | Performed by: INTERNAL MEDICINE

## 2024-09-17 PROCEDURE — 3051F HG A1C>EQUAL 7.0%<8.0%: CPT | Performed by: INTERNAL MEDICINE

## 2024-09-17 PROCEDURE — 80053 COMPREHEN METABOLIC PANEL: CPT | Performed by: INTERNAL MEDICINE

## 2024-09-17 PROCEDURE — 84443 ASSAY THYROID STIM HORMONE: CPT | Performed by: INTERNAL MEDICINE

## 2024-09-17 PROCEDURE — 83036 HEMOGLOBIN GLYCOSYLATED A1C: CPT | Performed by: INTERNAL MEDICINE

## 2024-09-17 PROCEDURE — 84439 ASSAY OF FREE THYROXINE: CPT | Performed by: INTERNAL MEDICINE

## 2024-09-17 PROCEDURE — 82947 ASSAY GLUCOSE BLOOD QUANT: CPT | Performed by: INTERNAL MEDICINE

## 2024-09-17 PROCEDURE — 86800 THYROGLOBULIN ANTIBODY: CPT | Performed by: INTERNAL MEDICINE

## 2024-09-17 PROCEDURE — 36415 COLL VENOUS BLD VENIPUNCTURE: CPT | Performed by: INTERNAL MEDICINE

## 2024-09-17 PROCEDURE — 82607 VITAMIN B-12: CPT | Performed by: INTERNAL MEDICINE

## 2024-09-17 PROCEDURE — 99214 OFFICE O/P EST MOD 30 MIN: CPT | Performed by: INTERNAL MEDICINE

## 2024-09-17 PROCEDURE — 3075F SYST BP GE 130 - 139MM HG: CPT | Performed by: INTERNAL MEDICINE

## 2024-09-17 RX ORDER — AMOXICILLIN 500 MG/1
500 CAPSULE ORAL 2 TIMES DAILY
COMMUNITY
Start: 2024-08-22 | End: 2024-10-16

## 2024-09-17 NOTE — PROGRESS NOTES
Chief Complaint   Patient presents with    Diabetes       HPI:   Cristy Louie is a 79 y.o.female who returns to [facility] for f/u evaluation of her Type 2 DM,  thyroid cancer, post-surgical hypothyroidism, and post-surgical hypoparathyroidism. Last visit 04/17/2024.  Her history is as follows:    Interim Events:   - Has been taking her DM and thyroid medications as prescribed. Reports eating more candy since her last visit  - Reports having intermittent diarrhea since her GI surgeries in 2023. 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 Ava Jacobs, Endocrine Surgery, at [facility] for surgical treatment of pt's primary hyperparathyroidism.  - (05/24/2018) had pre-operative localization imaging with Thyroid US at  by Dr. Jacobs and an US-Guided FNA of a right inferior thyroid lobe nodule was completed. 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  - pt underwent right superior parathyroidectomy and partial right thyroidectomy on 08/07/2018  - underwent completion thyroidectomy 11/27/2018    Surgical pathology:  (08/07/2018)  A. Thyroid, right, lobectomy:  - papillary thyroid carcinoma, oncocytic variant (pT1a, N0)  - Tumor Size: 1.0 x 0.8 x 0.7 cm, single focus  - no extrathyroidal extension  - no lymphatic or angioinvasion  - no invasion of the surgical resection margin or to the thyroid capsule  - one benign perithyroidal lymph node (0/1)  - benign adenomatoid nodules.    B. Parathyroid, right superior, excision:  - hypercellular parathyroid (0.45 gm)    (11/27/2018)  A. Thyroid, left lobe:   - multifocal papillary thyroid carcinoma, classic type, involving bilateral lobes (pT1b, Nx)  - Tumor Size:  1.2 x 0.8 x 0.6 cm, single focus  - no extrathyroidal extension  - no lymphatic or angioinvasion  - no invasion of the surgical resection margin or to the thyroid capsule.  - Follicular adenoma of the superior pole (1.5 mm in the greatest dimension).    B. Parathyroid Left Inferior:   - hypercellular parathyroid gland (0.03 gm) with no tumor seen    C. Parathyroid Left Superior:   - hypercellular parathyroid gland (0.04 gm) with no tumor seen    Lab summary:   (01/2019, [facility]) TG <0.1, TG Ab < 1.0, TSH 0.55, free T4 1.6, Calcium 7.7  (04/2019, [facility]) TG 0.1, TG Ab <0 1.0, TSH 0.397, PTH 43.9, Ca 8.6  (10/2019) TSH 0.296 (0.270  - 4.200) - on levothyroxine 112 mcg  (10/2020) TSH 0.098, free T4 1.85. [facility]: TG <0.1, TG Ab < 1.0  (11/2020) TSH 4.330, free T4 1.24, increased to levothyroxine 125 mcg  (01/2021) TSH 0.086 - on levothyroxine 125 mcg  (04/2021) TSH 0.049,  Free T4 1.73 - on levothyroxine 125 mcg  (11/2021) TSH 0.138, free T4 1.58, [facility]: TG <0.1, TG Ab < 1.0 - on levothyroxine 112 mcg, dose changed to 100 mcg.  (10/2022) TSH 7.870, free T4 1.58, [facility]: TG by ROGELIO 0.2, TG Ab < 1.0 - on levothyroxine 88 mcg    Recent Labs: (09/2024) TSH 2.250, free T4 1.51, TG by LCMS 0.2, TG Ab 1.1 - on levothyroxine 100 mcg qAM    2) post-surgical hypothyroidism:  Current Dose: [id number] mcg  - Pt taking in AM on an empty stomach as directed without interfering substances  - Is not on high dose biotin    Lab Results   Component Value Date    TSH 2.250 09/17/2024       3) post-surgical hypoparathyroidism:  - h/o  primary hyperparathyroidism: s/p right superior parathyroidectomy 08/2018, s/p left superior and inferior parathyroidectomy 11/2018  - On Calcitriol 0.25 mcg daily   Serum Calcium at lower end of normal range since 01/2021    (09/17/2024) CMP - Cr 0.91, eGFR 63.5  Ca 9.7, Alb 4.5    4) Type 2 diabetes without complication:  - had pre-diabetes for several years. Diagnosed with overt T2DM in 2013, A1C%  "6.5    Current Rxs:  - Metformin  mg BID - see above regarding diarrhea, pt does not associate with the metformin ER  - glimepiride 4 mg qAM: denies hypoglycemia    Other History:  (08/30/2023) was admitted to [facility] for diverticulitis.  Later developed a Colovaginal fistula  (10/24/2023) s/p LAR diverting ostomy and bladder reconstruction  (11/28/2023) Was admitted again to [facility] for dehydration  (04/25/2024) s/p Illeostomy reversal on 04/25/2024    Review of Systems   Constitutional:  Negative for fatigue.        Wt gain   HENT: Negative.     Eyes: Negative.    Respiratory: Negative.     Cardiovascular: Negative.    Gastrointestinal: Negative.  Positive for diarrhea (internittemt, See HPI).   Endocrine: Negative.    Genitourinary: Negative.    Musculoskeletal:  Positive for arthralgias and myalgias. Negative for joint swelling.   Skin: Negative.    Allergic/Immunologic: Negative.    Neurological: Negative.    Hematological: Negative.    Psychiatric/Behavioral: Negative.       The following portions of the patient's history were reviewed and updated as appropriate: allergies, current medications, past family history, past medical history, past social history, past surgical history and problem list.    /80   Pulse 87   Ht 154.9 cm (60.98\")   Wt 73 kg (161 lb)   SpO2 97%   BMI 30.44 kg/m²   Physical Exam   Constitutional: She is oriented to person, place, and time. She appears well-developed. No distress.   HENT:   Head: Normocephalic.   Eyes: Pupils are equal, round, and reactive to light. Conjunctivae are normal.   Neck: No tracheal deviation present.   No palpable thyroid tissue      Cardiovascular: Normal rate, regular rhythm and normal heart sounds.   No murmur heard.  Pulmonary/Chest: Effort normal and breath sounds normal. No respiratory distress.   Abdominal: Soft.   Musculoskeletal:      Comments: Arthritic changes in hands   Lymphadenopathy:     She has no cervical adenopathy.   Neurological: " She is alert and oriented to person, place, and time. No cranial nerve deficit.   Skin: Skin is warm and dry. She is not diaphoretic. No erythema.   Psychiatric: Her behavior is normal.   Vitals reviewed.    LABS/IMAGING: outside records reviewed and summarized in HPI  Office Visit on 09/17/2024   Component Date Value Ref Range Status    Glucose 09/17/2024 182 (A)  70 - 130 mg/dL Final    Lot Number 09/17/2024 [id number]   Final    Expiration Date 09/17/2024 4/5/2025   Final    Hemoglobin A1C 09/17/2024 7.8 (A)  4.5 - 5.7 % Final    Lot Number 09/17/2024 [id number]   Final    Expiration Date 09/17/2024 5/27/2026   Final    Glucose 09/17/2024 106 (H)  65 - 99 mg/dL Final    BUN 09/17/2024 20  8 - 23 mg/dL Final    Creatinine 09/17/2024 0.91  0.57 - 1.00 mg/dL Final    Sodium 09/17/2024 139  136 - 145 mmol/L Final    Potassium 09/17/2024 4.4  3.5 - 5.2 mmol/L Final    Chloride 09/17/2024 102  98 - 107 mmol/L Final    CO2 09/17/2024 23.3  22.0 - 29.0 mmol/L Final    Calcium 09/17/2024 9.7  8.6 - 10.5 mg/dL Final    Total Protein 09/17/2024 7.6  6.0 - 8.5 g/dL Final    Albumin 09/17/2024 4.5  3.5 - 5.2 g/dL Final    ALT (SGPT) 09/17/2024 28  1 - 33 U/L Final    AST (SGOT) 09/17/2024 28  1 - 32 U/L Final    Alkaline Phosphatase 09/17/2024 114  39 - 117 U/L Final    Total Bilirubin 09/17/2024 0.2  0.0 - 1.2 mg/dL Final    Globulin 09/17/2024 3.1  gm/dL Final    A/G Ratio 09/17/2024 1.5  g/dL Final    BUN/Creatinine Ratio 09/17/2024 22.0  7.0 - 25.0 Final    Anion Gap 09/17/2024 13.7  5.0 - 15.0 mmol/L Final    eGFR 09/17/2024 63.5  >60.0 mL/min/1.73 Final    TSH 09/17/2024 2.250  0.270 - 4.200 uIU/mL Final    Vitamin B-12 09/17/2024 891  211 - 946 pg/mL Final    Free T4 09/17/2024 1.51  0.92 - 1.68 ng/dL Final    Thyroglobulin Ab 09/17/2024 1.1 (H)  0.0 - 0.9 IU/mL Final    Thyroglobulin Antibody measured by [facility]  It should be noted that the presence of thyroglobulin antibodies  may not be pathogenic  nor diagnostic, especially at very low  levels. The assay  has found that four percent of  individuals without evidence of thyroid disease or autoimmunity  will have positive TgAb levels up to 4 IU/mL.    Thyroglobulin 09/17/2024 0.2 (L)  1.5 - 38.5 ng/mL Final    This test was developed and its performance characteristics  determined by [employer]. It has not been cleared or approved  by the Food and Drug Administration.  According to the National Academy of Clinical Biochemistry, the  reference interval for Thyroglobulin (TG) should be related to  euthyroid patients and not for patients who underwent thyroidectomy.  TG reference intervals for these patients depend on the residual  mass of the thyroid tissue left after surgery. Establishing a  post-operative baseline is recommended.  The assay limit of quantitation is 0.2 ng/mL       ASSESSMENT/PLAN:    1) Type 2 diabetes w/o complications: fair control, A1C% 7.80 today  - continue Metformin  mg to BID with food.   - continue glimepiride to 4 mg qAM. Reviewed potential for hypoglycemia on this medication. Advised to not skip meals    2) post-surgical hypothyroidism: controlled  - clinically euthyroid on exam  - TSH today was 2.250  - Will continue levothyroxine 100 mcg qAM   - Reviewed proper thyroid hormone administration, and factors to avoid that decrease medication potency and medication absorption.     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 Louie current American Thyroid Association 2015 guidelines for differentiated thyroid carcinoma.   - discussed that the need for additional treatment (in particular, radioiodine therapy) after surgery, per these guidelines, is based upon an initial risk stratification system which estimates the risk of persistent/recurrent disease. Per this stratification system, she is at low risk for recurrence.  I did not recommend REESE remnant ablation.     Labs: (09/2024) TSH 2.250, free T4 1.51, TG by LCMS 0.2, TG Ab 1.1 - on levothyroxine 100 mcg qAM    5) diarrhea, unspecified:   - Reports having intermittent diarrhea since her GI surgeries in 2023. Pt reports that she does not associate the diarrhea with her metformin ER  - Will have pt try colestid 1 gm once weekly at lunchtime to see if medication helps her symptom    RTC 4 months    Electronically Signed: Maureen Aiken MD

## 2024-10-03 LAB
THYROGLOB AB SERPL-ACNC: 1.1 IU/ML (ref 0–0.9)
THYROGLOB SERPL-MCNC: 0.2 NG/ML (ref 1.5–38.5)

## 2024-10-16 ENCOUNTER — LAB (OUTPATIENT)
Dept: LAB | Facility: HOSPITAL | Age: 79
End: 2024-10-16
Payer: MEDICARE

## 2024-10-16 ENCOUNTER — OFFICE VISIT (OUTPATIENT)
Dept: INTERNAL MEDICINE | Facility: CLINIC | Age: 79
End: 2024-10-16
Payer: MEDICARE

## 2024-10-16 VITALS
OXYGEN SATURATION: 96 % | SYSTOLIC BLOOD PRESSURE: 136 MMHG | HEIGHT: 61 IN | TEMPERATURE: 98.2 F | DIASTOLIC BLOOD PRESSURE: 78 MMHG | HEART RATE: 96 BPM | BODY MASS INDEX: 30.85 KG/M2 | WEIGHT: 163.4 LBS

## 2024-10-16 DIAGNOSIS — K04.7 DENTAL INFECTION: ICD-10-CM

## 2024-10-16 DIAGNOSIS — Z01.818 PREOP EXAMINATION: Primary | ICD-10-CM

## 2024-10-16 DIAGNOSIS — Z01.818 PREOP EXAMINATION: ICD-10-CM

## 2024-10-16 DIAGNOSIS — E11.9 TYPE 2 DIABETES MELLITUS WITHOUT COMPLICATION, WITHOUT LONG-TERM CURRENT USE OF INSULIN: ICD-10-CM

## 2024-10-16 DIAGNOSIS — I10 ESSENTIAL HYPERTENSION: ICD-10-CM

## 2024-10-16 DIAGNOSIS — N18.31 STAGE 3A CHRONIC KIDNEY DISEASE: ICD-10-CM

## 2024-10-16 LAB
DEPRECATED RDW RBC AUTO: 47.4 FL (ref 37–54)
ERYTHROCYTE [DISTWIDTH] IN BLOOD BY AUTOMATED COUNT: 14.4 % (ref 12.3–15.4)
HCT VFR BLD AUTO: 39.2 % (ref 34–46.6)
HGB BLD-MCNC: 12.9 G/DL (ref 12–15.9)
MCH RBC QN AUTO: 29.7 PG (ref 26.6–33)
MCHC RBC AUTO-ENTMCNC: 32.9 G/DL (ref 31.5–35.7)
MCV RBC AUTO: 90.3 FL (ref 79–97)
PLATELET # BLD AUTO: 405 10*3/MM3 (ref 140–450)
PMV BLD AUTO: 11.8 FL (ref 6–12)
RBC # BLD AUTO: 4.34 10*6/MM3 (ref 3.77–5.28)
WBC NRBC COR # BLD AUTO: 15.24 10*3/MM3 (ref 3.4–10.8)

## 2024-10-16 PROCEDURE — 1159F MED LIST DOCD IN RCRD: CPT | Performed by: INTERNAL MEDICINE

## 2024-10-16 PROCEDURE — 3078F DIAST BP <80 MM HG: CPT | Performed by: INTERNAL MEDICINE

## 2024-10-16 PROCEDURE — 99214 OFFICE O/P EST MOD 30 MIN: CPT | Performed by: INTERNAL MEDICINE

## 2024-10-16 PROCEDURE — 1160F RVW MEDS BY RX/DR IN RCRD: CPT | Performed by: INTERNAL MEDICINE

## 2024-10-16 PROCEDURE — 3075F SYST BP GE 130 - 139MM HG: CPT | Performed by: INTERNAL MEDICINE

## 2024-10-16 PROCEDURE — 93000 ELECTROCARDIOGRAM COMPLETE: CPT | Performed by: INTERNAL MEDICINE

## 2024-10-16 PROCEDURE — 85027 COMPLETE CBC AUTOMATED: CPT

## 2024-10-16 PROCEDURE — 1125F AMNT PAIN NOTED PAIN PRSNT: CPT | Performed by: INTERNAL MEDICINE

## 2024-10-16 RX ORDER — AMOXICILLIN 500 MG/1
500 CAPSULE ORAL 2 TIMES DAILY
Qty: 14 CAPSULE | Refills: 0 | Status: SHIPPED | OUTPATIENT
Start: 2024-10-16 | End: 2024-10-23

## 2024-10-16 NOTE — PROGRESS NOTES
Internal Medicine Preop Evaluation    Chief Complaint  Subjective   Chief Complaint   Patient presents with    Hypertension     Pt not wanting to continue metformin or lisinopril due to side effects        HPI  Cristy Louie is a 79 y.o. female who presents to the office today for a preoperative consultation at the request of dental surgeon who plans on performing dental extractions, ?bone grafting on October 25. This consultation is requested for the specific conditions prompting preoperative evaluation (i.e. because of potential affect on operative risk): HTN, DM, CKD. Planned anesthesia: general. The patient has the following known anesthesia issues:  None . Patients bleeding risk: no recent abnormal bleeding.     Cristy Louie can walk up two flights of stairs or two blocks.     She also notes hair loss and L ear pain. Believes hair loss is secondary to lisinopril. Ear pain ongoing for about a week. Has significant dental decay and suspected dental infection. Did complete amoxicillin previously for this.    Review of Systems  Review of Systems   Constitutional: Negative.    HENT:  Positive for dental problem and ear pain.    Respiratory:  Positive for wheezing. Negative for shortness of breath.    Cardiovascular:  Negative for chest pain, palpitations and leg swelling.   Musculoskeletal:  Positive for arthralgias and myalgias.   Skin:         Hair loss       The following portions of the patient's history were reviewed and updated as appropriate: allergies, current medications, past family history, past medical history, past social history, past surgical history, and problem list.    Past Medical History  Past Medical History:   Diagnosis Date    Abnormal liver function tests     Description: liver bx showed fatty liver 2008    Allergic rhinitis     Anemia     Anxiety disorder     Asthma     Description: dx 1998    Benign colonic polyp     Description: dx 2007    Cancer     Thyroid    Cataract     Chronic  kidney disease (CKD), stage III (moderate)     Colovaginal fistula 09/07/2023    Colovesical fistula 04/25/2024    Diabetes mellitus     Description: dx 7/10    Disease of thyroid gland     Diverticulosis of intestine     Description: dx 2007    Fibromyalgia     Description: dx 2003    Gastroesophageal reflux disease     Description: dx 2003.  EGD normal 2007.    H/O cardiovascular stress test     12/06- normal dobutamine myoview. 12/16/13- acceptable negative Lexiscan Cardiolite test.    History of echocardiogram     2/06- normal except ? relaxation abnormality    Hyperlipidemia     Description: dx 1980's    Hyperparathyroidism     Description: dx 8/11    Hypertension     Osteoarthritis     Description: dx 1990's    Osteoporosis     Description: dx 2007    PONV (postoperative nausea and vomiting)     Rheumatoid arthritis     Description: dx 2003    Sensorineural hearing loss (SNHL) of both ears 10/14/2021    DX October 2021 by [facility] (mild to severe loss)    Sigmoid diverticulitis 09/07/2023    Vitamin D deficiency     Description: dx 7/10 (mild)       Surgical History  Past Surgical History:   Procedure Laterality Date    BLADDER SURGERY      urethral bulking injections    BREAST BIOPSY Right     benign    CHOLECYSTECTOMY      1978    COLON SURGERY  10/24/2023    Brodnax Ivins (Colectomy)diverticulitis    COLONOSCOPY      CYSTOSCOPY W/ BULKING AGENT INJECTION N/A 04/08/2022    Procedure: CYSTOSCOPY WITH BULKING AGENT INJECTION (BULKAMID);  Surgeon: Sly Mariano MD;  Location: [facility] OR;  Service: Urology;  Laterality: N/A;    ENDOSCOPY      ILEOSTOMY CLOSURE N/A 4/25/2024    Procedure: ILEOSTOMY TAKEDOWN;  Surgeon: Lopez Barba MD;  Location:  [facility] OR;  Service: General;  Laterality: N/A;    PARATHYROIDECTOMY Right 08/07/2018    [place], path c/w hypercellular parathyroid- Dr. Ava Jacobs ([facility])    THYROID SURGERY  08/2018    x 2 11/2018    THYROIDECTOMY Right 08/07/2018     Papillary Thyroid Carcinoma- Dr. Ava Jacobs ([facility])    THYROIDECTOMY Left 11/27/2018    Papillary Thyroid Carcinoma- Dr. Ava Jacobs ([facility])    TOTAL ABDOMINAL HYSTERECTOMY WITH SALPINGO OOPHORECTOMY Bilateral     1987, menorrhagia    WISDOM TOOTH EXTRACTION         Social History  Social History     Socioeconomic History    Marital status:     Number of children: 4   Tobacco Use    Smoking status: Never     Passive exposure: Past    Smokeless tobacco: Never   Vaping Use    Vaping status: Never Used   Substance and Sexual Activity    Alcohol use: No    Drug use: No    Sexual activity: Defer       Current Medications  Current Outpatient Medications   Medication Sig Dispense Refill    Accu-Chek Guide test strip 1 each by Other route Daily. 50 each 5    Accu-Chek Softclix Lancets lancets 1 each by Other route 2 (Two) Times a Day. 60 each 5    allopurinol (ZYLOPRIM) 100 MG tablet Take 1.5 tablets by mouth Daily. 135 tablet 1    amoxicillin (AMOXIL) 500 MG capsule Take 1 capsule by mouth 2 (Two) Times a Day for 7 days. 14 capsule 0    Berberine Chloride 500 MG capsule Take 500 mg by mouth Daily.      Blood Glucose Monitoring Suppl (Accu-Chek Guide Me) w/Device kit Use 1 Device Take As Directed. 1 kit 0    calcitriol (ROCALTROL) 0.25 MCG capsule Take 1 capsule by mouth Daily. 90 capsule 3    DULoxetine (CYMBALTA) 30 MG capsule Take 1 capsule by mouth Daily. 90 capsule 1    fluticasone (FLONASE) 50 MCG/ACT nasal spray Administer 1 spray into the nostril(s) as directed by provider As Needed for Rhinitis or Allergies.      glimepiride (AMARYL) 4 MG tablet Take 1 tablet by mouth Every Morning Before Breakfast. 90 tablet 3    hydrOXYzine (ATARAX) 25 MG tablet Take 1 tablet by mouth 3 (Three) Times a Day As Needed for Anxiety. 30 tablet 1    levothyroxine (SYNTHROID, LEVOTHROID) 100 MCG tablet TAKE 1 TABLET BY MOUTH EVERY DAY IN THE MORNING 90 tablet 3    lisinopril (PRINIVIL,ZESTRIL) 20 MG tablet Take 1  "tablet by mouth 2 (Two) Times a Day. 180 tablet 1    loratadine (Claritin) 10 MG tablet Take 1 tablet by mouth Daily. 90 tablet 0    Lutein 20 MG capsule Take 20 mg by mouth Daily.      metFORMIN ER (GLUCOPHAGE-XR) 500 MG 24 hr tablet TAKE 1 TABLET BY MOUTH TWICE A DAY WITH MEALS (Patient taking differently: Take 1 tablet by mouth Daily With Breakfast.) 180 tablet 3    nystatin 318414 UNIT/GM powder APPLY TOPICALLY TO THE APPROPRIATE AREA AS DIRECTED 3 (THREE) TIMES A DAY. 60 g 11    simvastatin (ZOCOR) 40 MG tablet Take 1 tablet by mouth Daily. 90 tablet 3     No current facility-administered medications for this visit.       Allergies  Allergies   Allergen Reactions    Amlodipine Swelling    Escitalopram Dizziness        Objective  Visit Vitals  /78   Pulse 96   Temp 98.2 °F (36.8 °C) (Oral)   Ht 154.9 cm (60.98\")   Wt 74.1 kg (163 lb 6.4 oz)   SpO2 96%   BMI 30.89 kg/m²       Physical Exam  Physical Exam  Vitals and nursing note reviewed.   Constitutional:       General: She is not in acute distress.     Appearance: She is well-developed. She is obese. She is not ill-appearing or toxic-appearing.   HENT:      Head: Normocephalic and atraumatic.      Left Ear: Tympanic membrane, ear canal and external ear normal. There is no impacted cerumen.      Mouth/Throat:      Dentition: Abnormal dentition. Gingival swelling present.   Eyes:      Conjunctiva/sclera: Conjunctivae normal.   Cardiovascular:      Rate and Rhythm: Normal rate and regular rhythm.      Heart sounds: Normal heart sounds. No murmur heard.  Pulmonary:      Effort: Pulmonary effort is normal. No respiratory distress.      Breath sounds: Normal breath sounds. No wheezing, rhonchi or rales.   Musculoskeletal:      Right lower leg: No edema.      Left lower leg: No edema.   Skin:     General: Skin is warm and dry.   Neurological:      Mental Status: She is alert and oriented to person, place, and time. Mental status is at baseline.      Gait: Gait " normal.         Cardiographics    ECG 12 Lead    Date/Time: 10/16/2024 3:00 PM  Performed by: Sandra Daniel MD    Authorized by: Sandra Daniel MD  Comparison: compared with previous ECG from 4/19/2024  Similar to previous ECG  Rhythm: sinus rhythm  Rate: normal  BPM: 80  Conduction: conduction normal  Q waves: III    ST Segments: ST segments normal  T Waves: T waves normal  QRS axis: left  Other: no other findings    Clinical impression: abnormal EKG              Lab Review   CMP  Lab Results   Component Value Date    GLUCOSE 106 (H) 09/17/2024    BUN 20 09/17/2024    CREATININE 0.91 09/17/2024    EGFRIFNONA 51 (L) 11/29/2021    BCR 22.0 09/17/2024    K 4.4 09/17/2024    CO2 23.3 09/17/2024    CALCIUM 9.7 09/17/2024    ALBUMIN 4.5 09/17/2024    AST 28 09/17/2024    ALT 28 09/17/2024        CBC w/DIFF   Lab Results   Component Value Date    WBC 15.24 (H) 10/16/2024    RBC 4.34 10/16/2024    HGB 12.9 10/16/2024    HCT 39.2 10/16/2024    MCV 90.3 10/16/2024    MCH 29.7 10/16/2024    MCHC 32.9 10/16/2024    RDW 14.4 10/16/2024    RDWSD 47.4 10/16/2024    MPV 11.8 10/16/2024     10/16/2024    NEUTRORELPCT 55.7 04/28/2024    LYMPHORELPCT 29.9 04/28/2024    MONORELPCT 8.7 04/28/2024    EOSRELPCT 4.8 04/28/2024    BASORELPCT 0.4 04/28/2024    AUTOIGPER 0.5 04/28/2024    NEUTROABS 5.62 04/28/2024    LYMPHSABS 3.01 04/28/2024    MONOSABS 0.88 04/28/2024    EOSABS 0.48 (H) 04/28/2024    BASOSABS 0.04 04/28/2024    AUTOIGNUM 0.05 04/28/2024    NRBC 0.0 04/28/2024       PT/INR  Lab Results   Component Value Date    PROTIME 9.8 10/09/2015    INR 0.94 10/09/2015       aPTT  Lab Results   Component Value Date    PTT 29 10/09/2015        Assessment and Plan  Diagnoses and all orders for this visit:    Preop examination  - Cristy Louie is a 79 y.o. female with planned surgery as above.    Known risk factors for perioperative complications: Diabetes mellitus  Renal dysfunction. RCRI 0.  - reviewed recent labs  with normal renal and hepatic function, A1c 7.8. Need CBC which will be obtained today.  - ECG LAD, stable from prior. No cardiac complaints today.  - based on above she is optimized for planned dental surgery under general anesthesia.    Dental infection  - suspect ear pain is referred from dental infection  - amoxicillin sent to pharmacy  - keep appt with dentist    Stage 3a chronic kidney disease  - most recent gfr >60 however historically has had CKD3a gfr 50-55     Type 2 diabetes mellitus without complication, without long-term current use of insulin  - Following with Dr. Aiken, last A1c 7.8 on glimepiride 4mg daily and metformin XR 500mg daily   - hold both glimepiride and metformin day of surgery    Essential hypertension  - BP improving  - continue lisinopril 20mg BID  - hold ACEI day of surgery    Addendum:  CBC reviewed. Rising white cell count which may be related to dental infection. Okay to proceed with surgery. Will plan to reassess leukocytosis at her next visit.    Return for Next scheduled follow up, Labs today.

## 2024-10-18 ENCOUNTER — TELEPHONE (OUTPATIENT)
Dept: ENDOCRINOLOGY | Facility: CLINIC | Age: 79
End: 2024-10-18
Payer: MEDICARE

## 2024-10-18 ENCOUNTER — TELEPHONE (OUTPATIENT)
Dept: INTERNAL MEDICINE | Facility: CLINIC | Age: 79
End: 2024-10-18
Payer: MEDICARE

## 2024-10-18 DIAGNOSIS — F41.1 GENERALIZED ANXIETY DISORDER: ICD-10-CM

## 2024-10-18 DIAGNOSIS — F32.A DEPRESSION, UNSPECIFIED DEPRESSION TYPE: ICD-10-CM

## 2024-10-18 DIAGNOSIS — I10 ESSENTIAL HYPERTENSION: ICD-10-CM

## 2024-10-18 DIAGNOSIS — M79.7 FIBROMYALGIA: ICD-10-CM

## 2024-10-18 RX ORDER — LISINOPRIL 20 MG/1
20 TABLET ORAL 2 TIMES DAILY
Qty: 180 TABLET | Refills: 1 | Status: SHIPPED | OUTPATIENT
Start: 2024-10-18

## 2024-10-18 RX ORDER — DULOXETIN HYDROCHLORIDE 30 MG/1
30 CAPSULE, DELAYED RELEASE ORAL DAILY
Qty: 90 CAPSULE | Refills: 1 | Status: SHIPPED | OUTPATIENT
Start: 2024-10-18

## 2024-10-18 NOTE — TELEPHONE ENCOUNTER
Caller: Cristy Louie    Relationship: Self    Best call back number: [phone]    What form or medical record are you requesting: [facility]       Additional notes: THE CALLER WOULD LIKE TO KNOW [facility]

## 2024-10-18 NOTE — TELEPHONE ENCOUNTER
Caller: Cristy Louie    Relationship: Self    Best call back number: [phone]    Requested Prescriptions:   Requested Prescriptions     Pending Prescriptions Disp Refills    lisinopril (PRINIVIL,ZESTRIL) 20 MG tablet 180 tablet 1     Sig: Take 1 tablet by mouth 2 (Two) Times a Day.    DULoxetine (CYMBALTA) 30 MG capsule 90 capsule 1     Sig: Take 1 capsule by mouth Daily.        Pharmacy where request should be sent: [id number] [address] [phone] [facility] [phone] [facility]     Last office visit with prescribing clinician: 10/16/2024   Last telemedicine visit with prescribing clinician: Visit date not found   Next office visit with prescribing clinician: 11/20/2024     Additional details provided by patient: THE PATIENT STATES THAT SHE HAS THREE DAYS LEFT     Does the patient have less than a 3 day supply:  [] Yes  [x] No    Would you like a call back once the refill request has been completed: [] Yes [x] No    If the office needs to give you a call back, can they leave a voicemail: [] Yes [x] No    Kat Maza Rep   10/18/24 15:32 EDT

## 2024-10-20 RX ORDER — METFORMIN HCL 500 MG
500 TABLET, EXTENDED RELEASE 24 HR ORAL 2 TIMES DAILY WITH MEALS
Status: SHIPPED
Start: 2024-10-20

## 2024-10-20 RX ORDER — MONTELUKAST SODIUM 4 MG/1
1 TABLET, CHEWABLE ORAL
Qty: 30 TABLET | Refills: 0 | Status: SHIPPED | OUTPATIENT
Start: 2024-10-20

## 2024-10-20 NOTE — TELEPHONE ENCOUNTER
Spoke to patient about lab results. See clinic note from 09/17/2024 for details.   Electronically Signed: Maureen Aiken MD

## 2024-10-22 ENCOUNTER — TELEPHONE (OUTPATIENT)
Dept: INTERNAL MEDICINE | Facility: CLINIC | Age: 79
End: 2024-10-22
Payer: MEDICARE

## 2024-10-22 NOTE — TELEPHONE ENCOUNTER
----- Message from Sandra Daniel sent at 10/21/2024  4:16 PM EDT -----  Please call patient and let her know that her white blood cell count is rising. This will not keep her from having dental surgery but should be followed up at her next appointment. Let her know we will fax this information to her dental surgeon.

## 2024-10-26 DIAGNOSIS — J30.89 SEASONAL ALLERGIC RHINITIS DUE TO OTHER ALLERGIC TRIGGER: ICD-10-CM

## 2024-10-28 RX ORDER — LORATADINE 10 MG/1
10 TABLET ORAL DAILY
Qty: 90 TABLET | Refills: 0 | Status: SHIPPED | OUTPATIENT
Start: 2024-10-28

## 2024-11-05 DIAGNOSIS — M1A.0720 IDIOPATHIC CHRONIC GOUT OF LEFT FOOT WITHOUT TOPHUS: ICD-10-CM

## 2024-11-05 RX ORDER — ALLOPURINOL 100 MG/1
150 TABLET ORAL DAILY
Qty: 135 TABLET | Refills: 0 | Status: SHIPPED | OUTPATIENT
Start: 2024-11-05

## 2024-11-05 NOTE — TELEPHONE ENCOUNTER
Last filled:5/22/24  allopurinol (ZYLOPRIM) 100 MG  Dispense Quantity: 135 tablet Refills: 1    Sig: Take 1.5 tablets by mouth Daily.  LOV:10/16/24  NOV:11/20/24

## 2024-11-14 DIAGNOSIS — R19.7 DIARRHEA, UNSPECIFIED TYPE: ICD-10-CM

## 2024-11-14 RX ORDER — MONTELUKAST SODIUM 4 MG/1
1 TABLET, CHEWABLE ORAL
Qty: 90 TABLET | Refills: 0 | OUTPATIENT
Start: 2024-11-14

## 2024-11-14 NOTE — TELEPHONE ENCOUNTER
Last office visit with prescribing clinician: 9/17/2024       Next office visit with prescribing clinician: 1/28/2025     {

## 2024-11-14 NOTE — TELEPHONE ENCOUNTER
Pt has not had a chance to use Rx sent at her prior visit. She will use the Rx she has currently and see if it helps her diarrhea symptoms. The medication was expensive for her as well.   Electronically Signed: Maureen Aiken MD

## 2024-11-18 DIAGNOSIS — R19.7 DIARRHEA, UNSPECIFIED TYPE: ICD-10-CM

## 2024-11-18 RX ORDER — MONTELUKAST SODIUM 4 MG/1
1 TABLET, CHEWABLE ORAL
Qty: 30 TABLET | Refills: 0 | Status: CANCELLED | OUTPATIENT
Start: 2024-11-18

## 2024-11-20 ENCOUNTER — LAB (OUTPATIENT)
Dept: LAB | Facility: HOSPITAL | Age: 79
End: 2024-11-20
Payer: MEDICARE

## 2024-11-20 ENCOUNTER — OFFICE VISIT (OUTPATIENT)
Dept: INTERNAL MEDICINE | Facility: CLINIC | Age: 79
End: 2024-11-20
Payer: MEDICARE

## 2024-11-20 VITALS
DIASTOLIC BLOOD PRESSURE: 76 MMHG | SYSTOLIC BLOOD PRESSURE: 122 MMHG | OXYGEN SATURATION: 99 % | HEIGHT: 61 IN | TEMPERATURE: 97.4 F | BODY MASS INDEX: 30.89 KG/M2 | WEIGHT: 163.6 LBS

## 2024-11-20 DIAGNOSIS — I10 ESSENTIAL HYPERTENSION: Primary | ICD-10-CM

## 2024-11-20 DIAGNOSIS — D72.829 LEUKOCYTOSIS, UNSPECIFIED TYPE: ICD-10-CM

## 2024-11-20 DIAGNOSIS — N18.31 STAGE 3A CHRONIC KIDNEY DISEASE: ICD-10-CM

## 2024-11-20 PROCEDURE — 85025 COMPLETE CBC W/AUTO DIFF WBC: CPT

## 2024-11-20 NOTE — PROGRESS NOTES
Internal Medicine Follow Up    Chief Complaint  Cristy Louie is a 79 y.o. female who presents today for follow up of chronic medical conditions outlined below.    Chief Complaint   Patient presents with    Essential hypertension        HPI  Ms. Louie comes in today for follow up. BP controlled. Notes dental extractions and only able to eat soft foods. Ongoing diarrhea since ostomy and reversal. Dr. Aiken had prescribed colestipol with lunch but she chose not to take it.         Review of Systems  Review of Systems   Constitutional: Negative.  Negative for fever.   HENT:  Positive for dental problem.    Respiratory: Negative.     Cardiovascular: Negative.    Gastrointestinal:  Positive for diarrhea.   Musculoskeletal:  Positive for arthralgias.        Current Medications  Current Outpatient Medications on File Prior to Visit   Medication Sig Dispense Refill    allopurinol (ZYLOPRIM) 100 MG tablet TAKE 1 1/2 TABLETS BY MOUTH DAILY 135 tablet 0    Berberine Chloride 500 MG capsule Take 500 mg by mouth Daily.      calcitriol (ROCALTROL) 0.25 MCG capsule Take 1 capsule by mouth Daily. 90 capsule 3    DULoxetine (CYMBALTA) 30 MG capsule Take 1 capsule by mouth Daily. 90 capsule 1    fluticasone (FLONASE) 50 MCG/ACT nasal spray Administer 1 spray into the nostril(s) as directed by provider As Needed for Rhinitis or Allergies.      glimepiride (AMARYL) 4 MG tablet Take 1 tablet by mouth Every Morning Before Breakfast. 90 tablet 3    hydrOXYzine (ATARAX) 25 MG tablet Take 1 tablet by mouth 3 (Three) Times a Day As Needed for Anxiety. 30 tablet 1    levothyroxine (SYNTHROID, LEVOTHROID) 100 MCG tablet TAKE 1 TABLET BY MOUTH EVERY DAY IN THE MORNING 90 tablet 3    lisinopril (PRINIVIL,ZESTRIL) 20 MG tablet Take 1 tablet by mouth 2 (Two) Times a Day. 180 tablet 1    Lutein 20 MG capsule Take 20 mg by mouth Daily.      metFORMIN ER (GLUCOPHAGE-XR) 500 MG 24 hr tablet Take 1 tablet by mouth 2 (Two) Times a Day With Meals.    "   nystatin [id number] UNIT/GM powder APPLY TOPICALLY TO THE APPROPRIATE AREA AS DIRECTED 3 (THREE) TIMES A DAY. 60 g 11    omeprazole (priLOSEC) 20 MG capsule Take 1 capsule by mouth Daily.      simvastatin (ZOCOR) 40 MG tablet Take 1 tablet by mouth Daily. 90 tablet 3    Accu-Chek Guide test strip 1 each by Other route Daily. (Patient not taking: Reported on 11/20/2024) 50 each 5    Accu-Chek Softclix Lancets lancets 1 each by Other route 2 (Two) Times a Day. (Patient not taking: Reported on 11/20/2024) 60 each 5    Blood Glucose Monitoring Suppl (Accu-Chek Guide Me) w/Device kit Use 1 Device Take As Directed. (Patient not taking: Reported on 11/20/2024) 1 kit 0    colestipol (COLESTID) 1 g tablet Take 1 tablet by mouth Daily With Lunch. (Patient not taking: Reported on 11/20/2024) 30 tablet 0    loratadine (CLARITIN) 10 MG tablet TAKE 1 TABLET BY MOUTH EVERY DAY (Patient not taking: Reported on 11/20/2024) 90 tablet 0     No current facility-administered medications on file prior to visit.       Allergies  Allergies   Allergen Reactions    Amlodipine Swelling    Escitalopram Dizziness       Objective  Visit Vitals  /76 (BP Location: Left arm, Patient Position: Sitting, Cuff Size: Adult)   Temp 97.4 °F (36.3 °C) (Temporal)   Ht 154.9 cm (60.98\")   Wt 74.2 kg (163 lb 9.6 oz)   SpO2 99%   BMI 30.93 kg/m²        Physical Exam  Physical Exam  Vitals and nursing note reviewed.   Constitutional:       General: She is not in acute distress.     Appearance: She is well-developed.   HENT:      Head: Normocephalic and atraumatic.   Eyes:      Conjunctiva/sclera: Conjunctivae normal.   Cardiovascular:      Rate and Rhythm: Normal rate and regular rhythm.      Heart sounds: Normal heart sounds.   Pulmonary:      Effort: Pulmonary effort is normal. No respiratory distress.      Breath sounds: Normal breath sounds.   Musculoskeletal:      Right lower leg: No edema.      Left lower leg: No edema.   Skin:     General: Skin is " warm and dry.   Neurological:      Mental Status: She is alert and oriented to person, place, and time. Mental status is at baseline.      Gait: Gait normal.         Results  Results for orders placed or performed in visit on 10/16/24   CBC (No Diff)    Collection Time: 10/16/24  3:14 PM    Specimen: Blood   Result Value Ref Range    WBC 15.24 (H) 3.40 - 10.80 10*3/mm3    RBC 4.34 3.77 - 5.28 10*6/mm3    Hemoglobin 12.9 12.0 - 15.9 g/dL    Hematocrit 39.2 34.0 - 46.6 %    MCV 90.3 79.0 - 97.0 fL    MCH 29.7 26.6 - 33.0 pg    MCHC 32.9 31.5 - 35.7 g/dL    RDW 14.4 12.3 - 15.4 %    RDW-SD 47.4 37.0 - 54.0 fl    MPV 11.8 6.0 - 12.0 fL    Platelets 405 140 - 450 10*3/mm3     *Note: Due to a large number of results and/or encounters for the requested time period, some results have not been displayed. A complete set of results can be found in Results Review.        Assessment and Plan  Diagnoses and all orders for this visit:    Essential hypertension  - BP controlled, continue lisinopril 20mg BID    Stage 3a chronic kidney disease  - most recent gfr >60 however historically has had CKD3a gfr 50-55     Leukocytosis, unspecified type  - x1 year with single high normal reading 4/2024.  - Had increased up to 15.24 last visit but did have dental infection at that time. Has since had dental surgery a month ago. Denies fevers.  - recheck CBC w diff today     Health Maintenance  - Pap smear: no longer indicated  - Mammogram: 6/2022 BIRADS 1, needs rescheduled  - Colonoscopy: 6/2021  - HCV: negative  - Immunizations: Pneumococcal complete. Declines COVID19, flu. Shingrix too expensive. Discussed Tdap and RSV.  - Depression screening: negative 5/2024    Return for Next scheduled follow up, Labs today.

## 2024-11-21 LAB
BASOPHILS # BLD AUTO: 0.09 10*3/MM3 (ref 0–0.2)
BASOPHILS NFR BLD AUTO: 0.8 % (ref 0–1.5)
DEPRECATED RDW RBC AUTO: 43.5 FL (ref 37–54)
EOSINOPHIL # BLD AUTO: 0.83 10*3/MM3 (ref 0–0.4)
EOSINOPHIL NFR BLD AUTO: 7.4 % (ref 0.3–6.2)
ERYTHROCYTE [DISTWIDTH] IN BLOOD BY AUTOMATED COUNT: 13.2 % (ref 12.3–15.4)
HCT VFR BLD AUTO: 37.7 % (ref 34–46.6)
HGB BLD-MCNC: 12.6 G/DL (ref 12–15.9)
IMM GRANULOCYTES # BLD AUTO: 0.05 10*3/MM3 (ref 0–0.05)
IMM GRANULOCYTES NFR BLD AUTO: 0.4 % (ref 0–0.5)
LYMPHOCYTES # BLD AUTO: 3.5 10*3/MM3 (ref 0.7–3.1)
LYMPHOCYTES NFR BLD AUTO: 31 % (ref 19.6–45.3)
MCH RBC QN AUTO: 30.4 PG (ref 26.6–33)
MCHC RBC AUTO-ENTMCNC: 33.4 G/DL (ref 31.5–35.7)
MCV RBC AUTO: 91.1 FL (ref 79–97)
MONOCYTES # BLD AUTO: 1.14 10*3/MM3 (ref 0.1–0.9)
MONOCYTES NFR BLD AUTO: 10.1 % (ref 5–12)
NEUTROPHILS NFR BLD AUTO: 5.68 10*3/MM3 (ref 1.7–7)
NEUTROPHILS NFR BLD AUTO: 50.3 % (ref 42.7–76)
NRBC BLD AUTO-RTO: 0 /100 WBC (ref 0–0.2)
PLATELET # BLD AUTO: 351 10*3/MM3 (ref 140–450)
PMV BLD AUTO: 11.4 FL (ref 6–12)
RBC # BLD AUTO: 4.14 10*6/MM3 (ref 3.77–5.28)
WBC NRBC COR # BLD AUTO: 11.29 10*3/MM3 (ref 3.4–10.8)

## 2024-11-21 NOTE — PROGRESS NOTES
"TREATMENT PLAN (Medication Management Only)        [facility]    Name and Date of Birth:  Christie Whelan 23 y.o. 2001  Date of Treatment Plan: November 21, 2024  Diagnosis/Diagnoses:    1. Mixed obsessional thoughts and acts    2. MDD (major depressive disorder), recurrent, in partial remission (HCC)    3. Generalized anxiety disorder      Strengths/Personal Resources for Self-Care: supportive family, supportive friends, taking medications as prescribed, ability to adapt to life changes, ability to communicate needs, ability to communicate well, ability to listen, ability to reason, ability to understand psychiatric illness, average or above intelligence, family ties, financial means, financial security, general fund of knowledge, good physical health, good understanding of illness, independence, motivation for treatment, ability to negotiate basic needs, being resoureceful, self-reliance, sense of humor, special hobby/interest, stable employment, well educated, willingness to work on problems, work skills.  Area/Areas of need (in own words): \"maintaining stability\"  1. Long Term Goal: maintain mood stability.  Target Date:6 months - 5/21/2025  Person/Persons responsible for completion of goal: Christie  2.  Short Term Objective (s) - How will we reach this goal?:   A. Provider new recommended medication/dosage changes and/or continue medication(s): continue current medications as prescribed.  B. Attend medication management appointments regularly.  C. Call supportive people when needed .  Target Date:6 months - 5/21/2025  Person/Persons Responsible for Completion of Goal: Christie  Progress Towards Goals: stable  Treatment Modality: medication management every 6 months  Review due 180 days from date of this plan: 6 months - 5/21/2025  Expected length of service: maintenance  My Physician/PA/NP and I have developed this plan together and I agree to work on the " Chief Complaint   Patient presents with   • Hypothyroidism     follow up   • Thyroid Cancer     follow-uo       HPI:   Cristy Louie is a 75 y.o.female who returns to Endocrine Clinic for f/u evaluation of her thyroid cancer and post-surgical hypothyroidism. Last visit 10/14/2019.  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 Ava Jacobs, Endocrine Surgery, at [facility] for surgical treatment of pt's primary hyperparathyroidism.  - (05/24/2018) had pre-operative localization imaging with Thyroid US at  by Dr. Jacobs and an US-Guided FNA of a right inferior thyroid lobe nodule was completed. 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  - pt underwent right superior parathyroidectomy and partial right thyroidectomy on 08/07/2018  - underwent completion thyroidectomy 11/27/2018    Surgical pathology:  (08/07/2018)  A. Thyroid, right, lobectomy:  - papillary thyroid carcinoma, oncocytic variant (pT1a, N0)  - Tumor Size: 1.0 x 0.8 x 0.7 cm, single focus  - no extrathyroidal extension  - no lymphatic or angioinvasion  - no invasion of the surgical resection margin or to the thyroid capsule  - one benign perithyroidal lymph node (0/1)  - benign adenomatoid nodules.    B. Parathyroid, right superior, excision:  - hypercellular parathyroid (0.45 gm)    (11/27/2018)  A. Thyroid, left lobe:   - multifocal papillary thyroid carcinoma, classic type, involving bilateral lobes (pT1b, Nx)  - Tumor Size: 1.2 x 0.8 x 0.6 cm, single focus  - no extrathyroidal extension  - no lymphatic or angioinvasion  - no invasion of the surgical resection margin or to the thyroid capsule.  - Follicular adenoma of the superior pole (1.5 mm in the greatest dimension).    B. Parathyroid  "Left Inferior:   - hypercellular parathyroid gland (0.03 gm) with no tumor seen    C. Parathyroid Left Superior:   - hypercellular parathyroid gland (0.04 gm) with no tumor seen    Lab summary:   (01/2019, [facility]) TG <0.1, TG Ab < 1.0, TSH 0.55, free T4 1.6, Calcium 7.7  (04/2019, [facility]) TG 0.1, TG Ab <0 1.0, TSH 0.397, PTH 43.9, Ca 8.6  (10/2019) TSH 0.296 (0.270  - 4.200)    2) post-surgical hypothyroidism:  Current Dose: levothyroxine 112 mcg  - Pt taking in AM on an empty stomach as directed without interfering substances    3) h/o  primary hyperparathyroidism:  - s/p right superior parathyroidectomy 08/2018  - s/p left superior and inferior parathyroidectomy 11/2018    Not on calcitriol   - takes OTC tab daily: contains Calcium 1000 mg + Mag 400 mg + Vit D3 600 IU + Zinc 15 mg     Review of Systems   Constitutional: Negative for fatigue.        Weight stable   HENT: Negative.    Eyes: Negative.    Respiratory: Negative.    Cardiovascular: Negative.    Gastrointestinal: Positive for constipation. Negative for abdominal pain.   Endocrine: Negative.    Genitourinary: Negative.    Musculoskeletal: Positive for arthralgias and myalgias. Negative for joint swelling.   Skin: Negative.    Allergic/Immunologic: Negative.    Neurological: Negative.    Hematological: Negative.    Psychiatric/Behavioral: Negative.      The following portions of the patient's history were reviewed and updated as appropriate: allergies, current medications, past family history, past medical history, past social history, past surgical history and problem list.    /70   Pulse 75   Ht 154.9 cm (61\")   Wt 72.6 kg (160 lb)   SpO2 95%   BMI 30.23 kg/m²   Physical Exam   Constitutional: She is oriented to person, place, and time. She appears well-developed. No distress.   HENT:   Head: Normocephalic.   Eyes: Pupils are equal, round, and reactive to light. Conjunctivae are normal.   Neck: No tracheal deviation present.   No palpable thyroid " goals and objectives. I understand the treatment goals that were developed for my treatment.       tissue      Cardiovascular: Normal rate, regular rhythm and normal heart sounds.   No murmur heard.  Pulmonary/Chest: Effort normal and breath sounds normal. No respiratory distress.   Abdominal: Soft. Bowel sounds are normal. She exhibits no mass. There is no abdominal tenderness.   Lymphadenopathy:     She has no cervical adenopathy.   Neurological: She is alert and oriented to person, place, and time. No cranial nerve deficit.   Skin: Skin is warm and dry. She is not diaphoretic. No erythema.   Psychiatric: Her behavior is normal.   Vitals reviewed.    LABS/IMAGING: outside records reviewed and summarized in HPI  Office Visit on 10/14/2020   Component Date Value Ref Range Status   • TSH 10/14/2020 0.098* 0.270 - 4.200 uIU/mL Final   • Free T4 10/14/2020 1.85* 0.93 - 1.70 ng/dL Final   • Glucose 10/14/2020 120* 65 - 99 mg/dL Final   • BUN 10/14/2020 12  8 - 23 mg/dL Final   • Creatinine 10/14/2020 0.99  0.57 - 1.00 mg/dL Final   • Sodium 10/14/2020 142  136 - 145 mmol/L Final   • Potassium 10/14/2020 4.4  3.5 - 5.2 mmol/L Final   • Chloride 10/14/2020 104  98 - 107 mmol/L Final   • CO2 10/14/2020 28.3  22.0 - 29.0 mmol/L Final   • Calcium 10/14/2020 8.3* 8.6 - 10.5 mg/dL Final   • Albumin 10/14/2020 4.40  3.50 - 5.20 g/dL Final   • Phosphorus 10/14/2020 3.9  2.5 - 4.5 mg/dL Final   • Anion Gap 10/14/2020 9.7  5.0 - 15.0 mmol/L Final   • BUN/Creatinine Ratio 10/14/2020 12.1  7.0 - 25.0 Final   • eGFR Non African Amer 10/14/2020 54* >60 mL/min/1.73 Final   • Reference Lab Report 10/14/2020    Final    [facility]: TG <0.1, TG Ab < 1.0         ASSESSMENT/PLAN:  1) post-surgical hypothyroidism:  ADDENDUM: pt notified me after her visit that she has been taking a high dose biotin supplement.  Explained to patient that high-dose biotin interferes with thyroid function tests and will call spurious results.  TFTs today showing a suppressed TSH and high free T4 level are inaccurate due to recent biotin supplement.  -Patient  instructed to hold her biotin supplement and repeat TFTs in November at her convenience.  - continue levothyroxine 112 mcg daily  - Reviewed proper thyroid hormone administration, and factors to avoid that decrease medication potency and medication absorption.     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 Louie current American Thyroid Association 2015 guidelines for differentiated thyroid carcinoma.   - discussed that the need for additional treatment (in particular, radioiodine therapy) after surgery, per these guidelines, is based upon an initial risk stratification system which estimates the risk of persistent/recurrent disease. Per this stratification system, she is at low risk for recurrence.  I did not recommend REESE remnant ablation.     10/2020: TG < 0.1, TG Ab < 1.0, good response to surgical resection  - goal will be to keep TSH in the lower end of the range (0.1 - 0.5)     3) h/o hyperparathyroidism:  - s/p parathyroid resection  X 3 glands  - not on calcitriol now  - calcium today 8.3.   Discussed with patient that her goal calcium can range between slightly below the the normal range to the low end of the normal range  -  takes OTC tab daily: contains Calcium 1000 mg + Mag 400 mg + Vit D3 600 IU + Zinc 15 mg: asked pt to continue one tab daily    RTC 12 months.

## 2025-01-28 ENCOUNTER — OFFICE VISIT (OUTPATIENT)
Dept: ENDOCRINOLOGY | Facility: CLINIC | Age: 80
End: 2025-01-28
Payer: MEDICARE

## 2025-01-28 VITALS
DIASTOLIC BLOOD PRESSURE: 78 MMHG | WEIGHT: 165 LBS | HEART RATE: 102 BPM | OXYGEN SATURATION: 98 % | SYSTOLIC BLOOD PRESSURE: 140 MMHG | BODY MASS INDEX: 31.15 KG/M2 | HEIGHT: 61 IN

## 2025-01-28 DIAGNOSIS — C73 THYROID CANCER: ICD-10-CM

## 2025-01-28 DIAGNOSIS — E11.9 TYPE 2 DIABETES MELLITUS WITHOUT COMPLICATION, WITHOUT LONG-TERM CURRENT USE OF INSULIN: Primary | ICD-10-CM

## 2025-01-28 DIAGNOSIS — E89.2 POST-SURGICAL HYPOPARATHYROIDISM: ICD-10-CM

## 2025-01-28 DIAGNOSIS — E89.0 POST-SURGICAL HYPOTHYROIDISM: ICD-10-CM

## 2025-01-28 LAB
ALBUMIN SERPL-MCNC: 4.3 G/DL (ref 3.5–5.2)
ALBUMIN/GLOB SERPL: 1.3 G/DL
ALP SERPL-CCNC: 131 U/L (ref 39–117)
ALT SERPL W P-5'-P-CCNC: 26 U/L (ref 1–33)
ANION GAP SERPL CALCULATED.3IONS-SCNC: 11.9 MMOL/L (ref 5–15)
AST SERPL-CCNC: 30 U/L (ref 1–32)
BILIRUB SERPL-MCNC: 0.2 MG/DL (ref 0–1.2)
BUN SERPL-MCNC: 17 MG/DL (ref 8–23)
BUN/CREAT SERPL: 16.3 (ref 7–25)
CALCIUM SPEC-SCNC: 8.5 MG/DL (ref 8.6–10.5)
CHLORIDE SERPL-SCNC: 100 MMOL/L (ref 98–107)
CO2 SERPL-SCNC: 25.1 MMOL/L (ref 22–29)
CREAT SERPL-MCNC: 1.04 MG/DL (ref 0.57–1)
EGFRCR SERPLBLD CKD-EPI 2021: 54.1 ML/MIN/1.73
EXPIRATION DATE: ABNORMAL
EXPIRATION DATE: ABNORMAL
GLOBULIN UR ELPH-MCNC: 3.2 GM/DL
GLUCOSE BLDC GLUCOMTR-MCNC: 247 MG/DL (ref 70–130)
GLUCOSE SERPL-MCNC: 274 MG/DL (ref 65–99)
HBA1C MFR BLD: 7.9 % (ref 4.5–5.7)
Lab: ABNORMAL
Lab: ABNORMAL
POTASSIUM SERPL-SCNC: 4.2 MMOL/L (ref 3.5–5.2)
PROT SERPL-MCNC: 7.5 G/DL (ref 6–8.5)
SODIUM SERPL-SCNC: 137 MMOL/L (ref 136–145)
TSH SERPL DL<=0.05 MIU/L-ACNC: 4.26 UIU/ML (ref 0.27–4.2)

## 2025-01-28 PROCEDURE — 84443 ASSAY THYROID STIM HORMONE: CPT | Performed by: INTERNAL MEDICINE

## 2025-01-28 PROCEDURE — 3077F SYST BP >= 140 MM HG: CPT | Performed by: INTERNAL MEDICINE

## 2025-01-28 PROCEDURE — 36415 COLL VENOUS BLD VENIPUNCTURE: CPT | Performed by: INTERNAL MEDICINE

## 2025-01-28 PROCEDURE — 3078F DIAST BP <80 MM HG: CPT | Performed by: INTERNAL MEDICINE

## 2025-01-28 PROCEDURE — 3051F HG A1C>EQUAL 7.0%<8.0%: CPT | Performed by: INTERNAL MEDICINE

## 2025-01-28 PROCEDURE — 80053 COMPREHEN METABOLIC PANEL: CPT | Performed by: INTERNAL MEDICINE

## 2025-01-28 PROCEDURE — 82947 ASSAY GLUCOSE BLOOD QUANT: CPT | Performed by: INTERNAL MEDICINE

## 2025-01-28 PROCEDURE — 99214 OFFICE O/P EST MOD 30 MIN: CPT | Performed by: INTERNAL MEDICINE

## 2025-01-28 PROCEDURE — 83036 HEMOGLOBIN GLYCOSYLATED A1C: CPT | Performed by: INTERNAL MEDICINE

## 2025-01-28 PROCEDURE — 85027 COMPLETE CBC AUTOMATED: CPT | Performed by: INTERNAL MEDICINE

## 2025-01-28 RX ORDER — BLOOD SUGAR DIAGNOSTIC
1 STRIP MISCELLANEOUS DAILY
Qty: 30 EACH | Refills: 5 | Status: SHIPPED | OUTPATIENT
Start: 2025-01-28

## 2025-01-28 RX ORDER — LANCETS
1 EACH MISCELLANEOUS DAILY
Qty: 30 EACH | Refills: 5 | Status: SHIPPED | OUTPATIENT
Start: 2025-01-28
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
## 2025-01-29 ENCOUNTER — HOSPITAL ENCOUNTER (OUTPATIENT)
Dept: GENERAL RADIOLOGY | Facility: HOSPITAL | Age: 80
Discharge: HOME | End: 2025-01-29
Admitting: INTERNAL MEDICINE
Payer: COMMERCIAL

## 2025-01-29 ENCOUNTER — OFFICE VISIT (OUTPATIENT)
Dept: INTERNAL MEDICINE | Facility: CLINIC | Age: 80
End: 2025-01-29
Payer: COMMERCIAL

## 2025-01-29 VITALS
DIASTOLIC BLOOD PRESSURE: 78 MMHG | TEMPERATURE: 97.9 F | BODY MASS INDEX: 31.15 KG/M2 | HEART RATE: 98 BPM | OXYGEN SATURATION: 96 % | HEIGHT: 61 IN | WEIGHT: 165 LBS | SYSTOLIC BLOOD PRESSURE: 148 MMHG

## 2025-01-29 DIAGNOSIS — M25.562 PAIN AND SWELLING OF LEFT KNEE: ICD-10-CM

## 2025-01-29 DIAGNOSIS — M25.462 PAIN AND SWELLING OF LEFT KNEE: ICD-10-CM
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
## 2025-01-29 DIAGNOSIS — M54.2 NECK PAIN: ICD-10-CM

## 2025-01-29 DIAGNOSIS — M54.16 LUMBAR RADICULAR PAIN: ICD-10-CM
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
## 2025-01-29 DIAGNOSIS — M1A.0720 IDIOPATHIC CHRONIC GOUT OF LEFT FOOT WITHOUT TOPHUS: ICD-10-CM

## 2025-01-29 LAB
DEPRECATED RDW RBC AUTO: 41.5 FL (ref 37–54)
ERYTHROCYTE [DISTWIDTH] IN BLOOD BY AUTOMATED COUNT: 12.7 % (ref 12.3–15.4)
HCT VFR BLD AUTO: 37.4 % (ref 34–46.6)
HGB BLD-MCNC: 12.7 G/DL (ref 12–15.9)
MCH RBC QN AUTO: 31.1 PG (ref 26.6–33)
MCHC RBC AUTO-ENTMCNC: 34 G/DL (ref 31.5–35.7)
MCV RBC AUTO: 91.4 FL (ref 79–97)
PLATELET # BLD AUTO: 296 10*3/MM3 (ref 140–450)
PMV BLD AUTO: 11.1 FL (ref 6–12)
RBC # BLD AUTO: 4.09 10*6/MM3 (ref 3.77–5.28)
WBC NRBC COR # BLD AUTO: 8.03 10*3/MM3 (ref 3.4–10.8)

## 2025-01-29 PROCEDURE — 73560 X-RAY EXAM OF KNEE 1 OR 2: CPT

## 2025-01-29 PROCEDURE — 72050 X-RAY EXAM NECK SPINE 4/5VWS: CPT

## 2025-01-29 PROCEDURE — 99214 OFFICE O/P EST MOD 30 MIN: CPT | Performed by: INTERNAL MEDICINE

## 2025-01-29 PROCEDURE — 72110 X-RAY EXAM L-2 SPINE 4/>VWS: CPT

## 2025-01-29 RX ORDER — ALLOPURINOL 100 MG/1
150 TABLET ORAL DAILY
Qty: 135 TABLET | Refills: 3 | Status: SHIPPED | OUTPATIENT
Start: 2025-01-29

## 2025-01-29 RX ORDER — TRAMADOL HYDROCHLORIDE 50 MG/1
50 TABLET ORAL EVERY 12 HOURS PRN
Qty: 6 TABLET | Refills: 0 | Status: SHIPPED | OUTPATIENT
Start: 2025-01-29 | End: 2025-02-01
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
## 2025-02-11 ENCOUNTER — TELEPHONE (OUTPATIENT)
Dept: INTERNAL MEDICINE | Facility: CLINIC | Age: 80
End: 2025-02-11
Payer: MEDICARE

## 2025-02-11 DIAGNOSIS — I72.8 SPLENIC ARTERY ANEURYSM: Primary | ICD-10-CM

## 2025-02-11 NOTE — TELEPHONE ENCOUNTER
Left message stating several labs have resulted   Left office number for patient to return call    ----- Message from Sandra Daniel sent at 2/11/2025  8:49 AM EST -----  Patient has several results to discuss please make sure to call with all results.    Her knee xray was normal.    Her neck xray does not show fracture. It does show arthritis.      Her low back xray does not show fracture. It does show mild arthritis. It also shows a possible blood vessel issue called an aneurysm affecting the spleen. She needs to have an ultrasound completed for further evaluation.

## 2025-02-12 DIAGNOSIS — E11.65 TYPE 2 DIABETES MELLITUS WITH HYPERGLYCEMIA, WITHOUT LONG-TERM CURRENT USE OF INSULIN: ICD-10-CM

## 2025-02-12 NOTE — TELEPHONE ENCOUNTER
Rx Refill Note  Requested Prescriptions     Pending Prescriptions Disp Refills    glimepiride (AMARYL) 4 MG tablet [Pharmacy Med Name: GLIMEPIRIDE 4 MG TABLET] 90 tablet 1     Sig: TAKE 1 TABLET BY MOUTH EVERY DAY BEFORE BREAKFAST      Last office visit with prescribing clinician: 1/28/2025     Next office visit with prescribing clinician: 6/10/2025   {Thony Sheffield MA  02/12/25, 08:35 EST

## 2025-02-13 DIAGNOSIS — K21.9 GASTRO-ESOPHAGEAL REFLUX DISEASE WITHOUT ESOPHAGITIS: ICD-10-CM

## 2025-02-13 DIAGNOSIS — E89.2 POST-SURGICAL HYPOPARATHYROIDISM: ICD-10-CM

## 2025-02-13 NOTE — TELEPHONE ENCOUNTER
Rx Refill Note  Requested Prescriptions     Pending Prescriptions Disp Refills    calcitriol (ROCALTROL) 0.25 MCG capsule [Pharmacy Med Name: CALCITRIOL 0.25 MCG CAPSULE] 90 capsule 1     Sig: TAKE 1 CAPSULE BY MOUTH EVERY DAY      Last office visit with prescribing clinician: 1/28/2025     Next office visit with prescribing clinician: 6/10/2025   {Thony Sheffield MA  02/13/25, 08:38 EST

## 2025-02-14 ENCOUNTER — TELEPHONE (OUTPATIENT)
Dept: ENDOCRINOLOGY | Facility: CLINIC | Age: 80
End: 2025-02-14
Payer: MEDICARE

## 2025-02-14 RX ORDER — LEVOTHYROXINE SODIUM 112 UG/1
112 TABLET ORAL EVERY MORNING
Qty: 90 TABLET | Refills: 1 | Status: SHIPPED | OUTPATIENT
Start: 2025-02-14

## 2025-02-14 RX ORDER — GLIMEPIRIDE 4 MG/1
4 TABLET ORAL
Qty: 90 TABLET | Refills: 1 | OUTPATIENT
Start: 2025-02-14

## 2025-02-14 RX ORDER — GLIMEPIRIDE 2 MG/1
6 TABLET ORAL
Qty: 180 TABLET | Refills: 1 | Status: SHIPPED | OUTPATIENT
Start: 2025-02-14

## 2025-02-14 RX ORDER — METFORMIN HYDROCHLORIDE 500 MG/1
500 TABLET, EXTENDED RELEASE ORAL
Start: 2025-02-14

## 2025-02-14 RX ORDER — CALCITRIOL 0.25 UG/1
0.25 CAPSULE, LIQUID FILLED ORAL DAILY
Qty: 90 CAPSULE | Refills: 3 | Status: SHIPPED | OUTPATIENT
Start: 2025-02-14

## 2025-02-14 NOTE — TELEPHONE ENCOUNTER
Dose has changed to glimepiride 2 mg tabs (6 mg daily qAM)  Electronically Signed: Maureen Aiken MD

## 2025-02-14 NOTE — TELEPHONE ENCOUNTER
PT CALLED REQUESTING A CALL BACK TO CONSULT ON WHAT MEDICATION(S) SHE CAN TAKE. SHE STATED HER BS  DURING THE CALL. SHE REQUESTED A CALL BACK TO CONSULT.     SHE STATED SHE HAS A HEADACHE AND EYE PAIN.

## 2025-03-04 ENCOUNTER — HOSPITAL ENCOUNTER (OUTPATIENT)
Facility: HOSPITAL | Age: 80
Discharge: HOME | End: 2025-03-04
Admitting: INTERNAL MEDICINE
Payer: MEDICARE

## 2025-03-04 VITALS
WEIGHT: 165 LBS | DIASTOLIC BLOOD PRESSURE: 82 MMHG | SYSTOLIC BLOOD PRESSURE: 157 MMHG | HEIGHT: 61 IN | BODY MASS INDEX: 31.15 KG/M2

## 2025-03-04 DIAGNOSIS — I72.8 SPLENIC ARTERY ANEURYSM: ICD-10-CM

## 2025-03-04 LAB
BH CV VAS HEPATIC PORTAL SPLEEN LENGTH: 11.3 CM
BH CV VAS HEPATIC PORTAL VEIN DIAMETER: 1.1 CM
BH CV VAS HEPATOPORTAL AORTA AP DIAM: 2.03 CM
BH CV VAS HEPATOPORTAL HEPATIC V LT DIRECTION: NORMAL
BH CV VAS HEPATOPORTAL HEPATIC V MID DIRECTION: NORMAL
BH CV VAS HEPATOPORTAL HEPATIC V RT DIRECTION: NORMAL
BH CV VAS HEPATOPORTAL PORTAL V EXTRAHEPATIC DIRECTION: NORMAL
BH CV VAS HEPATOPORTAL PORTAL V LT INTRA DIRECTION: NORMAL
BH CV VAS HEPATOPORTAL PORTAL V MAIN INTRA DIRECTION: NORMAL
BH CV VAS HEPATOPORTAL PORTAL V PSV: 29 CM/S
BH CV VAS HEPATOPORTAL PORTAL V RT INTRA DIRECTION: NORMAL
BH CV VAS HEPATOPORTAL SMV DIRECTION: NORMAL
BH CV VAS HEPATOPORTAL SMV PSV: 22 CM/S
BH CV VAS HEPATOPORTAL SPLENIC DIRECTION: NORMAL
BH CV VAS HEPATOPORTAL SPLENIC V PSV: 40 CM/S
BH CV VAS SMA AORTA PSV: 92 CM/S
BH CV VAS SMA HEPATIC EDV: 23 CM/S
BH CV VAS SMA HEPATIC PSV: 126 CM/S
BH CV VAS SMA SPLENIC EDV: 26 CM/S
BH CV VAS SMA SPLENIC PSV: 158 CM/S

## 2025-03-04 PROCEDURE — 93975 VASCULAR STUDY: CPT

## 2025-03-05 ENCOUNTER — TELEPHONE (OUTPATIENT)
Dept: INTERNAL MEDICINE | Facility: CLINIC | Age: 80
End: 2025-03-05
Payer: MEDICARE

## 2025-03-05 NOTE — TELEPHONE ENCOUNTER
Notified patient of results. Patient verbalized understanding  ----- Message from Sandra Daniel sent at 3/5/2025  4:42 PM EST -----  Please let patient know that ultrasound was negative for splenic artery aneurysm however could not definitively rule out a small aneurysm. The next test would by a CT scan with contrast however due to her kidney dysfunction I recommend against further testing at this time.

## 2025-03-11 PROBLEM — E11.3293 TYPE 2 DIABETES MELLITUS WITH BOTH EYES AFFECTED BY MILD NONPROLIFERATIVE RETINOPATHY WITHOUT MACULAR EDEMA, WITHOUT LONG-TERM CURRENT USE OF INSULIN: Status: ACTIVE | Noted: 2021-11-30

## 2025-04-28 DIAGNOSIS — I10 ESSENTIAL HYPERTENSION: ICD-10-CM

## 2025-04-28 DIAGNOSIS — E78.2 MIXED HYPERLIPIDEMIA: ICD-10-CM

## 2025-04-28 RX ORDER — SIMVASTATIN 40 MG
40 TABLET ORAL DAILY
Qty: 30 TABLET | Refills: 0 | Status: SHIPPED | OUTPATIENT
Start: 2025-04-28

## 2025-04-28 RX ORDER — LISINOPRIL 20 MG/1
20 TABLET ORAL 2 TIMES DAILY
Qty: 60 TABLET | Refills: 0 | Status: SHIPPED | OUTPATIENT
Start: 2025-04-28

## 2025-05-22 ENCOUNTER — OFFICE VISIT (OUTPATIENT)
Dept: INTERNAL MEDICINE | Facility: CLINIC | Age: 80
End: 2025-05-22
Payer: MEDICARE

## 2025-05-22 ENCOUNTER — LAB (OUTPATIENT)
Dept: LAB | Facility: HOSPITAL | Age: 80
End: 2025-05-22
Payer: MEDICARE

## 2025-05-22 VITALS
SYSTOLIC BLOOD PRESSURE: 130 MMHG | OXYGEN SATURATION: 96 % | DIASTOLIC BLOOD PRESSURE: 80 MMHG | TEMPERATURE: 97.6 F | HEART RATE: 88 BPM | WEIGHT: 161.8 LBS | HEIGHT: 61 IN | BODY MASS INDEX: 30.55 KG/M2

## 2025-05-22 DIAGNOSIS — I10 ESSENTIAL HYPERTENSION: ICD-10-CM

## 2025-05-22 DIAGNOSIS — K21.9 GASTROESOPHAGEAL REFLUX DISEASE WITHOUT ESOPHAGITIS: ICD-10-CM

## 2025-05-22 DIAGNOSIS — M85.89 OSTEOPENIA OF MULTIPLE SITES: ICD-10-CM

## 2025-05-22 DIAGNOSIS — F41.1 GENERALIZED ANXIETY DISORDER: ICD-10-CM

## 2025-05-22 DIAGNOSIS — M05.9 RHEUMATOID ARTHRITIS WITH POSITIVE RHEUMATOID FACTOR, INVOLVING UNSPECIFIED SITE: ICD-10-CM

## 2025-05-22 DIAGNOSIS — E78.2 MIXED HYPERLIPIDEMIA: ICD-10-CM

## 2025-05-22 DIAGNOSIS — M1A.0720 IDIOPATHIC CHRONIC GOUT OF LEFT FOOT WITHOUT TOPHUS: ICD-10-CM

## 2025-05-22 DIAGNOSIS — N18.31 STAGE 3A CHRONIC KIDNEY DISEASE: ICD-10-CM

## 2025-05-22 DIAGNOSIS — E89.0 POST-SURGICAL HYPOTHYROIDISM: ICD-10-CM

## 2025-05-22 DIAGNOSIS — K14.8 TONGUE LESION: ICD-10-CM

## 2025-05-22 DIAGNOSIS — K57.90 DIVERTICULOSIS OF INTESTINE WITHOUT BLEEDING, UNSPECIFIED INTESTINAL TRACT LOCATION: ICD-10-CM

## 2025-05-22 DIAGNOSIS — Z00.00 MEDICARE ANNUAL WELLNESS VISIT, SUBSEQUENT: Primary | ICD-10-CM

## 2025-05-22 DIAGNOSIS — R10.9 CHRONIC ABDOMINAL PAIN: ICD-10-CM

## 2025-05-22 DIAGNOSIS — E11.3293 TYPE 2 DIABETES MELLITUS WITH BOTH EYES AFFECTED BY MILD NONPROLIFERATIVE RETINOPATHY WITHOUT MACULAR EDEMA, WITHOUT LONG-TERM CURRENT USE OF INSULIN: ICD-10-CM

## 2025-05-22 DIAGNOSIS — J30.89 SEASONAL ALLERGIC RHINITIS DUE TO OTHER ALLERGIC TRIGGER: ICD-10-CM

## 2025-05-22 DIAGNOSIS — Z85.850 HISTORY OF THYROID CANCER: ICD-10-CM

## 2025-05-22 DIAGNOSIS — Z90.49 S/P PARTIAL RESECTION OF COLON: ICD-10-CM

## 2025-05-22 DIAGNOSIS — Z98.890 STATUS POST REVERSAL OF ILEOSTOMY: ICD-10-CM

## 2025-05-22 DIAGNOSIS — Z12.31 ENCOUNTER FOR SCREENING MAMMOGRAM FOR MALIGNANT NEOPLASM OF BREAST: ICD-10-CM

## 2025-05-22 DIAGNOSIS — E89.2 POST-SURGICAL HYPOPARATHYROIDISM: ICD-10-CM

## 2025-05-22 DIAGNOSIS — G89.29 CHRONIC ABDOMINAL PAIN: ICD-10-CM

## 2025-05-22 PROCEDURE — 84550 ASSAY OF BLOOD/URIC ACID: CPT

## 2025-05-22 PROCEDURE — 80048 BASIC METABOLIC PNL TOTAL CA: CPT

## 2025-05-22 PROCEDURE — 80061 LIPID PANEL: CPT

## 2025-05-22 PROCEDURE — 82306 VITAMIN D 25 HYDROXY: CPT

## 2025-05-22 RX ORDER — FLUTICASONE PROPIONATE 50 MCG
1 SPRAY, SUSPENSION (ML) NASAL DAILY PRN
Qty: 16 G | Refills: 5 | Status: SHIPPED | OUTPATIENT
Start: 2025-05-22

## 2025-05-22 RX ORDER — LORATADINE 10 MG/1
10 TABLET ORAL DAILY PRN
Qty: 90 TABLET | Refills: 1 | Status: SHIPPED | OUTPATIENT
Start: 2025-05-22

## 2025-05-22 NOTE — PROGRESS NOTES
The ABCs of the Annual Wellness Visit  Subsequent Medicare Wellness Visit    Chief Complaint   Patient presents with    Medicare Wellness-subsequent       Subjective   History of Present Illness:  Cristy Louie is a 79 y.o. female who presents for a Subsequent Medicare Wellness Visit.    HEALTH RISK ASSESSMENT    Recent Hospitalizations:  No hospitalization(s) within the last year.    Current Medical Providers:  Patient Care Team:  Sandra Daniel MD as PCP - General (Internal Medicine)  Sunil Jones MD as Consulting Physician (Dermatology)  Maureen Aiken MD as Consulting Physician (Endocrinology)  Rajat Kim MD as Consulting Physician (Gastroenterology)  Robby Valadez MD as Consulting Physician (Colon and Rectal Surgery)  Sly Mariano MD as Consulting Physician (Urology)  Maureen Aiken MD as Consulting Physician (Endocrinology)  Lopez Barba MD as Consulting Physician (Colon and Rectal Surgery)    Smoking Status:  Social History     Tobacco Use   Smoking Status Never    Passive exposure: Past   Smokeless Tobacco Never       Alcohol Consumption:  Social History     Substance and Sexual Activity   Alcohol Use No       Depression Screen:       2025     2:55 PM   PHQ-2/PHQ-9 Depression Screening   Little interest or pleasure in doing things Not at all   Feeling down, depressed, or hopeless Not at all   How difficult have these problems made it for you to do your work, take care of things at home, or get along with other people? Not difficult at all       Fall Risk Screen:  ZINAADI Fall Risk Assessment was completed, and patient is at LOW risk for falls.Assessment completed on:2025    Health Habits and Functional and Cognitive Screenin/22/2025     2:54 PM   Functional & Cognitive Status   Do you have difficulty preparing food and eating? No   Do you have difficulty bathing yourself, getting dressed or grooming yourself? No   Do you have difficulty using the  toilet? No   Do you have difficulty moving around from place to place? No   Do you have trouble with steps or getting out of a bed or a chair? No   Current Diet Frequent Junk Food   Dental Exam Up to date   Eye Exam Up to date   Exercise (times per week) 3 times per week   Current Exercises Include Walking   Do you need help using the phone?  No   Are you deaf or do you have serious difficulty hearing?  Yes   Do you need help to go to places out of walking distance? No   Do you need help shopping? No   Do you need help preparing meals?  No   Do you need help with housework?  No   Do you need help with laundry? No   Do you need help taking your medications? No   Do you need help managing money? No   Do you ever drive or ride in a car without wearing a seat belt? No   Have you felt unusual stress, anger or loneliness in the last month? No   Who do you live with? Alone   If you need help, do you have trouble finding someone available to you? No   Have you been bothered in the last four weeks by sexual problems? No   Do you have difficulty concentrating, remembering or making decisions? No         Does the patient have evidence of cognitive impairment? No    Asprin use counseling:Does not need ASA (and currently is not on it)    Age-appropriate Screening Schedule:  Refer to the list below for future screening recommendations based on patient's age, sex and/or medical conditions. Orders for these recommended tests are listed in the plan section. The patient has been provided with a written plan.    Health Maintenance   Topic Date Due    URINE MICROALBUMIN-CREATININE RATIO (uACR)  Never done    TDAP/TD VACCINES (2 - Td or Tdap) 11/28/2017    DIABETIC EYE EXAM  10/01/2020    DIABETIC FOOT EXAM  10/20/2021    MAMMOGRAM  06/21/2023    DXA SCAN  06/21/2024    LIPID PANEL  05/20/2025    HEMOGLOBIN A1C  07/28/2025    RSV Vaccine - Adults (1 - 1-dose 75+ series) 01/29/2026 (Originally 9/6/2018)    INFLUENZA VACCINE  07/01/2025     ANNUAL WELLNESS VISIT  05/22/2026    COLORECTAL CANCER SCREENING  03/08/2029    Pneumococcal Vaccine 50+  Completed    COVID-19 Vaccine  Discontinued    ZOSTER VACCINE  Discontinued          The following portions of the patient's history were reviewed and updated as appropriate: allergies, current medications, past family history, past medical history, past social history, past surgical history, and problem list.    Outpatient Medications Prior to Visit   Medication Sig Dispense Refill    Accu-Chek Guide Test test strip 1 each by Other route Daily. E11.9 30 each 5    Accu-Chek Softclix Lancets lancets 1 each by Other route Daily. E11.9 30 each 5    allopurinol (ZYLOPRIM) 100 MG tablet Take 1.5 tablets by mouth Daily. 135 tablet 3    Berberine Chloride 500 MG capsule Take 500 mg by mouth Daily.      Blood Glucose Monitoring Suppl (Accu-Chek Guide Me) w/Device kit Use 1 Device Take As Directed. 1 kit 0    calcitriol (ROCALTROL) 0.25 MCG capsule TAKE 1 CAPSULE BY MOUTH EVERY DAY 90 capsule 3    DULoxetine (CYMBALTA) 30 MG capsule Take 1 capsule by mouth Daily. 90 capsule 1    glimepiride (AMARYL) 2 MG tablet Take 3 tablets by mouth Every Morning Before Breakfast. New dose. Please d/c prior Rx for 4 mg tabs 180 tablet 1    hydrOXYzine (ATARAX) 25 MG tablet Take 1 tablet by mouth 3 (Three) Times a Day As Needed for Anxiety. 30 tablet 1    levothyroxine (SYNTHROID, LEVOTHROID) 112 MCG tablet Take 1 tablet by mouth Every Morning. New dose. Please d/c prior Rx for 100 mcg 90 tablet 1    lisinopril (PRINIVIL,ZESTRIL) 20 MG tablet TAKE 1 TABLET BY MOUTH TWICE A DAY 60 tablet 0    Lutein 20 MG capsule Take 20 mg by mouth Daily.      nystatin 147959 UNIT/GM powder APPLY TOPICALLY TO THE APPROPRIATE AREA AS DIRECTED 3 (THREE) TIMES A DAY. 60 g 11    omeprazole (priLOSEC) 20 MG capsule TAKE 1 CAPSULE BY MOUTH EVERY DAY 90 capsule 3    simvastatin (ZOCOR) 40 MG tablet TAKE 1 TABLET BY MOUTH EVERY DAY 30 tablet 0    fluticasone  (FLONASE) 50 MCG/ACT nasal spray Administer 1 spray into the nostril(s) as directed by provider As Needed for Rhinitis or Allergies.      loratadine (CLARITIN) 10 MG tablet TAKE 1 TABLET BY MOUTH EVERY DAY 90 tablet 0    metFORMIN ER (GLUCOPHAGE-XR) 500 MG 24 hr tablet Take 1 tablet by mouth Daily With Dinner.       No facility-administered medications prior to visit.       Patient Active Problem List   Diagnosis    Allergic rhinitis    Anxiety disorder    Asthma    Diverticulosis of intestine    Gastroesophageal reflux disease    Fibromyalgia    Mixed hyperlipidemia    Essential hypertension    Osteoarthritis    Osteopenia of multiple sites    Rheumatoid arthritis    Vitamin D deficiency    Thyroid cancer    Post-surgical hypothyroidism    Post-surgical hypoparathyroidism    Urinary incontinence, mixed    Renal cyst, right    Sensorineural hearing loss (SNHL) of both ears    Type 2 diabetes mellitus with both eyes affected by mild nonproliferative retinopathy without macular edema, without long-term current use of insulin    Stage 3a chronic kidney disease    Tinnitus of both ears    History of thyroid cancer    S/P partial resection of colon    Idiopathic chronic gout of left foot without tophus    Status post reversal of ileostomy       Advanced Care Planning:  ACP discussion was held with the patient during this visit. Patient does not have an advance directive, declines further assistance.    Review of Systems   Constitutional:  Positive for fatigue.   HENT:  Positive for dental problem and ear pain (chronic L ear).    Respiratory: Negative.     Cardiovascular:  Positive for leg swelling. Negative for chest pain and palpitations.   Gastrointestinal:  Positive for abdominal pain, anal bleeding (hemorrhoids) and diarrhea (improved off metformin). Negative for constipation.   Genitourinary:  Negative for decreased urine volume and difficulty urinating.   Musculoskeletal:  Positive for arthralgias and joint  "swelling.   Neurological: Negative.    Psychiatric/Behavioral:  Positive for stress. Negative for depressed mood.        Compared to one year ago, the patient feels her physical health is the same.  Compared to one year ago, the patient feels her mental health is the same.    Reviewed chart for potential of high risk medication in the elderly: yes  Reviewed chart for potential of harmful drug interactions in the elderly:yes      No opioid medication identified on active medication list. I have reviewed chart for other potential high risk medication/s and harmful drug interactions in the elderly.      Objective         Vitals:    05/22/25 1452   BP: 130/80   BP Location: Left arm   Patient Position: Sitting   Pulse: 88   Temp: 97.6 °F (36.4 °C)   TempSrc: Temporal   SpO2: 96%   Weight: 73.4 kg (161 lb 12.8 oz)   Height: 154.9 cm (61\")   PainSc: 7    PainLoc: Neck       Body mass index is 30.57 kg/m².  Discussed the patient's BMI with her. The BMI is above average; BMI management plan is completed.    Physical Exam  Vitals and nursing note reviewed.   Constitutional:       General: She is not in acute distress.     Appearance: She is well-developed. She is obese. She is not ill-appearing, toxic-appearing or diaphoretic.   HENT:      Head: Normocephalic and atraumatic.      Right Ear: Tympanic membrane, ear canal and external ear normal.      Left Ear: Tympanic membrane, ear canal and external ear normal.      Nose: Nose normal.      Mouth/Throat:      Tongue: Lesions (plaque on R side of tongue) present.   Eyes:      General: No scleral icterus.     Conjunctiva/sclera: Conjunctivae normal.   Neck:      Thyroid: No thyromegaly.   Cardiovascular:      Rate and Rhythm: Normal rate and regular rhythm.      Heart sounds: Normal heart sounds. No murmur heard.  Pulmonary:      Effort: Pulmonary effort is normal. No respiratory distress.      Breath sounds: Normal breath sounds.   Abdominal:      General: Bowel sounds are " normal. There is no distension.      Palpations: Abdomen is soft. There is no mass.      Tenderness: There is abdominal tenderness (over incision in RLQ with possible hernia, LLQ, and periumbilical over midline abdominal incision). There is guarding. There is no rebound.   Musculoskeletal:         General: Tenderness (ankles) present. No deformity.      Cervical back: Neck supple.      Right lower leg: No edema.      Left lower leg: No edema.   Lymphadenopathy:      Cervical: No cervical adenopathy.   Skin:     General: Skin is warm and dry.      Findings: No rash.   Neurological:      Mental Status: She is alert and oriented to person, place, and time. Mental status is at baseline.      Gait: Gait normal.   Psychiatric:         Mood and Affect: Mood normal.         Behavior: Behavior normal.         Thought Content: Thought content normal.         Judgment: Judgment normal.               Assessment & Plan   Medicare Risks and Personalized Health Plan  CMS Preventative Services Quick Reference  Advance Directive Discussion  Breast Cancer/Mammogram Screening  Chronic Pain   Osteoporosis Risk    The above risks/problems have been discussed with the patient.  Pertinent information has been shared with the patient in the After Visit Summary.  Follow up plans and orders are seen below in the Assessment/Plan Section.    Diagnoses and all orders for this visit:    Medicare annual wellness visit, subsequent    Generalized anxiety disorder  - stable, continue cymbalta 30mg daily and hydroxyzine 25mg TID PRN     Stage 3a chronic kidney disease  - gfr 50-55   - BMP today    Essential hypertension  - BP controlled, continue lisinopril 20mg BID     Idiopathic chronic gout of left foot without tophus  - asymptomatic on allopurinol 135mg daily  - needs repeat uric acid level, last was 7 and dose was increased at that time.     Diverticulosis of intestine without bleeding, unspecified intestinal tract location  S/P partial  resection of colon  Status post reversal of ileostomy   s/p LAR with diverting ostomy and bladder reconstruction due to sigmoid diverticulitis with colovaginal fistula. Subsequently s/p ostomy reversal.  - following with Dr. Freda MCDANIELS    History of thyroid cancer  Post-surgical hypothyroidism  - s/p thyroidectomy  - Following with Dr. Aiken and on levothyroxine 112mcg for subsequent hypothyroidism    Post-surgical hypoparathyroidism  - s/p parathyroidectomy for primary hyperparathyroidism. Now with postsurgical hypoparathyroidism on calcitriol 0.25mcg daily. Managed by Dr. Aiken.     Type 2 diabetes mellitus with both eyes affected by mild nonproliferative retinopathy without macular edema, without long-term current use of insulin  - Following with Dr. Aiken, last A1c 7.9  - she has stopped metformin due to diarrhea. She is on glimepiride 6mg daily and berberine. Reports glucose well controlled.    Mixed hyperlipidemia  - continue simvastatin 40mg daily and update lipid panel     Gastroesophageal reflux disease without esophagitis  - stable, continue omeprazole 20mg daily     Osteopenia of multiple sites  - DEXA 6/2022 with osteopenia, FRAX 15% and 3.4%. BMD improved at all sites but this may be secondary to her arthritis.   - on calcitriol, check vitamin D level  - DEXA pending scheduling     Rheumatoid arthritis with positive rheumatoid factor, involving unspecified site  - previously followed at [facility] and on plaquenil with recommendation to try humira however now declines to see rheumatology and off treatment.     Seasonal allergic rhinitis due to other allergic trigger  - resume claritin and flonase    Tongue lesion  - following with  dentistry and oral surgeon    Encounter for screening mammogram for malignant neoplasm of breast  -     Mammo Screening Digital Tomosynthesis Bilateral With CAD; Future    Chronic abdominal pain  - possible incisional hernias versus scar tissue from extensive prior  abdominal surgery  - CT abd/pelvis wo contrast ordered     Health Maintenance  - Pap smear: no longer indicated  - Mammogram: 6/2022 BIRADS 1, ordered  - Colonoscopy: 6/2021  - HCV: negative  - Immunizations: Pneumococcal complete. Declines COVID19, flu. Shingrix too expensive. Discussed Tdap and RSV.  - Depression screening: negative 5/2025    Follow Up:  Return in about 6 months (around 11/22/2025) for Follow up, 1 year for wellness 45 minutes, Labs today.     An After Visit Summary and PPPS were given to the patient.

## 2025-05-23 LAB
25(OH)D3 SERPL-MCNC: 41.8 NG/ML (ref 30–100)
ANION GAP SERPL CALCULATED.3IONS-SCNC: 12 MMOL/L (ref 5–15)
BUN SERPL-MCNC: 19 MG/DL (ref 8–23)
BUN/CREAT SERPL: 17.6 (ref 7–25)
CALCIUM SPEC-SCNC: 9.6 MG/DL (ref 8.6–10.5)
CHLORIDE SERPL-SCNC: 103 MMOL/L (ref 98–107)
CHOLEST SERPL-MCNC: 152 MG/DL (ref 0–200)
CO2 SERPL-SCNC: 27 MMOL/L (ref 22–29)
CREAT SERPL-MCNC: 1.08 MG/DL (ref 0.57–1)
EGFRCR SERPLBLD CKD-EPI 2021: 51.7 ML/MIN/1.73
GLUCOSE SERPL-MCNC: 128 MG/DL (ref 65–99)
HDLC SERPL-MCNC: 45 MG/DL (ref 40–60)
LDLC SERPL CALC-MCNC: 80 MG/DL (ref 0–100)
LDLC/HDLC SERPL: 1.7 {RATIO}
POTASSIUM SERPL-SCNC: 4.7 MMOL/L (ref 3.5–5.2)
SODIUM SERPL-SCNC: 142 MMOL/L (ref 136–145)
TRIGL SERPL-MCNC: 153 MG/DL (ref 0–150)
URATE SERPL-MCNC: 5.5 MG/DL (ref 2.4–5.7)
VLDLC SERPL-MCNC: 27 MG/DL (ref 5–40)

## 2025-05-26 ENCOUNTER — RESULTS FOLLOW-UP (OUTPATIENT)
Dept: INTERNAL MEDICINE | Facility: CLINIC | Age: 80
End: 2025-05-26
Payer: MEDICARE

## 2025-05-26 NOTE — LETTER
Cristy ANTONIO Liban  [address]  [place] [address]    May 27, 2025     Dear Ms. Louie:        Below are the results from your recent visit:            Resulted Orders   Lipid Panel   Result Value Ref Range    Total Cholesterol 152 0 - 200 mg/dL    Triglycerides 153 (H) 0 - 150 mg/dL    HDL Cholesterol 45 40 - 60 mg/dL    LDL Cholesterol  80 0 - 100 mg/dL    VLDL Cholesterol 27 5 - 40 mg/dL    LDL/HDL Ratio 1.70    Basic Metabolic Panel   Result Value Ref Range    Glucose 128 (H) 65 - 99 mg/dL    BUN 19 8 - 23 mg/dL    Creatinine 1.08 (H) 0.57 - 1.00 mg/dL    Sodium 142 136 - 145 mmol/L    Potassium 4.7 3.5 - 5.2 mmol/L    Chloride 103 98 - 107 mmol/L    CO2 27.0 22.0 - 29.0 mmol/L    Calcium 9.6 8.6 - 10.5 mg/dL    BUN/Creatinine Ratio 17.6 7.0 - 25.0    Anion Gap 12.0 5.0 - 15.0 mmol/L    eGFR 51.7 (L) >60.0 mL/min/1.73   Vitamin D,25-Hydroxy   Result Value Ref Range    25 Hydroxy, Vitamin D 41.8 30.0 - 100.0 ng/ml   Uric acid   Result Value Ref Range    Uric Acid 5.5 2.4 - 5.7 mg/dL                   Your kidney function is stable. Vitamin D level is adequate. Uric acid level is at goal. Cholesterol is well controlled. No changes to medications recommended.  Vitamin D,25-Hydroxy; Lipid Panel; Basic Metabolic Panel; Uric acid    If you have any questions or concerns, please don't hesitate to call.         Sincerely,        Sandra Daniel MD

## 2025-05-27 DIAGNOSIS — I10 ESSENTIAL HYPERTENSION: ICD-10-CM

## 2025-05-27 DIAGNOSIS — E78.2 MIXED HYPERLIPIDEMIA: ICD-10-CM

## 2025-05-27 RX ORDER — SIMVASTATIN 40 MG
40 TABLET ORAL DAILY
Qty: 30 TABLET | Refills: 2 | Status: SHIPPED | OUTPATIENT
Start: 2025-05-27

## 2025-05-27 RX ORDER — LISINOPRIL 20 MG/1
20 TABLET ORAL 2 TIMES DAILY
Qty: 60 TABLET | Refills: 2 | Status: SHIPPED | OUTPATIENT
Start: 2025-05-27

## 2025-05-28 ENCOUNTER — TELEPHONE (OUTPATIENT)
Dept: INTERNAL MEDICINE | Facility: CLINIC | Age: 80
End: 2025-05-28
Payer: MEDICARE

## 2025-05-28 ENCOUNTER — OFFICE VISIT (OUTPATIENT)
Dept: INTERNAL MEDICINE | Facility: CLINIC | Age: 80
End: 2025-05-28
Payer: MEDICARE

## 2025-05-28 VITALS
HEIGHT: 61 IN | OXYGEN SATURATION: 97 % | DIASTOLIC BLOOD PRESSURE: 74 MMHG | BODY MASS INDEX: 30.29 KG/M2 | WEIGHT: 160.4 LBS | SYSTOLIC BLOOD PRESSURE: 132 MMHG | HEART RATE: 82 BPM

## 2025-05-28 DIAGNOSIS — F41.1 GENERALIZED ANXIETY DISORDER: ICD-10-CM

## 2025-05-28 DIAGNOSIS — M79.7 FIBROMYALGIA: ICD-10-CM

## 2025-05-28 DIAGNOSIS — B02.7 DISSEMINATED HERPES ZOSTER: Primary | ICD-10-CM

## 2025-05-28 DIAGNOSIS — M19.90 ARTHRITIS PAIN: ICD-10-CM

## 2025-05-28 DIAGNOSIS — F32.A DEPRESSION, UNSPECIFIED DEPRESSION TYPE: ICD-10-CM

## 2025-05-28 RX ORDER — VALACYCLOVIR HYDROCHLORIDE 1 G/1
1000 TABLET, FILM COATED ORAL 2 TIMES DAILY
Qty: 20 TABLET | Refills: 0 | Status: SHIPPED | OUTPATIENT
Start: 2025-05-28 | End: 2025-06-07

## 2025-05-28 RX ORDER — LIDOCAINE 50 MG/G
1 PATCH TOPICAL EVERY 24 HOURS
Qty: 30 EACH | Refills: 0 | Status: SHIPPED | OUTPATIENT
Start: 2025-05-28

## 2025-05-28 NOTE — TELEPHONE ENCOUNTER
Called and spoke to pt. Pt reports she spilled coffee on her hand 3 days ago and has had blisters on palm of L hand since yesterday. Unsure if hand is burnt. Admits having radiating pain throughout L arm and blisters that popped on her shoulder.    Scheduled to see Opal Hoskins today at 2:45p per pt request.   Bactrim Pregnancy And Lactation Text: This medication is Pregnancy Category D and is known to cause fetal risk.  It is also excreted in breast milk.
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
## 2025-05-28 NOTE — TELEPHONE ENCOUNTER
Caller: Cristy Louie    Relationship: Self    Best call back number: [phone]     What is the best time to reach you: ANYTIME    Who are you requesting to speak with (clinical staff, provider,  specific staff member): CLINICAL    PATIENT IS REQUESTING A CALL BACK FROM THE NURSE

## 2025-05-29 ENCOUNTER — PRIOR AUTHORIZATION (OUTPATIENT)
Dept: INTERNAL MEDICINE | Facility: CLINIC | Age: 80
End: 2025-05-29
Payer: MEDICARE

## 2025-05-29 RX ORDER — DULOXETIN HYDROCHLORIDE 30 MG/1
30 CAPSULE, DELAYED RELEASE ORAL DAILY
Qty: 90 CAPSULE | Refills: 1 | Status: SHIPPED | OUTPATIENT
Start: 2025-05-29

## 2025-05-30 ENCOUNTER — TELEPHONE (OUTPATIENT)
Dept: INTERNAL MEDICINE | Facility: CLINIC | Age: 80
End: 2025-05-30

## 2025-05-30 NOTE — TELEPHONE ENCOUNTER
Caller: Cristy Louie    Relationship: Self    Best call back number: [phone]     What was the call regarding: PATIENT WANTS TO KNOW IF IT'S OK TO HAVE HER CT SCAN WITH SHINGLES.    Is it okay if the provider responds through MyChart: NO

## 2025-05-30 NOTE — TELEPHONE ENCOUNTER
CT is scheduled for 6/10. If she has shingles now I expect it will be improving by that time and it will be okay to proceed with CT. She can let radiology department know if shingles is still in active flare because she should not be around pregnant or immunocompromised people.

## 2025-06-10 ENCOUNTER — TELEPHONE (OUTPATIENT)
Dept: ENDOCRINOLOGY | Facility: CLINIC | Age: 80
End: 2025-06-10

## 2025-06-10 DIAGNOSIS — B02.8 HERPES ZOSTER WITH COMPLICATION: Primary | ICD-10-CM

## 2025-06-10 RX ORDER — TRAMADOL HYDROCHLORIDE 50 MG/1
50 TABLET ORAL EVERY 12 HOURS PRN
Qty: 20 TABLET | Refills: 0 | Status: SHIPPED | OUTPATIENT
Start: 2025-06-10 | End: 2025-06-20

## 2025-06-10 NOTE — TELEPHONE ENCOUNTER
Patient called office, stated that she has shingles and is miserable. She saw her PCP who told her there was nothing they could do for her. She said she has heard of a medication that helps block nerve pain and was wondering if Dr. Aiken would call some in. She said that she has shingles on her left shoulder blade and arm and that the pain radiates form her shoulder blade to her finger tips. She has tried putting ice on her arm, aloe, and other medications but nothing has helped the burning. She would like a call back.

## 2025-06-10 NOTE — TELEPHONE ENCOUNTER
Spoke to patient. Will send Rx for for tramadol for the acute pain.   Electronically Signed: Maureen Aiken MD

## 2025-06-26 DIAGNOSIS — M19.90 ARTHRITIS PAIN: ICD-10-CM

## 2025-06-26 DIAGNOSIS — B02.7 DISSEMINATED HERPES ZOSTER: ICD-10-CM

## 2025-06-26 RX ORDER — LIDOCAINE 50 MG/G
PATCH TOPICAL
Qty: 30 PATCH | Refills: 0 | OUTPATIENT
Start: 2025-06-26

## 2025-07-02 DIAGNOSIS — E11.9 TYPE 2 DIABETES MELLITUS WITHOUT COMPLICATION, WITHOUT LONG-TERM CURRENT USE OF INSULIN: ICD-10-CM

## 2025-07-02 RX ORDER — GLIMEPIRIDE 2 MG/1
6 TABLET ORAL
Qty: 270 TABLET | Refills: 1 | Status: SHIPPED | OUTPATIENT
Start: 2025-07-02

## 2025-07-02 NOTE — TELEPHONE ENCOUNTER
Rx Refill Note  Requested Prescriptions     Pending Prescriptions Disp Refills    glimepiride (AMARYL) 2 MG tablet 180 tablet 1     Sig: Take 3 tablets by mouth Every Morning Before Breakfast. New dose. Please d/c prior Rx for 4 mg tabs      Last office visit with prescribing clinician: 1/28/2025     Next office visit with prescribing clinician: 10/21/2025

## 2025-08-18 DIAGNOSIS — I10 ESSENTIAL HYPERTENSION: ICD-10-CM

## 2025-08-18 RX ORDER — LISINOPRIL 20 MG/1
20 TABLET ORAL 2 TIMES DAILY
Qty: 60 TABLET | Refills: 2 | Status: SHIPPED | OUTPATIENT
Start: 2025-08-18

## 2025-08-19 ENCOUNTER — TELEPHONE (OUTPATIENT)
Dept: INTERNAL MEDICINE | Facility: CLINIC | Age: 80
End: 2025-08-19
Payer: MEDICARE

## 2025-08-19 DIAGNOSIS — Z12.31 ENCOUNTER FOR SCREENING MAMMOGRAM FOR MALIGNANT NEOPLASM OF BREAST: Primary | ICD-10-CM

## 2025-08-22 DIAGNOSIS — E78.2 MIXED HYPERLIPIDEMIA: ICD-10-CM

## 2025-08-22 DIAGNOSIS — E89.0 POST-SURGICAL HYPOTHYROIDISM: ICD-10-CM

## 2025-08-22 RX ORDER — SIMVASTATIN 40 MG
40 TABLET ORAL DAILY
Qty: 90 TABLET | Refills: 0 | Status: SHIPPED | OUTPATIENT
Start: 2025-08-22

## 2025-08-22 RX ORDER — LEVOTHYROXINE SODIUM 112 UG/1
112 TABLET ORAL EVERY MORNING
Qty: 90 TABLET | Refills: 1 | Status: SHIPPED | OUTPATIENT
Start: 2025-08-22

## (undated) DEVICE — BULKAMID URETHRAL BULKING SYSTEM 2ML: Brand: BULKAMID

## (undated) DEVICE — GLV SURG BIOGEL LTX PF 8

## (undated) DEVICE — SYR CONTRL LUERLOK 10CC

## (undated) DEVICE — ANTIBACTERIAL UNDYED BRAIDED (POLYGLACTIN 910), SYNTHETIC ABSORBABLE SUTURE: Brand: COATED VICRYL

## (undated) DEVICE — SYR LUERLOK 5CC

## (undated) DEVICE — TRAP FLD MINIVAC MEGADYNE 100ML

## (undated) DEVICE — GLV SURG SENSICARE PI MIC PF SZ6.5 LF STRL

## (undated) DEVICE — COVER,MAYO STAND,STERILE: Brand: MEDLINE

## (undated) DEVICE — DRSNG WND BORDR/ADHS NONADHR/GZ LF 4X4IN STRL

## (undated) DEVICE — 3M™ IOBAN™ 2 ANTIMICROBIAL INCISE DRAPE 6650EZ: Brand: IOBAN™ 2

## (undated) DEVICE — GLV SURG SENSICARE PI ORTHO PF SZ7 LF STRL

## (undated) DEVICE — SUT VIC PLS 0 CTX 36IN UD VCP978H

## (undated) DEVICE — TOWEL,OR,DSP,ST,BLUE,STD,4/PK,20PK/CS: Brand: MEDLINE

## (undated) DEVICE — GOWN,REINFORCE,POLY,SIRUS,BREATH SLV,XLG: Brand: MEDLINE

## (undated) DEVICE — SPNG GZ WOVN 4X4IN 12PLY 10/BX STRL

## (undated) DEVICE — SUT PDS 1 CTX 36IN VIO PDP371T

## (undated) DEVICE — LEX GENERAL ABDOMINAL SPLIT: Brand: MEDLINE INDUSTRIES, INC.

## (undated) DEVICE — DBD-DRAPE,LAP,CHOLE,W/TROUGHS,STERILE: Brand: MEDLINE

## (undated) DEVICE — 450 ML BOTTLE OF 0.05% CHLORHEXIDINE GLUCONATE IN 99.95% STERILE WATER FOR IRRIGATION, USP AND APPLICATOR.: Brand: IRRISEPT ANTIMICROBIAL WOUND LAVAGE

## (undated) DEVICE — NDL HYPO ECLPS SFTY 25G 1 1/2IN

## (undated) DEVICE — PREMIUM DRY TRAY LF: Brand: MEDLINE INDUSTRIES, INC.

## (undated) DEVICE — CATHETER,URETHRAL,REDRUBBER,STRL,16FR: Brand: MEDLINE

## (undated) DEVICE — PENCL ROCKRSWCH MEGADYNE W/HOLSTR 10FT SS